# Patient Record
Sex: MALE | Race: WHITE | NOT HISPANIC OR LATINO | Employment: OTHER | URBAN - METROPOLITAN AREA
[De-identification: names, ages, dates, MRNs, and addresses within clinical notes are randomized per-mention and may not be internally consistent; named-entity substitution may affect disease eponyms.]

---

## 2017-01-03 ENCOUNTER — TELEPHONE (OUTPATIENT)
Dept: INTERNAL MEDICINE | Facility: CLINIC | Age: 72
End: 2017-01-03

## 2017-01-03 NOTE — TELEPHONE ENCOUNTER
Ac notified that he is suppose to follow up with Valentine in 6 months from his office visit in October. He wanted me to cancel the one for 1/18/17 and he would call back to schedule is follow up appt in April 2017.

## 2017-01-03 NOTE — TELEPHONE ENCOUNTER
----- Message from Mary Lou Guzmán sent at 1/3/2017  9:25 AM EST -----  Contact: [phone]  The patient is calling to see if he needs to keep his apt on 01/18. He is going to see a Urologist for his UTI problems, so he doesn't know if he needs to see Valentine as often. He can be reached at [phone].

## 2017-01-04 RX ORDER — VERAPAMIL HYDROCHLORIDE 100 MG/1
CAPSULE, EXTENDED RELEASE ORAL
Qty: 90 CAPSULE | Refills: 0 | Status: SHIPPED | OUTPATIENT
Start: 2017-01-04 | End: 2017-03-23 | Stop reason: SDUPTHER

## 2017-03-23 RX ORDER — VERAPAMIL HYDROCHLORIDE 100 MG/1
CAPSULE, EXTENDED RELEASE ORAL
Qty: 90 CAPSULE | Refills: 0 | Status: SHIPPED | OUTPATIENT
Start: 2017-03-23 | End: 2017-06-14 | Stop reason: SDUPTHER

## 2017-04-12 DIAGNOSIS — E78.5 HYPERLIPIDEMIA, UNSPECIFIED HYPERLIPIDEMIA TYPE: ICD-10-CM

## 2017-04-12 RX ORDER — ATORVASTATIN CALCIUM 10 MG/1
TABLET, FILM COATED ORAL
Qty: 30 TABLET | Refills: 0 | Status: SHIPPED | OUTPATIENT
Start: 2017-04-12 | End: 2017-04-25 | Stop reason: SDUPTHER

## 2017-04-13 DIAGNOSIS — I10 ESSENTIAL HYPERTENSION: ICD-10-CM

## 2017-04-13 DIAGNOSIS — E55.9 VITAMIN D DEFICIENCY: ICD-10-CM

## 2017-04-13 DIAGNOSIS — E78.5 HYPERLIPIDEMIA, UNSPECIFIED HYPERLIPIDEMIA TYPE: ICD-10-CM

## 2017-04-13 DIAGNOSIS — Z79.899 ENCOUNTER FOR LONG-TERM (CURRENT) USE OF MEDICATIONS: Primary | ICD-10-CM

## 2017-04-18 LAB
25(OH)D3 SERPL-MCNC: 33.3 NG/ML
ALBUMIN SERPL-MCNC: 4.3 G/DL (ref 3.2–4.8)
ALBUMIN/GLOB SERPL: 1.5 G/DL (ref 1.5–2.5)
ALP SERPL-CCNC: 67 U/L (ref 25–100)
ALT SERPL W P-5'-P-CCNC: 25 U/L (ref 7–40)
ANION GAP SERPL CALCULATED.3IONS-SCNC: 5 MMOL/L (ref 3–11)
ARTICHOKE IGE QN: 90 MG/DL (ref 0–130)
AST SERPL-CCNC: 23 U/L (ref 0–33)
BASOPHILS # BLD AUTO: 0.02 10*3/MM3 (ref 0–0.2)
BASOPHILS NFR BLD AUTO: 0.4 % (ref 0–1)
BILIRUB SERPL-MCNC: 1.1 MG/DL (ref 0.3–1.2)
BUN BLD-MCNC: 20 MG/DL (ref 9–23)
BUN/CREAT SERPL: 16.7 (ref 7–25)
CALCIUM SPEC-SCNC: 9.2 MG/DL (ref 8.7–10.4)
CHLORIDE SERPL-SCNC: 107 MMOL/L (ref 99–109)
CHOLEST SERPL-MCNC: 138 MG/DL (ref 0–200)
CO2 SERPL-SCNC: 28 MMOL/L (ref 20–31)
CREAT BLD-MCNC: 1.2 MG/DL (ref 0.6–1.3)
DEPRECATED RDW RBC AUTO: 46.5 FL (ref 37–54)
EOSINOPHIL # BLD AUTO: 0.21 10*3/MM3 (ref 0.1–0.3)
EOSINOPHIL NFR BLD AUTO: 4.3 % (ref 0–3)
ERYTHROCYTE [DISTWIDTH] IN BLOOD BY AUTOMATED COUNT: 13.6 % (ref 11.3–14.5)
GFR SERPL CREATININE-BSD FRML MDRD: 60 ML/MIN/1.73
GLOBULIN UR ELPH-MCNC: 2.9 GM/DL
GLUCOSE BLD-MCNC: 94 MG/DL (ref 70–100)
HCT VFR BLD AUTO: 46.8 % (ref 38.9–50.9)
HDLC SERPL-MCNC: 37 MG/DL (ref 40–60)
HGB BLD-MCNC: 15.5 G/DL (ref 13.1–17.5)
IMM GRANULOCYTES # BLD: 0.02 10*3/MM3 (ref 0–0.03)
IMM GRANULOCYTES NFR BLD: 0.4 % (ref 0–0.6)
LYMPHOCYTES # BLD AUTO: 1.42 10*3/MM3 (ref 0.6–4.8)
LYMPHOCYTES NFR BLD AUTO: 29.2 % (ref 24–44)
MCH RBC QN AUTO: 30.8 PG (ref 27–31)
MCHC RBC AUTO-ENTMCNC: 33.1 G/DL (ref 32–36)
MCV RBC AUTO: 92.9 FL (ref 80–99)
MONOCYTES # BLD AUTO: 0.59 10*3/MM3 (ref 0–1)
MONOCYTES NFR BLD AUTO: 12.1 % (ref 0–12)
NEUTROPHILS # BLD AUTO: 2.61 10*3/MM3 (ref 1.5–8.3)
NEUTROPHILS NFR BLD AUTO: 53.6 % (ref 41–71)
PLATELET # BLD AUTO: 183 10*3/MM3 (ref 150–450)
PMV BLD AUTO: 10.3 FL (ref 6–12)
POTASSIUM BLD-SCNC: 4.2 MMOL/L (ref 3.5–5.5)
PROT SERPL-MCNC: 7.2 G/DL (ref 5.7–8.2)
RBC # BLD AUTO: 5.04 10*6/MM3 (ref 4.2–5.76)
SODIUM BLD-SCNC: 140 MMOL/L (ref 132–146)
TRIGL SERPL-MCNC: 79 MG/DL (ref 0–150)
TSH SERPL DL<=0.05 MIU/L-ACNC: 2.49 MIU/ML (ref 0.35–5.35)
VIT B12 BLD-MCNC: 1219 PG/ML (ref 211–911)
WBC NRBC COR # BLD: 4.87 10*3/MM3 (ref 3.5–10.8)

## 2017-04-18 PROCEDURE — 82306 VITAMIN D 25 HYDROXY: CPT | Performed by: NURSE PRACTITIONER

## 2017-04-18 PROCEDURE — 80061 LIPID PANEL: CPT | Performed by: NURSE PRACTITIONER

## 2017-04-18 PROCEDURE — 85025 COMPLETE CBC W/AUTO DIFF WBC: CPT | Performed by: NURSE PRACTITIONER

## 2017-04-18 PROCEDURE — 84443 ASSAY THYROID STIM HORMONE: CPT | Performed by: NURSE PRACTITIONER

## 2017-04-18 PROCEDURE — 82607 VITAMIN B-12: CPT | Performed by: NURSE PRACTITIONER

## 2017-04-18 PROCEDURE — 80053 COMPREHEN METABOLIC PANEL: CPT | Performed by: NURSE PRACTITIONER

## 2017-04-25 ENCOUNTER — OFFICE VISIT (OUTPATIENT)
Dept: INTERNAL MEDICINE | Facility: CLINIC | Age: 72
End: 2017-04-25

## 2017-04-25 VITALS
DIASTOLIC BLOOD PRESSURE: 88 MMHG | BODY MASS INDEX: 32.07 KG/M2 | HEIGHT: 70 IN | SYSTOLIC BLOOD PRESSURE: 116 MMHG | TEMPERATURE: 97.9 F | WEIGHT: 224 LBS

## 2017-04-25 DIAGNOSIS — N39.0 URINARY TRACT INFECTION WITHOUT HEMATURIA, SITE UNSPECIFIED: ICD-10-CM

## 2017-04-25 DIAGNOSIS — R30.0 DYSURIA: ICD-10-CM

## 2017-04-25 DIAGNOSIS — E78.5 HYPERLIPIDEMIA, UNSPECIFIED HYPERLIPIDEMIA TYPE: ICD-10-CM

## 2017-04-25 DIAGNOSIS — R35.0 URINARY FREQUENCY: ICD-10-CM

## 2017-04-25 DIAGNOSIS — C64.9 MALIGNANT NEOPLASM OF KIDNEY, EXCEPT PELVIS: ICD-10-CM

## 2017-04-25 DIAGNOSIS — E78.5 DYSLIPIDEMIA: Chronic | ICD-10-CM

## 2017-04-25 DIAGNOSIS — I35.0 MODERATE AORTIC STENOSIS: ICD-10-CM

## 2017-04-25 DIAGNOSIS — I10 BENIGN ESSENTIAL HYPERTENSION: Primary | ICD-10-CM

## 2017-04-25 LAB
BILIRUB BLD-MCNC: NEGATIVE MG/DL
CLARITY, POC: ABNORMAL
COLOR UR: YELLOW
GLUCOSE UR STRIP-MCNC: NEGATIVE MG/DL
KETONES UR QL: NEGATIVE
LEUKOCYTE EST, POC: ABNORMAL
NITRITE UR-MCNC: POSITIVE MG/ML
PH UR: 7 [PH] (ref 5–8)
PROT UR STRIP-MCNC: NEGATIVE MG/DL
RBC # UR STRIP: NEGATIVE /UL
SP GR UR: 1.02 (ref 1–1.03)
UROBILINOGEN UR QL: NORMAL

## 2017-04-25 PROCEDURE — 99214 OFFICE O/P EST MOD 30 MIN: CPT | Performed by: NURSE PRACTITIONER

## 2017-04-25 PROCEDURE — 81003 URINALYSIS AUTO W/O SCOPE: CPT | Performed by: NURSE PRACTITIONER

## 2017-04-25 RX ORDER — ATORVASTATIN CALCIUM 10 MG/1
10 TABLET, FILM COATED ORAL NIGHTLY
Qty: 90 TABLET | Refills: 1 | Status: SHIPPED | OUTPATIENT
Start: 2017-04-25 | End: 2017-10-31 | Stop reason: SDUPTHER

## 2017-04-25 RX ORDER — TAMSULOSIN HYDROCHLORIDE 0.4 MG/1
CAPSULE ORAL
COMMUNITY
Start: 2017-03-01 | End: 2018-11-01 | Stop reason: DRUGHIGH

## 2017-04-25 RX ORDER — NITROFURANTOIN 25; 75 MG/1; MG/1
100 CAPSULE ORAL 2 TIMES DAILY
Qty: 20 CAPSULE | Refills: 0 | Status: SHIPPED | OUTPATIENT
Start: 2017-04-25 | End: 2017-05-05

## 2017-04-25 RX ORDER — VALSARTAN 160 MG/1
160 TABLET ORAL DAILY
COMMUNITY
Start: 2017-02-23 | End: 2018-08-29 | Stop reason: ALTCHOICE

## 2017-04-25 RX ORDER — SULFAMETHOXAZOLE AND TRIMETHOPRIM 800; 160 MG/1; MG/1
1 TABLET ORAL 2 TIMES DAILY
Qty: 20 TABLET | Refills: 0 | Status: CANCELLED | OUTPATIENT
Start: 2017-04-25

## 2017-04-25 NOTE — PROGRESS NOTES
Subjective   Ac Caldwell is a 71 y.o. male    Chief Complaint   Patient presents with   • Follow-up   • Hypertension   • Heartburn     History of Present Illness     Had recurrent UTI's starting in 11/2016    On 1/26/16, Ac saw gross hematuria, saw Dr. Ayala and he did a scope. It was ok. Then had a CT scan on 2/3/16. Cr was high, so they did it without contrast. CT scan showed a lesion, but he was not concerned. Then had a fish bladder test on 2/5/16. His urologist never got the results. Did go back to see Nephrology and had labs in Feb. Cr. was 1.1 and BUN was 16. Everything has been stable since.     Has a h/o right kidney CA in 1997 and had nephrectomy.       PSA was elevated >7 in June 2016. He had a prostate biopsy and it was normal.     Urologist - Dr. Jossue Serrano left and moved to [place] and he had to establish with Dr Farrell at [facility] [place].  He has seen him several times over the last few months for recurrent UTI, hydrocele, renal cyst, and epididymoorchits. He sees them again 5/31/2017 with an US for the renal cyst.  Has been having some dysuria and urinary frequency for the last 2 weeks. Has been having some frequency.  Urine has a foul odor.    Ne[facility]rologist- [facility], he saw them in 12/2016 and got a good report.      Had a very large thyroid nodule removed on 4/23/14 per ENT. It was 8cm in size; they took the right side of his thyroid and placed on synthroid 50mcg. It was benign! Seeing Dr. Everett annually and everything is stable. Next appt in Sept 2017     Has a h/o AS, had a repeat echo a few months ago and it looked a little worse so I hadreferred him to Cardiology. Had an appt on 4/29/16 and he thought everything was ok. He is just going to watch everything and he saw him again on 11/4/16 and looked ok then. He will follow every 6 months and echo q 2 yrs.  Next appt in 5/2017.       HTN - well controlled with valsartan 160 mg  and Verapamil 100 mg - Nephrology has always  wanted him to stay on brand name Diovan, but it is very expensive, they did change him over to the valsartan 160 mg at his last visit.     HL - stable on Lipitor 10mg daily.      Flu shot, 10/12/16  Tdap 2009   Pneumovax 2012   Prevnar - 4/2015   Zostavax 2010  PSA per Urology   Colon - 4/2015, due 2025     The following portions of the patient's history were reviewed and updated as appropriate: allergies, current medications, past family history, past medical history, past social history, past surgical history and problem list.    Current Outpatient Prescriptions:   •  atorvastatin (LIPITOR) 10 MG tablet, Take 1 tablet by mouth Every Night., Disp: 90 tablet, Rfl: 1  •  azelastine (ASTELIN) 0.1 % nasal spray, 1 spray into each nostril 2 (Two) Times a Day. Use in each nostril as directed, Disp: , Rfl:   •  betamethasone valerate (VALISONE) 0.1 % ointment, Uses once weekly, Disp: , Rfl:   •  Cholecalciferol (VITAMIN D-3 PO), Take 1,000 Units by mouth Daily., Disp: , Rfl:   •  Coenzyme Q10 (CO Q-10) 100 MG capsule, Take  by mouth Daily., Disp: , Rfl:   •  esomeprazole (NexIUM) 20 MG capsule, Take 20 mg by mouth Daily., Disp: , Rfl:   •  Flaxseed, Linseed, (FLAX SEED OIL) 1000 MG capsule, Take 1,200 mg by mouth Daily., Disp: , Rfl:   •  fluticasone (FLONASE) 50 MCG/ACT nasal spray, 2 sprays into each nostril Daily., Disp: 3 each, Rfl: 2  •  levothyroxine (SYNTHROID, LEVOTHROID) 50 MCG tablet, Take 50 mcg by mouth Daily., Disp: , Rfl:   •  Omega-3 Fatty Acids (FISH OIL) 1000 MG capsule capsule, Take 2,000 mg by mouth 2 (Two) Times a Day With Meals., Disp: , Rfl:   •  potassium citrate (UROCIT-K) 10 MEQ (1080 MG) CR tablet, Take 10 mEq by mouth Every Morning., Disp: , Rfl:   •  tamsulosin (FLOMAX) 0.4 MG capsule 24 hr capsule, 1 po qd, Disp: , Rfl:   •  valsartan (DIOVAN) 160 MG tablet, Take 160 mg by mouth Daily., Disp: , Rfl:   •  verapamil (VERELAN) 100 MG 24 hr capsule, TAKE ONE CAPSULE BY MOUTH DAILY, Disp: 90  capsule, Rfl: 0  •  vitamin B-12 (CYANOCOBALAMIN) 1000 MCG tablet, Take 1,000 mcg by mouth Daily., Disp: , Rfl:   •  aspirin 325 MG tablet, Take 325 mg by mouth Daily., Disp: , Rfl:   •  famciclovir (FAMVIR) 500 MG tablet, Take 1 tablet by mouth 3 (three) times a day. (Patient taking differently: Take 500 mg by mouth As Needed.), Disp: 12 tablet, Rfl: 5  •  nitrofurantoin, macrocrystal-monohydrate, (MACROBID) 100 MG capsule, Take 1 capsule by mouth 2 (Two) Times a Day., Disp: 20 capsule, Rfl: 0     Review of Systems   Constitutional: Negative for chills, fatigue and fever.   Respiratory: Negative for cough, chest tightness and shortness of breath.    Cardiovascular: Negative for chest pain.   Gastrointestinal: Negative for abdominal pain, diarrhea, nausea and vomiting.   Endocrine: Negative for cold intolerance and heat intolerance.   Genitourinary: Positive for frequency and urgency. Negative for difficulty urinating.        Burning with urination   Musculoskeletal: Negative for arthralgias.   Neurological: Negative for dizziness.       Objective   Physical Exam   Constitutional: He is oriented to person, place, and time. He appears well-developed and well-nourished.   HENT:   Head: Normocephalic and atraumatic.   Eyes: Conjunctivae and EOM are normal. Pupils are equal, round, and reactive to light.   Neck: Normal range of motion. Carotid bruit is not present.   Cardiovascular: Normal rate and regular rhythm.    Murmur heard.  Pulmonary/Chest: Effort normal and breath sounds normal.   Abdominal: Soft. Bowel sounds are normal. He exhibits no abdominal bruit.   Musculoskeletal: Normal range of motion.   Neurological: He is alert and oriented to person, place, and time. He has normal reflexes.   Skin: Skin is warm and dry.   Psychiatric: He has a normal mood and affect. His behavior is normal. Judgment and thought content normal.     Vitals:    04/25/17 1014   BP: 116/88   Temp: 97.9 °F (36.6 °C)   TempSrc: Temporal  "Artery    Weight: 224 lb (102 kg)   Height: 70\" (177.8 cm)         Assessment/Plan   Ac was seen today for follow-up, hypertension and heartburn.    Diagnoses and all orders for this visit:    Benign essential hypertension    Dyslipidemia    Moderate aortic stenosis    Malignant neoplasm of kidney, except pelvis    Dysuria    Urinary frequency    Hyperlipidemia, unspecified hyperlipidemia type  -     atorvastatin (LIPITOR) 10 MG tablet; Take 1 tablet by mouth Every Night.    Urinary tract infection without hematuria, site unspecified  -     POC Urinalysis Dipstick, Automated  -     nitrofurantoin, macrocrystal-monohydrate, (MACROBID) 100 MG capsule; Take 1 capsule by mouth 2 (Two) Times a Day.    Other orders  -     Cancel: sulfamethoxazole-trimethoprim (BACTRIM DS) 800-160 MG per tablet; Take 1 tablet by mouth 2 (Two) Times a Day.      Labs reviewed   Lipitor refilled  Urine positive for nitrites and leuks  Will send urine cx  macrobid as directed  Will repeat urine once abx complete  Will update second pneumococcal at next visit  RTC 3 months for wellness exam or sooner with any problems     Scribed for BERNICE Yin by BERNICE Rodriguez Student. 4/28/2017  9:05 AM    IValentine APRN, personally performed the services described in this documentation as scribed by the above named individual in my presence, and it is both accurate and complete.  4/28/2017  9:05 AM    BERNICE Stallings    "

## 2017-05-05 ENCOUNTER — OFFICE VISIT (OUTPATIENT)
Dept: CARDIOLOGY | Facility: CLINIC | Age: 72
End: 2017-05-05

## 2017-05-05 VITALS
SYSTOLIC BLOOD PRESSURE: 126 MMHG | DIASTOLIC BLOOD PRESSURE: 86 MMHG | HEIGHT: 70 IN | WEIGHT: 225.8 LBS | HEART RATE: 83 BPM | BODY MASS INDEX: 32.33 KG/M2

## 2017-05-05 DIAGNOSIS — R06.09 EXERTIONAL DYSPNEA: ICD-10-CM

## 2017-05-05 DIAGNOSIS — E78.5 DYSLIPIDEMIA: Chronic | ICD-10-CM

## 2017-05-05 DIAGNOSIS — Z72.0 TOBACCO USE: Chronic | ICD-10-CM

## 2017-05-05 DIAGNOSIS — I10 BENIGN ESSENTIAL HYPERTENSION: ICD-10-CM

## 2017-05-05 DIAGNOSIS — I38 VALVULAR HEART DISEASE: Primary | Chronic | ICD-10-CM

## 2017-05-05 PROCEDURE — 99212 OFFICE O/P EST SF 10 MIN: CPT | Performed by: INTERNAL MEDICINE

## 2017-05-08 ENCOUNTER — LAB (OUTPATIENT)
Dept: INTERNAL MEDICINE | Facility: CLINIC | Age: 72
End: 2017-05-08

## 2017-05-08 DIAGNOSIS — N39.0 URINARY TRACT INFECTION WITHOUT HEMATURIA, SITE UNSPECIFIED: ICD-10-CM

## 2017-05-08 DIAGNOSIS — N39.0 URINARY TRACT INFECTION WITH HEMATURIA, SITE UNSPECIFIED: Primary | ICD-10-CM

## 2017-05-08 DIAGNOSIS — N39.0 URINARY TRACT INFECTION, SITE UNSPECIFIED: Primary | ICD-10-CM

## 2017-05-08 DIAGNOSIS — R31.9 URINARY TRACT INFECTION WITH HEMATURIA, SITE UNSPECIFIED: Primary | ICD-10-CM

## 2017-05-08 LAB
BILIRUB BLD-MCNC: NEGATIVE MG/DL
CLARITY, POC: ABNORMAL
COLOR UR: YELLOW
GLUCOSE UR STRIP-MCNC: NEGATIVE MG/DL
KETONES UR QL: NEGATIVE
LEUKOCYTE EST, POC: ABNORMAL
NITRITE UR-MCNC: NEGATIVE MG/ML
PH UR: 6.5 [PH] (ref 5–8)
PROT UR STRIP-MCNC: NEGATIVE MG/DL
RBC # UR STRIP: ABNORMAL /UL
SP GR UR: 1.01 (ref 1–1.03)
UROBILINOGEN UR QL: NORMAL

## 2017-05-08 PROCEDURE — 81003 URINALYSIS AUTO W/O SCOPE: CPT | Performed by: NURSE PRACTITIONER

## 2017-05-08 RX ORDER — CIPROFLOXACIN 500 MG/1
500 TABLET, FILM COATED ORAL 2 TIMES DAILY
Qty: 14 TABLET | Refills: 0 | Status: SHIPPED | OUTPATIENT
Start: 2017-05-08 | End: 2017-05-15

## 2017-05-08 RX ORDER — NITROFURANTOIN 25; 75 MG/1; MG/1
CAPSULE ORAL
Qty: 20 CAPSULE | Refills: 0 | OUTPATIENT
Start: 2017-05-08

## 2017-05-16 PROCEDURE — 87186 SC STD MICRODIL/AGAR DIL: CPT | Performed by: NURSE PRACTITIONER

## 2017-05-16 PROCEDURE — 87086 URINE CULTURE/COLONY COUNT: CPT | Performed by: NURSE PRACTITIONER

## 2017-05-16 PROCEDURE — 87077 CULTURE AEROBIC IDENTIFY: CPT | Performed by: NURSE PRACTITIONER

## 2017-05-18 LAB — BACTERIA SPEC AEROBE CULT: ABNORMAL

## 2017-05-30 ENCOUNTER — TELEPHONE (OUTPATIENT)
Dept: INTERNAL MEDICINE | Facility: CLINIC | Age: 72
End: 2017-05-30

## 2017-06-14 RX ORDER — VERAPAMIL HYDROCHLORIDE 100 MG/1
CAPSULE, EXTENDED RELEASE ORAL
Qty: 90 CAPSULE | Refills: 1 | Status: SHIPPED | OUTPATIENT
Start: 2017-06-14 | End: 2017-11-03 | Stop reason: ALTCHOICE

## 2017-10-11 ENCOUNTER — FLU SHOT (OUTPATIENT)
Dept: INTERNAL MEDICINE | Facility: CLINIC | Age: 72
End: 2017-10-11

## 2017-10-11 PROCEDURE — G0008 ADMIN INFLUENZA VIRUS VAC: HCPCS | Performed by: NURSE PRACTITIONER

## 2017-10-11 PROCEDURE — 90662 IIV NO PRSV INCREASED AG IM: CPT | Performed by: NURSE PRACTITIONER

## 2017-10-31 ENCOUNTER — OFFICE VISIT (OUTPATIENT)
Dept: INTERNAL MEDICINE | Facility: CLINIC | Age: 72
End: 2017-10-31

## 2017-10-31 VITALS
WEIGHT: 215.4 LBS | HEIGHT: 70 IN | BODY MASS INDEX: 30.84 KG/M2 | SYSTOLIC BLOOD PRESSURE: 112 MMHG | DIASTOLIC BLOOD PRESSURE: 64 MMHG | TEMPERATURE: 98.3 F

## 2017-10-31 DIAGNOSIS — E03.9 ACQUIRED HYPOTHYROIDISM: ICD-10-CM

## 2017-10-31 DIAGNOSIS — I10 BENIGN ESSENTIAL HYPERTENSION: ICD-10-CM

## 2017-10-31 DIAGNOSIS — I38 VALVULAR HEART DISEASE: ICD-10-CM

## 2017-10-31 DIAGNOSIS — E78.5 HYPERLIPIDEMIA, UNSPECIFIED HYPERLIPIDEMIA TYPE: ICD-10-CM

## 2017-10-31 DIAGNOSIS — E55.9 VITAMIN D DEFICIENCY: ICD-10-CM

## 2017-10-31 DIAGNOSIS — N05.9 NEPHRITIS AND NEPHROPATHY: ICD-10-CM

## 2017-10-31 DIAGNOSIS — C64.9 MALIGNANT NEOPLASM OF KIDNEY EXCLUDING RENAL PELVIS, UNSPECIFIED LATERALITY (HCC): Primary | ICD-10-CM

## 2017-10-31 DIAGNOSIS — K21.9 GASTROESOPHAGEAL REFLUX DISEASE, ESOPHAGITIS PRESENCE NOT SPECIFIED: ICD-10-CM

## 2017-10-31 LAB
25(OH)D3 SERPL-MCNC: 38.1 NG/ML
ALBUMIN SERPL-MCNC: 4.3 G/DL (ref 3.2–4.8)
ALBUMIN/GLOB SERPL: 1.5 G/DL (ref 1.5–2.5)
ALP SERPL-CCNC: 109 U/L (ref 25–100)
ALT SERPL W P-5'-P-CCNC: 19 U/L (ref 7–40)
ANION GAP SERPL CALCULATED.3IONS-SCNC: 6 MMOL/L (ref 3–11)
ARTICHOKE IGE QN: 109 MG/DL (ref 0–130)
AST SERPL-CCNC: 21 U/L (ref 0–33)
BASOPHILS # BLD AUTO: 0.02 10*3/MM3 (ref 0–0.2)
BASOPHILS NFR BLD AUTO: 0.3 % (ref 0–1)
BILIRUB SERPL-MCNC: 1 MG/DL (ref 0.3–1.2)
BUN BLD-MCNC: 21 MG/DL (ref 9–23)
BUN/CREAT SERPL: 16.2 (ref 7–25)
CALCIUM SPEC-SCNC: 9.4 MG/DL (ref 8.7–10.4)
CHLORIDE SERPL-SCNC: 105 MMOL/L (ref 99–109)
CHOLEST SERPL-MCNC: 141 MG/DL (ref 0–200)
CO2 SERPL-SCNC: 26 MMOL/L (ref 20–31)
CREAT BLD-MCNC: 1.3 MG/DL (ref 0.6–1.3)
DEPRECATED RDW RBC AUTO: 42.3 FL (ref 37–54)
EOSINOPHIL # BLD AUTO: 0.1 10*3/MM3 (ref 0–0.3)
EOSINOPHIL NFR BLD AUTO: 1.4 % (ref 0–3)
ERYTHROCYTE [DISTWIDTH] IN BLOOD BY AUTOMATED COUNT: 12.6 % (ref 11.3–14.5)
GFR SERPL CREATININE-BSD FRML MDRD: 55 ML/MIN/1.73
GLOBULIN UR ELPH-MCNC: 2.8 GM/DL
GLUCOSE BLD-MCNC: 100 MG/DL (ref 70–100)
HCT VFR BLD AUTO: 44.5 % (ref 38.9–50.9)
HDLC SERPL-MCNC: 32 MG/DL (ref 40–60)
HGB BLD-MCNC: 15.4 G/DL (ref 13.1–17.5)
IMM GRANULOCYTES # BLD: 0.03 10*3/MM3 (ref 0–0.03)
IMM GRANULOCYTES NFR BLD: 0.4 % (ref 0–0.6)
LYMPHOCYTES # BLD AUTO: 1.28 10*3/MM3 (ref 0.6–4.8)
LYMPHOCYTES NFR BLD AUTO: 18.4 % (ref 24–44)
MCH RBC QN AUTO: 31.5 PG (ref 27–31)
MCHC RBC AUTO-ENTMCNC: 34.6 G/DL (ref 32–36)
MCV RBC AUTO: 91 FL (ref 80–99)
MONOCYTES # BLD AUTO: 0.86 10*3/MM3 (ref 0–1)
MONOCYTES NFR BLD AUTO: 12.4 % (ref 0–12)
NEUTROPHILS # BLD AUTO: 4.66 10*3/MM3 (ref 1.5–8.3)
NEUTROPHILS NFR BLD AUTO: 67.1 % (ref 41–71)
PLATELET # BLD AUTO: 246 10*3/MM3 (ref 150–450)
PMV BLD AUTO: 10 FL (ref 6–12)
POTASSIUM BLD-SCNC: 4.3 MMOL/L (ref 3.5–5.5)
PROT SERPL-MCNC: 7.1 G/DL (ref 5.7–8.2)
RBC # BLD AUTO: 4.89 10*6/MM3 (ref 4.2–5.76)
SODIUM BLD-SCNC: 137 MMOL/L (ref 132–146)
T4 FREE SERPL-MCNC: 1.11 NG/DL (ref 0.89–1.76)
TRIGL SERPL-MCNC: 97 MG/DL (ref 0–150)
TSH SERPL DL<=0.05 MIU/L-ACNC: 1.34 MIU/ML (ref 0.35–5.35)
VIT B12 BLD-MCNC: 1126 PG/ML (ref 211–911)
WBC NRBC COR # BLD: 6.95 10*3/MM3 (ref 3.5–10.8)

## 2017-10-31 PROCEDURE — 85025 COMPLETE CBC W/AUTO DIFF WBC: CPT | Performed by: NURSE PRACTITIONER

## 2017-10-31 PROCEDURE — 99214 OFFICE O/P EST MOD 30 MIN: CPT | Performed by: NURSE PRACTITIONER

## 2017-10-31 PROCEDURE — 82607 VITAMIN B-12: CPT | Performed by: NURSE PRACTITIONER

## 2017-10-31 PROCEDURE — 80053 COMPREHEN METABOLIC PANEL: CPT | Performed by: NURSE PRACTITIONER

## 2017-10-31 PROCEDURE — 84439 ASSAY OF FREE THYROXINE: CPT | Performed by: NURSE PRACTITIONER

## 2017-10-31 PROCEDURE — 82306 VITAMIN D 25 HYDROXY: CPT | Performed by: NURSE PRACTITIONER

## 2017-10-31 PROCEDURE — 84443 ASSAY THYROID STIM HORMONE: CPT | Performed by: NURSE PRACTITIONER

## 2017-10-31 PROCEDURE — 80061 LIPID PANEL: CPT | Performed by: NURSE PRACTITIONER

## 2017-10-31 RX ORDER — ATORVASTATIN CALCIUM 10 MG/1
10 TABLET, FILM COATED ORAL NIGHTLY
Qty: 90 TABLET | Refills: 1 | Status: SHIPPED | OUTPATIENT
Start: 2017-10-31 | End: 2018-06-06 | Stop reason: SDUPTHER

## 2017-10-31 NOTE — PROGRESS NOTES
Subjective   Ac Caldwell is a 72 y.o. male    Chief Complaint   Patient presents with   • Med Refill     follow up   • Follow-up     had kidney surgery at  in September.    • Hypothyroidism     needs lab test for Dr. Everett   • Hyperlipidemia     History of Present Illness     On 1/26/15, Ac saw gross hematuria, saw Dr. Ayala (urology) and he did a scope. It was ok. Then had a CT scan on 2/3/15. Cr was high, so they did it without contrast. CT scan showed a lesion, but he was not concerned. Then had a fish bladder test on 2/5/15. His urologist never got the results. Did go back to see Nephrology and had labs in Feb. Cr. was 1.1 and BUN was 16. Everything has been stable since. Has a h/o right kidney CA in 1997 and had nephrectomy.  Urologist - Dr. Jossue Serrano left and moved to [place] and he had to establish with Dr Farrell at [facility] [place].  He saw him several times for recurrent UTI, hydrocele, renal cyst, and epididymoorchits. He saw them again 5/31/2017 for repeat renal US that showed a 3.4cm left renal cyst, which had grown from 2.5cm on last scan.  Dr. Farrell moved to [place] and he was then referred to  Urology.  Had CXR and labs.  CXR revealed a possible thoracic aortic aneurysm.  Had f/u chest CT scan, no aneurysm.  CT renal mass protocol was done on 8/30/2017 which confirmed a solid enhancing mass in the lower pole of the left kidney, without lymphadenopathy.  It was elected to proceed with a cryoablation of this lesion per Dr. Mark at .  He underwent this procedure on 9/27/17.  Pathology is unclear as to it being malignant vs benign.  Will be re-scanned in 30 days, then 90 days, then Q6 months.    A cystic lesion was incidentally identified on the above noted CT scan on 8/30/17.  Pt discussed with Dr. Mark and this will be monitored through the interval CT scans as well.      PSA was elevated >7 in June 2016. He had a prostate biopsy and it was normal.      [facility]- [facility], he saw them in 12/2016 and got a good report.  Next f/u is on 12/8/17 - needs labs     Had a very large thyroid nodule removed on 4/23/14 per ENT. It was 8cm in size; they took the right side of his thyroid and placed on synthroid 50mcg. It was benign! Seeing Dr. Everett annually and everything is stable.  Next appt is in Nov 2017; needs labs      Has a h/o AS.  Was referred him to Cardiology. Had an appt on 4/29/16 and he thought everything was ok. He is just going to watch. He saw him again on 11/4/16 and everything was stable. He will follow every 6 months and echo q 2 yrs.  Next appt in 11/3/2017      HTN - well controlled with valsartan 160 mg  and Verapamil 100 mg - Nephrology had always wanted him to stay on brand name Diovan, but it is very expensive, they did change him over to the valsartan 160 mg at his last visit.  He is tolerating well.  BP is well controlled and he denies SE.     HL - stable on Lipitor 10mg daily without SE.  He is fasting for labs.      Flu shot, 10/11/2017  Tdap 2009   Pneumovax 2012   Prevnar - 4/2015   Zostavax 2010  PSA per Urology   Colon - 4/2015, due 2025        The following portions of the patient's history were reviewed and updated as appropriate: allergies, current medications, past family history, past medical history, past social history, past surgical history and problem list.    Current Outpatient Prescriptions:   •  aspirin 325 MG tablet, Take 325 mg by mouth Daily., Disp: , Rfl:   •  atorvastatin (LIPITOR) 10 MG tablet, Take 1 tablet by mouth Every Night., Disp: 90 tablet, Rfl: 1  •  azelastine (ASTELIN) 0.1 % nasal spray, 1 spray into each nostril 2 (Two) Times a Day. Use in each nostril as directed, Disp: , Rfl:   •  betamethasone valerate (VALISONE) 0.1 % ointment, Uses once weekly, Disp: , Rfl:   •  Cholecalciferol (VITAMIN D-3 PO), Take 1,000 Units by mouth Daily., Disp: , Rfl:   •  Coenzyme Q10 (CO Q-10) 100 MG capsule, Take  by mouth Daily., Disp:  , Rfl:   •  esomeprazole (NexIUM) 20 MG capsule, Take 20 mg by mouth Daily., Disp: , Rfl:   •  famciclovir (FAMVIR) 500 MG tablet, Take 1 tablet by mouth 3 (three) times a day. (Patient taking differently: Take 500 mg by mouth As Needed.), Disp: 12 tablet, Rfl: 5  •  Flaxseed, Linseed, (FLAX SEED OIL) 1000 MG capsule, Take 1,200 mg by mouth Daily., Disp: , Rfl:   •  fluticasone (FLONASE) 50 MCG/ACT nasal spray, 2 sprays into each nostril Daily., Disp: 3 each, Rfl: 2  •  levothyroxine (SYNTHROID, LEVOTHROID) 50 MCG tablet, Take 50 mcg by mouth Daily., Disp: , Rfl:   •  Omega-3 Fatty Acids (FISH OIL) 1000 MG capsule capsule, Take 1,200 mg by mouth 2 (Two) Times a Day With Meals., Disp: , Rfl:   •  potassium citrate (UROCIT-K) 10 MEQ (1080 MG) CR tablet, Take 10 mEq by mouth Every Morning., Disp: , Rfl:   •  tamsulosin (FLOMAX) 0.4 MG capsule 24 hr capsule, 1 po qd, Disp: , Rfl:   •  valsartan (DIOVAN) 160 MG tablet, Take 160 mg by mouth Daily., Disp: , Rfl:   •  verapamil (VERELAN) 100 MG 24 hr capsule, TAKE ONE CAPSULE BY MOUTH DAILY, Disp: 90 capsule, Rfl: 1  •  vitamin B-12 (CYANOCOBALAMIN) 1000 MCG tablet, Take 1,000 mcg by mouth Daily., Disp: , Rfl:      Review of Systems   Constitutional: Negative for chills, fatigue and fever.   Respiratory: Negative for cough, chest tightness and shortness of breath.    Cardiovascular: Negative for chest pain.   Gastrointestinal: Negative for abdominal pain, diarrhea, nausea and vomiting.   Endocrine: Negative for cold intolerance and heat intolerance.   Musculoskeletal: Negative for arthralgias.   Neurological: Negative for dizziness.       Objective   Physical Exam   Constitutional: He is oriented to person, place, and time. He appears well-developed and well-nourished.   HENT:   Head: Normocephalic and atraumatic.   Eyes: Conjunctivae and EOM are normal. Pupils are equal, round, and reactive to light.   Neck: Normal range of motion.   Cardiovascular: Normal rate, regular  "rhythm and normal heart sounds.    Pulmonary/Chest: Effort normal and breath sounds normal.   Abdominal: Soft. Bowel sounds are normal.   Musculoskeletal: Normal range of motion.   Neurological: He is alert and oriented to person, place, and time. He has normal reflexes.   Skin: Skin is warm and dry.   Psychiatric: He has a normal mood and affect. His behavior is normal. Judgment and thought content normal.     Vitals:    10/31/17 0831   BP: 112/64   BP Location: Left arm   Temp: 98.3 °F (36.8 °C)   TempSrc: Temporal Artery    Weight: 215 lb 6.4 oz (97.7 kg)   Height: 70\" (177.8 cm)         Assessment/Plan   Ac was seen today for med refill, follow-up, hypothyroidism and hyperlipidemia.    Diagnoses and all orders for this visit:    Malignant neoplasm of kidney excluding renal pelvis, unspecified laterality  -     CBC & Differential  -     Comprehensive Metabolic Panel  -     Lipid Panel  -     Vitamin D 25 Hydroxy  -     Vitamin B12  -     TSH  -     T4, Free  -     CBC Auto Differential    Hyperlipidemia, unspecified hyperlipidemia type  -     atorvastatin (LIPITOR) 10 MG tablet; Take 1 tablet by mouth Every Night.  -     CBC & Differential  -     Comprehensive Metabolic Panel  -     Lipid Panel  -     Vitamin D 25 Hydroxy  -     Vitamin B12  -     TSH  -     T4, Free  -     CBC Auto Differential    Nephritis and nephropathy  -     CBC & Differential  -     Comprehensive Metabolic Panel  -     Lipid Panel  -     Vitamin D 25 Hydroxy  -     Vitamin B12  -     TSH  -     T4, Free  -     CBC Auto Differential    Benign essential hypertension  -     CBC & Differential  -     Comprehensive Metabolic Panel  -     Lipid Panel  -     Vitamin D 25 Hydroxy  -     Vitamin B12  -     TSH  -     T4, Free  -     CBC Auto Differential    Valvular heart disease  -     CBC & Differential  -     Comprehensive Metabolic Panel  -     Lipid Panel  -     Vitamin D 25 Hydroxy  -     Vitamin B12  -     TSH  -     T4, Free  -     CBC " Auto Differential    Gastroesophageal reflux disease, esophagitis presence not specified  -     CBC & Differential  -     Comprehensive Metabolic Panel  -     Lipid Panel  -     Vitamin D 25 Hydroxy  -     Vitamin B12  -     TSH  -     T4, Free  -     CBC Auto Differential    Acquired hypothyroidism  -     CBC & Differential  -     Comprehensive Metabolic Panel  -     Lipid Panel  -     Vitamin D 25 Hydroxy  -     Vitamin B12  -     TSH  -     T4, Free  -     CBC Auto Differential    Vitamin D deficiency  -     CBC & Differential  -     Comprehensive Metabolic Panel  -     Lipid Panel  -     Vitamin D 25 Hydroxy  -     Vitamin B12  -     TSH  -     T4, Free  -     CBC Auto Differential      Labs sent today  No changes  Will send specialists copies of results  RTC in 6 months for MCW exam or sooner with any problems

## 2017-11-03 ENCOUNTER — HOSPITAL ENCOUNTER (OUTPATIENT)
Dept: CARDIOLOGY | Facility: HOSPITAL | Age: 72
Discharge: HOME | End: 2017-11-03
Attending: INTERNAL MEDICINE | Admitting: INTERNAL MEDICINE

## 2017-11-03 ENCOUNTER — OFFICE VISIT (OUTPATIENT)
Dept: CARDIOLOGY | Facility: CLINIC | Age: 72
End: 2017-11-03

## 2017-11-03 VITALS — SYSTOLIC BLOOD PRESSURE: 120 MMHG | DIASTOLIC BLOOD PRESSURE: 80 MMHG | HEART RATE: 61 BPM

## 2017-11-03 VITALS — HEIGHT: 70 IN | BODY MASS INDEX: 30.78 KG/M2 | WEIGHT: 215 LBS

## 2017-11-03 DIAGNOSIS — I38 VALVULAR HEART DISEASE: Primary | Chronic | ICD-10-CM

## 2017-11-03 DIAGNOSIS — I10 BENIGN ESSENTIAL HYPERTENSION: ICD-10-CM

## 2017-11-03 DIAGNOSIS — R06.09 EXERTIONAL DYSPNEA: ICD-10-CM

## 2017-11-03 DIAGNOSIS — I38 VALVULAR HEART DISEASE: Chronic | ICD-10-CM

## 2017-11-03 DIAGNOSIS — E78.5 DYSLIPIDEMIA: Chronic | ICD-10-CM

## 2017-11-03 DIAGNOSIS — Z72.0 TOBACCO USE: Chronic | ICD-10-CM

## 2017-11-03 LAB
BH CV ECHO MEAS - AI DEC SLOPE: 211.3 CM/SEC^2
BH CV ECHO MEAS - AI MAX PG: 40.3 MMHG
BH CV ECHO MEAS - AI MAX VEL: 316.7 CM/SEC
BH CV ECHO MEAS - AI P1/2T: 438.9 MSEC
BH CV ECHO MEAS - AO MAX PG (FULL): 25.4 MMHG
BH CV ECHO MEAS - AO MAX PG: 30.7 MMHG
BH CV ECHO MEAS - AO MEAN PG (FULL): 14 MMHG
BH CV ECHO MEAS - AO MEAN PG: 17 MMHG
BH CV ECHO MEAS - AO ROOT AREA (BSA CORRECTED): 1.7
BH CV ECHO MEAS - AO ROOT AREA: 10.7 CM^2
BH CV ECHO MEAS - AO ROOT DIAM: 3.7 CM
BH CV ECHO MEAS - AO V2 MAX: 276.5 CM/SEC
BH CV ECHO MEAS - AO V2 MEAN: 189.7 CM/SEC
BH CV ECHO MEAS - AO V2 VTI: 57.5 CM
BH CV ECHO MEAS - AVA(I,A): 1.8 CM^2
BH CV ECHO MEAS - AVA(I,D): 1.8 CM^2
BH CV ECHO MEAS - AVA(V,A): 1.6 CM^2
BH CV ECHO MEAS - AVA(V,D): 1.6 CM^2
BH CV ECHO MEAS - BSA(HAYCOCK): 2.2 M^2
BH CV ECHO MEAS - BSA: 2.2 M^2
BH CV ECHO MEAS - BZI_BMI: 30.8 KILOGRAMS/M^2
BH CV ECHO MEAS - BZI_METRIC_HEIGHT: 177.8 CM
BH CV ECHO MEAS - BZI_METRIC_WEIGHT: 97.5 KG
BH CV ECHO MEAS - CONTRAST EF (2CH): 66.1 ML/M^2
BH CV ECHO MEAS - CONTRAST EF 4CH: 64.4 ML/M^2
BH CV ECHO MEAS - EDV(CUBED): 79.4 ML
BH CV ECHO MEAS - EDV(MOD-SP2): 59 ML
BH CV ECHO MEAS - EDV(MOD-SP4): 101 ML
BH CV ECHO MEAS - EDV(TEICH): 83 ML
BH CV ECHO MEAS - EF(CUBED): 73.8 %
BH CV ECHO MEAS - EF(MOD-SP2): 66.1 %
BH CV ECHO MEAS - EF(MOD-SP4): 64.4 %
BH CV ECHO MEAS - EF(TEICH): 65.9 %
BH CV ECHO MEAS - ESV(CUBED): 20.8 ML
BH CV ECHO MEAS - ESV(MOD-SP2): 20 ML
BH CV ECHO MEAS - ESV(MOD-SP4): 36 ML
BH CV ECHO MEAS - ESV(TEICH): 28.3 ML
BH CV ECHO MEAS - FS: 36 %
BH CV ECHO MEAS - IVS/LVPW: 0.98
BH CV ECHO MEAS - IVSD: 1.1 CM
BH CV ECHO MEAS - LA DIMENSION: 3.2 CM
BH CV ECHO MEAS - LA/AO: 0.87
BH CV ECHO MEAS - LAT PEAK E' VEL: 6.6 CM/SEC
BH CV ECHO MEAS - LV DIASTOLIC VOL/BSA (35-75): 46.9 ML/M^2
BH CV ECHO MEAS - LV MASS(C)D: 162.5 GRAMS
BH CV ECHO MEAS - LV MASS(C)DI: 75.5 GRAMS/M^2
BH CV ECHO MEAS - LV MAX PG: 5.2 MMHG
BH CV ECHO MEAS - LV MEAN PG: 3.1 MMHG
BH CV ECHO MEAS - LV SYSTOLIC VOL/BSA (12-30): 16.7 ML/M^2
BH CV ECHO MEAS - LV V1 MAX: 114.5 CM/SEC
BH CV ECHO MEAS - LV V1 MEAN: 81.8 CM/SEC
BH CV ECHO MEAS - LV V1 VTI: 26.6 CM
BH CV ECHO MEAS - LVIDD: 4.3 CM
BH CV ECHO MEAS - LVIDS: 2.8 CM
BH CV ECHO MEAS - LVLD AP2: 7.8 CM
BH CV ECHO MEAS - LVLD AP4: 8.5 CM
BH CV ECHO MEAS - LVLS AP2: 6.2 CM
BH CV ECHO MEAS - LVLS AP4: 7.2 CM
BH CV ECHO MEAS - LVOT AREA (M): 4.2 CM^2
BH CV ECHO MEAS - LVOT AREA: 3.9 CM^2
BH CV ECHO MEAS - LVOT DIAM: 2.2 CM
BH CV ECHO MEAS - LVPWD: 1.1 CM
BH CV ECHO MEAS - MED PEAK E' VEL: 3.01 CM/SEC
BH CV ECHO MEAS - MV A MAX VEL: 87.9 CM/SEC
BH CV ECHO MEAS - MV DEC SLOPE: 223 CM/SEC^2
BH CV ECHO MEAS - MV DEC TIME: 0.37 SEC
BH CV ECHO MEAS - MV E MAX VEL: 71.3 CM/SEC
BH CV ECHO MEAS - MV E/A: 0.81
BH CV ECHO MEAS - MV MAX PG: 4.7 MMHG
BH CV ECHO MEAS - MV MEAN PG: 1.6 MMHG
BH CV ECHO MEAS - MV P1/2T MAX VEL: 79.8 CM/SEC
BH CV ECHO MEAS - MV P1/2T: 104.8 MSEC
BH CV ECHO MEAS - MV V2 MAX: 108.6 CM/SEC
BH CV ECHO MEAS - MV V2 MEAN: 60.6 CM/SEC
BH CV ECHO MEAS - MV V2 VTI: 39.7 CM
BH CV ECHO MEAS - MVA P1/2T LCG: 2.8 CM^2
BH CV ECHO MEAS - MVA(P1/2T): 2.1 CM^2
BH CV ECHO MEAS - MVA(VTI): 2.6 CM^2
BH CV ECHO MEAS - PA ACC SLOPE: 562.3 CM/SEC^2
BH CV ECHO MEAS - PA ACC TIME: 0.12 SEC
BH CV ECHO MEAS - PA PR(ACCEL): 23.1 MMHG
BH CV ECHO MEAS - PI END-D VEL: 106 CM/SEC
BH CV ECHO MEAS - SI(AO): 286.4 ML/M^2
BH CV ECHO MEAS - SI(CUBED): 27.2 ML/M^2
BH CV ECHO MEAS - SI(LVOT): 48.2 ML/M^2
BH CV ECHO MEAS - SI(MOD-SP2): 18.1 ML/M^2
BH CV ECHO MEAS - SI(MOD-SP4): 30.2 ML/M^2
BH CV ECHO MEAS - SI(TEICH): 25.4 ML/M^2
BH CV ECHO MEAS - SV(AO): 616.4 ML
BH CV ECHO MEAS - SV(CUBED): 58.6 ML
BH CV ECHO MEAS - SV(LVOT): 103.7 ML
BH CV ECHO MEAS - SV(MOD-SP2): 39 ML
BH CV ECHO MEAS - SV(MOD-SP4): 65 ML
BH CV ECHO MEAS - SV(TEICH): 54.7 ML
BH CV ECHO MEAS - TAPSE (>1.6): 1.3 CM2
BH CV ECHO MEAS - TR MAX VEL: 197.6 CM/SEC
BH CV VAS BP LEFT ARM: NORMAL MMHG
BH CV XLRA - RV BASE: 3.8 CM
BH CV XLRA - RV LENGTH: 6.2 CM
BH CV XLRA - RV MID: 3.3 CM
BH CV XLRA - TDI S': 7.17 CM/SEC
E/E' RATIO: 17.2
LEFT ATRIUM VOLUME INDEX: 43 ML/M2
LV EF 2D ECHO EST: 65 %
MAXIMAL PREDICTED HEART RATE: 150 BPM
STRESS TARGET HR: 128 BPM

## 2017-11-03 PROCEDURE — 93306 TTE W/DOPPLER COMPLETE: CPT

## 2017-11-03 PROCEDURE — 93306 TTE W/DOPPLER COMPLETE: CPT | Performed by: INTERNAL MEDICINE

## 2017-11-03 PROCEDURE — 99214 OFFICE O/P EST MOD 30 MIN: CPT | Performed by: INTERNAL MEDICINE

## 2017-11-03 RX ORDER — METOPROLOL SUCCINATE 25 MG/1
25 TABLET, EXTENDED RELEASE ORAL
Qty: 90 TABLET | Refills: 3 | Status: SHIPPED | OUTPATIENT
Start: 2017-11-03 | End: 2018-09-27 | Stop reason: SDUPTHER

## 2017-11-03 NOTE — PROGRESS NOTES
Encounter Date:11/03/2017      Patient ID: Ac Caldwell is a 72 y.o. male.    Chief Complaint: VHD    PROBLEM LIST:   1. Valvular heart disease:   a. Refused enrollment in the  50 years ago due to some cardiac condition, details not known.   b. Annual echocardiographic followups for the last several years for aortic stenosis.   c. Most recent echocardiogram, 02/23/2016, showed moderate aortic stenosis with aortic valve area of 1.2 cm2, mean gradient of 16.6, and peak gradient of 33.3.   d. Ejection fraction was normal. There was also mild aortic regurgitation with trace tricuspid regurgitation and RVSP of 26.2.   e. No current symptoms.  f. Nuclear stress 5/19/2016:  EF 72%. NL  g. CTA 8/30/2017: Mild ectasia of ascending aorta measuring 4.2cm significant aortic leaflet calcification, coronary sclerosis, no aneurysm of descending aorta.  h. Echo 11/3/2017: Normal EF 64%. Mild LVH. HEATHER 1.8 cm². MG 17.0.  2. Exertional dyspnea/anginal equivalent symptoms with occasional orthopnea.   3. Hypertension.   4. Dyslipidemia.   5. History of renal cancer:   a. Status post right nephrectomy in 1997.   b. Status post resection of a tumor from his left kidney -- incomplete database.   c. Status post CT ablation of L renal mass.  6. Status post resection of tumor from right thyroid gland.   7. Status post basal cell cancer surgery.   8. Episode of hematuria in January with subsequent negative cystoscopy.  9. Surgical Hx:  a. CT Ablation of Left renal mass    History of Present Illness  Patient presents today for follow-up with a history of VHD and cardiac risk factors. Recently underwent cryosurgery of his left kidney for a mass which was successfully treated, states that he lost 20% of his kidney however the function as remained normal.  He is status post remote right nephrectomy, [facility] and has been doing well since the procedure. Brings in documents from some of his tests for  review including CTA report. Denies chest pain, shortness of breath, leg swelling, palpitations, and syncope. Remains busy and active with household chores and no limitations.    Allergies   Allergen Reactions   • Norvasc [Amlodipine] Swelling   • Oxycodone      Can elevate Blood pressure   • Lotensin [Benazepril] Cough   • Zocor [Simvastatin] Myalgia         Current Outpatient Prescriptions:   •  aspirin 325 MG tablet, Take 325 mg by mouth Daily., Disp: , Rfl:   •  atorvastatin (LIPITOR) 10 MG tablet, Take 1 tablet by mouth Every Night., Disp: 90 tablet, Rfl: 1  •  azelastine (ASTELIN) 0.1 % nasal spray, 1 spray into each nostril 2 (Two) Times a Day. Use in each nostril as directed, Disp: , Rfl:   •  betamethasone valerate (VALISONE) 0.1 % ointment, Uses once weekly, Disp: , Rfl:   •  Cholecalciferol (VITAMIN D-3 PO), Take 1,000 Units by mouth Daily., Disp: , Rfl:   •  Coenzyme Q10 (CO Q-10) 100 MG capsule, Take  by mouth 2 (Two) Times a Day., Disp: , Rfl:   •  esomeprazole (NexIUM) 20 MG capsule, Take 20 mg by mouth Daily., Disp: , Rfl:   •  famciclovir (FAMVIR) 500 MG tablet, Take 1 tablet by mouth 3 (three) times a day. (Patient taking differently: Take 500 mg by mouth As Needed.), Disp: 12 tablet, Rfl: 5  •  Flaxseed, Linseed, (FLAX SEED OIL) 1000 MG capsule, Take 1,200 mg by mouth Daily., Disp: , Rfl:   •  fluticasone (FLONASE) 50 MCG/ACT nasal spray, 2 sprays into each nostril Daily., Disp: 3 each, Rfl: 2  •  levothyroxine (SYNTHROID, LEVOTHROID) 50 MCG tablet, Take 50 mcg by mouth Daily., Disp: , Rfl:   •  Omega-3 Fatty Acids (FISH OIL) 1000 MG capsule capsule, Take 1,200 mg by mouth 2 (Two) Times a Day With Meals., Disp: , Rfl:   •  tamsulosin (FLOMAX) 0.4 MG capsule 24 hr capsule, 1 po qd, Disp: , Rfl:   •  valsartan (DIOVAN) 160 MG tablet, Take 160 mg by mouth Daily., Disp: , Rfl:   •  vitamin B-12 (CYANOCOBALAMIN) 1000 MCG tablet, Take 1,000 mcg by mouth Daily., Disp: , Rfl:   •  metoprolol succinate XL  (TOPROL-XL) 25 MG 24 hr tablet, Take 1 tablet by mouth every night at bedtime., Disp: 90 tablet, Rfl: 3    The following portions of the patient's history were reviewed and updated as appropriate: allergies, current medications, past family history, past medical history, past social history, past surgical history and problem list.    ROS  Review of Systems   Constitution: Negative for chills, fever, weight gain and weight loss.   Cardiovascular: Negative for chest pain, claudication, dyspnea on exertion, leg swelling, orthopnea, palpitations, paroxysmal nocturnal dyspnea and syncope.        No dizziness   Gastrointestinal: Negative for abdominal pain, constipation, diarrhea, nausea and vomiting.   Genitourinary:        No urinary symptoms   Neurological:        No symptoms of stroke.   All other systems reviewed and are negative.    Objective:     Blood pressure 120/80, pulse 61.      Physical Exam   Constitutional: He appears well-developed and well-nourished.   HENT:   HEENT exam unremarkable.   Neck: Neck supple. No JVD present.   No carotid bruits.   Cardiovascular: Normal rate and regular rhythm.    Murmur (faint systolic) heard.  2 plus symmetric pulses.   Pulmonary/Chest: Breath sounds normal. He exhibits no tenderness.   Abdominal:   Abdomen benign.   Musculoskeletal: He exhibits no edema.   Neurological:   Neurological exam unremarkable.   Vitals reviewed.    Constitutional: She appears well-developed and well-nourished.   HENT:   HEENT exam unremarkable.   Neck: Neck supple. No JVD present.   No carotid bruits.   Cardiovascular: Normal rate, regular rhythm and normal heart sounds.    No murmur heard.  2 plus symmetric pulses.   Pulmonary/Chest: Breath sounds normal. Does not exhibit tenderness.   Abdominal:   Abdomen benign.   Musculoskeletal: Does not exhibit edema.   Neurological:   Neurological exam unremarkable.   Vitals reviewed.    Lab Review:   Procedures       Assessment:      Diagnosis Plan   1.  Valvular heart disease , Mild aortic stenosis, asymptomatic.   Well compensated   2. Exertional dyspnea  No recurrences.   3. Benign essential hypertension  Well controlled. Discontinue Verapamil. Start Toprol 25 mg once daily at bed time for cardiovascular benefit in the setting of aortic ectasia.  .   4. Dyslipidemia  Controlled on Lipitor 10 mg.     Plan:   Discontinue verapamil. Start Toprol 25 mg once daily.  Stable cardiac status.  Continue current medications.   FU in 6 MO, sooner as needed.  Thank you for allowing us to participate in the care of your patient.     Scribed for Migel Valladares MD by Buck Umanzor. 11/3/2017  11:33 AM    I, Migel Valladares MD, personally performed the services described in this documentation as scribed by the above named individual in my presence, and it is both accurate and complete.  11/3/2017  11:33 AM            Please note that portions of this note may have been completed with a voice recognition program. Efforts were made to edit the dictations, but occasionally words are mistranscribed.

## 2018-02-19 ENCOUNTER — OFFICE VISIT (OUTPATIENT)
Dept: INTERNAL MEDICINE | Facility: CLINIC | Age: 73
End: 2018-02-19

## 2018-02-19 VITALS
OXYGEN SATURATION: 99 % | HEIGHT: 70 IN | SYSTOLIC BLOOD PRESSURE: 136 MMHG | BODY MASS INDEX: 32.04 KG/M2 | WEIGHT: 223.8 LBS | TEMPERATURE: 98.3 F | HEART RATE: 79 BPM | RESPIRATION RATE: 16 BRPM | DIASTOLIC BLOOD PRESSURE: 80 MMHG

## 2018-02-19 DIAGNOSIS — N39.0 URINARY TRACT INFECTION WITHOUT HEMATURIA, SITE UNSPECIFIED: Primary | ICD-10-CM

## 2018-02-19 LAB
BILIRUB BLD-MCNC: NEGATIVE MG/DL
CLARITY, POC: CLEAR
COLOR UR: YELLOW
GLUCOSE UR STRIP-MCNC: NEGATIVE MG/DL
KETONES UR QL: NEGATIVE
LEUKOCYTE EST, POC: ABNORMAL
NITRITE UR-MCNC: POSITIVE MG/ML
PH UR: 6 [PH] (ref 5–8)
PROT UR STRIP-MCNC: NEGATIVE MG/DL
RBC # UR STRIP: NEGATIVE /UL
SP GR UR: 1.01 (ref 1–1.03)
UROBILINOGEN UR QL: NORMAL

## 2018-02-19 PROCEDURE — 99213 OFFICE O/P EST LOW 20 MIN: CPT | Performed by: NURSE PRACTITIONER

## 2018-02-19 PROCEDURE — 81003 URINALYSIS AUTO W/O SCOPE: CPT | Performed by: NURSE PRACTITIONER

## 2018-02-19 PROCEDURE — 87077 CULTURE AEROBIC IDENTIFY: CPT | Performed by: NURSE PRACTITIONER

## 2018-02-19 PROCEDURE — 87186 SC STD MICRODIL/AGAR DIL: CPT | Performed by: NURSE PRACTITIONER

## 2018-02-19 PROCEDURE — 87086 URINE CULTURE/COLONY COUNT: CPT | Performed by: NURSE PRACTITIONER

## 2018-02-19 RX ORDER — POTASSIUM CITRATE 10 MEQ/1
TABLET, EXTENDED RELEASE ORAL
COMMUNITY
Start: 2018-01-22 | End: 2019-01-15 | Stop reason: SDUPTHER

## 2018-02-19 RX ORDER — CEFUROXIME AXETIL 250 MG/1
250 TABLET ORAL 2 TIMES DAILY
Qty: 20 TABLET | Refills: 0 | Status: SHIPPED | OUTPATIENT
Start: 2018-02-19 | End: 2018-03-01

## 2018-02-19 NOTE — PROGRESS NOTES
Subjective   Ac Caldwell is a 72 y.o. male    Chief Complaint   Patient presents with   • Burning with urination   • strong urine odor     Urinary Tract Infection    This is a new problem. The current episode started in the past 7 days. The problem occurs intermittently. The problem has been gradually worsening. The quality of the pain is described as burning. The pain is moderate. There has been no fever. He is not sexually active. There is a history of pyelonephritis. Associated symptoms include frequency, hesitancy and urgency. Pertinent negatives include no chills, discharge, flank pain, hematuria, nausea, possible pregnancy, sweats or vomiting. Associated symptoms comments: Odor. He has tried nothing for the symptoms. The treatment provided no relief. His past medical history is significant for recurrent UTIs and a urological procedure. There is no history of catheterization, kidney stones, a single kidney or urinary stasis. renal CA        The following portions of the patient's history were reviewed and updated as appropriate: allergies, current medications, past family history, past medical history, past social history, past surgical history and problem list.    Current Outpatient Prescriptions:   •  aspirin 325 MG tablet, Take 325 mg by mouth Daily., Disp: , Rfl:   •  atorvastatin (LIPITOR) 10 MG tablet, Take 1 tablet by mouth Every Night., Disp: 90 tablet, Rfl: 1  •  azelastine (ASTELIN) 0.1 % nasal spray, 1 spray into each nostril 2 (Two) Times a Day. Use in each nostril as directed, Disp: , Rfl:   •  betamethasone valerate (VALISONE) 0.1 % ointment, Uses once weekly, Disp: , Rfl:   •  cefuroxime (CEFTIN) 250 MG tablet, Take 1 tablet by mouth 2 (Two) Times a Day for 10 days., Disp: 20 tablet, Rfl: 0  •  Cholecalciferol (VITAMIN D-3 PO), Take 1,000 Units by mouth Daily., Disp: , Rfl:   •  Coenzyme Q10 (CO Q-10) 100 MG capsule, Take  by mouth 2 (Two) Times a Day., Disp: , Rfl:   •  esomeprazole (NexIUM)  20 MG capsule, Take 20 mg by mouth Daily., Disp: , Rfl:   •  famciclovir (FAMVIR) 500 MG tablet, Take 1 tablet by mouth 3 (three) times a day. (Patient taking differently: Take 500 mg by mouth As Needed.), Disp: 12 tablet, Rfl: 5  •  Flaxseed, Linseed, (FLAX SEED OIL) 1000 MG capsule, Take 1,200 mg by mouth Daily., Disp: , Rfl:   •  fluticasone (FLONASE) 50 MCG/ACT nasal spray, 2 sprays into each nostril Daily., Disp: 3 each, Rfl: 2  •  levothyroxine (SYNTHROID, LEVOTHROID) 50 MCG tablet, Take 50 mcg by mouth Daily., Disp: , Rfl:   •  metoprolol succinate XL (TOPROL-XL) 25 MG 24 hr tablet, Take 1 tablet by mouth every night at bedtime., Disp: 90 tablet, Rfl: 3  •  Omega-3 Fatty Acids (FISH OIL) 1000 MG capsule capsule, Take 1,200 mg by mouth 2 (Two) Times a Day With Meals., Disp: , Rfl:   •  potassium citrate (UROCIT-K) 10 MEQ (1080 MG) CR tablet, , Disp: , Rfl:   •  tamsulosin (FLOMAX) 0.4 MG capsule 24 hr capsule, 1 po qd, Disp: , Rfl:   •  valsartan (DIOVAN) 160 MG tablet, Take 160 mg by mouth Daily., Disp: , Rfl:   •  vitamin B-12 (CYANOCOBALAMIN) 1000 MCG tablet, Take 1,000 mcg by mouth Daily., Disp: , Rfl:      Review of Systems   Constitutional: Negative for chills, fatigue and fever.   Respiratory: Negative for cough, chest tightness and shortness of breath.    Cardiovascular: Negative for chest pain.   Gastrointestinal: Negative for abdominal pain, diarrhea, nausea and vomiting.   Endocrine: Negative for cold intolerance and heat intolerance.   Genitourinary: Positive for frequency, hesitancy and urgency. Negative for flank pain and hematuria.   Musculoskeletal: Negative for arthralgias.   Neurological: Negative for dizziness.       Objective   Physical Exam   Constitutional: He is oriented to person, place, and time. He appears well-developed and well-nourished.   HENT:   Head: Normocephalic and atraumatic.   Eyes: Conjunctivae and EOM are normal. Pupils are equal, round, and reactive to light.   Neck:  "Normal range of motion.   Cardiovascular: Normal rate, regular rhythm and normal heart sounds.    Pulmonary/Chest: Effort normal and breath sounds normal.   Abdominal: Soft. Bowel sounds are normal.   Musculoskeletal: Normal range of motion.   Neurological: He is alert and oriented to person, place, and time. He has normal reflexes.   Skin: Skin is warm and dry.   Psychiatric: He has a normal mood and affect. His behavior is normal. Judgment and thought content normal.     Vitals:    02/19/18 0951   BP: 136/80   Pulse: 79   Resp: 16   Temp: 98.3 °F (36.8 °C)   TempSrc: Temporal Artery    SpO2: 99%   Weight: 102 kg (223 lb 12.8 oz)   Height: 177.8 cm (70\")         Assessment/Plan   cA was seen today for burning with urination and strong urine odor.    Diagnoses and all orders for this visit:    Urinary tract infection without hematuria, site unspecified  -     POC Urinalysis Dipstick, Automated  -     Urine Culture - Urine, Urine, Clean Catch  -     cefuroxime (CEFTIN) 250 MG tablet; Take 1 tablet by mouth 2 (Two) Times a Day for 10 days.      Meds as directed  Increase fluids  Urine sent for culture  RTC with any problems           "

## 2018-02-21 LAB
BACTERIA SPEC AEROBE CULT: ABNORMAL
BACTERIA SPEC AEROBE CULT: ABNORMAL

## 2018-02-22 ENCOUNTER — TELEPHONE (OUTPATIENT)
Dept: INTERNAL MEDICINE | Facility: CLINIC | Age: 73
End: 2018-02-22

## 2018-02-22 NOTE — TELEPHONE ENCOUNTER
----- Message from BERNICE Mccullough sent at 2/22/2018  5:08 PM EST -----  Urine culture is +, but the Ceftin will cover!

## 2018-04-12 ENCOUNTER — TELEPHONE (OUTPATIENT)
Dept: INTERNAL MEDICINE | Facility: CLINIC | Age: 73
End: 2018-04-12

## 2018-04-12 RX ORDER — CEFUROXIME AXETIL 250 MG/1
250 TABLET ORAL 2 TIMES DAILY
Qty: 20 TABLET | Refills: 0 | Status: SHIPPED | OUTPATIENT
Start: 2018-04-12 | End: 2018-04-22

## 2018-04-24 ENCOUNTER — TELEPHONE (OUTPATIENT)
Dept: INTERNAL MEDICINE | Facility: CLINIC | Age: 73
End: 2018-04-24

## 2018-04-24 DIAGNOSIS — E78.5 DYSLIPIDEMIA: Chronic | ICD-10-CM

## 2018-04-24 DIAGNOSIS — E55.9 VITAMIN D DEFICIENCY: ICD-10-CM

## 2018-04-24 DIAGNOSIS — E03.9 ACQUIRED HYPOTHYROIDISM: ICD-10-CM

## 2018-04-24 DIAGNOSIS — C64.9 MALIGNANT NEOPLASM OF KIDNEY EXCLUDING RENAL PELVIS, UNSPECIFIED LATERALITY (HCC): ICD-10-CM

## 2018-04-24 DIAGNOSIS — I10 BENIGN ESSENTIAL HYPERTENSION: Primary | ICD-10-CM

## 2018-04-26 ENCOUNTER — CLINICAL SUPPORT (OUTPATIENT)
Dept: INTERNAL MEDICINE | Facility: CLINIC | Age: 73
End: 2018-04-26

## 2018-04-26 DIAGNOSIS — E78.5 DYSLIPIDEMIA: ICD-10-CM

## 2018-04-26 DIAGNOSIS — I10 BENIGN ESSENTIAL HYPERTENSION: ICD-10-CM

## 2018-04-26 DIAGNOSIS — E55.9 VITAMIN D DEFICIENCY: ICD-10-CM

## 2018-04-26 DIAGNOSIS — C64.9 MALIGNANT NEOPLASM OF KIDNEY EXCLUDING RENAL PELVIS, UNSPECIFIED LATERALITY (HCC): ICD-10-CM

## 2018-04-26 DIAGNOSIS — E03.9 ACQUIRED HYPOTHYROIDISM: ICD-10-CM

## 2018-04-26 LAB
25(OH)D3 SERPL-MCNC: 27.8 NG/ML
ALBUMIN SERPL-MCNC: 4.4 G/DL (ref 3.2–4.8)
ALBUMIN/GLOB SERPL: 1.8 G/DL (ref 1.5–2.5)
ALP SERPL-CCNC: 62 U/L (ref 25–100)
ALT SERPL W P-5'-P-CCNC: 32 U/L (ref 7–40)
ANION GAP SERPL CALCULATED.3IONS-SCNC: 6 MMOL/L (ref 3–11)
ARTICHOKE IGE QN: 104 MG/DL (ref 0–130)
AST SERPL-CCNC: 26 U/L (ref 0–33)
BASOPHILS # BLD AUTO: 0.01 10*3/MM3 (ref 0–0.2)
BASOPHILS NFR BLD AUTO: 0.2 % (ref 0–1)
BILIRUB SERPL-MCNC: 0.9 MG/DL (ref 0.3–1.2)
BUN BLD-MCNC: 19 MG/DL (ref 9–23)
BUN/CREAT SERPL: 14.6 (ref 7–25)
CALCIUM SPEC-SCNC: 9.3 MG/DL (ref 8.7–10.4)
CHLORIDE SERPL-SCNC: 103 MMOL/L (ref 99–109)
CHOLEST SERPL-MCNC: 154 MG/DL (ref 0–200)
CO2 SERPL-SCNC: 28 MMOL/L (ref 20–31)
CREAT BLD-MCNC: 1.3 MG/DL (ref 0.6–1.3)
DEPRECATED RDW RBC AUTO: 44.5 FL (ref 37–54)
EOSINOPHIL # BLD AUTO: 0.23 10*3/MM3 (ref 0–0.3)
EOSINOPHIL NFR BLD AUTO: 4.9 % (ref 0–3)
ERYTHROCYTE [DISTWIDTH] IN BLOOD BY AUTOMATED COUNT: 13.8 % (ref 11.3–14.5)
GFR SERPL CREATININE-BSD FRML MDRD: 55 ML/MIN/1.73
GLOBULIN UR ELPH-MCNC: 2.5 GM/DL
GLUCOSE BLD-MCNC: 93 MG/DL (ref 70–100)
HCT VFR BLD AUTO: 44.5 % (ref 38.9–50.9)
HDLC SERPL-MCNC: 35 MG/DL (ref 40–60)
HGB BLD-MCNC: 15.1 G/DL (ref 13.1–17.5)
IMM GRANULOCYTES # BLD: 0.02 10*3/MM3 (ref 0–0.03)
IMM GRANULOCYTES NFR BLD: 0.4 % (ref 0–0.6)
LYMPHOCYTES # BLD AUTO: 1.36 10*3/MM3 (ref 0.6–4.8)
LYMPHOCYTES NFR BLD AUTO: 28.9 % (ref 24–44)
MCH RBC QN AUTO: 30.5 PG (ref 27–31)
MCHC RBC AUTO-ENTMCNC: 33.9 G/DL (ref 32–36)
MCV RBC AUTO: 89.9 FL (ref 80–99)
MONOCYTES # BLD AUTO: 0.61 10*3/MM3 (ref 0–1)
MONOCYTES NFR BLD AUTO: 13 % (ref 0–12)
NEUTROPHILS # BLD AUTO: 2.5 10*3/MM3 (ref 1.5–8.3)
NEUTROPHILS NFR BLD AUTO: 53 % (ref 41–71)
PLATELET # BLD AUTO: 175 10*3/MM3 (ref 150–450)
PMV BLD AUTO: 11 FL (ref 6–12)
POTASSIUM BLD-SCNC: 4.5 MMOL/L (ref 3.5–5.5)
PROT SERPL-MCNC: 6.9 G/DL (ref 5.7–8.2)
RBC # BLD AUTO: 4.95 10*6/MM3 (ref 4.2–5.76)
SODIUM BLD-SCNC: 137 MMOL/L (ref 132–146)
TRIGL SERPL-MCNC: 119 MG/DL (ref 0–150)
TSH SERPL DL<=0.05 MIU/L-ACNC: 2.81 MIU/ML (ref 0.35–5.35)
VIT B12 BLD-MCNC: 1000 PG/ML (ref 211–911)
WBC NRBC COR # BLD: 4.71 10*3/MM3 (ref 3.5–10.8)

## 2018-04-26 PROCEDURE — 84443 ASSAY THYROID STIM HORMONE: CPT | Performed by: NURSE PRACTITIONER

## 2018-04-26 PROCEDURE — 82306 VITAMIN D 25 HYDROXY: CPT | Performed by: NURSE PRACTITIONER

## 2018-04-26 PROCEDURE — 80053 COMPREHEN METABOLIC PANEL: CPT | Performed by: NURSE PRACTITIONER

## 2018-04-26 PROCEDURE — 82607 VITAMIN B-12: CPT | Performed by: NURSE PRACTITIONER

## 2018-04-26 PROCEDURE — 80061 LIPID PANEL: CPT | Performed by: NURSE PRACTITIONER

## 2018-04-26 PROCEDURE — 85025 COMPLETE CBC W/AUTO DIFF WBC: CPT | Performed by: NURSE PRACTITIONER

## 2018-04-30 ENCOUNTER — OFFICE VISIT (OUTPATIENT)
Dept: INTERNAL MEDICINE | Facility: CLINIC | Age: 73
End: 2018-04-30

## 2018-04-30 VITALS
HEIGHT: 70 IN | BODY MASS INDEX: 32.38 KG/M2 | HEART RATE: 61 BPM | WEIGHT: 226.2 LBS | TEMPERATURE: 97.9 F | OXYGEN SATURATION: 98 % | SYSTOLIC BLOOD PRESSURE: 126 MMHG | RESPIRATION RATE: 16 BRPM | DIASTOLIC BLOOD PRESSURE: 80 MMHG

## 2018-04-30 DIAGNOSIS — E78.5 DYSLIPIDEMIA: Chronic | ICD-10-CM

## 2018-04-30 DIAGNOSIS — I35.0 MODERATE AORTIC STENOSIS: ICD-10-CM

## 2018-04-30 DIAGNOSIS — C64.9 MALIGNANT NEOPLASM OF KIDNEY EXCLUDING RENAL PELVIS, UNSPECIFIED LATERALITY (HCC): ICD-10-CM

## 2018-04-30 DIAGNOSIS — Z00.00 MEDICARE ANNUAL WELLNESS VISIT, SUBSEQUENT: Primary | ICD-10-CM

## 2018-04-30 DIAGNOSIS — I10 BENIGN ESSENTIAL HYPERTENSION: ICD-10-CM

## 2018-04-30 DIAGNOSIS — E03.9 ACQUIRED HYPOTHYROIDISM: ICD-10-CM

## 2018-04-30 DIAGNOSIS — K21.9 GASTROESOPHAGEAL REFLUX DISEASE, ESOPHAGITIS PRESENCE NOT SPECIFIED: ICD-10-CM

## 2018-04-30 PROCEDURE — G0439 PPPS, SUBSEQ VISIT: HCPCS | Performed by: NURSE PRACTITIONER

## 2018-04-30 NOTE — PROGRESS NOTES
QUICK REFERENCE INFORMATION:  The ABCs of the Annual Wellness Visit    Subsequent Medicare Wellness Visit    HEALTH RISK ASSESSMENT    1945    Recent Hospitalizations:  Recently treated at the following:  Other: [facility] 9/2017.        Current Medical Providers:  Patient Care Team:  BERNICE Mccullough as PCP - General (Family Medicine)  Migel Valladares MD as Consulting Physician (Cardiology)  Manish Camp MD as Consulting Physician (Dermatology)  Chacorta Guajardo MD as Consulting Physician (Ophthalmology)  Ez Everett MD as Consulting Physician (Otolaryngology)  Georgiana Bettencourt MD (Urology)  Jasen Ayala MD as Consulting Physician (Urology)  Chacorta Ludwig MD (Vascular Surgery)  Layla Mccarthy MD as Consulting Physician (Dermatology)        Smoking Status:  History   Smoking Status   • Never Smoker   Smokeless Tobacco   • Never Used       Alcohol Consumption:  History   Alcohol Use   • 0.6 oz/week   • 1 Shots of liquor per week     Comment: weekly       Depression Screen:   PHQ-2/PHQ-9 Depression Screening 4/30/2018   Little interest or pleasure in doing things 0   Feeling down, depressed, or hopeless 0   Total Score 0       Health Habits and Functional and Cognitive Screening:  Functional & Cognitive Status 4/30/2018   Do you have difficulty preparing food and eating? No   Do you have difficulty bathing yourself, getting dressed or grooming yourself? No   Do you have difficulty using the toilet? No   Do you have difficulty moving around from place to place? No   Do you have trouble with steps or getting out of a bed or a chair? No   In the past year have you fallen or experienced a near fall? No   Current Diet Frequent Junk Food   Dental Exam Up to date   Eye Exam Up to date   Exercise (times per week) 4 times per week   Current Exercise Activities Include Gardening   Do you need help using the phone?  No   Are you deaf or do you have serious difficulty hearing?  No   Do you need help with  transportation? No   Do you need help shopping? No   Do you need help preparing meals?  No   Do you need help with housework?  No   Do you need help with laundry? No   Do you need help taking your medications? No   Do you need help managing money? No   Do you ever drive or ride in a car without wearing a seat belt? No   Have you felt unusual stress, anger or loneliness in the last month? No   Who do you live with? Alone   If you need help, do you have trouble finding someone available to you? No   Have you been bothered in the last four weeks by sexual problems? No   Do you have difficulty concentrating, remembering or making decisions? No           Does the patient have evidence of cognitive impairment? No    Aspirin use counseling: Taking ASA appropriately as indicated      Recent Lab Results:  CMP:  Lab Results   Component Value Date    BUN 19 04/26/2018    CREATININE 1.30 04/26/2018    EGFRIFNONA 55 (L) 04/26/2018    BCR 14.6 04/26/2018     04/26/2018    K 4.5 04/26/2018    CO2 28.0 04/26/2018    CALCIUM 9.3 04/26/2018    ALBUMIN 4.40 04/26/2018    LABIL2 1.8 04/26/2018    BILITOT 0.9 04/26/2018    ALKPHOS 62 04/26/2018    AST 26 04/26/2018    ALT 32 04/26/2018     Lipid Panel:  Lab Results   Component Value Date    CHOL 154 04/26/2018    TRIG 119 04/26/2018    HDL 35 (L) 04/26/2018    LDLHDL 2.36 10/12/2016     HbA1c:       Visual Acuity:  No exam data present    Age-appropriate Screening Schedule:  Refer to the list below for future screening recommendations based on patient's age, sex and/or medical conditions. Orders for these recommended tests are listed in the plan section. The patient has been provided with a written plan.    Health Maintenance   Topic Date Due   • INFLUENZA VACCINE  08/01/2018   • LIPID PANEL  04/26/2019   • TDAP/TD VACCINES (2 - Td) 11/01/2019   • COLONOSCOPY  04/28/2025   • PNEUMOCOCCAL VACCINES (65+ LOW/MEDIUM RISK)  Completed   • ZOSTER VACCINE  Completed        Subjective    History of Present Illness    Ac Caldwell is a 72 y.o. male who presents for an Subsequent Wellness Visit.    On 1/26/15, Ac saw gross hematuria, saw Dr. Ayala (urology) and he did a scope. It was ok. Then had a CT scan on 2/3/15. Cr was high, so they did it without contrast. CT scan showed a lesion, but he was not concerned. Then had a fish bladder test on 2/5/15. His urologist never got the results. Did go back to see Nephrology and had labs in Feb. Cr. was 1.1 and BUN was 16. Everything has been stable since. Has a h/o right kidney CA in 1997 and had nephrectomy.  Urologist - Dr. Jossue Serrano left and moved to [place] and he had to establish with Dr Farrell at [facility].  He saw him several times for recurrent UTI, hydrocele, renal cyst, and epididymoorchits. He saw them again 5/31/2017 for repeat renal US that showed a 3.4cm left renal cyst, which had grown from 2.5cm on last scan.  Dr. Farrell moved to [place] and he was then referred to  Urology.  Had CXR and labs.  CXR revealed a possible thoracic aortic aneurysm.  Had f/u chest CT scan, no aneurysm.  CT renal mass protocol was done on 8/30/2017 which confirmed a solid enhancing mass in the lower pole of the left kidney, without lymphadenopathy.  It was elected to proceed with a cryoablation of this lesion per Dr. Mark at .  He underwent this procedure on 9/27/17.  Pathology is unclear as to it being malignant vs benign.  Will be re-scanned in 30 days, then 90 days, then Q6 months.  Next appt is 7/16/18     A cystic lesion was incidentally identified on the above noted CT scan on 8/30/17.  Pt discussed with Dr. Mark and this will be monitored through the interval CT scans as well.      PSA was elevated >7 in June 2016. He had a prostate biopsy and it was normal.      Nephrologist- [facility], he saw them in 12/2016 and got a good report.  Last f/u was on 12/8/17     Had a very large thyroid nodule removed on 4/23/14 per ENT. It was 8cm  in size; they took the right side of his thyroid and placed on synthroid 50mcg. It was benign! Seeing Dr. Everett annually and everything is stable.  Last appt was Nov 2017      Has a h/o AS.  Was referred him to Cardiology. Had an appt on 4/29/16 and he thought everything was ok. He is just going to watch. He saw him again on 11/4/16 and everything was stable. He will follow every 6 months and echo q 2 yrs.  Last appt was 11/3/2017; Verapamil was d/c'd and he was started on Toprol XL 25mg daily.        HTN - well controlled with valsartan 160 mg and Toprol XL 25mg; Verapamil was stopped per Cards in 11/17 - Nephrology had always wanted him to stay on brand name Diovan, but it is very expensive, they did change him over to the valsartan 160 mg at his last visit.  He is tolerating well.  BP is well controlled and he denies SE.     HL - stable on Lipitor 10mg daily without SE.  LDL looks great    The following portions of the patient's history were reviewed and updated as appropriate: allergies, current medications, past family history, past medical history, past social history, past surgical history and problem list.    Outpatient Medications Prior to Visit   Medication Sig Dispense Refill   • aspirin 325 MG tablet Take 325 mg by mouth Daily.     • atorvastatin (LIPITOR) 10 MG tablet Take 1 tablet by mouth Every Night. 90 tablet 1   • azelastine (ASTELIN) 0.1 % nasal spray 1 spray into each nostril 2 (Two) Times a Day. Use in each nostril as directed     • betamethasone valerate (VALISONE) 0.1 % ointment Uses once weekly     • Cholecalciferol (VITAMIN D-3 PO) Take 1,000 Units by mouth Daily.     • Coenzyme Q10 (CO Q-10) 100 MG capsule Take  by mouth 2 (Two) Times a Day.     • esomeprazole (NexIUM) 20 MG capsule Take 20 mg by mouth Daily.     • famciclovir (FAMVIR) 500 MG tablet Take 1 tablet by mouth 3 (three) times a day. (Patient taking differently: Take 500 mg by mouth As Needed.) 12 tablet 5   • Flaxseed,  Linseed, (FLAX SEED OIL) 1000 MG capsule Take 1,200 mg by mouth Daily.     • fluticasone (FLONASE) 50 MCG/ACT nasal spray 2 sprays into each nostril Daily. 3 each 2   • levothyroxine (SYNTHROID, LEVOTHROID) 50 MCG tablet Take 50 mcg by mouth Daily.     • metoprolol succinate XL (TOPROL-XL) 25 MG 24 hr tablet Take 1 tablet by mouth every night at bedtime. 90 tablet 3   • Omega-3 Fatty Acids (FISH OIL) 1000 MG capsule capsule Take 1,200 mg by mouth 2 (Two) Times a Day With Meals.     • potassium citrate (UROCIT-K) 10 MEQ (1080 MG) CR tablet      • tamsulosin (FLOMAX) 0.4 MG capsule 24 hr capsule 1 po qd     • valsartan (DIOVAN) 160 MG tablet Take 160 mg by mouth Daily.     • vitamin B-12 (CYANOCOBALAMIN) 1000 MCG tablet Take 1,000 mcg by mouth Daily.       No facility-administered medications prior to visit.        Patient Active Problem List   Diagnosis   • Aortic valve disorder   • Nephritis and nephropathy   • Benign essential hypertension   • Esophageal reflux   • Proteinuria   • Diverticulosis of small intestine without hemorrhage   • Abnormal glucose   • Obesity   • Lymphadenopathy   • Localized swelling, mass and lump, head   • Ganglion cyst   • Malignant neoplasm of kidney, except pelvis   • Dyslipidemia   • Exertional dyspnea   • Moderate aortic stenosis   • Tobacco use   • Valvular heart disease   • Acquired hypothyroidism       Advance Care Planning:  has NO advance directive - information provided to the patient today    Identification of Risk Factors:  Risk factors include: weight  and inactivity.    Review of Systems   Constitutional: Negative for appetite change, chills, fatigue, fever and unexpected weight change.   HENT: Negative for congestion, ear pain, nosebleeds, rhinorrhea and tinnitus.    Eyes: Negative for pain.   Respiratory: Negative for cough, chest tightness and shortness of breath.    Cardiovascular: Negative for chest pain, palpitations and leg swelling.   Gastrointestinal: Negative for  "abdominal distention, abdominal pain, blood in stool, constipation, diarrhea, nausea and vomiting.   Endocrine: Negative for cold intolerance, heat intolerance, polydipsia, polyphagia and polyuria.   Genitourinary: Negative for dysuria, flank pain, frequency, hematuria and urgency.   Musculoskeletal: Negative for arthralgias, back pain, gait problem, joint swelling, myalgias and neck pain.   Skin: Negative for color change, pallor, rash and wound.   Allergic/Immunologic: Negative for environmental allergies and food allergies.   Neurological: Negative for dizziness, syncope, weakness, light-headedness, numbness and headaches.   Hematological: Negative for adenopathy. Does not bruise/bleed easily.   Psychiatric/Behavioral: Negative for behavioral problems and suicidal ideas. The patient is not nervous/anxious.        Compared to one year ago, the patient feels his physical health is the same.  Compared to one year ago, the patient feels his mental health is the same.    Objective     Physical Exam    Vitals:    04/30/18 0901   BP: 126/80   Pulse: 61   Resp: 16   Temp: 97.9 °F (36.6 °C)   TempSrc: Temporal Artery    SpO2: 98%   Weight: 103 kg (226 lb 3.2 oz)   Height: 177.8 cm (70\")   PainSc: 0-No pain       Patient's Body mass index is 32.46 kg/m². BMI is above normal parameters. Follow-up plan includes:  educational material, exercise counseling and nutrition counseling.      Assessment/Plan   Patient Self-Management and Personalized Health Advice  The patient has been provided with information about: diet, exercise, weight management and designing advance directives and preventive services including:   · Advance directive, Exercise counseling provided, Nutrition counseling provided.        Flu shot, 10/11/2017  Tdap 2009   Pneumovax 2012   Prevnar - 4/2015   Zostavax 2010  PSA per Urology   Colon - 4/2015, due 2025     Visit Diagnoses:    ICD-10-CM ICD-9-CM   1. Medicare annual wellness visit, subsequent Z00.00 " V70.0   2. Benign essential hypertension I10 401.1   3. Moderate aortic stenosis I35.0 424.1   4. Gastroesophageal reflux disease, esophagitis presence not specified K21.9 530.81   5. Acquired hypothyroidism E03.9 244.9   6. Malignant neoplasm of kidney excluding renal pelvis, unspecified laterality C64.9 189.0   7. Dyslipidemia E78.5 272.4       No orders of the defined types were placed in this encounter.      Outpatient Encounter Prescriptions as of 4/30/2018   Medication Sig Dispense Refill   • aspirin 325 MG tablet Take 325 mg by mouth Daily.     • atorvastatin (LIPITOR) 10 MG tablet Take 1 tablet by mouth Every Night. 90 tablet 1   • azelastine (ASTELIN) 0.1 % nasal spray 1 spray into each nostril 2 (Two) Times a Day. Use in each nostril as directed     • betamethasone valerate (VALISONE) 0.1 % ointment Uses once weekly     • Cholecalciferol (VITAMIN D-3 PO) Take 1,000 Units by mouth Daily.     • Coenzyme Q10 (CO Q-10) 100 MG capsule Take  by mouth 2 (Two) Times a Day.     • esomeprazole (NexIUM) 20 MG capsule Take 20 mg by mouth Daily.     • famciclovir (FAMVIR) 500 MG tablet Take 1 tablet by mouth 3 (three) times a day. (Patient taking differently: Take 500 mg by mouth As Needed.) 12 tablet 5   • Flaxseed, Linseed, (FLAX SEED OIL) 1000 MG capsule Take 1,200 mg by mouth Daily.     • fluticasone (FLONASE) 50 MCG/ACT nasal spray 2 sprays into each nostril Daily. 3 each 2   • levothyroxine (SYNTHROID, LEVOTHROID) 50 MCG tablet Take 50 mcg by mouth Daily.     • metoprolol succinate XL (TOPROL-XL) 25 MG 24 hr tablet Take 1 tablet by mouth every night at bedtime. 90 tablet 3   • Omega-3 Fatty Acids (FISH OIL) 1000 MG capsule capsule Take 1,200 mg by mouth 2 (Two) Times a Day With Meals.     • potassium citrate (UROCIT-K) 10 MEQ (1080 MG) CR tablet      • tamsulosin (FLOMAX) 0.4 MG capsule 24 hr capsule 1 po qd     • valsartan (DIOVAN) 160 MG tablet Take 160 mg by mouth Daily.     • vitamin B-12 (CYANOCOBALAMIN)  1000 MCG tablet Take 1,000 mcg by mouth Daily.       No facility-administered encounter medications on file as of 4/30/2018.        Reviewed use of high risk medication in the elderly: yes  Reviewed for potential of harmful drug interactions in the elderly: yes    Labs reviewed, no changes made    Follow Up:  Return in about 6 months (around 10/30/2018).     An After Visit Summary and PPPS with all of these plans were given to the patient.

## 2018-05-03 ENCOUNTER — TELEPHONE (OUTPATIENT)
Dept: INTERNAL MEDICINE | Facility: CLINIC | Age: 73
End: 2018-05-03

## 2018-05-03 NOTE — TELEPHONE ENCOUNTER
PT ONLINE STATES HE SEE HE MAY NEED HEP C SCREENING PLEASE GIVE A CALL HE SAYS [facility] DID NOT MENTION THAT TO HIM

## 2018-05-18 ENCOUNTER — OFFICE VISIT (OUTPATIENT)
Dept: CARDIOLOGY | Facility: CLINIC | Age: 73
End: 2018-05-18

## 2018-05-18 VITALS
HEART RATE: 75 BPM | SYSTOLIC BLOOD PRESSURE: 124 MMHG | WEIGHT: 228.4 LBS | DIASTOLIC BLOOD PRESSURE: 80 MMHG | HEIGHT: 70 IN | BODY MASS INDEX: 32.7 KG/M2

## 2018-05-18 DIAGNOSIS — I10 ESSENTIAL HYPERTENSION: ICD-10-CM

## 2018-05-18 DIAGNOSIS — I35.9 AORTIC VALVE DISORDER: ICD-10-CM

## 2018-05-18 DIAGNOSIS — E78.5 DYSLIPIDEMIA: Chronic | ICD-10-CM

## 2018-05-18 DIAGNOSIS — I38 VALVULAR HEART DISEASE: Primary | Chronic | ICD-10-CM

## 2018-05-18 PROCEDURE — 99213 OFFICE O/P EST LOW 20 MIN: CPT | Performed by: INTERNAL MEDICINE

## 2018-05-18 NOTE — PROGRESS NOTES
Encounter Date:05/18/2018      Patient ID: Ac Caldwell is a 72 y.o. male.    Chief Complaint: Valvular heart disease      PROBLEM LIST:  1. Valvular heart disease:   a. Refused enrollment in the  50 years ago due to some cardiac condition, details not known.   b. Annual echocardiographic followups for the last several years for aortic stenosis.   c. Most recent echocardiogram, 02/23/2016, showed moderate aortic stenosis with aortic valve area of 1.2 cm2, mean gradient of 16.6, and peak gradient of 33.3.   d. Ejection fraction was normal. There was also mild aortic regurgitation with trace tricuspid regurgitation and RVSP of 26.2.   e. No current symptoms.  f. Nuclear stress 5/19/2016:  EF 72%. NL  g. CTA 8/30/2017: Mild ectasia of ascending aorta measuring 4.2cm significant aortic leaflet calcification, coronary sclerosis, no aneurysm of descending aorta.  h. Echo 11/3/2017: Normal EF 64%. Mild LVH. HEATHER 1.8 cm². MG 17.0.  2. Exertional dyspnea/anginal equivalent symptoms with occasional orthopnea.   3. Hypertension.   4. Dyslipidemia.   5. History of renal cancer:   a. Status post right nephrectomy in 1997.   b. Status post resection of a tumor from his left kidney -- incomplete database.   c. Status post CT ablation of L renal mass.  6. Status post resection of tumor from right thyroid gland.   7. Status post basal cell cancer surgery.   8. Episode of hematuria in January with subsequent negative cystoscopy.  9. Surgical Hx:  a. CT Ablation of Left renal mass    History of Present Illness  Patient presents today for follow-up with a history of valvular heart disease and cardiac risk factors. Since last visit has been doing well from a cardiac standpoint. Reports that he experiences leg pain when he is laying down in his bed. During hospitalization at  was referred to Dr. cartagena for dilated aorta, he is now scheduled for a follow-up with same-day echocardiogram in September 2018.  His lifestyle  is sedentary but he remains busy with household and yard work.  Has no current chest pain shortness of breath edema palpitations or syncope.     Allergies   Allergen Reactions   • Norvasc [Amlodipine] Swelling   • Oxycodone      Can elevate Blood pressure   • Lotensin [Benazepril] Cough   • Zocor [Simvastatin] Myalgia         Current Outpatient Prescriptions:   •  aspirin 325 MG tablet, Take 325 mg by mouth Daily., Disp: , Rfl:   •  atorvastatin (LIPITOR) 10 MG tablet, Take 1 tablet by mouth Every Night., Disp: 90 tablet, Rfl: 1  •  azelastine (ASTELIN) 0.1 % nasal spray, 1 spray into each nostril 2 (Two) Times a Day. Use in each nostril as directed, Disp: , Rfl:   •  betamethasone valerate (VALISONE) 0.1 % ointment, Uses once weekly, Disp: , Rfl:   •  Cholecalciferol (VITAMIN D-3 PO), Take 1,000 Units by mouth Daily., Disp: , Rfl:   •  Coenzyme Q10 (CO Q-10) 100 MG capsule, Take  by mouth 2 (Two) Times a Day., Disp: , Rfl:   •  esomeprazole (NexIUM) 20 MG capsule, Take 20 mg by mouth Daily., Disp: , Rfl:   •  famciclovir (FAMVIR) 500 MG tablet, Take 1 tablet by mouth 3 (three) times a day. (Patient taking differently: Take 500 mg by mouth As Needed.), Disp: 12 tablet, Rfl: 5  •  Flaxseed, Linseed, (FLAX SEED OIL) 1000 MG capsule, Take 1,200 mg by mouth Daily., Disp: , Rfl:   •  fluticasone (FLONASE) 50 MCG/ACT nasal spray, 2 sprays into each nostril Daily., Disp: 3 each, Rfl: 2  •  levothyroxine (SYNTHROID, LEVOTHROID) 50 MCG tablet, Take 50 mcg by mouth Daily., Disp: , Rfl:   •  metoprolol succinate XL (TOPROL-XL) 25 MG 24 hr tablet, Take 1 tablet by mouth every night at bedtime., Disp: 90 tablet, Rfl: 3  •  Omega-3 Fatty Acids (FISH OIL) 1000 MG capsule capsule, Take 1,200 mg by mouth 2 (Two) Times a Day With Meals., Disp: , Rfl:   •  potassium citrate (UROCIT-K) 10 MEQ (1080 MG) CR tablet, , Disp: , Rfl:   •  tamsulosin (FLOMAX) 0.4 MG capsule 24 hr capsule, 1 po qd, Disp: , Rfl:   •  valsartan (DIOVAN) 160 MG  "tablet, Take 160 mg by mouth Daily., Disp: , Rfl:   •  vitamin B-12 (CYANOCOBALAMIN) 1000 MCG tablet, Take 1,000 mcg by mouth Daily., Disp: , Rfl:     The following portions of the patient's history were reviewed and updated as appropriate: allergies, current medications, past family history, past medical history, past social history, past surgical history and problem list.    ROS  Review of Systems   Constitution: Negative for chills, fever, weight gain and weight loss.   Cardiovascular: Negative for chest pain, claudication, dyspnea on exertion, leg swelling, orthopnea, palpitations, paroxysmal nocturnal dyspnea and syncope.        No dizziness   Gastrointestinal: Negative for abdominal pain, constipation, diarrhea, nausea and vomiting.   Genitourinary:        No urinary symptoms   Neurological:        No symptoms of stroke.   All other systems reviewed and are negative.    Objective:     Blood pressure 124/80, pulse 75, height 177.8 cm (70\"), weight 104 kg (228 lb 6.4 oz).      Physical Exam  Constitutional: She appears well-developed and well-nourished.   HENT:   HEENT exam unremarkable.   Neck: Neck supple. No JVD present.   No carotid bruits.   Cardiovascular: Normal rate, regular rhythm and normal heart sounds.    2/6 systolic ejection murmur heard.  2 plus symmetric pulses.   Pulmonary/Chest: Breath sounds normal. Does not exhibit tenderness.   Abdominal:   Abdomen benign.   Musculoskeletal: Does not exhibit edema.   Neurological:   Neurological exam unremarkable.   Vitals reviewed.    Lab Review:   Procedures       Assessment:      Diagnosis Plan   1. Valvular heart disease     2. Aortic valve Stenosis, currently asymptomatic.   Stable.   3. Essential hypertension  Well controlled with current medication regimen.   4. Dyslipidemia. LDL goal < 70. Continue Lipitor 10 mg daily.     Plan:   All of with Dr. Ludwig at scheduled, we will request that a copy of echocardiogram be forwarded to us.  Continue current " medications.   FU in 6 MO, sooner as needed.  Thank you for allowing us to participate in the care of your patient.     Scribed for Migel Valladares MD by Manuel Umanzor. 5/18/2018  2:42 PM    I, Migel Valladares MD, personally performed the services described in this documentation as scribed by the above named individual in my presence, and it is both accurate and complete.  5/18/2018  2:54 PM        Please note that portions of this note may have been completed with a voice recognition program. Efforts were made to edit the dictations, but occasionally words are mistranscribed.

## 2018-06-06 DIAGNOSIS — E78.5 HYPERLIPIDEMIA, UNSPECIFIED HYPERLIPIDEMIA TYPE: ICD-10-CM

## 2018-06-06 RX ORDER — ATORVASTATIN CALCIUM 10 MG/1
TABLET, FILM COATED ORAL
Qty: 90 TABLET | Refills: 1 | Status: SHIPPED | OUTPATIENT
Start: 2018-06-06 | End: 2019-01-01 | Stop reason: SDUPTHER

## 2018-06-26 ENCOUNTER — TELEPHONE (OUTPATIENT)
Dept: INTERNAL MEDICINE | Facility: CLINIC | Age: 73
End: 2018-06-26

## 2018-06-26 NOTE — TELEPHONE ENCOUNTER
I would prefer he come and leave a urine sample for UA so I can send for culture instead of continuing to treat blindly

## 2018-06-27 PROCEDURE — 87077 CULTURE AEROBIC IDENTIFY: CPT | Performed by: NURSE PRACTITIONER

## 2018-06-27 PROCEDURE — 87086 URINE CULTURE/COLONY COUNT: CPT | Performed by: NURSE PRACTITIONER

## 2018-06-27 PROCEDURE — 87186 SC STD MICRODIL/AGAR DIL: CPT | Performed by: NURSE PRACTITIONER

## 2018-06-27 RX ORDER — CEFUROXIME AXETIL 250 MG/1
TABLET ORAL
Qty: 20 TABLET | Refills: 0 | OUTPATIENT
Start: 2018-06-27

## 2018-06-28 ENCOUNTER — TELEPHONE (OUTPATIENT)
Dept: INTERNAL MEDICINE | Facility: CLINIC | Age: 73
End: 2018-06-28

## 2018-06-28 RX ORDER — SULFAMETHOXAZOLE AND TRIMETHOPRIM 800; 160 MG/1; MG/1
1 TABLET ORAL 2 TIMES DAILY
Qty: 14 TABLET | Refills: 0 | Status: SHIPPED | OUTPATIENT
Start: 2018-06-28 | End: 2018-07-05

## 2018-06-28 NOTE — TELEPHONE ENCOUNTER
----- Message from BERNICE Mccullough sent at 6/28/2018  9:12 AM EDT -----  UA was definitely +.  I have sent in Bactrim and urine was sent for culture.

## 2018-07-05 ENCOUNTER — TELEPHONE (OUTPATIENT)
Dept: INTERNAL MEDICINE | Facility: CLINIC | Age: 73
End: 2018-07-05

## 2018-07-05 NOTE — TELEPHONE ENCOUNTER
----- Message from BERNICE Mccullough sent at 7/3/2018  3:21 PM EDT -----  Urine culture is +, but the Bactrim that he is on will cover.  I would recommend seeing his urologist if he does not have a soon f/u due to recent/recurrent UTIs

## 2018-07-23 RX ORDER — FAMCICLOVIR 500 MG/1
TABLET ORAL
Qty: 12 TABLET | Refills: 4 | Status: SHIPPED | OUTPATIENT
Start: 2018-07-23 | End: 2019-06-28

## 2018-08-29 RX ORDER — LOSARTAN POTASSIUM 50 MG/1
1 TABLET ORAL DAILY
COMMUNITY
Start: 2018-08-29 | End: 2019-11-08 | Stop reason: DRUGHIGH

## 2018-09-25 ENCOUNTER — CLINICAL SUPPORT (OUTPATIENT)
Dept: INTERNAL MEDICINE | Facility: CLINIC | Age: 73
End: 2018-09-25

## 2018-09-25 DIAGNOSIS — Z23 NEED FOR INFLUENZA VACCINATION: Primary | ICD-10-CM

## 2018-09-25 PROCEDURE — 90662 IIV NO PRSV INCREASED AG IM: CPT | Performed by: NURSE PRACTITIONER

## 2018-09-25 PROCEDURE — G0008 ADMIN INFLUENZA VIRUS VAC: HCPCS | Performed by: NURSE PRACTITIONER

## 2018-09-28 RX ORDER — METOPROLOL SUCCINATE 25 MG/1
TABLET, EXTENDED RELEASE ORAL
Qty: 90 TABLET | Refills: 2 | Status: SHIPPED | OUTPATIENT
Start: 2018-09-28 | End: 2019-06-28 | Stop reason: SDUPTHER

## 2018-10-23 ENCOUNTER — TELEPHONE (OUTPATIENT)
Dept: INTERNAL MEDICINE | Facility: CLINIC | Age: 73
End: 2018-10-23

## 2018-10-23 DIAGNOSIS — C64.9 MALIGNANT NEOPLASM OF KIDNEY EXCLUDING RENAL PELVIS, UNSPECIFIED LATERALITY (HCC): ICD-10-CM

## 2018-10-23 DIAGNOSIS — I35.0 MODERATE AORTIC STENOSIS: ICD-10-CM

## 2018-10-23 DIAGNOSIS — R73.09 ABNORMAL GLUCOSE: ICD-10-CM

## 2018-10-23 DIAGNOSIS — E78.5 DYSLIPIDEMIA: Chronic | ICD-10-CM

## 2018-10-23 DIAGNOSIS — E55.9 VITAMIN D DEFICIENCY: ICD-10-CM

## 2018-10-23 DIAGNOSIS — K21.9 GASTROESOPHAGEAL REFLUX DISEASE, ESOPHAGITIS PRESENCE NOT SPECIFIED: ICD-10-CM

## 2018-10-23 DIAGNOSIS — I10 BENIGN ESSENTIAL HYPERTENSION: Primary | ICD-10-CM

## 2018-10-23 DIAGNOSIS — E03.9 ACQUIRED HYPOTHYROIDISM: ICD-10-CM

## 2018-10-23 NOTE — TELEPHONE ENCOUNTER
Patient called and said he is due for labs every 6 mo and he was wanting to come in.  I didn't see any lab orders in there for him.

## 2018-10-24 ENCOUNTER — LAB (OUTPATIENT)
Dept: INTERNAL MEDICINE | Facility: CLINIC | Age: 73
End: 2018-10-24

## 2018-10-24 DIAGNOSIS — I10 BENIGN ESSENTIAL HYPERTENSION: ICD-10-CM

## 2018-10-24 DIAGNOSIS — R73.09 ABNORMAL GLUCOSE: ICD-10-CM

## 2018-10-24 DIAGNOSIS — K21.9 GASTROESOPHAGEAL REFLUX DISEASE, ESOPHAGITIS PRESENCE NOT SPECIFIED: ICD-10-CM

## 2018-10-24 DIAGNOSIS — E03.9 ACQUIRED HYPOTHYROIDISM: ICD-10-CM

## 2018-10-24 DIAGNOSIS — C64.9 MALIGNANT NEOPLASM OF KIDNEY EXCLUDING RENAL PELVIS, UNSPECIFIED LATERALITY (HCC): ICD-10-CM

## 2018-10-24 DIAGNOSIS — E78.5 DYSLIPIDEMIA: ICD-10-CM

## 2018-10-24 DIAGNOSIS — E55.9 VITAMIN D DEFICIENCY: ICD-10-CM

## 2018-10-24 DIAGNOSIS — I35.0 MODERATE AORTIC STENOSIS: ICD-10-CM

## 2018-10-24 LAB
25(OH)D3 SERPL-MCNC: 25.4 NG/ML
ALBUMIN SERPL-MCNC: 4.2 G/DL (ref 3.2–4.8)
ALBUMIN/GLOB SERPL: 2 G/DL (ref 1.5–2.5)
ALP SERPL-CCNC: 57 U/L (ref 25–100)
ALT SERPL W P-5'-P-CCNC: 24 U/L (ref 7–40)
ANION GAP SERPL CALCULATED.3IONS-SCNC: 5 MMOL/L (ref 3–11)
ARTICHOKE IGE QN: 104 MG/DL (ref 0–130)
AST SERPL-CCNC: 20 U/L (ref 0–33)
BASOPHILS # BLD AUTO: 0.02 10*3/MM3 (ref 0–0.2)
BASOPHILS NFR BLD AUTO: 0.4 % (ref 0–1)
BILIRUB SERPL-MCNC: 1 MG/DL (ref 0.3–1.2)
BUN BLD-MCNC: 21 MG/DL (ref 9–23)
BUN/CREAT SERPL: 14.3 (ref 7–25)
CALCIUM SPEC-SCNC: 9 MG/DL (ref 8.7–10.4)
CHLORIDE SERPL-SCNC: 107 MMOL/L (ref 99–109)
CHOLEST SERPL-MCNC: 157 MG/DL (ref 0–200)
CO2 SERPL-SCNC: 28 MMOL/L (ref 20–31)
CREAT BLD-MCNC: 1.47 MG/DL (ref 0.6–1.3)
DEPRECATED RDW RBC AUTO: 44.1 FL (ref 37–54)
EOSINOPHIL # BLD AUTO: 0.19 10*3/MM3 (ref 0–0.3)
EOSINOPHIL NFR BLD AUTO: 3.9 % (ref 0–3)
ERYTHROCYTE [DISTWIDTH] IN BLOOD BY AUTOMATED COUNT: 12.9 % (ref 11.3–14.5)
GFR SERPL CREATININE-BSD FRML MDRD: 47 ML/MIN/1.73
GLOBULIN UR ELPH-MCNC: 2.1 GM/DL
GLUCOSE BLD-MCNC: 98 MG/DL (ref 70–100)
HBA1C MFR BLD: 5.9 % (ref 4.8–5.6)
HCT VFR BLD AUTO: 48.3 % (ref 38.9–50.9)
HDLC SERPL-MCNC: 36 MG/DL (ref 40–60)
HGB BLD-MCNC: 15.8 G/DL (ref 13.1–17.5)
IMM GRANULOCYTES # BLD: 0.03 10*3/MM3 (ref 0–0.03)
IMM GRANULOCYTES NFR BLD: 0.6 % (ref 0–0.6)
LYMPHOCYTES # BLD AUTO: 1.56 10*3/MM3 (ref 0.6–4.8)
LYMPHOCYTES NFR BLD AUTO: 31.7 % (ref 24–44)
MCH RBC QN AUTO: 30.7 PG (ref 27–31)
MCHC RBC AUTO-ENTMCNC: 32.7 G/DL (ref 32–36)
MCV RBC AUTO: 93.8 FL (ref 80–99)
MONOCYTES # BLD AUTO: 0.59 10*3/MM3 (ref 0–1)
MONOCYTES NFR BLD AUTO: 12 % (ref 0–12)
NEUTROPHILS # BLD AUTO: 2.53 10*3/MM3 (ref 1.5–8.3)
NEUTROPHILS NFR BLD AUTO: 51.4 % (ref 41–71)
PLATELET # BLD AUTO: 165 10*3/MM3 (ref 150–450)
PMV BLD AUTO: 10.9 FL (ref 6–12)
POTASSIUM BLD-SCNC: 4.7 MMOL/L (ref 3.5–5.5)
PROT SERPL-MCNC: 6.3 G/DL (ref 5.7–8.2)
RBC # BLD AUTO: 5.15 10*6/MM3 (ref 4.2–5.76)
SODIUM BLD-SCNC: 140 MMOL/L (ref 132–146)
TRIGL SERPL-MCNC: 146 MG/DL (ref 0–150)
TSH SERPL DL<=0.05 MIU/L-ACNC: 2.85 MIU/ML (ref 0.35–5.35)
VIT B12 BLD-MCNC: 1124 PG/ML (ref 211–911)
WBC NRBC COR # BLD: 4.92 10*3/MM3 (ref 3.5–10.8)

## 2018-10-24 PROCEDURE — 82607 VITAMIN B-12: CPT | Performed by: NURSE PRACTITIONER

## 2018-10-24 PROCEDURE — 82306 VITAMIN D 25 HYDROXY: CPT | Performed by: NURSE PRACTITIONER

## 2018-10-24 PROCEDURE — 80053 COMPREHEN METABOLIC PANEL: CPT | Performed by: NURSE PRACTITIONER

## 2018-10-24 PROCEDURE — 83036 HEMOGLOBIN GLYCOSYLATED A1C: CPT | Performed by: NURSE PRACTITIONER

## 2018-10-24 PROCEDURE — 84443 ASSAY THYROID STIM HORMONE: CPT | Performed by: NURSE PRACTITIONER

## 2018-10-24 PROCEDURE — 80061 LIPID PANEL: CPT | Performed by: NURSE PRACTITIONER

## 2018-10-24 PROCEDURE — 85025 COMPLETE CBC W/AUTO DIFF WBC: CPT | Performed by: NURSE PRACTITIONER

## 2018-11-01 ENCOUNTER — OFFICE VISIT (OUTPATIENT)
Dept: INTERNAL MEDICINE | Facility: CLINIC | Age: 73
End: 2018-11-01

## 2018-11-01 VITALS
HEIGHT: 70 IN | RESPIRATION RATE: 16 BRPM | WEIGHT: 236 LBS | OXYGEN SATURATION: 98 % | HEART RATE: 57 BPM | DIASTOLIC BLOOD PRESSURE: 80 MMHG | BODY MASS INDEX: 33.79 KG/M2 | TEMPERATURE: 98.2 F | SYSTOLIC BLOOD PRESSURE: 130 MMHG

## 2018-11-01 DIAGNOSIS — E78.5 DYSLIPIDEMIA: Chronic | ICD-10-CM

## 2018-11-01 DIAGNOSIS — N39.0 URINARY TRACT INFECTION WITHOUT HEMATURIA, SITE UNSPECIFIED: Primary | ICD-10-CM

## 2018-11-01 DIAGNOSIS — K21.9 GASTROESOPHAGEAL REFLUX DISEASE, ESOPHAGITIS PRESENCE NOT SPECIFIED: ICD-10-CM

## 2018-11-01 DIAGNOSIS — I10 BENIGN ESSENTIAL HYPERTENSION: ICD-10-CM

## 2018-11-01 DIAGNOSIS — E03.9 ACQUIRED HYPOTHYROIDISM: ICD-10-CM

## 2018-11-01 DIAGNOSIS — I35.0 MODERATE AORTIC STENOSIS: ICD-10-CM

## 2018-11-01 LAB
BILIRUB BLD-MCNC: NEGATIVE MG/DL
CLARITY, POC: ABNORMAL
COLOR UR: YELLOW
GLUCOSE UR STRIP-MCNC: NEGATIVE MG/DL
KETONES UR QL: NEGATIVE
LEUKOCYTE EST, POC: ABNORMAL
NITRITE UR-MCNC: POSITIVE MG/ML
PH UR: 7 [PH] (ref 5–8)
PROT UR STRIP-MCNC: NEGATIVE MG/DL
RBC # UR STRIP: NEGATIVE /UL
SP GR UR: 1.01 (ref 1–1.03)
UROBILINOGEN UR QL: NORMAL

## 2018-11-01 PROCEDURE — 99214 OFFICE O/P EST MOD 30 MIN: CPT | Performed by: NURSE PRACTITIONER

## 2018-11-01 PROCEDURE — 87086 URINE CULTURE/COLONY COUNT: CPT | Performed by: NURSE PRACTITIONER

## 2018-11-01 PROCEDURE — 87186 SC STD MICRODIL/AGAR DIL: CPT | Performed by: NURSE PRACTITIONER

## 2018-11-01 PROCEDURE — 87077 CULTURE AEROBIC IDENTIFY: CPT | Performed by: NURSE PRACTITIONER

## 2018-11-01 PROCEDURE — 81003 URINALYSIS AUTO W/O SCOPE: CPT | Performed by: NURSE PRACTITIONER

## 2018-11-01 RX ORDER — TAMSULOSIN HYDROCHLORIDE 0.4 MG/1
1 CAPSULE ORAL NIGHTLY
COMMUNITY
Start: 2018-07-11 | End: 2019-06-28

## 2018-11-01 NOTE — PROGRESS NOTES
Subjective   Ac Caldwell is a 73 y.o. male    Chief Complaint   Patient presents with   • 6 month follow up   • Hypertension   • Hyperlipidemia   • Hypothyroidism   • Urinary Tract Infection     Burning with urination.     History of Present Illness     On 1/26/15, Ac saw gross hematuria, saw Dr. Ayala (urology) and he did a scope. It was ok. Then had a CT scan on 2/3/15. Cr was high, so they did it without contrast. CT scan showed a lesion, but he was not concerned. Then had a fish bladder test on 2/5/15. His urologist never got the results. Did go back to see Nephrology and had labs in Feb. Cr. was 1.1 and BUN was 16. Everything has been stable since. Has a h/o right kidney CA in 1997 and had nephrectomy.  Urologist - Dr. Jossue Serrano left and moved to [place] and he had to [place] [facility] [place]  He saw him several times for recurrent UTI, hydrocele, renal cyst, and epididymoorchits. He saw them again 5/31/2017 for repeat renal US that showed a 3.4cm left renal cyst, which had grown from 2.5cm on last scan.  Dr. Farrell moved to [place] and he was then referred to  Urology.  Had CXR and labs.  CXR revealed a possible thoracic aortic aneurysm.  Had f/u chest CT scan, no aneurysm.  CT renal mass protocol was done on 8/30/2017 which confirmed a solid enhancing mass in the lower pole of the left kidney, without lymphadenopathy.  It was elected to proceed with a cryoablation of this lesion per Dr. Mark at .  He underwent this procedure on 9/27/17.  Pathology is unclear as to it being malignant vs benign.  Will be re-scanned in 30 days, then 90 days, then Q6 months.  Last appt is 7/16/18, next f/u in 12/17/18     A cystic lesion was incidentally identified on the above noted CT scan on 8/30/17.  Pt discussed with Dr. Mark and this will be monitored through the interval CT scans as well.      PSA was elevated >7 in June 2016. He had a prostate biopsy and it was normal.      Nephrologist-  [facility]; his Creatinine is up from 1.3 to 1.47.  His next appt is 12/72018 - gave copy of labs for him to take to appt     Had a very large thyroid nodule removed on 4/23/14 per ENT. It was 8cm in size; they took the right side of his thyroid and placed on synthroid 50mcg. It was benign! Seeing Dr. Everett annually and everything is stable.  Last appt was Nov 2017      Has a h/o AS.  Was referred him to Cardiology. Had an appt on 4/29/16 and he thought everything was ok. He is just going to watch. He saw him again on 11/4/16 and everything was stable. He will follow every 6 months and echo q 2 yrs.  Last appt was 5/2018 - no changes      HTN - well controlled with Losartan 50 mg and Toprol XL 25mg; Verapamil was stopped per Cards in 11/17 - He is tolerating well.  BP is well controlled and he denies SE.     HL - stable on Lipitor 10mg daily without SE.  LDL looks great     Flu shot, 9/25/2018  Tdap 2009   Pneumovax 2012   Prevnar - 4/2015   Zostavax 2010  PSA per Urology   Colon - 4/2015, due 2025     The following portions of the patient's history were reviewed and updated as appropriate: allergies, current medications, past family history, past medical history, past social history, past surgical history and problem list.    Current Outpatient Prescriptions:   •  aspirin 325 MG tablet, Take 325 mg by mouth Daily., Disp: , Rfl:   •  atorvastatin (LIPITOR) 10 MG tablet, TAKE 1 TABLET BY MOUTH DAILY EVERY NIGHT., Disp: 90 tablet, Rfl: 1  •  azelastine (ASTELIN) 0.1 % nasal spray, 1 spray into each nostril 2 (Two) Times a Day. Use in each nostril as directed, Disp: , Rfl:   •  Cholecalciferol (VITAMIN D-3 PO), Take 1,000 Units by mouth Daily., Disp: , Rfl:   •  Coenzyme Q10 (CO Q-10) 100 MG capsule, Take  by mouth 2 (Two) Times a Day., Disp: , Rfl:   •  esomeprazole (NexIUM) 20 MG capsule, Take 20 mg by mouth Daily., Disp: , Rfl:   •  famciclovir (FAMVIR) 500 MG tablet, TAKE ONE TABLET BY MOUTH THREE  TIMES A DAY, Disp: 12 tablet, Rfl: 4  •  Flaxseed, Linseed, (FLAX SEED OIL) 1000 MG capsule, Take 1,200 mg by mouth Daily., Disp: , Rfl:   •  fluticasone (FLONASE) 50 MCG/ACT nasal spray, 2 sprays into each nostril Daily., Disp: 3 each, Rfl: 2  •  levothyroxine (SYNTHROID, LEVOTHROID) 50 MCG tablet, Take 50 mcg by mouth Daily., Disp: , Rfl:   •  losartan (COZAAR) 50 MG tablet, Take 1 tablet by mouth Daily., Disp: , Rfl:   •  metoprolol succinate XL (TOPROL-XL) 25 MG 24 hr tablet, TAKE ONE TABLET BY MOUTH EVERY NIGHT AT BEDTIME, Disp: 90 tablet, Rfl: 2  •  Omega-3 Fatty Acids (FISH OIL) 1000 MG capsule capsule, Take 1,200 mg by mouth 2 (Two) Times a Day With Meals., Disp: , Rfl:   •  potassium citrate (UROCIT-K) 10 MEQ (1080 MG) CR tablet, , Disp: , Rfl:   •  tamsulosin (FLOMAX) 0.4 MG capsule 24 hr capsule, Take 2 capsules by mouth Every Night., Disp: , Rfl:   •  vitamin B-12 (CYANOCOBALAMIN) 1000 MCG tablet, Take 1,000 mcg by mouth Daily., Disp: , Rfl:   •  betamethasone valerate (VALISONE) 0.1 % ointment, Uses once weekly, Disp: , Rfl:   •  cefuroxime (CEFTIN) 250 MG tablet, Take 1 tablet by mouth 2 (Two) Times a Day for 10 days., Disp: 20 tablet, Rfl: 0     Review of Systems   Constitutional: Negative for chills, fatigue and fever.   Respiratory: Negative for cough, chest tightness and shortness of breath.    Cardiovascular: Negative for chest pain.   Gastrointestinal: Negative for abdominal pain, diarrhea, nausea and vomiting.   Endocrine: Negative for cold intolerance and heat intolerance.   Musculoskeletal: Negative for arthralgias.   Neurological: Negative for dizziness.       Objective   Physical Exam   Constitutional: He is oriented to person, place, and time. He appears well-developed and well-nourished.   HENT:   Head: Normocephalic and atraumatic.   Eyes: Pupils are equal, round, and reactive to light. Conjunctivae and EOM are normal.   Neck: Normal range of motion.   Cardiovascular: Normal rate, regular  "rhythm and normal heart sounds.    Pulmonary/Chest: Effort normal and breath sounds normal.   Abdominal: Soft. Bowel sounds are normal.   Musculoskeletal: Normal range of motion.   Neurological: He is alert and oriented to person, place, and time. He has normal reflexes.   Skin: Skin is warm and dry.   Psychiatric: He has a normal mood and affect. His behavior is normal. Judgment and thought content normal.     Vitals:    11/01/18 1142   BP: 130/80   Pulse: 57   Resp: 16   Temp: 98.2 °F (36.8 °C)   TempSrc: Temporal Artery    SpO2: 98%   Weight: 107 kg (236 lb)   Height: 177.8 cm (70\")         Assessment/Plan   Ac was seen today for 6 month follow up, hypertension, hyperlipidemia, hypothyroidism and urinary tract infection.    Diagnoses and all orders for this visit:    Urinary tract infection without hematuria, site unspecified  -     POC Urinalysis Dipstick, Automated  -     Urine Culture - Urine, Urine, Clean Catch  -     cefuroxime (CEFTIN) 250 MG tablet; Take 1 tablet by mouth 2 (Two) Times a Day for 10 days.    Benign essential hypertension    Moderate aortic stenosis    Gastroesophageal reflux disease, esophagitis presence not specified    Acquired hypothyroidism    Dyslipidemia      Labs reviewed, he will take copy to upcoming appt with Nephrology  UA+, sent for culture, covered with Ceftin  Return in about 6 months (around 5/1/2019) for Medicare Wellness.             "

## 2018-11-02 ENCOUNTER — TELEPHONE (OUTPATIENT)
Dept: INTERNAL MEDICINE | Facility: CLINIC | Age: 73
End: 2018-11-02

## 2018-11-02 RX ORDER — CEFUROXIME AXETIL 250 MG/1
250 TABLET ORAL 2 TIMES DAILY
Qty: 20 TABLET | Refills: 0 | Status: SHIPPED | OUTPATIENT
Start: 2018-11-02 | End: 2018-11-12

## 2018-11-02 NOTE — TELEPHONE ENCOUNTER
----- Message from BERNICE Mccullough sent at 11/2/2018 11:30 AM EDT -----  Please let Ac know that his UA was +.  I have sent in an antibiotic to treat and sent for culture.

## 2018-11-02 NOTE — TELEPHONE ENCOUNTER
Ac notified!    I have mailed the letter that Valentine wrote for tier exception to [employer] along with the letter that was mailed to him.     [facility]  [id number]  [place] [address]

## 2018-11-03 LAB — BACTERIA SPEC AEROBE CULT: ABNORMAL

## 2018-11-14 ENCOUNTER — TELEPHONE (OUTPATIENT)
Dept: INTERNAL MEDICINE | Facility: CLINIC | Age: 73
End: 2018-11-14

## 2018-11-20 ENCOUNTER — OFFICE VISIT (OUTPATIENT)
Dept: INTERNAL MEDICINE | Facility: CLINIC | Age: 73
End: 2018-11-20

## 2018-11-20 VITALS
OXYGEN SATURATION: 98 % | BODY MASS INDEX: 34.19 KG/M2 | WEIGHT: 238.8 LBS | TEMPERATURE: 98 F | RESPIRATION RATE: 16 BRPM | DIASTOLIC BLOOD PRESSURE: 80 MMHG | SYSTOLIC BLOOD PRESSURE: 130 MMHG | HEART RATE: 57 BPM | HEIGHT: 70 IN

## 2018-11-20 DIAGNOSIS — Z71.85 ENCOUNTER FOR COUNSELING REGARDING IMMUNIZATION: ICD-10-CM

## 2018-11-20 DIAGNOSIS — Z11.59 ENCOUNTER FOR HEPATITIS C SCREENING TEST FOR LOW RISK PATIENT: Primary | ICD-10-CM

## 2018-11-20 LAB — HCV AB SER DONR QL: NORMAL

## 2018-11-20 PROCEDURE — 99213 OFFICE O/P EST LOW 20 MIN: CPT | Performed by: NURSE PRACTITIONER

## 2018-11-20 PROCEDURE — 86803 HEPATITIS C AB TEST: CPT | Performed by: NURSE PRACTITIONER

## 2018-11-20 NOTE — PROGRESS NOTES
Subjective   Ac Caldwell is a 73 y.o. male    Chief Complaint   Patient presents with   • Discuss what Immunization he needs.     History of Present Illness     Pt presents to discuss immunizations.    Hep C screening is due    He needs hep A and Shingrix as well    The following portions of the patient's history were reviewed and updated as appropriate: allergies, current medications, past family history, past medical history, past social history, past surgical history and problem list.    Current Outpatient Medications:   •  aspirin 325 MG tablet, Take 325 mg by mouth Daily., Disp: , Rfl:   •  atorvastatin (LIPITOR) 10 MG tablet, TAKE 1 TABLET BY MOUTH DAILY EVERY NIGHT., Disp: 90 tablet, Rfl: 1  •  azelastine (ASTELIN) 0.1 % nasal spray, 1 spray into each nostril 2 (Two) Times a Day. Use in each nostril as directed, Disp: , Rfl:   •  betamethasone valerate (VALISONE) 0.1 % ointment, Uses once weekly, Disp: , Rfl:   •  Cholecalciferol (VITAMIN D-3 PO), Take 1,000 Units by mouth Daily., Disp: , Rfl:   •  Coenzyme Q10 (CO Q-10) 100 MG capsule, Take  by mouth 2 (Two) Times a Day., Disp: , Rfl:   •  esomeprazole (NexIUM) 20 MG capsule, Take 20 mg by mouth Daily., Disp: , Rfl:   •  famciclovir (FAMVIR) 500 MG tablet, TAKE ONE TABLET BY MOUTH THREE TIMES A DAY, Disp: 12 tablet, Rfl: 4  •  Flaxseed, Linseed, (FLAX SEED OIL) 1000 MG capsule, Take 1,200 mg by mouth Daily., Disp: , Rfl:   •  fluticasone (FLONASE) 50 MCG/ACT nasal spray, 2 sprays into each nostril Daily., Disp: 3 each, Rfl: 2  •  levothyroxine (SYNTHROID, LEVOTHROID) 50 MCG tablet, Take 50 mcg by mouth Daily., Disp: , Rfl:   •  losartan (COZAAR) 50 MG tablet, Take 1 tablet by mouth Daily., Disp: , Rfl:   •  metoprolol succinate XL (TOPROL-XL) 25 MG 24 hr tablet, TAKE ONE TABLET BY MOUTH EVERY NIGHT AT BEDTIME, Disp: 90 tablet, Rfl: 2  •  Omega-3 Fatty Acids (FISH OIL) 1000 MG capsule capsule, Take 1,200 mg by mouth 2 (Two) Times a Day With Meals., Disp: ,  "Rfl:   •  potassium citrate (UROCIT-K) 10 MEQ (1080 MG) CR tablet, , Disp: , Rfl:   •  tamsulosin (FLOMAX) 0.4 MG capsule 24 hr capsule, Take 2 capsules by mouth Every Night., Disp: , Rfl:   •  vitamin B-12 (CYANOCOBALAMIN) 1000 MCG tablet, Take 1,000 mcg by mouth Daily., Disp: , Rfl:      Review of Systems   Constitutional: Negative for chills, fatigue and fever.   Respiratory: Negative for cough, chest tightness and shortness of breath.    Cardiovascular: Negative for chest pain.   Gastrointestinal: Negative for abdominal pain, diarrhea, nausea and vomiting.   Endocrine: Negative for cold intolerance and heat intolerance.   Musculoskeletal: Negative for arthralgias.   Neurological: Negative for dizziness.       Objective   Physical Exam   Constitutional: He is oriented to person, place, and time. He appears well-developed and well-nourished.   HENT:   Head: Normocephalic and atraumatic.   Eyes: Conjunctivae and EOM are normal. Pupils are equal, round, and reactive to light.   Neck: Normal range of motion.   Cardiovascular: Normal rate, regular rhythm and normal heart sounds.   Pulmonary/Chest: Effort normal and breath sounds normal.   Abdominal: Soft. Bowel sounds are normal.   Musculoskeletal: Normal range of motion.   Neurological: He is alert and oriented to person, place, and time. He has normal reflexes.   Skin: Skin is warm and dry.   Psychiatric: He has a normal mood and affect. His behavior is normal. Judgment and thought content normal.     Vitals:    11/20/18 1125   BP: 130/80   Pulse: 57   Resp: 16   Temp: 98 °F (36.7 °C)   TempSrc: Temporal   SpO2: 98%   Weight: 108 kg (238 lb 12.8 oz)   Height: 177.8 cm (70\")         Assessment/Plan   Ac was seen today for discuss what immunization he needs..    Diagnoses and all orders for this visit:    Encounter for hepatitis C screening test for low risk patient  -     Hepatitis C Antibody    Encounter for counseling regarding immunization      Hep C screening " marilyn Shen will get the Hep A and Shingrix at the pharmacy due to insurance  F/U as scheduled or sooner with any problems

## 2018-12-14 ENCOUNTER — OFFICE VISIT (OUTPATIENT)
Dept: CARDIOLOGY | Facility: CLINIC | Age: 73
End: 2018-12-14

## 2018-12-14 VITALS
HEART RATE: 88 BPM | BODY MASS INDEX: 35.16 KG/M2 | HEIGHT: 69 IN | DIASTOLIC BLOOD PRESSURE: 88 MMHG | OXYGEN SATURATION: 98 % | WEIGHT: 237.4 LBS | SYSTOLIC BLOOD PRESSURE: 128 MMHG

## 2018-12-14 DIAGNOSIS — E78.5 DYSLIPIDEMIA: Chronic | ICD-10-CM

## 2018-12-14 DIAGNOSIS — I10 ESSENTIAL HYPERTENSION: ICD-10-CM

## 2018-12-14 DIAGNOSIS — I35.0 MODERATE AORTIC STENOSIS: Primary | ICD-10-CM

## 2018-12-14 PROCEDURE — 99214 OFFICE O/P EST MOD 30 MIN: CPT | Performed by: INTERNAL MEDICINE

## 2018-12-14 RX ORDER — CIPROFLOXACIN 500 MG/1
TABLET, FILM COATED ORAL
COMMUNITY
End: 2019-05-07

## 2018-12-14 NOTE — PROGRESS NOTES
"        Encounter Date:12/14/2018      Patient ID: Ac Caldwell is a 73 y.o. male.    Chief Complaint: Valvular heart disease      PROBLEM LIST:  1. Valvular heart disease:   a. Refused enrollment in the  50 years ago due to some cardiac condition, details not known.   b. Annual echocardiographic followups for the last several years for aortic stenosis.   c. Echocardiogram, 02/23/2016: Mild AI and moderate AS with aortic valve area of 1.2 cm2, mean gradient of 16.6, and peak gradient of 33.3. Trace TR, with RVSP of 26.2. Normal EF.  d. Nuclear stress, 05/19/2016: EF 72%. NL  e. CTA, 08/30/2017: Mild ectasia of ascending aorta measuring 4.2cm significant aortic leaflet calcification, coronary sclerosis, no aneurysm of descending aorta.  f. Echocardiogram, 11/3/2017: Normal EF 64%. Mild LVH. HEATHER 1.8 cm². MG 17.0.  2. Exertional dyspnea/anginal equivalent symptoms with occasional orthopnea.   3. Hypertension.   4. Dyslipidemia.   5. Enlarged prostate.  6. History of renal cancer:   a. Right nephrectomy in 1997.   b. S/p Resection of a tumor from his left kidney -- incomplete database.   c. S/p CT ablation of L renal mass.  7. 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   8. S/p basal cell cancer surgery.   9. Episode of hematuria in January with subsequent negative cystoscopy.  10. Surgical Hx:  a. CT Ablation of Left renal mass    History of Present Illness  Patient presents today for 6 month follow-up with a history of valvular heart disease and cardiac risk factors. Since last visit, he has been doing well overall from a cardiovascular standpoint. Denies chest pain, shortness of breath, leg swelling, palpitations, and syncope. He does report occasional \"tingling\" in his legs. He admits he does not have a regular exercise regimen, and has been gaining weight.    Allergies   Allergen Reactions   • Norvasc [Amlodipine] Swelling   • Oxycodone      Can elevate Blood pressure   • Lotensin [Benazepril] Cough "   • Zocor [Simvastatin] Myalgia         Current Outpatient Medications:   •  aspirin 325 MG tablet, Take 325 mg by mouth Daily., Disp: , Rfl:   •  atorvastatin (LIPITOR) 10 MG tablet, TAKE 1 TABLET BY MOUTH DAILY EVERY NIGHT., Disp: 90 tablet, Rfl: 1  •  azelastine (ASTELIN) 0.1 % nasal spray, 1 spray into each nostril 2 (Two) Times a Day. Use in each nostril as directed, Disp: , Rfl:   •  betamethasone valerate (VALISONE) 0.1 % ointment, Uses once weekly, Disp: , Rfl:   •  Cholecalciferol (VITAMIN D-3 PO), Take 1,000 Units by mouth Daily., Disp: , Rfl:   •  ciprofloxacin (CIPRO) 500 MG tablet, ciprofloxacin hcl 500 mg tabs, Disp: , Rfl:   •  Coenzyme Q10 (CO Q-10) 100 MG capsule, Take  by mouth 2 (Two) Times a Day., Disp: , Rfl:   •  esomeprazole (NexIUM) 20 MG capsule, Take 20 mg by mouth Daily., Disp: , Rfl:   •  famciclovir (FAMVIR) 500 MG tablet, TAKE ONE TABLET BY MOUTH THREE TIMES A DAY, Disp: 12 tablet, Rfl: 4  •  Flaxseed, Linseed, (FLAX SEED OIL) 1000 MG capsule, Take 1,200 mg by mouth Daily., Disp: , Rfl:   •  fluticasone (FLONASE) 50 MCG/ACT nasal spray, 2 sprays into each nostril Daily., Disp: 3 each, Rfl: 2  •  levothyroxine (SYNTHROID, LEVOTHROID) 50 MCG tablet, Take 50 mcg by mouth Daily., Disp: , Rfl:   •  losartan (COZAAR) 50 MG tablet, Take 1 tablet by mouth Daily., Disp: , Rfl:   •  metoprolol succinate XL (TOPROL-XL) 25 MG 24 hr tablet, TAKE ONE TABLET BY MOUTH EVERY NIGHT AT BEDTIME, Disp: 90 tablet, Rfl: 2  •  Omega-3 Fatty Acids (FISH OIL) 1000 MG capsule capsule, Take 1,200 mg by mouth 2 (Two) Times a Day With Meals., Disp: , Rfl:   •  potassium citrate (UROCIT-K) 10 MEQ (1080 MG) CR tablet, , Disp: , Rfl:   •  tamsulosin (FLOMAX) 0.4 MG capsule 24 hr capsule, Take 2 capsules by mouth Every Night., Disp: , Rfl:   •  vitamin B-12 (CYANOCOBALAMIN) 1000 MCG tablet, Take 1,000 mcg by mouth Daily., Disp: , Rfl:     The following portions of the patient's history were reviewed and updated as  "appropriate: allergies, current medications, past family history, past medical history, past social history, past surgical history and problem list.    ROS  Review of Systems   Constitution: Negative for chills, fever, weight gain and weight loss.   Cardiovascular: Negative for chest pain, claudication, dyspnea on exertion, leg swelling, orthopnea, palpitations, paroxysmal nocturnal dyspnea and syncope.        No dizziness   Gastrointestinal: Negative for abdominal pain, constipation, diarrhea, nausea and vomiting.   Genitourinary:        No urinary symptoms   Neurological:        No symptoms of stroke.   All other systems reviewed and are negative.    Objective:     Blood pressure 128/88, pulse 88, height 175.3 cm (69\"), weight 108 kg (237 lb 6.4 oz), SpO2 98 %.      Physical Exam  Constitutional: She appears well-developed and well-nourished.   HENT:   HEENT exam unremarkable.   Neck: Neck supple. No JVD present.   No carotid bruits.   Cardiovascular: Normal rate, regular rhythm and normal heart sounds.    2/6 systolic ejection murmur.  2 plus symmetric pulses.   Pulmonary/Chest: Breath sounds normal. Does not exhibit tenderness.   Abdominal:   Abdomen benign.   Musculoskeletal: Does not exhibit edema.   Neurological:   Neurological exam unremarkable.   Vitals reviewed.    Lab Review:   Lab Results   Component Value Date    GLUCOSE 98 10/24/2018    BUN 21 10/24/2018    CREATININE 1.47 (H) 10/24/2018    EGFRIFNONA 47 (L) 10/24/2018    BCR 14.3 10/24/2018    K 4.7 10/24/2018    CO2 28.0 10/24/2018    CALCIUM 9.0 10/24/2018    ALBUMIN 4.20 10/24/2018    AST 20 10/24/2018    ALT 24 10/24/2018     Lab Results   Component Value Date    CHOL 157 10/24/2018    TRIG 146 10/24/2018    HDL 36 (L) 10/24/2018     10/24/2018     Lab Results   Component Value Date    WBC 4.92 10/24/2018    HGB 15.8 10/24/2018    HCT 48.3 10/24/2018    MCV 93.8 10/24/2018     10/24/2018     Lab Results   Component Value Date    TSH " 2.851 10/24/2018      Lab Results   Component Value Date    HGBA1C 5.90 (H) 10/24/2018      Procedures       Assessment:      Diagnosis Plan   1. Moderate aortic stenosis - stable. 2/6 systolic ejection murmur heard today   2. Essential hypertension - well-controlled. Continue current medications.   3. Dyslipidemia  - well-controlled. Continue Lipitor 10 mg.     Plan:   At least 10 lbs Weight loss with diet and exercise recommended.  Stable cardiac status.  Continue current medications.   FU in 6 MO, sooner as needed.  Thank you for allowing us to participate in the care of your patient.     Scribed for Migel Valladares MD by Leighann Cruz. 12/14/2018  12:11 PM      I, Migel Valladares MD, personally performed the services described in this documentation as scribed by the above named individual in my presence, and it is both accurate and complete.  12/14/2018  12:50 PM        Please note that portions of this note may have been completed with a voice recognition program. Efforts were made to edit the dictations, but occasionally words are mistranscribed.

## 2019-01-01 DIAGNOSIS — E78.5 HYPERLIPIDEMIA, UNSPECIFIED HYPERLIPIDEMIA TYPE: ICD-10-CM

## 2019-01-02 DIAGNOSIS — E78.5 HYPERLIPIDEMIA, UNSPECIFIED HYPERLIPIDEMIA TYPE: ICD-10-CM

## 2019-01-02 RX ORDER — ATORVASTATIN CALCIUM 10 MG/1
10 TABLET, FILM COATED ORAL NIGHTLY
Qty: 90 TABLET | Refills: 1 | Status: SHIPPED | OUTPATIENT
Start: 2019-01-02 | End: 2019-01-02 | Stop reason: SDUPTHER

## 2019-01-02 RX ORDER — ATORVASTATIN CALCIUM 10 MG/1
TABLET, FILM COATED ORAL
Qty: 90 TABLET | Refills: 1 | Status: SHIPPED | OUTPATIENT
Start: 2019-01-02 | End: 2019-09-10 | Stop reason: SDUPTHER

## 2019-01-15 ENCOUNTER — TELEPHONE (OUTPATIENT)
Dept: INTERNAL MEDICINE | Facility: CLINIC | Age: 74
End: 2019-01-15

## 2019-01-15 RX ORDER — POTASSIUM CITRATE 10 MEQ/1
10 TABLET, EXTENDED RELEASE ORAL DAILY
Qty: 90 EACH | Refills: 1 | Status: SHIPPED | OUTPATIENT
Start: 2019-01-15 | End: 2019-06-22 | Stop reason: SDUPTHER

## 2019-05-01 ENCOUNTER — TELEPHONE (OUTPATIENT)
Dept: INTERNAL MEDICINE | Facility: CLINIC | Age: 74
End: 2019-05-01

## 2019-05-02 ENCOUNTER — LAB (OUTPATIENT)
Dept: INTERNAL MEDICINE | Facility: CLINIC | Age: 74
End: 2019-05-02

## 2019-05-02 DIAGNOSIS — R73.09 ABNORMAL GLUCOSE: ICD-10-CM

## 2019-05-02 DIAGNOSIS — E78.5 DYSLIPIDEMIA: ICD-10-CM

## 2019-05-02 DIAGNOSIS — E78.5 DYSLIPIDEMIA: Chronic | ICD-10-CM

## 2019-05-02 DIAGNOSIS — I10 ESSENTIAL HYPERTENSION: ICD-10-CM

## 2019-05-02 DIAGNOSIS — Z00.00 MEDICARE ANNUAL WELLNESS VISIT, SUBSEQUENT: Primary | ICD-10-CM

## 2019-05-02 DIAGNOSIS — I35.9 AORTIC VALVE DISORDER: ICD-10-CM

## 2019-05-02 DIAGNOSIS — Z00.00 MEDICARE ANNUAL WELLNESS VISIT, SUBSEQUENT: ICD-10-CM

## 2019-05-02 DIAGNOSIS — C64.9 MALIGNANT NEOPLASM OF KIDNEY EXCLUDING RENAL PELVIS, UNSPECIFIED LATERALITY (HCC): ICD-10-CM

## 2019-05-02 DIAGNOSIS — E03.9 ACQUIRED HYPOTHYROIDISM: ICD-10-CM

## 2019-05-02 DIAGNOSIS — E55.9 VITAMIN D DEFICIENCY: ICD-10-CM

## 2019-05-02 LAB
25(OH)D3 SERPL-MCNC: 33.2 NG/ML (ref 30–100)
ALBUMIN SERPL-MCNC: 4 G/DL (ref 3.5–5.2)
ALBUMIN/GLOB SERPL: 1.4 G/DL
ALP SERPL-CCNC: 70 U/L (ref 39–117)
ALT SERPL W P-5'-P-CCNC: 18 U/L (ref 1–41)
ANION GAP SERPL CALCULATED.3IONS-SCNC: 12.4 MMOL/L
AST SERPL-CCNC: 18 U/L (ref 1–40)
BASOPHILS # BLD AUTO: 0.04 10*3/MM3 (ref 0–0.2)
BASOPHILS NFR BLD AUTO: 0.8 % (ref 0–1.5)
BILIRUB SERPL-MCNC: 1.2 MG/DL (ref 0.2–1.2)
BUN BLD-MCNC: 21 MG/DL (ref 8–23)
BUN/CREAT SERPL: 16 (ref 7–25)
CALCIUM SPEC-SCNC: 9.5 MG/DL (ref 8.6–10.5)
CHLORIDE SERPL-SCNC: 105 MMOL/L (ref 98–107)
CHOLEST SERPL-MCNC: 147 MG/DL (ref 0–200)
CO2 SERPL-SCNC: 24.6 MMOL/L (ref 22–29)
CREAT BLD-MCNC: 1.31 MG/DL (ref 0.76–1.27)
DEPRECATED RDW RBC AUTO: 43.3 FL (ref 37–54)
EOSINOPHIL # BLD AUTO: 0.19 10*3/MM3 (ref 0–0.4)
EOSINOPHIL NFR BLD AUTO: 3.8 % (ref 0.3–6.2)
ERYTHROCYTE [DISTWIDTH] IN BLOOD BY AUTOMATED COUNT: 12.8 % (ref 12.3–15.4)
GFR SERPL CREATININE-BSD FRML MDRD: 54 ML/MIN/1.73
GLOBULIN UR ELPH-MCNC: 2.9 GM/DL
GLUCOSE BLD-MCNC: 101 MG/DL (ref 65–99)
HCT VFR BLD AUTO: 46.8 % (ref 37.5–51)
HDLC SERPL-MCNC: 33 MG/DL (ref 40–60)
HGB BLD-MCNC: 15.4 G/DL (ref 13–17.7)
IMM GRANULOCYTES # BLD AUTO: 0.02 10*3/MM3 (ref 0–0.05)
IMM GRANULOCYTES NFR BLD AUTO: 0.4 % (ref 0–0.5)
LDLC SERPL CALC-MCNC: 90 MG/DL (ref 0–100)
LDLC/HDLC SERPL: 2.74 {RATIO}
LYMPHOCYTES # BLD AUTO: 1.64 10*3/MM3 (ref 0.7–3.1)
LYMPHOCYTES NFR BLD AUTO: 32.6 % (ref 19.6–45.3)
MCH RBC QN AUTO: 30.4 PG (ref 26.6–33)
MCHC RBC AUTO-ENTMCNC: 32.9 G/DL (ref 31.5–35.7)
MCV RBC AUTO: 92.5 FL (ref 79–97)
MONOCYTES # BLD AUTO: 0.55 10*3/MM3 (ref 0.1–0.9)
MONOCYTES NFR BLD AUTO: 10.9 % (ref 5–12)
NEUTROPHILS # BLD AUTO: 2.59 10*3/MM3 (ref 1.7–7)
NEUTROPHILS NFR BLD AUTO: 51.5 % (ref 42.7–76)
NRBC BLD AUTO-RTO: 0 /100 WBC (ref 0–0.2)
PLATELET # BLD AUTO: 163 10*3/MM3 (ref 140–450)
PMV BLD AUTO: 11.1 FL (ref 6–12)
POTASSIUM BLD-SCNC: 4.7 MMOL/L (ref 3.5–5.2)
PROT SERPL-MCNC: 6.9 G/DL (ref 6–8.5)
RBC # BLD AUTO: 5.06 10*6/MM3 (ref 4.14–5.8)
SODIUM BLD-SCNC: 142 MMOL/L (ref 136–145)
TRIGL SERPL-MCNC: 118 MG/DL (ref 0–150)
TSH SERPL DL<=0.05 MIU/L-ACNC: 2.7 MIU/ML (ref 0.27–4.2)
VIT B12 BLD-MCNC: 1027 PG/ML (ref 211–946)
VLDLC SERPL-MCNC: 23.6 MG/DL (ref 5–40)
WBC NRBC COR # BLD: 5.03 10*3/MM3 (ref 3.4–10.8)

## 2019-05-02 PROCEDURE — 84443 ASSAY THYROID STIM HORMONE: CPT | Performed by: NURSE PRACTITIONER

## 2019-05-02 PROCEDURE — 82607 VITAMIN B-12: CPT | Performed by: NURSE PRACTITIONER

## 2019-05-02 PROCEDURE — 80053 COMPREHEN METABOLIC PANEL: CPT | Performed by: NURSE PRACTITIONER

## 2019-05-02 PROCEDURE — 85025 COMPLETE CBC W/AUTO DIFF WBC: CPT | Performed by: NURSE PRACTITIONER

## 2019-05-02 PROCEDURE — 82306 VITAMIN D 25 HYDROXY: CPT | Performed by: NURSE PRACTITIONER

## 2019-05-02 PROCEDURE — 80061 LIPID PANEL: CPT | Performed by: NURSE PRACTITIONER

## 2019-05-07 ENCOUNTER — OFFICE VISIT (OUTPATIENT)
Dept: INTERNAL MEDICINE | Facility: CLINIC | Age: 74
End: 2019-05-07

## 2019-05-07 VITALS
WEIGHT: 240.8 LBS | HEIGHT: 69 IN | BODY MASS INDEX: 35.66 KG/M2 | HEART RATE: 58 BPM | TEMPERATURE: 97.6 F | RESPIRATION RATE: 16 BRPM | SYSTOLIC BLOOD PRESSURE: 130 MMHG | OXYGEN SATURATION: 98 % | DIASTOLIC BLOOD PRESSURE: 80 MMHG

## 2019-05-07 DIAGNOSIS — Z00.00 MEDICARE ANNUAL WELLNESS VISIT, SUBSEQUENT: Primary | ICD-10-CM

## 2019-05-07 DIAGNOSIS — C64.9 MALIGNANT NEOPLASM OF KIDNEY EXCLUDING RENAL PELVIS, UNSPECIFIED LATERALITY (HCC): ICD-10-CM

## 2019-05-07 DIAGNOSIS — E66.01 MORBIDLY OBESE (HCC): ICD-10-CM

## 2019-05-07 DIAGNOSIS — E78.5 DYSLIPIDEMIA: Chronic | ICD-10-CM

## 2019-05-07 DIAGNOSIS — K86.2 PANCREATIC CYST: ICD-10-CM

## 2019-05-07 DIAGNOSIS — I35.0 MODERATE AORTIC STENOSIS: ICD-10-CM

## 2019-05-07 DIAGNOSIS — E03.9 ACQUIRED HYPOTHYROIDISM: ICD-10-CM

## 2019-05-07 DIAGNOSIS — I10 ESSENTIAL HYPERTENSION: ICD-10-CM

## 2019-05-07 DIAGNOSIS — K21.9 GASTROESOPHAGEAL REFLUX DISEASE, ESOPHAGITIS PRESENCE NOT SPECIFIED: ICD-10-CM

## 2019-05-07 DIAGNOSIS — E66.9 OBESITY WITHOUT SERIOUS COMORBIDITY, UNSPECIFIED CLASSIFICATION, UNSPECIFIED OBESITY TYPE: ICD-10-CM

## 2019-05-07 PROCEDURE — G0439 PPPS, SUBSEQ VISIT: HCPCS | Performed by: NURSE PRACTITIONER

## 2019-05-07 NOTE — PATIENT INSTRUCTIONS
Calorie Counting for Weight Loss  Calories are units of energy. Your body needs a certain amount of calories from food to keep you going throughout the day. When you eat more calories than your body needs, your body stores the extra calories as fat. When you eat fewer calories than your body needs, your body burns fat to get the energy it needs.  Calorie counting means keeping track of how many calories you eat and drink each day. Calorie counting can be helpful if you need to lose weight. If you make sure to eat fewer calories than your body needs, you should lose weight. Ask your health care provider what a healthy weight is for you.  For calorie counting to work, you will need to eat the right number of calories in a day in order to lose a healthy amount of weight per week. A dietitian can help you determine how many calories you need in a day and will give you suggestions on how to reach your calorie goal.  · A healthy amount of weight to lose per week is usually 1-2 lb (0.5-0.9 kg). This usually means that your daily calorie intake should be reduced by 500-750 calories.  · Eating 1,200 - 1,500 calories per day can help most women lose weight.  · Eating 1,500 - 1,800 calories per day can help most men lose weight.    What is my plan?  My goal is to have __________ calories per day.  If I have this many calories per day, I should lose around __________ pounds per week.  What do I need to know about calorie counting?  In order to meet your daily calorie goal, you will need to:  · Find out how many calories are in each food you would like to eat. Try to do this before you eat.  · Decide how much of the food you plan to eat.  · Write down what you ate and how many calories it had. Doing this is called keeping a food log.    To successfully lose weight, it is important to balance calorie counting with a healthy lifestyle that includes regular activity. Aim for 150 minutes of moderate exercise (such as walking) or 75  minutes of vigorous exercise (such as running) each week.  Where do I find calorie information?    The number of calories in a food can be found on a Nutrition Facts label. If a food does not have a Nutrition Facts label, try to look up the calories online or ask your dietitian for help.  Remember that calories are listed per serving. If you choose to have more than one serving of a food, you will have to multiply the calories per serving by the amount of servings you plan to eat. For example, the label on a package of bread might say that a serving size is 1 slice and that there are 90 calories in a serving. If you eat 1 slice, you will have eaten 90 calories. If you eat 2 slices, you will have eaten 180 calories.  How do I keep a food log?  Immediately after each meal, record the following information in your food log:  · What you ate. Don't forget to include toppings, sauces, and other extras on the food.  · How much you ate. This can be measured in cups, ounces, or number of items.  · How many calories each food and drink had.  · The total number of calories in the meal.    Keep your food log near you, such as in a small notebook in your pocket, or use a mobile colton or website. Some programs will calculate calories for you and show you how many calories you have left for the day to meet your goal.  What are some calorie counting tips?  · Use your calories on foods and drinks that will fill you up and not leave you hungry:  ? Some examples of foods that fill you up are nuts and nut butters, vegetables, lean proteins, and high-fiber foods like whole grains. High-fiber foods are foods with more than 5 g fiber per serving.  ? Drinks such as sodas, specialty coffee drinks, alcohol, and juices have a lot of calories, yet do not fill you up.  · Eat nutritious foods and avoid empty calories. Empty calories are calories you get from foods or beverages that do not have many vitamins or protein, such as candy, sweets, and  "soda. It is better to have a nutritious high-calorie food (such as an avocado) than a food with few nutrients (such as a bag of chips).  · Know how many calories are in the foods you eat most often. This will help you calculate calorie counts faster.  · Pay attention to calories in drinks. Low-calorie drinks include water and unsweetened drinks.  · Pay attention to nutrition labels for \"low fat\" or \"fat free\" foods. These foods sometimes have the same amount of calories or more calories than the full fat versions. They also often have added sugar, starch, or salt, to make up for flavor that was removed with the fat.  · Find a way of tracking calories that works for you. Get creative. Try different apps or programs if writing down calories does not work for you.  What are some portion control tips?  · Know how many calories are in a serving. This will help you know how many servings of a certain food you can have.  · Use a measuring cup to measure serving sizes. You could also try weighing out portions on a kitchen scale. With time, you will be able to estimate serving sizes for some foods.  · Take some time to put servings of different foods on your favorite plates, bowls, and cups so you know what a serving looks like.  · Try not to eat straight from a bag or box. Doing this can lead to overeating. Put the amount you would like to eat in a cup or on a plate to make sure you are eating the right portion.  · Use smaller plates, glasses, and bowls to prevent overeating.  · Try not to multitask (for example, watch TV or use your computer) while eating. If it is time to eat, sit down at a table and enjoy your food. This will help you to know when you are full. It will also help you to be aware of what you are eating and how much you are eating.  What are tips for following this plan?  Reading food labels  · Check the calorie count compared to the serving size. The serving size may be smaller than what you are used to " eating.  · Check the source of the calories. Make sure the food you are eating is high in vitamins and protein and low in saturated and trans fats.  Shopping  · Read nutrition labels while you shop. This will help you make healthy decisions before you decide to purchase your food.  · Make a grocery list and stick to it.  Cooking  · Try to cook your favorite foods in a healthier way. For example, try baking instead of frying.  · Use low-fat dairy products.  Meal planning  · Use more fruits and vegetables. Half of your plate should be fruits and vegetables.  · Include lean proteins like poultry and fish.  How do I count calories when eating out?  · Ask for smaller portion sizes.  · Consider sharing an entree and sides instead of getting your own entree.  · If you get your own entree, eat only half. Ask for a box at the beginning of your meal and put the rest of your entree in it so you are not tempted to eat it.  · If calories are listed on the menu, choose the lower calorie options.  · Choose dishes that include vegetables, fruits, whole grains, low-fat dairy products, and lean protein.  · Choose items that are boiled, broiled, grilled, or steamed. Stay away from items that are buttered, battered, fried, or served with cream sauce. Items labeled “crispy” are usually fried, unless stated otherwise.  · Choose water, low-fat milk, unsweetened iced tea, or other drinks without added sugar. If you want an alcoholic beverage, choose a lower calorie option such as a glass of wine or light beer.  · Ask for dressings, sauces, and syrups on the side. These are usually high in calories, so you should limit the amount you eat.  · If you want a salad, choose a garden salad and ask for grilled meats. Avoid extra toppings like hanna, cheese, or fried items. Ask for the dressing on the side, or ask for olive oil and vinegar or lemon to use as dressing.  · Estimate how many servings of a food you are given. For example, a serving of  cooked rice is ½ cup or about the size of half a baseball. Knowing serving sizes will help you be aware of how much food you are eating at restaurants. The list below tells you how big or small some common portion sizes are based on everyday objects:  ? 1 oz--4 stacked dice.  ? 3 oz--1 deck of cards.  ? 1 tsp--1 die.  ? 1 Tbsp--½ a ping-pong ball.  ? 2 Tbsp--1 ping-pong ball.  ? ½ cup--½ baseball.  ? 1 cup--1 baseball.  Summary  · Calorie counting means keeping track of how many calories you eat and drink each day. If you eat fewer calories than your body needs, you should lose weight.  · A healthy amount of weight to lose per week is usually 1-2 lb (0.5-0.9 kg). This usually means reducing your daily calorie intake by 500-750 calories.  · The number of calories in a food can be found on a Nutrition Facts label. If a food does not have a Nutrition Facts label, try to look up the calories online or ask your dietitian for help.  · Use your calories on foods and drinks that will fill you up, and not on foods and drinks that will leave you hungry.  · Use smaller plates, glasses, and bowls to prevent overeating.  This information is not intended to replace advice given to you by your health care provider. Make sure you discuss any questions you have with your health care provider.  Document Released: 12/18/2006 Document Revised: 11/17/2017 Document Reviewed: 11/17/2017  [employer] Patient Education © 2019 [employer]      Exercising to Lose Weight  Exercising can help you to lose weight. In order to lose weight through exercise, you need to do vigorous-intensity exercise. You can tell that you are exercising with vigorous intensity if you are breathing very hard and fast and cannot hold a conversation while exercising.  Moderate-intensity exercise helps to maintain your current weight. You can tell that you are exercising at a moderate level if you have a higher heart rate and faster breathing, but you are  still able to hold a conversation.  How often should I exercise?  Choose an activity that you enjoy and set realistic goals. Your health care provider can help you to make an activity plan that works for you. Exercise regularly as directed by your health care provider. This may include:  · Doing resistance training twice each week, such as:  ? Push-ups.  ? Sit-ups.  ? Lifting weights.  ? Using resistance bands.  · Doing a given intensity of exercise for a given amount of time. Choose from these options:  ? 150 minutes of moderate-intensity exercise every week.  ? 75 minutes of vigorous-intensity exercise every week.  ? A mix of moderate-intensity and vigorous-intensity exercise every week.    Children, pregnant women, people who are out of shape, people who are overweight, and older adults may need to consult a health care provider for individual recommendations. If you have any sort of medical condition, be sure to consult your health care provider before starting a new exercise program.  What are some activities that can help me to lose weight?  · Walking at a rate of at least 4.5 miles an hour.  · Jogging or running at a rate of 5 miles per hour.  · Biking at a rate of at least 10 miles per hour.  · Lap swimming.  · Roller-skating or in-line skating.  · Cross-country skiing.  · Vigorous competitive sports, such as football, basketball, and soccer.  · Jumping rope.  · Aerobic dancing.  How can I be more active in my day-to-day activities?  · Use the stairs instead of the elevator.  · Take a walk during your lunch break.  · If you drive, park your car farther away from work or school.  · If you take public transportation, get off one stop early and walk the rest of the way.  · Make all of your phone calls while standing up and walking around.  · Get up, stretch, and walk around every 30 minutes throughout the day.  What guidelines should I follow while exercising?  · Do not exercise so much that you hurt yourself,  feel dizzy, or get very short of breath.  · Consult your health care provider prior to starting a new exercise program.  · Wear comfortable clothes and shoes with good support.  · Drink plenty of water while you exercise to prevent dehydration or heat stroke. Body water is lost during exercise and must be replaced.  · Work out until you breathe faster and your heart beats faster.  This information is not intended to replace advice given to you by your health care provider. Make sure you discuss any questions you have with your health care provider.  Document Released: 01/20/2012 Document Revised: 05/25/2017 Document Reviewed: 05/21/2015  [employer] Interactive Patient Education © 2019 [employer]

## 2019-05-07 NOTE — PROGRESS NOTES
QUICK REFERENCE INFORMATION:  The ABCs of the Annual Wellness Visit    Subsequent Medicare Wellness Visit     HEALTH RISK ASSESSMENT    : 1945    Recent Hospitalizations:  No hospitalization(s) within the last year..  ccc      Current Medical Providers:  Patient Care Team:  Valentine Newton APRN as PCP - General (Family Medicine)  Migel Valladares MD as Consulting Physician (Cardiology)  Manish Camp MD as Consulting Physician (Dermatology)  Chacorta Guajardo MD as Consulting Physician (Ophthalmology)  Ez Everett MD as Consulting Physician (Otolaryngology)  Georgiana Bettencourt MD (Urology)  Jasen Ayala MD as Consulting Physician (Urology)  Chacorta Ludwig MD (Vascular Surgery)  Layla Mccarthy MD as Consulting Physician (Dermatology)        Smoking Status:  Social History     Tobacco Use   Smoking Status Never Smoker   Smokeless Tobacco Never Used       Alcohol Consumption:  Social History     Substance and Sexual Activity   Alcohol Use Yes   • Alcohol/week: 0.6 oz   • Types: 1 Shots of liquor per week    Comment: weekly       Depression Screen:   PHQ-2/PHQ-9 Depression Screening 2019   Little interest or pleasure in doing things 0   Feeling down, depressed, or hopeless 0   Total Score 0       Health Habits and Functional and Cognitive Screening:  Functional & Cognitive Status 2019   Do you have difficulty preparing food and eating? No   Do you have difficulty bathing yourself, getting dressed or grooming yourself? No   Do you have difficulty using the toilet? No   Do you have difficulty moving around from place to place? No   Do you have trouble with steps or getting out of a bed or a chair? No   In the past year have you fallen or experienced a near fall? No   Current Diet Unhealthy Diet   Dental Exam Up to date   Eye Exam Up to date   Exercise (times per week) 5 times per week   Current Exercise Activities Include Yard Work   Do you need help using the phone?  No   Are you deaf or  do you have serious difficulty hearing?  No   Do you need help with transportation? No   Do you need help shopping? No   Do you need help preparing meals?  No   Do you need help with housework?  No   Do you need help with laundry? No   Do you need help taking your medications? No   Do you need help managing money? No   Do you ever drive or ride in a car without wearing a seat belt? No   Have you felt unusual stress, anger or loneliness in the last month? No   Who do you live with? Alone   If you need help, do you have trouble finding someone available to you? No   Have you been bothered in the last four weeks by sexual problems? No   Do you have difficulty concentrating, remembering or making decisions? No           Does the patient have evidence of cognitive impairment? No    Asiprin use counseling: Taking ASA appropriately as indicated      Recent Lab Results:       Lab Results   Component Value Date    HGBA1C 5.90 (H) 10/24/2018     Lab Results   Component Value Date    CHOL 147 05/02/2019    TRIG 118 05/02/2019    HDL 33 (L) 05/02/2019    VLDL 23.6 05/02/2019    LDLHDL 2.74 05/02/2019           Age-appropriate Screening Schedule:  Refer to the list below for future screening recommendations based on patient's age, sex and/or medical conditions. Orders for these recommended tests are listed in the plan section. The patient has been provided with a written plan.    Health Maintenance   Topic Date Due   • ZOSTER VACCINE (3 of 3) 05/20/2019   • INFLUENZA VACCINE  08/01/2019   • TDAP/TD VACCINES (2 - Td) 11/01/2019   • LIPID PANEL  05/02/2020   • COLONOSCOPY  04/28/2025   • PNEUMOCOCCAL VACCINES (65+ LOW/MEDIUM RISK)  Completed        Subjective   History of Present Illness    Ac Caldwell is a 73 y.o. male who presents for an Annual Wellness Visit.    On 1/26/15, Ac saw gross hematuria, saw Dr. Ayala (urology) and he did a scope. It was ok. Then had a CT scan on 2/3/15. Cr was high, so they did it without  contrast. CT scan showed a lesion, but he was not concerned. Then had a fish bladder test on 2/5/15. His urologist never got the results. Did go back to see Nephrology and had labs in Feb. Cr. was 1.1 and BUN was 16. Everything has been stable since. Has a h/o right kidney CA in 1997 and had nephrectomy.  Urologist - Dr. Jossue Serrano left and moved to [place] and he had to establish with Dr Farrell at [facility] [place].  He saw him several times for recurrent UTI, hydrocele, renal cyst, and epididymoorchits. He saw them again 5/31/2017 for repeat renal US that showed a 3.4cm left renal cyst, which had grown from 2.5cm on last scan.  Dr. Farrell moved to [place] and he was then referred to  Urology.  Had CXR and labs.  CXR revealed a possible thoracic aortic aneurysm.  Had f/u chest CT scan, no aneurysm.  CT renal mass protocol was done on 8/30/2017 which confirmed a solid enhancing mass in the lower pole of the left kidney, without lymphadenopathy.  It was elected to proceed with a cryoablation of this lesion per Dr. Mark at .  He underwent this procedure on 9/27/17.  Pathology is unclear as to it being malignant vs benign.  Will be re-scanned in 30 days, then 90 days, then Q6 months.      CT in Aug 2017 revealed an incidental pancreatic lesion on the head of the pancreas that was 12 mm in size; f/u imaging done 9/2018 showed that the mass was stable and unchanged in size.       A cystic lesion was incidentally identified on the above noted CT scan on 8/30/17.  Pt discussed with Dr. Mark and this will be monitored through the interval CT scans as well.     Underwent a STEAM procedure in 2/2019 for BPH @       Nephrologist- [facility]; his Creatinine is up from 1.3 to 1.47.  Last f/u was 3/2019; seeing them Q6 months     Had a very large thyroid nodule removed on 4/23/14 per ENT. It was 8cm in size; they took the right side of his thyroid and placed on synthroid 50mcg. It was benign! Seeing Dr. Everett  annually and everything has been stable.      Has a h/o AS.  Was referred him to Cardiology. Had an appt on 4/29/16 and he thought everything was ok. He is just going to watch. He saw him again on 11/4/16 and everything was stable. He will follow every 6 months and echo q 2 yrs.  Last appt was 12/2018      HTN - well controlled with Losartan 50 mg and Toprol XL 25mg; Verapamil was stopped per Cards in 11/17 - He is tolerating well.  BP is well controlled and he denies SE.     HL - stable on Lipitor 10mg daily without SE.  LDL looks great (90)       The following portions of the patient's history were reviewed and updated as appropriate: allergies, current medications, past family history, past medical history, past social history, past surgical history and problem list.    Outpatient Medications Prior to Visit   Medication Sig Dispense Refill   • aspirin 325 MG tablet Take 325 mg by mouth Daily.     • atorvastatin (LIPITOR) 10 MG tablet TAKE ONE TABLET BY MOUTH ONCE NIGHTLY 90 tablet 1   • azelastine (ASTELIN) 0.1 % nasal spray 1 spray into each nostril 2 (Two) Times a Day. Use in each nostril as directed     • betamethasone valerate (VALISONE) 0.1 % ointment Uses once weekly     • Cholecalciferol (VITAMIN D-3 PO) Take 1,000 Units by mouth Daily.     • Coenzyme Q10 (CO Q-10) 100 MG capsule Take  by mouth 2 (Two) Times a Day.     • esomeprazole (NexIUM) 20 MG capsule Take 20 mg by mouth Daily.     • famciclovir (FAMVIR) 500 MG tablet TAKE ONE TABLET BY MOUTH THREE TIMES A DAY 12 tablet 4   • Flaxseed, Linseed, (FLAX SEED OIL) 1000 MG capsule Take 1,200 mg by mouth Daily.     • fluticasone (FLONASE) 50 MCG/ACT nasal spray 2 sprays into each nostril Daily. 3 each 2   • levothyroxine (SYNTHROID, LEVOTHROID) 50 MCG tablet Take 50 mcg by mouth Daily.     • losartan (COZAAR) 50 MG tablet Take 1 tablet by mouth Daily.     • metoprolol succinate XL (TOPROL-XL) 25 MG 24 hr tablet TAKE ONE TABLET BY MOUTH EVERY NIGHT AT  BEDTIME 90 tablet 2   • Omega-3 Fatty Acids (FISH OIL) 1000 MG capsule capsule Take 1,200 mg by mouth 2 (Two) Times a Day With Meals.     • potassium citrate (UROCIT-K) 10 MEQ (1080 MG) CR tablet Take 1 tablet by mouth Daily. 90 each 1   • tamsulosin (FLOMAX) 0.4 MG capsule 24 hr capsule Take 1 capsule by mouth Every Night.     • vitamin B-12 (CYANOCOBALAMIN) 1000 MCG tablet Take 1,000 mcg by mouth Daily.     • ciprofloxacin (CIPRO) 500 MG tablet ciprofloxacin hcl 500 mg tabs       No facility-administered medications prior to visit.        Patient Active Problem List   Diagnosis   • Aortic valve disorder   • Nephritis and nephropathy   • Essential hypertension   • Esophageal reflux   • Proteinuria   • Diverticulosis of small intestine without hemorrhage   • Abnormal glucose   • Morbidly obese (CMS/HCC)   • Lymphadenopathy   • Localized swelling, mass and lump, head   • Ganglion cyst   • Malignant neoplasm of kidney, except pelvis   • Dyslipidemia   • Exertional dyspnea   • Moderate aortic stenosis   • Tobacco use   • Valvular heart disease   • Acquired hypothyroidism       Advance Care Planning:  Patient does not have an advance directive - not interested in additional information    Identification of Risk Factors:  Risk factors include: weight , unhealthy diet, cardiovascular risk and inactivity.    Review of Systems   Constitutional: Negative for chills, fatigue and fever.   Respiratory: Negative for cough, chest tightness and shortness of breath.    Cardiovascular: Negative for chest pain.   Gastrointestinal: Negative for abdominal pain, diarrhea, nausea and vomiting.   Endocrine: Negative for cold intolerance and heat intolerance.   Musculoskeletal: Negative for arthralgias.   Neurological: Negative for dizziness.       Compared to one year ago, the patient feels his physical health is better.  Compared to one year ago, the patient feels his mental health is better.    Objective     Physical Exam    Vitals:     "05/07/19 0904   BP: 130/80   Pulse: 58   Resp: 16   Temp: 97.6 °F (36.4 °C)   TempSrc: Temporal   SpO2: 98%   Weight: 109 kg (240 lb 12.8 oz)   Height: 175.3 cm (69.02\")   PainSc: 0-No pain       Patient's Body mass index is 35.54 kg/m². BMI is above normal parameters. Recommendations include: educational material, exercise counseling and nutrition counseling.      Assessment/Plan   Patient Self-Management and Personalized Health Advice  The patient has been provided with information about: diet, exercise and weight management and preventive services including:   · Exercise counseling provided, Nutrition counseling provided.    Flu shot, 9/25/2018  Tdap 2009   Pneumovax 2012   Prevnar - 4/2015   Zostavax 2010  PSA per Urology   Colon - 4/2015, due 2025       Visit Diagnoses:    ICD-10-CM ICD-9-CM   1. Medicare annual wellness visit, subsequent Z00.00 V70.0   2. Essential hypertension I10 401.9   3. Moderate aortic stenosis I35.0 424.1   4. Gastroesophageal reflux disease, esophagitis presence not specified K21.9 530.81   5. Acquired hypothyroidism E03.9 244.9   6. Obesity without serious comorbidity, unspecified classification, unspecified obesity type E66.9 278.00   7. Dyslipidemia E78.5 272.4   8. Pancreatic cyst K86.2 577.2   9. Malignant neoplasm of kidney excluding renal pelvis, unspecified laterality (CMS/[facility]) C64.9 189.0   10. Morbidly obese (CMS/[facility]) E66.01 278.01       Orders Placed This Encounter   Procedures   • Ambulatory Referral to Gastroenterology     Referral Priority:   Routine     Referral Type:   Consultation     Referral Reason:   Specialty Services Required     Requested Specialty:   Gastroenterology     Number of Visits Requested:   1       Outpatient Encounter Medications as of 5/7/2019   Medication Sig Dispense Refill   • aspirin 325 MG tablet Take 325 mg by mouth Daily.     • atorvastatin (LIPITOR) 10 MG tablet TAKE ONE TABLET BY MOUTH ONCE NIGHTLY 90 tablet 1   • azelastine (ASTELIN) 0.1 % " nasal spray 1 spray into each nostril 2 (Two) Times a Day. Use in each nostril as directed     • betamethasone valerate (VALISONE) 0.1 % ointment Uses once weekly     • Cholecalciferol (VITAMIN D-3 PO) Take 1,000 Units by mouth Daily.     • Coenzyme Q10 (CO Q-10) 100 MG capsule Take  by mouth 2 (Two) Times a Day.     • esomeprazole (NexIUM) 20 MG capsule Take 20 mg by mouth Daily.     • famciclovir (FAMVIR) 500 MG tablet TAKE ONE TABLET BY MOUTH THREE TIMES A DAY 12 tablet 4   • Flaxseed, Linseed, (FLAX SEED OIL) 1000 MG capsule Take 1,200 mg by mouth Daily.     • fluticasone (FLONASE) 50 MCG/ACT nasal spray 2 sprays into each nostril Daily. 3 each 2   • levothyroxine (SYNTHROID, LEVOTHROID) 50 MCG tablet Take 50 mcg by mouth Daily.     • losartan (COZAAR) 50 MG tablet Take 1 tablet by mouth Daily.     • metoprolol succinate XL (TOPROL-XL) 25 MG 24 hr tablet TAKE ONE TABLET BY MOUTH EVERY NIGHT AT BEDTIME 90 tablet 2   • Omega-3 Fatty Acids (FISH OIL) 1000 MG capsule capsule Take 1,200 mg by mouth 2 (Two) Times a Day With Meals.     • potassium citrate (UROCIT-K) 10 MEQ (1080 MG) CR tablet Take 1 tablet by mouth Daily. 90 each 1   • tamsulosin (FLOMAX) 0.4 MG capsule 24 hr capsule Take 1 capsule by mouth Every Night.     • vitamin B-12 (CYANOCOBALAMIN) 1000 MCG tablet Take 1,000 mcg by mouth Daily.     • [DISCONTINUED] ciprofloxacin (CIPRO) 500 MG tablet ciprofloxacin hcl 500 mg tabs       No facility-administered encounter medications on file as of 5/7/2019.        Reviewed use of high risk medication in the elderly: yes  Reviewed for potential of harmful drug interactions in the elderly: yes    Labs reviewed - no changes  We will let Ac see GI (he prefers ) for evaluation of pancreatic cyst due to his hx    Follow Up:  Return in about 6 months (around 11/7/2019).     An After Visit Summary and PPPS with all of these plans were given to the patient.

## 2019-06-24 RX ORDER — POTASSIUM CITRATE 10 MEQ/1
TABLET, EXTENDED RELEASE ORAL
Qty: 90 TABLET | Refills: 1 | Status: SHIPPED | OUTPATIENT
Start: 2019-06-24 | End: 2019-12-15 | Stop reason: SDUPTHER

## 2019-06-28 ENCOUNTER — OFFICE VISIT (OUTPATIENT)
Dept: CARDIOLOGY | Facility: CLINIC | Age: 74
End: 2019-06-28

## 2019-06-28 VITALS
BODY MASS INDEX: 34.72 KG/M2 | DIASTOLIC BLOOD PRESSURE: 78 MMHG | SYSTOLIC BLOOD PRESSURE: 128 MMHG | OXYGEN SATURATION: 98 % | WEIGHT: 234.4 LBS | HEART RATE: 58 BPM | HEIGHT: 69 IN

## 2019-06-28 DIAGNOSIS — E78.5 DYSLIPIDEMIA: Chronic | ICD-10-CM

## 2019-06-28 DIAGNOSIS — I35.0 MODERATE AORTIC STENOSIS: Primary | ICD-10-CM

## 2019-06-28 DIAGNOSIS — I10 ESSENTIAL HYPERTENSION: ICD-10-CM

## 2019-06-28 DIAGNOSIS — R07.9 CHEST PAIN, UNSPECIFIED TYPE: ICD-10-CM

## 2019-06-28 PROCEDURE — 99214 OFFICE O/P EST MOD 30 MIN: CPT | Performed by: INTERNAL MEDICINE

## 2019-06-28 RX ORDER — METOPROLOL SUCCINATE 25 MG/1
25 TABLET, EXTENDED RELEASE ORAL
Qty: 90 TABLET | Refills: 3 | Status: SHIPPED | OUTPATIENT
Start: 2019-06-28 | End: 2020-06-27

## 2019-06-28 NOTE — PROGRESS NOTES
Encounter Date:06/28/2019        Patient ID: Ac Caldwell is a 73 y.o. male.    PCP: Valentine Newton APRN     Chief Complaint: Valvular heart disease      PROBLEM LIST:  1. Valvular heart disease:   a. Refused enrollment in the  50 years ago due to some cardiac condition, details not known.   b. Annual echocardiographic followups for the last several years for aortic stenosis.   c. Echocardiogram, 02/23/2016: Mild AI and moderate AS with aortic valve area of 1.2 cm2, mean gradient of 16.6, and peak gradient of 33.3. Trace TR, with RVSP of 26.2. Normal EF.  d. Nuclear stress, 05/19/2016: EF 72%. No evidence of ischemia.  e. CTA, 08/30/2017: Mild ectasia of ascending aorta measuring 4.2cm significant aortic leaflet calcification, coronary sclerosis, no aneurysm of descending aorta.  f. Echocardiogram, 11/03/2017: Normal EF 64%. Mild LVH. HEATHER 1.8 cm². MG 17.0.  2. Exertional dyspnea/anginal equivalent symptoms with occasional orthopnea.   3. Hypertension.   4. Dyslipidemia.   5. Enlarged prostate.  6. History of renal cancer:   a. Right nephrectomy in 1997.   b. S/p Resection of a tumor from his left kidney -- incomplete database.   c. S/p CT ablation of L renal mass.  7. 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   8. S/p basal cell cancer surgery.   9. Episode of hematuria in January with subsequent negative cystoscopy.  10. Surgical Hx:  a. CT Ablation of Left renal mass    History of Present Illness  Patient presents today for 6 month follow-up with a history of valvular heart disease and cardiac risk factors. Since last visit, he has been doing well overall from a cardiovascular standpoint, and feels that he has lost some weight (3 lbs in the last 6 months). He does report some chest pressure with exertion especially in hot weather. Denies shortness of breath, leg swelling, palpitations, and syncope. 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  Since last visit he has had prostate surgery and during that hospitalization he was evaluated by cardiac surgeon Dr. Ludwig who plans to see him back for follow-up with echocardiogram in October.    Allergies   Allergen Reactions   • Norvasc [Amlodipine] Swelling   • Oxycodone      Can elevate Blood pressure   • Lotensin [Benazepril] Cough   • Zocor [Simvastatin] Myalgia         Current Outpatient Medications:   •  aspirin 325 MG tablet, Take 325 mg by mouth Daily., Disp: , Rfl:   •  atorvastatin (LIPITOR) 10 MG tablet, TAKE ONE TABLET BY MOUTH ONCE NIGHTLY, Disp: 90 tablet, Rfl: 1  •  azelastine (ASTELIN) 0.1 % nasal spray, 1 spray into each nostril 2 (Two) Times a Day. Use in each nostril as directed, Disp: , Rfl:   •  Cholecalciferol (VITAMIN D-3 PO), Take 1,000 Units by mouth Daily., Disp: , Rfl:   •  Coenzyme Q10 (CO Q-10) 100 MG capsule, Take  by mouth 2 (Two) Times a Day., Disp: , Rfl:   •  esomeprazole (NexIUM) 20 MG capsule, Take 20 mg by mouth Daily., Disp: , Rfl:   •  Flaxseed, Linseed, (FLAX SEED OIL) 1000 MG capsule, Take 1,200 mg by mouth Daily., Disp: , Rfl:   •  fluticasone (FLONASE) 50 MCG/ACT nasal spray, 2 sprays into each nostril Daily., Disp: 3 each, Rfl: 2  •  levothyroxine (SYNTHROID, LEVOTHROID) 50 MCG tablet, Take 50 mcg by mouth Daily., Disp: , Rfl:   •  losartan (COZAAR) 50 MG tablet, Take 1 tablet by mouth Daily., Disp: , Rfl:   •  metoprolol succinate XL (TOPROL-XL) 25 MG 24 hr tablet, TAKE ONE TABLET BY MOUTH EVERY NIGHT AT BEDTIME, Disp: 90 tablet, Rfl: 2  •  Omega-3 Fatty Acids (FISH OIL) 1000 MG capsule capsule, Take 1,200 mg by mouth 2 (Two) Times a Day With Meals., Disp: , Rfl:   •  potassium citrate (UROCIT-K) 10 MEQ (1080 MG) CR tablet, TAKE ONE TABLET BY MOUTH DAILY, Disp: 90 tablet, Rfl: 1  •  vitamin B-12 (CYANOCOBALAMIN) 1000 MCG tablet, Take 1,000 mcg by mouth Daily., Disp: , Rfl:   •  " "betamethasone valerate (VALISONE) 0.1 % ointment, Uses once weekly, Disp: , Rfl:     The following portions of the patient's history were reviewed and updated as appropriate: allergies, current medications, past family history, past medical history, past social history, past surgical history and problem list.    ROS  Review of Systems   Constitution: Negative for chills, fatigue, fever, generalized weakness, weight gain and weight loss.   Cardiovascular: Negative for claudication, dyspnea on exertion, leg swelling, orthopnea, palpitations, paroxysmal nocturnal dyspnea and syncope. Positive for chest pain.  Respiratory: Negative for cough, shortness of breath, and wheezing.  HENT: Negative for ear pain, nosebleeds, and tinnitus.  Gastrointestinal: Negative for abdominal pain, constipation, diarrhea, nausea and vomiting.   Genitourinary: No urinary symptoms   Musculoskeletal: Negative for muscle cramps.  Neurological: Negative for dizziness, headaches, loss of balance, numbness, and symptoms of stroke.  Psychiatric: Normal mental status.     All other systems reviewed and are negative.    Objective:     /78 (BP Location: Right arm, Patient Position: Sitting)   Pulse 58   Ht 175.3 cm (69\")   Wt 106 kg (234 lb 6.4 oz)   SpO2 98%   BMI 34.61 kg/m²        Physical Exam  Constitutional: Patient appears well-developed and well-nourished.   HENT: HEENT exam unremarkable.   Neck: Neck supple. No JVD present. No carotid bruits.   Cardiovascular: Normal rate, regular rhythm and normal heart sounds. 3/6 systolic ejection murmur heard.   2+ symmetric pulses.   Pulmonary/Chest: Breath sounds normal. Does not exhibit tenderness.   Abdominal: Abdomen benign.   Musculoskeletal: Does not exhibit edema.   Neurological: Neurological exam unremarkable.   Vitals reviewed.    Lab Review:   Lab Results   Component Value Date    GLUCOSE 101 (H) 05/02/2019    BUN 21 05/02/2019    CREATININE 1.31 (H) 05/02/2019    EGFRIFNONA 54 (L) " 05/02/2019    BCR 16.0 05/02/2019    K 4.7 05/02/2019    CO2 24.6 05/02/2019    CALCIUM 9.5 05/02/2019    ALBUMIN 4.00 05/02/2019    AST 18 05/02/2019    ALT 18 05/02/2019     Lab Results   Component Value Date    CHOL 147 05/02/2019    TRIG 118 05/02/2019    HDL 33 (L) 05/02/2019    LDL 90 05/02/2019      Lab Results   Component Value Date    WBC 5.03 05/02/2019    HGB 15.4 05/02/2019    HCT 46.8 05/02/2019    MCV 92.5 05/02/2019     05/02/2019     Lab Results   Component Value Date    TSH 2.700 05/02/2019     Lab Results   Component Value Date    HGBA1C 5.90 (H) 10/24/2018        Procedures       Assessment:      Diagnosis Plan   1. Moderate aortic stenosis  Repeat echocardiogram to reassess aortic stenosis.   2. Essential hypertension  Well-controlled, continue losartan and metoprolol.   3. Dyslipidemia  Well-controlled, continue atorvastatin 10 mg.   4. Chest pain, consistent with exertional stable angina. Nuclear stress test to further assess chest pain.     Plan:   Repeat echocardiogram to reassess aortic stenosis.  Schedule nuclear stress test for other assessment of anginal chest pain.  We will forward the results of echocardiogram to Dr. Ludwig.  Continue current medications.   FU in 6 MO, sooner as needed.  Thank you for allowing us to participate in the care of your patient.     Scribed for Migel Valladares MD by Leighann Cruz. 6/28/2019  10:38 AM      I, Migel Valladares MD, personally performed the services described in this documentation as scribed by the above named individual in my presence, and it is both accurate and complete.  6/28/2019  10:49 AM        Please note that portions of this note may have been completed with a voice recognition program. Efforts were made to edit the dictations, but occasionally words are mistranscribed.

## 2019-08-12 ENCOUNTER — HOSPITAL ENCOUNTER (OUTPATIENT)
Dept: CARDIOLOGY | Facility: HOSPITAL | Age: 74
Discharge: HOME | End: 2019-08-12

## 2019-08-12 ENCOUNTER — HOSPITAL ENCOUNTER (OUTPATIENT)
Dept: CARDIOLOGY | Facility: HOSPITAL | Age: 74
End: 2019-08-12

## 2019-08-12 DIAGNOSIS — E78.5 DYSLIPIDEMIA: ICD-10-CM

## 2019-08-12 DIAGNOSIS — I10 ESSENTIAL HYPERTENSION: ICD-10-CM

## 2019-08-12 DIAGNOSIS — I35.0 MODERATE AORTIC STENOSIS: ICD-10-CM

## 2019-08-12 DIAGNOSIS — R07.9 CHEST PAIN, UNSPECIFIED TYPE: ICD-10-CM

## 2019-08-12 DIAGNOSIS — R07.9 CHEST PAIN, UNSPECIFIED TYPE: Primary | ICD-10-CM

## 2019-08-12 DIAGNOSIS — E78.5 DYSLIPIDEMIA: Chronic | ICD-10-CM

## 2019-08-12 PROCEDURE — 93306 TTE W/DOPPLER COMPLETE: CPT | Performed by: INTERNAL MEDICINE

## 2019-08-12 PROCEDURE — 93306 TTE W/DOPPLER COMPLETE: CPT

## 2019-08-12 NOTE — PROGRESS NOTES
Patient could not do nuclear stress test due to claustrophobia.  Per Christiano Clemons PA-C order a stress echo.    Patient notified.

## 2019-08-14 LAB
BH CV ECHO MEAS - AO MAX PG (FULL): 26.9 MMHG
BH CV ECHO MEAS - AO MAX PG: 33 MMHG
BH CV ECHO MEAS - AO MEAN PG (FULL): 15.7 MMHG
BH CV ECHO MEAS - AO MEAN PG: 19.8 MMHG
BH CV ECHO MEAS - AO ROOT AREA (BSA CORRECTED): 1.4
BH CV ECHO MEAS - AO ROOT AREA: 7.5 CM^2
BH CV ECHO MEAS - AO ROOT DIAM: 3.1 CM
BH CV ECHO MEAS - AO V2 MAX: 287.4 CM/SEC
BH CV ECHO MEAS - AO V2 MEAN: 214.1 CM/SEC
BH CV ECHO MEAS - AO V2 VTI: 53.5 CM
BH CV ECHO MEAS - AVA(I,A): 1.1 CM^2
BH CV ECHO MEAS - AVA(I,D): 1.5 CM^2
BH CV ECHO MEAS - AVA(V,A): 1.3 CM^2
BH CV ECHO MEAS - AVA(V,D): 1.3 CM^2
BH CV ECHO MEAS - BSA(HAYCOCK): 2.3 M^2
BH CV ECHO MEAS - BSA: 2.2 M^2
BH CV ECHO MEAS - BZI_BMI: 34.6 KILOGRAMS/M^2
BH CV ECHO MEAS - BZI_METRIC_HEIGHT: 175.3 CM
BH CV ECHO MEAS - BZI_METRIC_WEIGHT: 106.1 KG
BH CV ECHO MEAS - EDV(CUBED): 65.1 ML
BH CV ECHO MEAS - EDV(TEICH): 70.9 ML
BH CV ECHO MEAS - EF(CUBED): 77 %
BH CV ECHO MEAS - EF(TEICH): 69.7 %
BH CV ECHO MEAS - ESV(CUBED): 14.9 ML
BH CV ECHO MEAS - ESV(TEICH): 21.5 ML
BH CV ECHO MEAS - FS: 38.8 %
BH CV ECHO MEAS - IVS/LVPW: 0.99
BH CV ECHO MEAS - IVSD: 1.2 CM
BH CV ECHO MEAS - LA DIMENSION: 3.6 CM
BH CV ECHO MEAS - LA/AO: 1.2
BH CV ECHO MEAS - LAT PEAK E' VEL: 12.1 CM/SEC
BH CV ECHO MEAS - LATERAL E/E' RATIO: 4.1
BH CV ECHO MEAS - LV MASS(C)D: 164 GRAMS
BH CV ECHO MEAS - LV MASS(C)DI: 74.3 GRAMS/M^2
BH CV ECHO MEAS - LV MAX PG: 6.1 MMHG
BH CV ECHO MEAS - LV MEAN PG: 4.2 MMHG
BH CV ECHO MEAS - LV V1 MAX: 123.9 CM/SEC
BH CV ECHO MEAS - LV V1 MEAN: 94.5 CM/SEC
BH CV ECHO MEAS - LV V1 VTI: 19.6 CM
BH CV ECHO MEAS - LVIDD: 4 CM
BH CV ECHO MEAS - LVIDS: 2.5 CM
BH CV ECHO MEAS - LVOT AREA (M): 3.1 CM^2
BH CV ECHO MEAS - LVOT AREA: 3.1 CM^2
BH CV ECHO MEAS - LVOT DIAM: 2 CM
BH CV ECHO MEAS - LVPWD: 1.2 CM
BH CV ECHO MEAS - MED PEAK E' VEL: 9.2 CM/SEC
BH CV ECHO MEAS - MEDIAL E/E' RATIO: 5.4
BH CV ECHO MEAS - MV A MAX VEL: 124.4 CM/SEC
BH CV ECHO MEAS - MV DEC SLOPE: 331.8 CM/SEC^2
BH CV ECHO MEAS - MV DEC TIME: 0.27 SEC
BH CV ECHO MEAS - MV E MAX VEL: 50.8 CM/SEC
BH CV ECHO MEAS - MV E/A: 0.41
BH CV ECHO MEAS - MV P1/2T MAX VEL: 76.9 CM/SEC
BH CV ECHO MEAS - MV P1/2T: 67.8 MSEC
BH CV ECHO MEAS - MVA P1/2T LCG: 2.9 CM^2
BH CV ECHO MEAS - MVA(P1/2T): 3.2 CM^2
BH CV ECHO MEAS - PA ACC SLOPE: 592.8 CM/SEC^2
BH CV ECHO MEAS - PA ACC TIME: 0.12 SEC
BH CV ECHO MEAS - PA PR(ACCEL): 23.5 MMHG
BH CV ECHO MEAS - PI END-D VEL: 150.4 CM/SEC
BH CV ECHO MEAS - RV MAX PG: 1.3 MMHG
BH CV ECHO MEAS - RV V1 MAX: 56.3 CM/SEC
BH CV ECHO MEAS - SI(AO): 182.9 ML/M^2
BH CV ECHO MEAS - SI(CUBED): 22.7 ML/M^2
BH CV ECHO MEAS - SI(LVOT): 27.5 ML/M^2
BH CV ECHO MEAS - SI(TEICH): 22.4 ML/M^2
BH CV ECHO MEAS - SV(AO): 403.8 ML
BH CV ECHO MEAS - SV(CUBED): 50.1 ML
BH CV ECHO MEAS - SV(LVOT): 60.7 ML
BH CV ECHO MEAS - SV(TEICH): 49.4 ML
BH CV ECHO MEAS - TAPSE (>1.6): 1.8 CM2
BH CV ECHO MEASUREMENTS AVERAGE E/E' RATIO: 4.77
BH CV XLRA - RV BASE: 2.2 CM
BH CV XLRA - RV LENGTH: 6.9 CM
BH CV XLRA - RV MID: 1.8 CM
BH CV XLRA - TDI S': 12.6 CM/SEC
LV EF 2D ECHO EST: 60 %
MAXIMAL PREDICTED HEART RATE: 148 BPM
STRESS TARGET HR: 126 BPM

## 2019-08-29 ENCOUNTER — HOSPITAL ENCOUNTER (OUTPATIENT)
Dept: CARDIOLOGY | Facility: HOSPITAL | Age: 74
Discharge: HOME | End: 2019-08-29
Admitting: PHYSICIAN ASSISTANT

## 2019-08-29 DIAGNOSIS — R07.9 CHEST PAIN, UNSPECIFIED TYPE: ICD-10-CM

## 2019-08-29 LAB
BH CV ECHO MEAS - BSA(HAYCOCK): 2.3 M^2
BH CV ECHO MEAS - BSA: 2.2 M^2
BH CV ECHO MEAS - BZI_BMI: 34.6 KILOGRAMS/M^2
BH CV ECHO MEAS - BZI_METRIC_HEIGHT: 175.3 CM
BH CV ECHO MEAS - BZI_METRIC_WEIGHT: 106.1 KG
BH CV STRESS BP STAGE 1: NORMAL
BH CV STRESS BP STAGE 2: NORMAL
BH CV STRESS DURATION MIN STAGE 1: 3
BH CV STRESS DURATION MIN STAGE 2: 3
BH CV STRESS DURATION SEC STAGE 1: 0
BH CV STRESS DURATION SEC STAGE 2: 0
BH CV STRESS DURATION SEC STAGE 3: 30
BH CV STRESS ECHO POST STRESS EJECTION FRACTION EF: 75 %
BH CV STRESS GRADE STAGE 1: 10
BH CV STRESS GRADE STAGE 2: 12
BH CV STRESS GRADE STAGE 3: 14
BH CV STRESS HR STAGE 1: 108
BH CV STRESS HR STAGE 2: 123
BH CV STRESS HR STAGE 3: 130
BH CV STRESS METS STAGE 1: 5
BH CV STRESS METS STAGE 2: 7.5
BH CV STRESS METS STAGE 3: 10
BH CV STRESS PROTOCOL 1: NORMAL
BH CV STRESS RECOVERY BP: NORMAL MMHG
BH CV STRESS RECOVERY HR: 95 BPM
BH CV STRESS SPEED STAGE 1: 1.7
BH CV STRESS SPEED STAGE 2: 2.5
BH CV STRESS SPEED STAGE 3: 3.4
BH CV STRESS STAGE 1: 1
BH CV STRESS STAGE 2: 2
BH CV STRESS STAGE 3: 3
BH CV VAS BP RIGHT ARM: NORMAL MMHG
PERCENT MAX PREDICTED HR: 90.54 %
STRESS BASELINE BP: NORMAL MMHG
STRESS BASELINE HR: 85 BPM
STRESS PERCENT HR: 107 %
STRESS POST ESTIMATED WORKLOAD: 7.8 METS
STRESS POST EXERCISE DUR MIN: 6 MIN
STRESS POST EXERCISE DUR SEC: 30 SEC
STRESS POST PEAK BP: NORMAL MMHG
STRESS POST PEAK HR: 134 BPM

## 2019-08-29 PROCEDURE — 93350 STRESS TTE ONLY: CPT

## 2019-08-29 PROCEDURE — 93018 CV STRESS TEST I&R ONLY: CPT | Performed by: INTERNAL MEDICINE

## 2019-08-29 PROCEDURE — 93017 CV STRESS TEST TRACING ONLY: CPT

## 2019-08-29 PROCEDURE — 25010000002 SULFUR HEXAFLUORIDE MICROSPH 60.7-25 MG RECONSTITUTED SUSPENSION: Performed by: PHYSICIAN ASSISTANT

## 2019-08-29 PROCEDURE — 93350 STRESS TTE ONLY: CPT | Performed by: INTERNAL MEDICINE

## 2019-08-29 PROCEDURE — 93352 ADMIN ECG CONTRAST AGENT: CPT | Performed by: INTERNAL MEDICINE

## 2019-08-29 RX ADMIN — SULFUR HEXAFLUORIDE 5 ML: KIT at 08:30

## 2019-09-04 ENCOUNTER — TELEPHONE (OUTPATIENT)
Dept: CARDIOLOGY | Facility: CLINIC | Age: 74
End: 2019-09-04

## 2019-09-04 NOTE — TELEPHONE ENCOUNTER
Regarding: Non-Urgent Medical Question  Contact: [phone]  ----- Message from [employer] sent at 2019  2:46 PM EDT -----    If my cardio tests on Aug 12 and Aug 29 were satisfactory enough for me to undergo an EUS by Dr Eliana Norwood of  Gastro to investigate the pancreatic cyst (1.2 shown on my CT scan) please forward my cardio history to him. I have also sent Dr Norwood a note. Thank you. Ac Caldwell  1945.

## 2019-09-09 DIAGNOSIS — E78.5 HYPERLIPIDEMIA, UNSPECIFIED HYPERLIPIDEMIA TYPE: ICD-10-CM

## 2019-09-09 NOTE — TELEPHONE ENCOUNTER
The patient needs risk assessment for endoscopic ultrasound to investigate pancreatic cyst.  He was last seen in our office on 6/28/19.  Also had a recent echo/stress echo which are in Epic.  Please advise.

## 2019-09-10 RX ORDER — ATORVASTATIN CALCIUM 10 MG/1
TABLET, FILM COATED ORAL
Qty: 90 TABLET | Refills: 0 | Status: SHIPPED | OUTPATIENT
Start: 2019-09-10 | End: 2019-12-14 | Stop reason: SDUPTHER

## 2019-09-10 NOTE — TELEPHONE ENCOUNTER
His stress test was negative. His echo shows minimal change in AS which is moderate. Cannot provide risk accessement without a lipid panel. However, he has no known CAD, stable moderate AS and a negative stress test. I'd consider him average risk

## 2019-10-07 ENCOUNTER — FLU SHOT (OUTPATIENT)
Dept: INTERNAL MEDICINE | Facility: CLINIC | Age: 74
End: 2019-10-07

## 2019-10-07 DIAGNOSIS — Z23 NEED FOR INFLUENZA VACCINATION: ICD-10-CM

## 2019-10-30 ENCOUNTER — TELEPHONE (OUTPATIENT)
Dept: INTERNAL MEDICINE | Facility: CLINIC | Age: 74
End: 2019-10-30

## 2019-10-30 NOTE — TELEPHONE ENCOUNTER
PATIENT WOULD LIKE TO HAVE LABS COMPLETED BEFORE HIS APPT ON 11/8. HE WOULD LIKE TO REMIND HER THAT HIS THYROID NEEDS TO BE CHECKED SO HE CAN SEND THE RESULTS TO HIS ENT.   I ADVISED HIM OF THE LAB HOURS AND HE WANTED TO MAKE SURE HE HAD ORDERS FOR LABS BEFORE HE WENT.   PLEASE CALL PATIENT WHEN THE ORDERS HAVE BEEN SENT SO HE CAN STOP BY THE LAB. PATIENT CALL BACK [phone].   THANK YOU.

## 2019-10-31 DIAGNOSIS — E78.5 DYSLIPIDEMIA: Chronic | ICD-10-CM

## 2019-10-31 DIAGNOSIS — K21.9 GASTROESOPHAGEAL REFLUX DISEASE, ESOPHAGITIS PRESENCE NOT SPECIFIED: ICD-10-CM

## 2019-10-31 DIAGNOSIS — E03.9 ACQUIRED HYPOTHYROIDISM: ICD-10-CM

## 2019-10-31 DIAGNOSIS — C64.9 MALIGNANT NEOPLASM OF KIDNEY EXCLUDING RENAL PELVIS, UNSPECIFIED LATERALITY (HCC): ICD-10-CM

## 2019-10-31 DIAGNOSIS — R73.09 ABNORMAL GLUCOSE: ICD-10-CM

## 2019-10-31 DIAGNOSIS — E53.8 B12 DEFICIENCY: ICD-10-CM

## 2019-10-31 DIAGNOSIS — I10 ESSENTIAL HYPERTENSION: Primary | ICD-10-CM

## 2019-10-31 DIAGNOSIS — I38 VALVULAR HEART DISEASE: Chronic | ICD-10-CM

## 2019-10-31 DIAGNOSIS — E55.9 VITAMIN D DEFICIENCY: ICD-10-CM

## 2019-11-04 ENCOUNTER — LAB (OUTPATIENT)
Dept: INTERNAL MEDICINE | Facility: CLINIC | Age: 74
End: 2019-11-04

## 2019-11-04 DIAGNOSIS — R73.09 ABNORMAL GLUCOSE: ICD-10-CM

## 2019-11-04 DIAGNOSIS — E55.9 VITAMIN D DEFICIENCY: ICD-10-CM

## 2019-11-04 DIAGNOSIS — I38 VALVULAR HEART DISEASE: ICD-10-CM

## 2019-11-04 DIAGNOSIS — C64.9 MALIGNANT NEOPLASM OF KIDNEY EXCLUDING RENAL PELVIS, UNSPECIFIED LATERALITY (HCC): ICD-10-CM

## 2019-11-04 DIAGNOSIS — E78.5 DYSLIPIDEMIA: ICD-10-CM

## 2019-11-04 DIAGNOSIS — E03.9 ACQUIRED HYPOTHYROIDISM: ICD-10-CM

## 2019-11-04 DIAGNOSIS — I10 ESSENTIAL HYPERTENSION: ICD-10-CM

## 2019-11-04 DIAGNOSIS — K21.9 GASTROESOPHAGEAL REFLUX DISEASE, ESOPHAGITIS PRESENCE NOT SPECIFIED: ICD-10-CM

## 2019-11-04 DIAGNOSIS — E53.8 B12 DEFICIENCY: ICD-10-CM

## 2019-11-04 LAB
25(OH)D3 SERPL-MCNC: 34.4 NG/ML (ref 30–100)
ALBUMIN SERPL-MCNC: 4 G/DL (ref 3.5–5.2)
ALBUMIN/GLOB SERPL: 1.3 G/DL
ALP SERPL-CCNC: 65 U/L (ref 39–117)
ALT SERPL W P-5'-P-CCNC: 16 U/L (ref 1–41)
ANION GAP SERPL CALCULATED.3IONS-SCNC: 7.7 MMOL/L (ref 5–15)
AST SERPL-CCNC: 15 U/L (ref 1–40)
BASOPHILS # BLD AUTO: 0.02 10*3/MM3 (ref 0–0.2)
BASOPHILS NFR BLD AUTO: 0.4 % (ref 0–1.5)
BILIRUB SERPL-MCNC: 0.9 MG/DL (ref 0.2–1.2)
BUN BLD-MCNC: 16 MG/DL (ref 8–23)
BUN/CREAT SERPL: 11 (ref 7–25)
CALCIUM SPEC-SCNC: 9.4 MG/DL (ref 8.6–10.5)
CHLORIDE SERPL-SCNC: 106 MMOL/L (ref 98–107)
CHOLEST SERPL-MCNC: 143 MG/DL (ref 0–200)
CO2 SERPL-SCNC: 29.3 MMOL/L (ref 22–29)
CREAT BLD-MCNC: 1.45 MG/DL (ref 0.76–1.27)
DEPRECATED RDW RBC AUTO: 41 FL (ref 37–54)
EOSINOPHIL # BLD AUTO: 0.2 10*3/MM3 (ref 0–0.4)
EOSINOPHIL NFR BLD AUTO: 3.7 % (ref 0.3–6.2)
ERYTHROCYTE [DISTWIDTH] IN BLOOD BY AUTOMATED COUNT: 12.6 % (ref 12.3–15.4)
GFR SERPL CREATININE-BSD FRML MDRD: 48 ML/MIN/1.73
GLOBULIN UR ELPH-MCNC: 3 GM/DL
GLUCOSE BLD-MCNC: 91 MG/DL (ref 65–99)
HBA1C MFR BLD: 6.16 % (ref 4.8–5.6)
HCT VFR BLD AUTO: 45.5 % (ref 37.5–51)
HDLC SERPL-MCNC: 31 MG/DL (ref 40–60)
HGB BLD-MCNC: 16 G/DL (ref 13–17.7)
IMM GRANULOCYTES # BLD AUTO: 0.03 10*3/MM3 (ref 0–0.05)
IMM GRANULOCYTES NFR BLD AUTO: 0.6 % (ref 0–0.5)
LDLC SERPL CALC-MCNC: 85 MG/DL (ref 0–100)
LDLC/HDLC SERPL: 2.75 {RATIO}
LYMPHOCYTES # BLD AUTO: 1.49 10*3/MM3 (ref 0.7–3.1)
LYMPHOCYTES NFR BLD AUTO: 27.5 % (ref 19.6–45.3)
MCH RBC QN AUTO: 31.6 PG (ref 26.6–33)
MCHC RBC AUTO-ENTMCNC: 35.2 G/DL (ref 31.5–35.7)
MCV RBC AUTO: 89.7 FL (ref 79–97)
MONOCYTES # BLD AUTO: 0.55 10*3/MM3 (ref 0.1–0.9)
MONOCYTES NFR BLD AUTO: 10.2 % (ref 5–12)
NEUTROPHILS # BLD AUTO: 3.12 10*3/MM3 (ref 1.7–7)
NEUTROPHILS NFR BLD AUTO: 57.6 % (ref 42.7–76)
NRBC BLD AUTO-RTO: 0 /100 WBC (ref 0–0.2)
PLATELET # BLD AUTO: 174 10*3/MM3 (ref 140–450)
PMV BLD AUTO: 10.8 FL (ref 6–12)
POTASSIUM BLD-SCNC: 4.5 MMOL/L (ref 3.5–5.2)
PROT SERPL-MCNC: 7 G/DL (ref 6–8.5)
RBC # BLD AUTO: 5.07 10*6/MM3 (ref 4.14–5.8)
SODIUM BLD-SCNC: 143 MMOL/L (ref 136–145)
T4 FREE SERPL-MCNC: 1.16 NG/DL (ref 0.93–1.7)
TRIGL SERPL-MCNC: 134 MG/DL (ref 0–150)
TSH SERPL DL<=0.05 MIU/L-ACNC: 2.69 UIU/ML (ref 0.27–4.2)
VIT B12 BLD-MCNC: 1175 PG/ML (ref 211–946)
VLDLC SERPL-MCNC: 26.8 MG/DL (ref 5–40)
WBC NRBC COR # BLD: 5.41 10*3/MM3 (ref 3.4–10.8)

## 2019-11-04 PROCEDURE — 84443 ASSAY THYROID STIM HORMONE: CPT | Performed by: NURSE PRACTITIONER

## 2019-11-04 PROCEDURE — 82607 VITAMIN B-12: CPT | Performed by: NURSE PRACTITIONER

## 2019-11-04 PROCEDURE — 80061 LIPID PANEL: CPT | Performed by: NURSE PRACTITIONER

## 2019-11-04 PROCEDURE — 85025 COMPLETE CBC W/AUTO DIFF WBC: CPT | Performed by: NURSE PRACTITIONER

## 2019-11-04 PROCEDURE — 84439 ASSAY OF FREE THYROXINE: CPT | Performed by: NURSE PRACTITIONER

## 2019-11-04 PROCEDURE — 83036 HEMOGLOBIN GLYCOSYLATED A1C: CPT | Performed by: NURSE PRACTITIONER

## 2019-11-04 PROCEDURE — 80053 COMPREHEN METABOLIC PANEL: CPT | Performed by: NURSE PRACTITIONER

## 2019-11-04 PROCEDURE — 82306 VITAMIN D 25 HYDROXY: CPT | Performed by: NURSE PRACTITIONER

## 2019-11-08 ENCOUNTER — OFFICE VISIT (OUTPATIENT)
Dept: INTERNAL MEDICINE | Facility: CLINIC | Age: 74
End: 2019-11-08

## 2019-11-08 VITALS
BODY MASS INDEX: 34.6 KG/M2 | TEMPERATURE: 97.7 F | SYSTOLIC BLOOD PRESSURE: 132 MMHG | OXYGEN SATURATION: 98 % | WEIGHT: 233.6 LBS | HEIGHT: 69 IN | RESPIRATION RATE: 16 BRPM | HEART RATE: 78 BPM | DIASTOLIC BLOOD PRESSURE: 80 MMHG

## 2019-11-08 DIAGNOSIS — E78.5 DYSLIPIDEMIA: Chronic | ICD-10-CM

## 2019-11-08 DIAGNOSIS — I10 ESSENTIAL HYPERTENSION: Primary | ICD-10-CM

## 2019-11-08 DIAGNOSIS — C64.9 MALIGNANT NEOPLASM OF KIDNEY EXCLUDING RENAL PELVIS, UNSPECIFIED LATERALITY (HCC): ICD-10-CM

## 2019-11-08 DIAGNOSIS — I38 VALVULAR HEART DISEASE: Chronic | ICD-10-CM

## 2019-11-08 DIAGNOSIS — R73.09 ABNORMAL GLUCOSE: ICD-10-CM

## 2019-11-08 DIAGNOSIS — E03.9 ACQUIRED HYPOTHYROIDISM: ICD-10-CM

## 2019-11-08 PROCEDURE — 99214 OFFICE O/P EST MOD 30 MIN: CPT | Performed by: NURSE PRACTITIONER

## 2019-11-08 PROCEDURE — 90653 IIV ADJUVANT VACCINE IM: CPT | Performed by: NURSE PRACTITIONER

## 2019-11-08 PROCEDURE — G0008 ADMIN INFLUENZA VIRUS VAC: HCPCS | Performed by: NURSE PRACTITIONER

## 2019-11-08 RX ORDER — LOSARTAN POTASSIUM 100 MG/1
100 TABLET ORAL DAILY
COMMUNITY

## 2019-11-08 NOTE — PROGRESS NOTES
Subjective   Ac Caldwell is a 74 y.o. male    Chief Complaint   Patient presents with   • Follow-up   • Hypertension   • Hypothyroidism   • Hyperlipidemia     History of Present Illness     On 1/26/15, Ac saw gross hematuria, saw Dr. Ayala (urology) and he did a scope. It was ok. Then had a CT scan on 2/3/15. Cr was high, so they did it without contrast. CT scan showed a lesion, but he was not concerned. Then had a fish bladder test on 2/5/15. His urologist never got the results. Did go back to see Nephrology and had labs in Feb. Cr. was 1.1 and BUN was 16. Everything has been stable since. Has a h/o right kidney CA in 1997 and had nephrectomy.  Urologist - Dr. Jossue Serrano left and moved to [place] and he had to establish with Dr Farrell at [facility].  He saw him several times for recurrent UTI, hydrocele, renal cyst, and epididymoorchits. He saw them again 5/31/2017 for repeat renal US that showed a 3.4cm left renal cyst, which had grown from 2.5cm on last scan.  Dr. Farrell moved to [place] and he was then referred to  Urology.  Had CXR and labs.  CXR revealed a possible thoracic aortic aneurysm.  Had f/u chest CT scan, no aneurysm.  CT renal mass protocol was done on 8/30/2017 which confirmed a solid enhancing mass in the lower pole of the left kidney, without lymphadenopathy.  It was elected to proceed with a cryoablation of this lesion per Dr. Mark at .  He underwent this procedure on 9/27/17.  Pathology is unclear as to it being malignant vs benign.  Will be re-scanned in 30 days, then 90 days, then Q6 months.       CT in Aug 2017 revealed an incidental pancreatic lesion on the head of the pancreas that was 12 mm in size; f/u imaging done 9/2018 showed that the mass was stable and unchanged in size.  Had a recent biopsy of the pancreatic cyst and it was benign.  They will monitor Q12 months with MRI     Underwent a STEAM procedure in 2/2019 for BPH @       Nephrologist- [facility]; his  Creatinine is up from 1.31 to 1.45.  Last f/u was 8/2019; seeing them Q6 months.  Creatinine with Urology was 1.27.       Had a very large thyroid nodule removed on 4/23/14 per ENT. It was 8cm in size; they took the right side of his thyroid and placed on synthroid 50mcg. It was benign! Seeing Dr. Everett annually and everything has been stable.      Has a h/o AS.  Was referred him to Cardiology. Had an appt on 4/29/16 and he thought everything was ok. He is just going to watch. He saw him again on 11/4/16 and everything was stable. He will follow every 6 months and echo q 2 yrs.  Last appt was 12/2018, next f/u is 1/2020      HTN - well controlled with Losartan 100 mg (increased per Nephrology in Aug 2019) and Toprol XL 25mg; Verapamil was stopped per Cards in 11/17 - He is tolerating well.  BP is well controlled and he denies SE.     HL - stable on Lipitor 10mg daily without SE.  LDL looks great (85)    Flu shot, 10/7/2019  Tdap 2009   Pneumovax 2012   Prevnar - 4/2015   Zostavax 2010  PSA per Urology   Colon - 4/2015, due 2025     The following portions of the patient's history were reviewed and updated as appropriate: allergies, current medications, past family history, past medical history, past social history, past surgical history and problem list.    Current Outpatient Medications:   •  aspirin 325 MG tablet, Take 325 mg by mouth Daily., Disp: , Rfl:   •  atorvastatin (LIPITOR) 10 MG tablet, TAKE ONE TABLET BY MOUTH ONCE NIGHTLY, Disp: 90 tablet, Rfl: 0  •  azelastine (ASTELIN) 0.1 % nasal spray, 1 spray into each nostril 2 (Two) Times a Day. Use in each nostril as directed, Disp: , Rfl:   •  Cholecalciferol (VITAMIN D-3 PO), Take 1,000 Units by mouth Daily., Disp: , Rfl:   •  Coenzyme Q10 (CO Q-10) 100 MG capsule, Take  by mouth 2 (Two) Times a Day., Disp: , Rfl:   •  esomeprazole (NexIUM) 20 MG capsule, Take 20 mg by mouth Daily., Disp: , Rfl:   •  Flaxseed, Linseed, (FLAX SEED OIL) 1000 MG capsule, Take  1,200 mg by mouth Daily., Disp: , Rfl:   •  fluticasone (FLONASE) 50 MCG/ACT nasal spray, 2 sprays into each nostril Daily., Disp: 3 each, Rfl: 2  •  levothyroxine (SYNTHROID, LEVOTHROID) 50 MCG tablet, Take 50 mcg by mouth Daily., Disp: , Rfl:   •  losartan (COZAAR) 100 MG tablet, Take 100 mg by mouth Daily., Disp: , Rfl:   •  metoprolol succinate XL (TOPROL-XL) 25 MG 24 hr tablet, Take 1 tablet by mouth every night at bedtime., Disp: 90 tablet, Rfl: 3  •  Omega-3 Fatty Acids (FISH OIL) 1000 MG capsule capsule, Take 1,200 mg by mouth 2 (Two) Times a Day With Meals., Disp: , Rfl:   •  potassium citrate (UROCIT-K) 10 MEQ (1080 MG) CR tablet, TAKE ONE TABLET BY MOUTH DAILY, Disp: 90 tablet, Rfl: 1  •  vitamin B-12 (CYANOCOBALAMIN) 1000 MCG tablet, Take 1,000 mcg by mouth Daily., Disp: , Rfl:   •  betamethasone valerate (VALISONE) 0.1 % ointment, Uses once weekly, Disp: , Rfl:      Review of Systems   Constitutional: Negative for chills, fatigue and fever.   Respiratory: Negative for cough, chest tightness and shortness of breath.    Cardiovascular: Negative for chest pain.   Gastrointestinal: Negative for abdominal pain, diarrhea, nausea and vomiting.   Endocrine: Negative for cold intolerance and heat intolerance.   Musculoskeletal: Negative for arthralgias.   Neurological: Negative for dizziness.       Objective   Physical Exam   Constitutional: He is oriented to person, place, and time. He appears well-developed and well-nourished.   HENT:   Head: Normocephalic and atraumatic.   Eyes: Conjunctivae and EOM are normal. Pupils are equal, round, and reactive to light.   Neck: Normal range of motion.   Cardiovascular: Normal rate, regular rhythm and normal heart sounds.   Pulmonary/Chest: Effort normal and breath sounds normal.   Abdominal: Soft. Bowel sounds are normal.   Musculoskeletal: Normal range of motion.   Neurological: He is alert and oriented to person, place, and time. He has normal reflexes.   Skin: Skin is  "warm and dry.   Psychiatric: He has a normal mood and affect. His behavior is normal. Judgment and thought content normal.     Vitals:    11/08/19 0830   BP: 132/80   Pulse: 78   Resp: 16   Temp: 97.7 °F (36.5 °C)   TempSrc: Temporal   SpO2: 98%   Weight: 106 kg (233 lb 9.6 oz)   Height: 175.3 cm (69.02\")         Assessment/Plan   Ac was seen today for follow-up, hypertension, hypothyroidism and hyperlipidemia.    Diagnoses and all orders for this visit:    Essential hypertension    Dyslipidemia    Acquired hypothyroidism    Malignant neoplasm of kidney excluding renal pelvis, unspecified laterality (CMS/[facility])  -     Comprehensive Metabolic Panel; Future    Abnormal glucose    Valvular heart disease      All labs reviewed  Patient will return to clinic in approximately 2 to 4 weeks for repeat CMP after he has well-hydrated.  Labs reviewed from nephrology visit in August, creatinine was 1.27 at that time.  I feel his elevation in creatinine on most recent labs was due to being fasted  No other changes made today  Continue with losartan 100 mg daily, metoprolol XL 25 mg daily for blood pressure control  Continue with Lipitor 10 mg daily for hyperlipidemia  Continue with levothyroxine 50 mcg daily for hypothyroid  Today's A1c was 6.2, diet and exercise discussed to lower blood glucose levels  He will follow-up with Dr. Valladares on valvular heart disease in January 2020  Return in about 6 months (around 5/8/2020) for Medicare Wellness.             "

## 2019-11-22 ENCOUNTER — LAB (OUTPATIENT)
Dept: INTERNAL MEDICINE | Facility: CLINIC | Age: 74
End: 2019-11-22

## 2019-11-22 DIAGNOSIS — C64.9 MALIGNANT NEOPLASM OF KIDNEY EXCLUDING RENAL PELVIS, UNSPECIFIED LATERALITY (HCC): ICD-10-CM

## 2019-11-22 LAB
ALBUMIN SERPL-MCNC: 4.2 G/DL (ref 3.5–5.2)
ALBUMIN/GLOB SERPL: 1.4 G/DL
ALP SERPL-CCNC: 61 U/L (ref 39–117)
ALT SERPL W P-5'-P-CCNC: 19 U/L (ref 1–41)
ANION GAP SERPL CALCULATED.3IONS-SCNC: 9.8 MMOL/L (ref 5–15)
AST SERPL-CCNC: 18 U/L (ref 1–40)
BILIRUB SERPL-MCNC: 0.6 MG/DL (ref 0.2–1.2)
BUN BLD-MCNC: 17 MG/DL (ref 8–23)
BUN/CREAT SERPL: 12 (ref 7–25)
CALCIUM SPEC-SCNC: 9.4 MG/DL (ref 8.6–10.5)
CHLORIDE SERPL-SCNC: 102 MMOL/L (ref 98–107)
CO2 SERPL-SCNC: 27.2 MMOL/L (ref 22–29)
CREAT BLD-MCNC: 1.42 MG/DL (ref 0.76–1.27)
GFR SERPL CREATININE-BSD FRML MDRD: 49 ML/MIN/1.73
GLOBULIN UR ELPH-MCNC: 3.1 GM/DL
GLUCOSE BLD-MCNC: 80 MG/DL (ref 65–99)
POTASSIUM BLD-SCNC: 4.7 MMOL/L (ref 3.5–5.2)
PROT SERPL-MCNC: 7.3 G/DL (ref 6–8.5)
SODIUM BLD-SCNC: 139 MMOL/L (ref 136–145)

## 2019-11-22 PROCEDURE — 80053 COMPREHEN METABOLIC PANEL: CPT | Performed by: NURSE PRACTITIONER

## 2019-12-05 ENCOUNTER — TELEPHONE (OUTPATIENT)
Dept: INTERNAL MEDICINE | Facility: CLINIC | Age: 74
End: 2019-12-05

## 2019-12-05 NOTE — TELEPHONE ENCOUNTER
----- Message from BERNICE Mccullough sent at 12/1/2019  8:29 PM EST -----  Can we forward a copy of pat's last 2 CMPs to his Nephrologist?

## 2019-12-14 DIAGNOSIS — E78.5 HYPERLIPIDEMIA, UNSPECIFIED HYPERLIPIDEMIA TYPE: ICD-10-CM

## 2019-12-16 RX ORDER — POTASSIUM CITRATE 10 MEQ/1
TABLET, EXTENDED RELEASE ORAL
Qty: 90 TABLET | Refills: 0 | Status: SHIPPED | OUTPATIENT
Start: 2019-12-16 | End: 2020-01-08

## 2019-12-16 RX ORDER — ATORVASTATIN CALCIUM 10 MG/1
TABLET, FILM COATED ORAL
Qty: 90 TABLET | Refills: 0 | Status: SHIPPED | OUTPATIENT
Start: 2019-12-16 | End: 2020-01-08

## 2020-01-08 DIAGNOSIS — E78.5 HYPERLIPIDEMIA, UNSPECIFIED HYPERLIPIDEMIA TYPE: ICD-10-CM

## 2020-01-08 RX ORDER — POTASSIUM CITRATE 10 MEQ/1
TABLET, EXTENDED RELEASE ORAL
Qty: 90 EACH | Refills: 0 | Status: SHIPPED | OUTPATIENT
Start: 2020-01-08 | End: 2020-05-15

## 2020-01-08 RX ORDER — ATORVASTATIN CALCIUM 10 MG/1
TABLET, FILM COATED ORAL
Qty: 90 TABLET | Refills: 0 | Status: SHIPPED | OUTPATIENT
Start: 2020-01-08 | End: 2020-05-15

## 2020-01-17 ENCOUNTER — OFFICE VISIT (OUTPATIENT)
Dept: CARDIOLOGY | Facility: CLINIC | Age: 75
End: 2020-01-17

## 2020-01-17 VITALS
OXYGEN SATURATION: 98 % | HEART RATE: 64 BPM | WEIGHT: 236 LBS | DIASTOLIC BLOOD PRESSURE: 90 MMHG | SYSTOLIC BLOOD PRESSURE: 162 MMHG | BODY MASS INDEX: 34.96 KG/M2 | HEIGHT: 69 IN

## 2020-01-17 DIAGNOSIS — I35.0 AORTIC STENOSIS, MILD: Primary | ICD-10-CM

## 2020-01-17 DIAGNOSIS — E78.5 DYSLIPIDEMIA: Chronic | ICD-10-CM

## 2020-01-17 DIAGNOSIS — I10 ESSENTIAL HYPERTENSION: ICD-10-CM

## 2020-01-17 PROCEDURE — 99214 OFFICE O/P EST MOD 30 MIN: CPT | Performed by: INTERNAL MEDICINE

## 2020-01-17 NOTE — PROGRESS NOTES
[facility] Cardiology    Encounter Date:2020        Patient ID: Ac Caldwell is a 74 y.o. male.  : 1945     PCP: Valentine Newton APRN     Chief Complaint: moderate aortic stenosis and valvular heart disease      PROBLEM LIST:  1. Aortic stenosis:   a. Refused enrollment in the  50 years ago due to some cardiac condition, details not known.   b. Annual echocardiographic followups for the last several years for aortic stenosis.   c. Echocardiogram, 2016: Mild AI and moderate AS, HEATHER 1.2 cm2, mean gradient of 16.6, and peak gradient of 33.3. Trace TR with RVSP of 26.2. Normal EF.  d. Nuclear stress, 2016: EF 72%. No evidence of ischemia.  e. CTA, 2017: Mild ectasia of ascending aorta measuring 4.2cm significant aortic leaflet calcification, coronary sclerosis, no aneurysm of descending aorta.  f. Echocardiogram, 2017: Mild AI/AS with HEATHER 1.8 cm². MPG 17.0. Normal EF 64%. Mild LVH.   g. Echocardiogram, 2019: Mild AS, MPG 20 mmHg, HEATHER 1.5 cm2. Trace AI. Mild MR/PI, trace TR. EF 60% with mild concentric LVH. Grade I a diastolic dysfunction.   2. Exertional dyspnea/anginal equivalent symptoms with occasional orthopnea.   a. Stress echo, 2019: Normal exercise capacity, THR reached at 6 min. No evidence of ischemia. EF 65% at rest, 75% with stress.  3. Hypertension.   4. Dyslipidemia.   5. Enlarged prostate.  6. History of renal cancer:   a. Right nephrectomy in .   b. S/p Resection of a tumor from his left kidney -- incomplete database.   c. S/p CT ablation of L renal mass.  7. 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   8. S/p basal cell cancer surgery.   9. Episode of hematuria in January with subsequent negative cystoscopy.  10. Surgical Hx:  a. CT Ablation of Left renal mass  b. Endoscopic US at  for benign cyst, 2019.    History of Present Illness  Patient presents today for 6 month follow-up with a history of  "aortic stenosis and cardiac risk factors. Since last visit, he has been feeling well overall from a cardiovascular standpoint. [facility] reportedly increase his losartan from 50 to 100 mg daily, which has helped control his BP. Patient denies chest pain, palpitations, shortness of breath, edema, dizziness, and syncope.      Allergies   Allergen Reactions   • Norvasc [Amlodipine] Swelling   • Oxycodone      Can elevate Blood pressure   • Lotensin [Benazepril] Cough   • Sulfamethoxazole-Trimethoprim Unknown (See Comments)     \"   • Zocor [Simvastatin] Myalgia         Current Outpatient Medications:   •  aspirin 325 MG tablet, Take 325 mg by mouth Daily., Disp: , Rfl:   •  atorvastatin (LIPITOR) 10 MG tablet, TAKE ONE TABLET BY MOUTH ONCE NIGHTLY, Disp: 90 tablet, Rfl: 0  •  azelastine (ASTELIN) 0.1 % nasal spray, 1 spray into each nostril 2 (Two) Times a Day. Use in each nostril as directed, Disp: , Rfl:   •  Cholecalciferol (VITAMIN D-3 PO), Take 1,000 Units by mouth Daily., Disp: , Rfl:   •  Coenzyme Q10 (CO Q-10) 100 MG capsule, Take  by mouth 2 (Two) Times a Day., Disp: , Rfl:   •  esomeprazole (NexIUM) 20 MG capsule, Take 20 mg by mouth Daily., Disp: , Rfl:   •  Flaxseed, Linseed, (FLAX SEED OIL) 1000 MG capsule, Take 1,200 mg by mouth Daily., Disp: , Rfl:   •  fluticasone (FLONASE) 50 MCG/ACT nasal spray, 2 sprays into each nostril Daily., Disp: 3 each, Rfl: 2  •  levothyroxine (SYNTHROID, LEVOTHROID) 50 MCG tablet, Take 50 mcg by mouth Daily., Disp: , Rfl:   •  losartan (COZAAR) 100 MG tablet, Take 100 mg by mouth Daily., Disp: , Rfl:   •  metoprolol succinate XL (TOPROL-XL) 25 MG 24 hr tablet, Take 1 tablet by mouth every night at bedtime., Disp: 90 tablet, Rfl: 3  •  Omega-3 Fatty Acids (FISH OIL) 1000 MG capsule capsule, Take 1,200 mg by mouth 2 (Two) Times a Day With Meals., Disp: , Rfl:   •  potassium citrate (UROCIT-K) 10 MEQ (1080 MG) CR tablet, TAKE ONE TABLET BY MOUTH DAILY, Disp: 90 each, " "Rfl: 0  •  vitamin B-12 (CYANOCOBALAMIN) 1000 MCG tablet, Take 1,000 mcg by mouth Daily., Disp: , Rfl:     The following portions of the patient's history were reviewed and updated as appropriate: allergies, current medications, past family history, past medical history, past social history, past surgical history and problem list.    ROS  Review of Systems   Constitution: Negative for chills, fatigue, fever, generalized weakness, weight gain and weight loss.   Cardiovascular: Negative for chest pain, claudication, dyspnea on exertion, leg swelling, orthopnea, palpitations, paroxysmal nocturnal dyspnea and syncope.   Respiratory: Negative for cough, shortness of breath, and wheezing.  HENT: Negative for ear pain, nosebleeds, and tinnitus.  Gastrointestinal: Negative for abdominal pain, constipation, diarrhea, nausea and vomiting.   Genitourinary: No urinary symptoms   Musculoskeletal: Negative for muscle cramps.  Neurological: Negative for dizziness, headaches, loss of balance, numbness, and symptoms of stroke.  Psychiatric: Normal mental status.     All other systems reviewed and are negative.    Objective:     /90 (BP Location: Right arm, Patient Position: Standing)   Pulse 64   Ht 175.3 cm (69\")   Wt 107 kg (236 lb)   SpO2 98%   BMI 34.85 kg/m²    Repeat blood pressure measurement by, Migel Valladares MD, at 140/80      Physical Exam  Constitutional: Patient appears well-developed and well-nourished.   HENT: HEENT exam unremarkable.   Neck: Neck supple. No JVD present. No carotid bruits.   Cardiovascular: Normal rate, regular rhythm and normal heart sounds. 3/6 systolic ejection murmur heard.   2+ symmetric pulses.   Pulmonary/Chest: Breath sounds normal. Does not exhibit tenderness.   Abdominal: Abdomen benign.   Musculoskeletal: Does not exhibit edema.   Neurological: Neurological exam unremarkable.   Vitals reviewed.    Lab Review:   Lab Results   Component Value Date    GLUCOSE 80 11/22/2019    BUN 17 " 11/22/2019    CREATININE 1.42 (H) 11/22/2019    EGFRIFNONA 49 (L) 11/22/2019    BCR 12.0 11/22/2019    K 4.7 11/22/2019    CO2 27.2 11/22/2019    CALCIUM 9.4 11/22/2019    ALBUMIN 4.20 11/22/2019    AST 18 11/22/2019    ALT 19 11/22/2019     Lab Results   Component Value Date    CHOL 143 11/04/2019    TRIG 134 11/04/2019    HDL 31 (L) 11/04/2019    LDL 85 11/04/2019      Lab Results   Component Value Date    WBC 5.41 11/04/2019    HGB 16.0 11/04/2019    HCT 45.5 11/04/2019    MCV 89.7 11/04/2019     11/04/2019     Lab Results   Component Value Date    TSH 2.690 11/04/2019     Lab Results   Component Value Date    HGBA1C 6.16 (H) 11/04/2019        Procedures       Assessment:      Diagnosis Plan   1. Aortic stenosis, mild  Stable, continue current medications.   2. Essential hypertension  Well-controlled, continue current medications.   3. Dyslipidemia  Well-controlled, continue atorvastatin 10 mg.   4. Obesity, BMI 34.9 Healthy meats and greens recommended for weight loss.     Plan:   Stable cardiac status.  Continue current medications.   FU in 6 MO, sooner as needed.  Thank you for allowing us to participate in the care of your patient.       Scribed for Migel Valladares MD by Leighann Cruz. 1/17/2020  12:07 PM      IMigel MD, personally performed the services described in this documentation as scribed by the above named individual in my presence, and it is both accurate and complete.  1/17/2020  12:50 PM        Please note that portions of this note may have been completed with a voice recognition program. Efforts were made to edit the dictations, but occasionally words are mistranscribed.

## 2020-04-27 RX ORDER — FAMCICLOVIR 500 MG/1
TABLET ORAL
Qty: 12 TABLET | Refills: 3 | Status: SHIPPED | OUTPATIENT
Start: 2020-04-27 | End: 2022-06-03

## 2020-04-29 ENCOUNTER — TELEPHONE (OUTPATIENT)
Dept: INTERNAL MEDICINE | Facility: CLINIC | Age: 75
End: 2020-04-29

## 2020-04-29 NOTE — TELEPHONE ENCOUNTER
TERESA(PHARMACIST) CALLED IN [facility] famciclovir (FAMVIR) 500 MG tablet IS ON BACK ORDER AND SHE IS REQUESTING A DIFFERENT PRESCRIPTION BE SENT INTO PHARM      CB NUMBER:   PH - [phone] [facility] (Pharmacy) [phone]     [facility]: [place]

## 2020-04-30 ENCOUNTER — TELEPHONE (OUTPATIENT)
Dept: INTERNAL MEDICINE | Facility: CLINIC | Age: 75
End: 2020-04-30

## 2020-04-30 NOTE — TELEPHONE ENCOUNTER
[facility] pharmacy called and requested to change a medication because Famciclovir is on backorder and they are requesting to change it to Acyclovir. Please advise [phone]

## 2020-05-07 DIAGNOSIS — I10 ESSENTIAL HYPERTENSION: Primary | ICD-10-CM

## 2020-05-07 DIAGNOSIS — E66.01 MORBIDLY OBESE (HCC): ICD-10-CM

## 2020-05-07 DIAGNOSIS — K21.9 GASTROESOPHAGEAL REFLUX DISEASE, ESOPHAGITIS PRESENCE NOT SPECIFIED: ICD-10-CM

## 2020-05-07 DIAGNOSIS — E03.9 ACQUIRED HYPOTHYROIDISM: ICD-10-CM

## 2020-05-07 DIAGNOSIS — E53.8 B12 DEFICIENCY: ICD-10-CM

## 2020-05-07 DIAGNOSIS — E78.5 DYSLIPIDEMIA: Chronic | ICD-10-CM

## 2020-05-07 DIAGNOSIS — C64.9 MALIGNANT NEOPLASM OF KIDNEY EXCLUDING RENAL PELVIS, UNSPECIFIED LATERALITY (HCC): ICD-10-CM

## 2020-05-07 DIAGNOSIS — E55.9 VITAMIN D DEFICIENCY: ICD-10-CM

## 2020-05-13 ENCOUNTER — LAB (OUTPATIENT)
Dept: LAB | Facility: HOSPITAL | Age: 75
End: 2020-05-13

## 2020-05-13 DIAGNOSIS — E03.9 ACQUIRED HYPOTHYROIDISM: ICD-10-CM

## 2020-05-13 DIAGNOSIS — E55.9 VITAMIN D DEFICIENCY: ICD-10-CM

## 2020-05-13 DIAGNOSIS — I10 ESSENTIAL HYPERTENSION: ICD-10-CM

## 2020-05-13 DIAGNOSIS — E53.8 B12 DEFICIENCY: ICD-10-CM

## 2020-05-13 DIAGNOSIS — C64.9 MALIGNANT NEOPLASM OF KIDNEY EXCLUDING RENAL PELVIS, UNSPECIFIED LATERALITY (HCC): ICD-10-CM

## 2020-05-13 DIAGNOSIS — E78.5 DYSLIPIDEMIA: ICD-10-CM

## 2020-05-13 DIAGNOSIS — E66.01 MORBIDLY OBESE (HCC): ICD-10-CM

## 2020-05-13 DIAGNOSIS — K21.9 GASTROESOPHAGEAL REFLUX DISEASE, ESOPHAGITIS PRESENCE NOT SPECIFIED: ICD-10-CM

## 2020-05-13 LAB
25(OH)D3 SERPL-MCNC: 36.3 NG/ML (ref 30–100)
ALBUMIN SERPL-MCNC: 4.1 G/DL (ref 3.5–5.2)
ALBUMIN/GLOB SERPL: 1.4 G/DL
ALP SERPL-CCNC: 56 U/L (ref 39–117)
ALT SERPL W P-5'-P-CCNC: 21 U/L (ref 1–41)
ANION GAP SERPL CALCULATED.3IONS-SCNC: 10.6 MMOL/L (ref 5–15)
AST SERPL-CCNC: 20 U/L (ref 1–40)
BASOPHILS # BLD AUTO: 0.03 10*3/MM3 (ref 0–0.2)
BASOPHILS NFR BLD AUTO: 0.6 % (ref 0–1.5)
BILIRUB SERPL-MCNC: 1 MG/DL (ref 0.2–1.2)
BUN BLD-MCNC: 18 MG/DL (ref 8–23)
BUN/CREAT SERPL: 12.5 (ref 7–25)
CALCIUM SPEC-SCNC: 9.7 MG/DL (ref 8.6–10.5)
CHLORIDE SERPL-SCNC: 103 MMOL/L (ref 98–107)
CHOLEST SERPL-MCNC: 140 MG/DL (ref 0–200)
CO2 SERPL-SCNC: 27.4 MMOL/L (ref 22–29)
CREAT BLD-MCNC: 1.44 MG/DL (ref 0.76–1.27)
DEPRECATED RDW RBC AUTO: 43.9 FL (ref 37–54)
EOSINOPHIL # BLD AUTO: 0.21 10*3/MM3 (ref 0–0.4)
EOSINOPHIL NFR BLD AUTO: 4.1 % (ref 0.3–6.2)
ERYTHROCYTE [DISTWIDTH] IN BLOOD BY AUTOMATED COUNT: 12.9 % (ref 12.3–15.4)
GFR SERPL CREATININE-BSD FRML MDRD: 48 ML/MIN/1.73
GLOBULIN UR ELPH-MCNC: 2.9 GM/DL
GLUCOSE BLD-MCNC: 98 MG/DL (ref 65–99)
HCT VFR BLD AUTO: 45.9 % (ref 37.5–51)
HDLC SERPL-MCNC: 33 MG/DL (ref 40–60)
HGB BLD-MCNC: 15.8 G/DL (ref 13–17.7)
IMM GRANULOCYTES # BLD AUTO: 0.03 10*3/MM3 (ref 0–0.05)
IMM GRANULOCYTES NFR BLD AUTO: 0.6 % (ref 0–0.5)
LDLC SERPL CALC-MCNC: 84 MG/DL (ref 0–100)
LDLC/HDLC SERPL: 2.56 {RATIO}
LYMPHOCYTES # BLD AUTO: 1.65 10*3/MM3 (ref 0.7–3.1)
LYMPHOCYTES NFR BLD AUTO: 31.9 % (ref 19.6–45.3)
MCH RBC QN AUTO: 31.9 PG (ref 26.6–33)
MCHC RBC AUTO-ENTMCNC: 34.4 G/DL (ref 31.5–35.7)
MCV RBC AUTO: 92.7 FL (ref 79–97)
MONOCYTES # BLD AUTO: 0.6 10*3/MM3 (ref 0.1–0.9)
MONOCYTES NFR BLD AUTO: 11.6 % (ref 5–12)
NEUTROPHILS # BLD AUTO: 2.65 10*3/MM3 (ref 1.7–7)
NEUTROPHILS NFR BLD AUTO: 51.2 % (ref 42.7–76)
NRBC BLD AUTO-RTO: 0 /100 WBC (ref 0–0.2)
PLATELET # BLD AUTO: 181 10*3/MM3 (ref 140–450)
PMV BLD AUTO: 10.6 FL (ref 6–12)
POTASSIUM BLD-SCNC: 4.6 MMOL/L (ref 3.5–5.2)
PROT SERPL-MCNC: 7 G/DL (ref 6–8.5)
RBC # BLD AUTO: 4.95 10*6/MM3 (ref 4.14–5.8)
SODIUM BLD-SCNC: 141 MMOL/L (ref 136–145)
TRIGL SERPL-MCNC: 113 MG/DL (ref 0–150)
TSH SERPL DL<=0.05 MIU/L-ACNC: 3.31 UIU/ML (ref 0.27–4.2)
VIT B12 BLD-MCNC: 1445 PG/ML (ref 211–946)
VLDLC SERPL-MCNC: 22.6 MG/DL (ref 5–40)
WBC NRBC COR # BLD: 5.17 10*3/MM3 (ref 3.4–10.8)

## 2020-05-13 PROCEDURE — 85025 COMPLETE CBC W/AUTO DIFF WBC: CPT

## 2020-05-13 PROCEDURE — 80061 LIPID PANEL: CPT

## 2020-05-13 PROCEDURE — 80053 COMPREHEN METABOLIC PANEL: CPT

## 2020-05-13 PROCEDURE — 82306 VITAMIN D 25 HYDROXY: CPT

## 2020-05-13 PROCEDURE — 84443 ASSAY THYROID STIM HORMONE: CPT

## 2020-05-13 PROCEDURE — 82607 VITAMIN B-12: CPT

## 2020-05-15 DIAGNOSIS — E78.5 HYPERLIPIDEMIA, UNSPECIFIED HYPERLIPIDEMIA TYPE: ICD-10-CM

## 2020-05-15 RX ORDER — ATORVASTATIN CALCIUM 10 MG/1
TABLET, FILM COATED ORAL
Qty: 90 TABLET | Refills: 0 | Status: SHIPPED | OUTPATIENT
Start: 2020-05-15 | End: 2020-08-17

## 2020-05-15 RX ORDER — POTASSIUM CITRATE 10 MEQ/1
TABLET, EXTENDED RELEASE ORAL
Qty: 90 EACH | Refills: 0 | Status: SHIPPED | OUTPATIENT
Start: 2020-05-15 | End: 2020-08-17

## 2020-05-18 ENCOUNTER — OFFICE VISIT (OUTPATIENT)
Dept: INTERNAL MEDICINE | Facility: CLINIC | Age: 75
End: 2020-05-18

## 2020-05-18 VITALS — BODY MASS INDEX: 34.83 KG/M2 | SYSTOLIC BLOOD PRESSURE: 123 MMHG | DIASTOLIC BLOOD PRESSURE: 79 MMHG | HEIGHT: 69 IN

## 2020-05-18 DIAGNOSIS — I10 ESSENTIAL HYPERTENSION: ICD-10-CM

## 2020-05-18 DIAGNOSIS — E78.5 DYSLIPIDEMIA: Chronic | ICD-10-CM

## 2020-05-18 DIAGNOSIS — Z00.00 MEDICARE ANNUAL WELLNESS VISIT, SUBSEQUENT: Primary | ICD-10-CM

## 2020-05-18 DIAGNOSIS — E66.01 MORBIDLY OBESE (HCC): ICD-10-CM

## 2020-05-18 DIAGNOSIS — K86.2 PANCREATIC CYST: ICD-10-CM

## 2020-05-18 DIAGNOSIS — E03.9 ACQUIRED HYPOTHYROIDISM: ICD-10-CM

## 2020-05-18 DIAGNOSIS — I35.0 AORTIC STENOSIS, MILD: ICD-10-CM

## 2020-05-18 DIAGNOSIS — C64.9 MALIGNANT NEOPLASM OF KIDNEY EXCLUDING RENAL PELVIS, UNSPECIFIED LATERALITY (HCC): ICD-10-CM

## 2020-05-18 PROCEDURE — G0439 PPPS, SUBSEQ VISIT: HCPCS | Performed by: NURSE PRACTITIONER

## 2020-05-18 NOTE — PROGRESS NOTES
The ABCs of the Annual Wellness Visit  Subsequent Medicare Wellness Visit    Chief Complaint   Patient presents with   • Medicare Wellness-subsequent     You have chosen to receive care through a telehealth visit.  Do you consent to use a video/audio connection for your medical care today? Yes    This was an audio and video enabled telemedicine encounter.      Subjective   History of Present Illness:  Ac Caldwell is a 74 y.o. male who presents for a Subsequent Medicare Wellness Visit.    HEALTH RISK ASSESSMENT    Recent Hospitalizations:  No hospitalization(s) within the last year.    Current Medical Providers:  Patient Care Team:  Valentine Newton APRN as PCP - General (Family Medicine)  Migel Valladares MD as Consulting Physician (Cardiology)  Manish Camp MD as Consulting Physician (Dermatology)  Chacorta Guajardo MD as Consulting Physician (Ophthalmology)  Ez Everett MD as Consulting Physician (Otolaryngology)  Georgiana Bettencourt MD (Urology)  Jasen Ayala MD as Consulting Physician (Urology)  Chacorta Ludwig MD (Vascular Surgery)  Layla Mccarthy MD as Consulting Physician (Dermatology)  Eva Lopez MD as Consulting Physician (Nephrology)    Smoking Status:  Social History     Tobacco Use   Smoking Status Never Smoker   Smokeless Tobacco Never Used       Alcohol Consumption:  Social History     Substance and Sexual Activity   Alcohol Use Yes   • Alcohol/week: 1.0 standard drinks   • Types: 1 Shots of liquor per week    Comment: social - no schedule       Depression Screen:   PHQ-2/PHQ-9 Depression Screening 5/18/2020   Little interest or pleasure in doing things 0   Feeling down, depressed, or hopeless 0   Total Score 0       Fall Risk Screen:  SPENCER Fall Risk Assessment was completed, and patient is at LOW risk for falls.Assessment completed on:5/18/2020    Health Habits and Functional and Cognitive Screening:  Functional & Cognitive Status 5/18/2020   Do you have difficulty  preparing food and eating? No   Do you have difficulty bathing yourself, getting dressed or grooming yourself? No   Do you have difficulty using the toilet? No   Do you have difficulty moving around from place to place? No   Do you have trouble with steps or getting out of a bed or a chair? No   Current Diet Limited Junk Food   Dental Exam Up to date   Eye Exam Up to date   Exercise (times per week) 0 times per week   Current Exercise Activities Include No Regular Exercise   Do you need help using the phone?  No   Are you deaf or do you have serious difficulty hearing?  Yes   Do you need help with transportation? No   Do you need help shopping? No   Do you need help preparing meals?  No   Do you need help with housework?  No   Do you need help with laundry? No   Do you need help taking your medications? No   Do you need help managing money? No   Do you ever drive or ride in a car without wearing a seat belt? No   Have you felt unusual stress, anger or loneliness in the last month? No   Who do you live with? Alone   If you need help, do you have trouble finding someone available to you? No   Have you been bothered in the last four weeks by sexual problems? No   Do you have difficulty concentrating, remembering or making decisions? No         Does the patient have evidence of cognitive impairment? No    Asprin use counseling:Taking ASA appropriately as indicated    Age-appropriate Screening Schedule:  Refer to the list below for future screening recommendations based on patient's age, sex and/or medical conditions. Orders for these recommended tests are listed in the plan section. The patient has been provided with a written plan.    Health Maintenance   Topic Date Due   • TDAP/TD VACCINES (2 - Td) 11/01/2019   • INFLUENZA VACCINE  08/01/2020   • LIPID PANEL  05/13/2021   • COLONOSCOPY  04/28/2025   • ZOSTER VACCINE  Completed          The following portions of the patient's history were reviewed and updated as  appropriate: allergies, current medications, past family history, past medical history, past social history, past surgical history and problem list.    Outpatient Medications Prior to Visit   Medication Sig Dispense Refill   • aspirin 325 MG tablet Take 325 mg by mouth Daily.     • atorvastatin (LIPITOR) 10 MG tablet TAKE ONE TABLET BY MOUTH ONCE NIGHTLY 90 tablet 0   • azelastine (ASTELIN) 0.1 % nasal spray 1 spray into each nostril 2 (Two) Times a Day. Use in each nostril as directed     • Cholecalciferol (VITAMIN D-3 PO) Take 1,000 Units by mouth Daily.     • Coenzyme Q10 (CO Q-10) 100 MG capsule Take  by mouth 2 (Two) Times a Day.     • esomeprazole (NexIUM) 20 MG capsule Take 20 mg by mouth Daily.     • famciclovir (FAMVIR) 500 MG tablet TAKE ONE TABLET BY MOUTH THREE TIMES A DAY 12 tablet 3   • Flaxseed, Linseed, (FLAX SEED OIL) 1000 MG capsule Take 1,200 mg by mouth Daily.     • fluticasone (FLONASE) 50 MCG/ACT nasal spray 2 sprays into each nostril Daily. 3 each 2   • levothyroxine (SYNTHROID, LEVOTHROID) 50 MCG tablet Take 50 mcg by mouth Daily.     • losartan (COZAAR) 100 MG tablet Take 100 mg by mouth Daily.     • metoprolol succinate XL (TOPROL-XL) 25 MG 24 hr tablet Take 1 tablet by mouth every night at bedtime. 90 tablet 3   • Omega-3 Fatty Acids (FISH OIL) 1000 MG capsule capsule Take 1,200 mg by mouth 2 (Two) Times a Day With Meals.     • potassium citrate (UROCIT-K) 10 MEQ (1080 MG) CR tablet TAKE ONE TABLET BY MOUTH DAILY 90 each 0   • vitamin B-12 (CYANOCOBALAMIN) 1000 MCG tablet Take 1,000 mcg by mouth Daily.       No facility-administered medications prior to visit.        Patient Active Problem List   Diagnosis   • Nephritis and nephropathy   • Essential hypertension   • Esophageal reflux   • Diverticulosis of small intestine without hemorrhage   • Morbidly obese (CMS/HCC)   • Lymphadenopathy   • Ganglion cyst   • Malignant neoplasm of kidney, except pelvis   • Dyslipidemia   • Exertional  dyspnea   • Aortic stenosis, mild   • Acquired hypothyroidism       Advanced Care Planning:  ACP discussion was held with the patient during this visit. Patient does not have an advance directive, declines further assistance.    On 1/26/15, Ac saw gross hematuria, saw Dr. Ayala (urology) and he did a scope. It was ok. Then had a CT scan on 2/3/15. Cr was high, so they did it without contrast. CT scan showed a lesion, but he was not concerned. Then had a fish bladder test on 2/5/15. His urologist never got the results. Did go back to see Nephrology and had labs in Feb. Cr. was 1.1 and BUN was 16. Everything has been stable since. Has a h/o right kidney CA in 1997 and had nephrectomy.  Urologist - Dr. Jossue Serrano left and moved to [place] and [place] [facility] [place] saw him several times for recurrent UTI, hydrocele, renal cyst, and epididymoorchits. He saw them again 5/31/2017 for repeat renal US that showed a 3.4cm left renal cyst, which had grown from 2.5cm on last scan.  Dr. Farrell moved to [place] and he was then referred to  Urology.  Had CXR and labs.  CXR revealed a possible thoracic aortic aneurysm.  Had f/u chest CT scan, no aneurysm.  CT renal mass protocol was done on 8/30/2017 which confirmed a solid enhancing mass in the lower pole of the left kidney, without lymphadenopathy.  It was elected to proceed with a cryoablation of this lesion per Dr. Mark at .  He underwent this procedure on 9/27/17.  Pathology is unclear as to it being malignant vs benign.  Will be re-scanned in 30 days, then 90 days, then Q6 months.       CT in Aug 2017 revealed an incidental pancreatic lesion on the head of the pancreas that was 12 mm in size; f/u imaging done 9/2018 showed that the mass was stable and unchanged in size.  Had a recent biopsy of the pancreatic cyst and it was benign.  They will monitor Q12 months with MRI.  Last imaging was in 9/2019.  Ac prefers a CT over an  MRI     Underwent a STEAM procedure in 2/2019 for BPH @ [facility]      Nephrologist- [facility]; his Creatinine is stable from 1.42 to 1.44.  Last f/u was 8/2019; seeing them Q6 months.       Had a very large thyroid nodule removed on 4/23/14 per ENT. It was 8cm in size; they took the right side of his thyroid and placed on synthroid 50mcg. It was benign! Seeing Dr. Everett annually and everything has been stable.  Lab Results   Component Value Date    TSH 3.310 05/13/2020    M5BJMSJ 5.2 04/17/2014     Has a h/o AS.  Was referred him to Cardiology. Had an appt on 4/29/16 and he thought everything was ok. He is just going to watch. He saw him again on 11/4/16 and everything was stable. He will follow every 6 months and echo q 2 yrs.  Last f/u was 1/2020      HTN - well controlled with Losartan 100 mg (increased per Nephrology in Aug 2019) and Toprol XL 25mg; Verapamil was stopped per Cards in 11/17 - He is tolerating well.  BP is well controlled and he denies SE.  He checked his BP at home this AM and it was 123/79     HL - stable on Lipitor 10mg daily without SE.  LDL looks great.  Lab Results   Component Value Date    CHOL 140 05/13/2020    TRIG 113 05/13/2020    HDL 33 (L) 05/13/2020    LDL 84 05/13/2020         Review of Systems   Constitutional: Negative for appetite change, chills, fatigue, fever and unexpected weight change.   HENT: Negative for congestion, ear pain, nosebleeds, rhinorrhea and tinnitus.    Eyes: Negative for pain.   Respiratory: Negative for cough, chest tightness and shortness of breath.    Cardiovascular: Negative for chest pain, palpitations and leg swelling.   Gastrointestinal: Negative for abdominal distention, abdominal pain, blood in stool, constipation, diarrhea, nausea and vomiting.   Endocrine: Negative for cold intolerance, heat intolerance, polydipsia, polyphagia and polyuria.   Genitourinary: Negative for dysuria, flank pain, frequency, hematuria and urgency.  "  Musculoskeletal: Negative for arthralgias, back pain, gait problem, joint swelling, myalgias and neck pain.   Skin: Negative for color change, pallor, rash and wound.   Allergic/Immunologic: Negative for environmental allergies and food allergies.   Neurological: Negative for dizziness, syncope, weakness, light-headedness, numbness and headaches.   Hematological: Negative for adenopathy. Does not bruise/bleed easily.   Psychiatric/Behavioral: Negative for behavioral problems and suicidal ideas. The patient is not nervous/anxious.        Compared to one year ago, the patient feels his physical health is the same.  Compared to one year ago, the patient feels his mental health is the same.    Reviewed chart for potential of high risk medication in the elderly: yes  Reviewed chart for potential of harmful drug interactions in the elderly:yes    Objective         Vitals:    05/18/20 0905   BP: 123/79  Comment: Patient took BP this morning   Height: 175.3 cm (69.02\")   PainSc: 0-No pain       Body mass index is 34.83 kg/m².  Discussed the patient's BMI with him. The BMI is above average; BMI management plan is completed.    Physical Exam    Lab Results   Component Value Date    TRIG 113 05/13/2020    HDL 33 (L) 05/13/2020    LDL 84 05/13/2020    VLDL 22.6 05/13/2020        Assessment/Plan   Medicare Risks and Personalized Health Plan  CMS Preventative Services Quick Reference  Inactivity/Sedentary  Obesity/Overweight     Flu shot, 10/7/2019  Tdap 2009   Pneumovax 2012   Prevnar - 4/2015   Zostavax 2010  Shingrix - UTD  PSA per Urology - no longer doing due to age  Colon - 4/2015, due 2025       The above risks/problems have been discussed with the patient.  Pertinent information has been shared with the patient in the After Visit Summary.  Follow up plans and orders are seen below in the Assessment/Plan Section.    Diagnoses and all orders for this visit:    1. Medicare annual wellness visit, subsequent (Primary)    2. " Essential hypertension    3. Aortic stenosis, mild    4. Morbidly obese (CMS/HCC)    5. Acquired hypothyroidism    6. Malignant neoplasm of kidney excluding renal pelvis, unspecified laterality (CMS/HCC)    7. Dyslipidemia    8. Pancreatic cyst  -     CT Abdomen Pelvis With & Without Contrast; Future      Labs reviewed - continue with POC  F/U CT ordered for monitoring of pancreatic cyst/lesion    Counseling - diet and exercise    Follow Up:  Return in about 6 months (around 11/18/2020).     An After Visit Summary and PPPS were given to the patient.

## 2020-07-24 ENCOUNTER — OFFICE VISIT (OUTPATIENT)
Dept: CARDIOLOGY | Facility: CLINIC | Age: 75
End: 2020-07-24

## 2020-07-24 VITALS
DIASTOLIC BLOOD PRESSURE: 80 MMHG | HEART RATE: 55 BPM | BODY MASS INDEX: 35.55 KG/M2 | HEIGHT: 69 IN | SYSTOLIC BLOOD PRESSURE: 144 MMHG | WEIGHT: 240 LBS

## 2020-07-24 DIAGNOSIS — E78.5 DYSLIPIDEMIA: Chronic | ICD-10-CM

## 2020-07-24 DIAGNOSIS — I35.0 AORTIC STENOSIS, MILD: Primary | ICD-10-CM

## 2020-07-24 DIAGNOSIS — I10 ESSENTIAL HYPERTENSION: ICD-10-CM

## 2020-07-24 PROCEDURE — 99214 OFFICE O/P EST MOD 30 MIN: CPT | Performed by: INTERNAL MEDICINE

## 2020-07-24 RX ORDER — CARVEDILOL 12.5 MG/1
12.5 TABLET ORAL 2 TIMES DAILY
Qty: 180 TABLET | Refills: 3 | Status: SHIPPED | OUTPATIENT
Start: 2020-07-24 | End: 2021-07-27

## 2020-07-24 RX ORDER — METOPROLOL SUCCINATE 25 MG/1
25 TABLET, EXTENDED RELEASE ORAL NIGHTLY
COMMUNITY
End: 2020-07-24 | Stop reason: ALTCHOICE

## 2020-07-24 RX ORDER — AMLODIPINE BESYLATE 5 MG/1
5 TABLET ORAL DAILY
Qty: 90 TABLET | Refills: 3 | Status: CANCELLED | OUTPATIENT
Start: 2020-07-24

## 2020-07-24 NOTE — PROGRESS NOTES
[facility] Cardiology    Encounter Date: 2020    Patient ID: Ac Caldwell is a 74 y.o. male.  : 1945     PCP: Valentine Newton APRN       Chief Complaint: Aortic stenosis, mild      PROBLEM LIST:  1. Aortic stenosis:   a. Refused enrollment in the  50 years ago due to some cardiac condition, details not known.   b. Annual echocardiographic followups for the last several years for aortic stenosis.   c. Echocardiogram, 2016: Mild AI and moderate AS, HEATHER 1.2 cm2, mean gradient of 16.6, and peak gradient of 33.3. Trace TR with RVSP of 26.2. Normal EF.  d. Nuclear stress, 2016: EF 72%. No evidence of ischemia.  e. CTA, 2017: Mild ectasia of ascending aorta measuring 4.2cm significant aortic leaflet calcification, coronary sclerosis, no aneurysm of descending aorta.  f. Echocardiogram, 2017: Mild AI/AS with HEATHER 1.8 cm². MPG 17.0. Normal EF 64%. Mild LVH.   g. Echocardiogram, 2019: Mild AS, MPG 20 mmHg, HEATHER 1.5 cm2. Trace AI. Mild MR/PI, trace TR. EF 60% with mild concentric LVH. Grade I a diastolic dysfunction.   2. Exertional dyspnea/anginal equivalent symptoms with occasional orthopnea.   a. Stress echo, 2019: Normal exercise capacity, THR reached at 6 min. No evidence of ischemia. EF 65% at rest, 75% with stress.  3. Hypertension.   4. Dyslipidemia.   5. Enlarged prostate.  6. History of renal cancer:   a. Right nephrectomy in .   b. S/p Resection of a tumor from his left kidney -- incomplete database.   c. S/p CT ablation of L renal mass.  7. 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   8. S/p basal cell cancer surgery.   9. Episode of hematuria in January with subsequent negative cystoscopy.  10. Surgical Hx:  a. CT Ablation of Left renal mass  b. Endoscopic US at  for benign cyst, 2019.    History of Present Illness  Patient presents today for a follow-up with a history of aortic stenosis and cardiac risk  "factors. Since last visit, he has been feeling well overall from a cardiovascular standpoint. He remains active with yard work and gardening without significant cardiopulmonary limitations. He sleeps on his left side and experiences a \"pulling\" pain in his left lateral chest. Patient denies shortness of breath, palpitations, edema, dizziness, and syncope.     Allergies   Allergen Reactions   • Norvasc [Amlodipine] Swelling   • Oxycodone      Can elevate Blood pressure   • Lotensin [Benazepril] Cough   • Sulfamethoxazole-Trimethoprim Unknown (See Comments)     \"   • Zocor [Simvastatin] Myalgia         Current Outpatient Medications:   •  aspirin 325 MG tablet, Take 325 mg by mouth Daily., Disp: , Rfl:   •  atorvastatin (LIPITOR) 10 MG tablet, TAKE ONE TABLET BY MOUTH ONCE NIGHTLY, Disp: 90 tablet, Rfl: 0  •  azelastine (ASTELIN) 0.1 % nasal spray, 1 spray into each nostril 2 (Two) Times a Day. Use in each nostril as directed, Disp: , Rfl:   •  Cholecalciferol (VITAMIN D-3 PO), Take 1,000 Units by mouth Daily., Disp: , Rfl:   •  Coenzyme Q10 (CO Q-10) 100 MG capsule, Take  by mouth 2 (Two) Times a Day., Disp: , Rfl:   •  esomeprazole (NexIUM) 20 MG capsule, Take 20 mg by mouth Daily., Disp: , Rfl:   •  famciclovir (FAMVIR) 500 MG tablet, TAKE ONE TABLET BY MOUTH THREE TIMES A DAY, Disp: 12 tablet, Rfl: 3  •  Flaxseed, Linseed, (FLAX SEED OIL) 1000 MG capsule, Take 1,200 mg by mouth Daily., Disp: , Rfl:   •  fluticasone (FLONASE) 50 MCG/ACT nasal spray, 2 sprays into each nostril Daily., Disp: 3 each, Rfl: 2  •  levothyroxine (SYNTHROID, LEVOTHROID) 50 MCG tablet, Take 50 mcg by mouth Daily., Disp: , Rfl:   •  losartan (COZAAR) 100 MG tablet, Take 100 mg by mouth Daily., Disp: , Rfl:   •  metoprolol succinate XL (TOPROL-XL) 25 MG 24 hr tablet, Take 25 mg by mouth Every Night., Disp: , Rfl:   •  Omega-3 Fatty Acids (FISH OIL) 1000 MG capsule capsule, Take 1,200 mg by mouth 2 (Two) Times a Day With Meals., Disp: , Rfl: " "  •  potassium citrate (UROCIT-K) 10 MEQ (1080 MG) CR tablet, TAKE ONE TABLET BY MOUTH DAILY, Disp: 90 each, Rfl: 0  •  vitamin B-12 (CYANOCOBALAMIN) 1000 MCG tablet, Take 1,000 mcg by mouth Daily., Disp: , Rfl:     The following portions of the patient's history were reviewed and updated as appropriate: allergies, current medications, past family history, past medical history, past social history, past surgical history and problem list.    ROS  Review of Systems   Constitution: Negative for chills, fever, fatigue, generalized weakness.   Cardiovascular: Negative for dyspnea on exertion, leg swelling, palpitations, orthopnea, and syncope. Positive for chest pain.  Respiratory: Negative for cough, shortness of breath, and wheezing.  HENT: Negative for ear pain, nosebleeds, and tinnitus.  Gastrointestinal: Negative for abdominal pain, constipation, diarrhea, nausea and vomiting.   Genitourinary: No urinary symptoms.  Musculoskeletal: Negative for muscle cramps.  Neurological: Negative for dizziness, headaches, loss of balance, numbness, and symptoms of stroke.  Psychiatric: Normal mental status.     All other systems reviewed and are negative.        Objective:     /80 (BP Location: Left arm, Patient Position: Sitting)   Pulse 55   Ht 175.3 cm (69\")   Wt 109 kg (240 lb)   BMI 35.44 kg/m²    Repeat BP measurement by Dr. Valladares: 150/80    Physical Exam  Constitutional: Patient appears well-developed and well-nourished.   HENT: HEENT exam unremarkable.   Neck: Neck supple. No JVD present. No carotid bruits.   Cardiovascular: Normal rate, regular rhythm and normal heart sounds. 3/6 JENNY  2+ symmetric pulses.   Pulmonary/Chest: Breath sounds normal. Does not exhibit tenderness.   Abdominal: Abdomen benign.   Musculoskeletal: Does not exhibit edema.   Neurological: Neurological exam unremarkable.   Vitals reviewed.    Data Review:   Lab Results   Component Value Date    GLUCOSE 98 05/13/2020    BUN 18 05/13/2020    " CREATININE 1.44 (H) 05/13/2020    EGFRIFNONA 48 (L) 05/13/2020    BCR 12.5 05/13/2020    K 4.6 05/13/2020    CO2 27.4 05/13/2020    CALCIUM 9.7 05/13/2020    ALBUMIN 4.10 05/13/2020    AST 20 05/13/2020    ALT 21 05/13/2020     Lab Results   Component Value Date    CHOL 140 05/13/2020    TRIG 113 05/13/2020    HDL 33 (L) 05/13/2020    LDL 84 05/13/2020      Lab Results   Component Value Date    WBC 5.17 05/13/2020    RBC 4.95 05/13/2020    HGB 15.8 05/13/2020    HCT 45.9 05/13/2020    MCV 92.7 05/13/2020     05/13/2020     Lab Results   Component Value Date    TSH 3.310 05/13/2020     Lab Results   Component Value Date    HGBA1C 6.16 (H) 11/04/2019        Procedures       Assessment:      Diagnosis Plan   1. Aortic stenosis, mild  Echocardiogram ordered for reassessment of aortic valve and dilated aorta..   2. Essential hypertension  Discontinue metoprolol.   Start carvedilol 12.5 mg BID.  Continue all other current medications.   3. Dyslipidemia  Monitored by PCP. Continue atorvastatin.     Plan:   Weight loss (about 20 lbs) recommended.  Echocardiogram ordered to reassess VHD.  His atypical chest sensation is infrequent and unrelated to activities, this is localized to the left chest on turning and appears musculoskeletal, conservative measures OTC pain meds recommended  Discontinue metoprolol. Start carvedilol 12.5 mg BID for better control of blood pressure.  Continue all other current medications.   FU in 6 MO, sooner as needed.  Thank you for allowing us to participate in the care of your patient.     I, Casimiro Bean, attest that this documentation has been prepared under the direction and in the presence of Migel aVlladares MD 07/24/2020      I, Migel Valladares MD, personally performed the services described in this documentation as scribed by the above named individual in my presence, and it is both accurate and complete.  7/24/2020  11:02        Please note that portions of this note may have been  completed with a voice recognition program. Efforts were made to edit the dictations, but occasionally words are mistranscribed.

## 2020-07-24 NOTE — PATIENT INSTRUCTIONS
STOP TOPROL-XL  START COREG (CARVEDILOL) 12.5 MG TWICE DAILY.  We will call you to schedule an ultrasound of your heart.

## 2020-08-16 DIAGNOSIS — E78.5 HYPERLIPIDEMIA, UNSPECIFIED HYPERLIPIDEMIA TYPE: ICD-10-CM

## 2020-08-17 RX ORDER — ATORVASTATIN CALCIUM 10 MG/1
TABLET, FILM COATED ORAL
Qty: 90 TABLET | Refills: 0 | Status: SHIPPED | OUTPATIENT
Start: 2020-08-17 | End: 2020-11-17

## 2020-08-17 RX ORDER — POTASSIUM CITRATE 10 MEQ/1
TABLET, EXTENDED RELEASE ORAL
Qty: 90 EACH | Refills: 0 | Status: SHIPPED | OUTPATIENT
Start: 2020-08-17 | End: 2020-11-13

## 2020-09-16 ENCOUNTER — HOSPITAL ENCOUNTER (OUTPATIENT)
Dept: CARDIOLOGY | Facility: HOSPITAL | Age: 75
Discharge: HOME | End: 2020-09-16
Admitting: INTERNAL MEDICINE

## 2020-09-16 VITALS — HEIGHT: 69 IN | BODY MASS INDEX: 35.55 KG/M2 | WEIGHT: 240 LBS

## 2020-09-16 DIAGNOSIS — I35.0 AORTIC STENOSIS, MILD: ICD-10-CM

## 2020-09-16 LAB
ASCENDING AORTA: 3.7 CM
BH CV ECHO MEAS - AI DEC SLOPE: 206.3 CM/SEC^2
BH CV ECHO MEAS - AI MAX PG: 45.1 MMHG
BH CV ECHO MEAS - AI MAX VEL: 335.6 CM/SEC
BH CV ECHO MEAS - AI P1/2T: 476.5 MSEC
BH CV ECHO MEAS - AO MAX PG (FULL): 47.3 MMHG
BH CV ECHO MEAS - AO MAX PG: 52.6 MMHG
BH CV ECHO MEAS - AO MEAN PG (FULL): 29.9 MMHG
BH CV ECHO MEAS - AO MEAN PG: 32.8 MMHG
BH CV ECHO MEAS - AO ROOT AREA (BSA CORRECTED): 1.7
BH CV ECHO MEAS - AO ROOT AREA: 11.3 CM^2
BH CV ECHO MEAS - AO ROOT DIAM: 4 CM
BH CV ECHO MEAS - AO V2 MAX: 362.8 CM/SEC
BH CV ECHO MEAS - AO V2 MEAN: 268.6 CM/SEC
BH CV ECHO MEAS - AO V2 VTI: 92.7 CM
BH CV ECHO MEAS - ASC AORTA: 3.7 CM
BH CV ECHO MEAS - AVA(I,A): 1.1 CM^2
BH CV ECHO MEAS - AVA(I,D): 1.1 CM^2
BH CV ECHO MEAS - AVA(V,A): 1 CM^2
BH CV ECHO MEAS - AVA(V,D): 1 CM^2
BH CV ECHO MEAS - BSA(HAYCOCK): 2.3 M^2
BH CV ECHO MEAS - BSA: 2.2 M^2
BH CV ECHO MEAS - BZI_BMI: 35.4 KILOGRAMS/M^2
BH CV ECHO MEAS - BZI_METRIC_HEIGHT: 175.3 CM
BH CV ECHO MEAS - BZI_METRIC_WEIGHT: 108.9 KG
BH CV ECHO MEAS - EDV(CUBED): 60.2 ML
BH CV ECHO MEAS - EDV(MOD-SP2): 117 ML
BH CV ECHO MEAS - EDV(MOD-SP4): 128 ML
BH CV ECHO MEAS - EDV(TEICH): 66.7 ML
BH CV ECHO MEAS - EF(CUBED): 64.7 %
BH CV ECHO MEAS - EF(MOD-BP): 58 %
BH CV ECHO MEAS - EF(MOD-SP2): 58.1 %
BH CV ECHO MEAS - EF(MOD-SP4): 58.6 %
BH CV ECHO MEAS - EF(TEICH): 56.9 %
BH CV ECHO MEAS - ESV(CUBED): 21.3 ML
BH CV ECHO MEAS - ESV(MOD-SP2): 49 ML
BH CV ECHO MEAS - ESV(MOD-SP4): 53 ML
BH CV ECHO MEAS - ESV(TEICH): 28.8 ML
BH CV ECHO MEAS - FS: 29.3 %
BH CV ECHO MEAS - IVS/LVPW: 1.1
BH CV ECHO MEAS - IVSD: 1.8 CM
BH CV ECHO MEAS - LA/AO: 1.3
BH CV ECHO MEAS - LAD MAJOR: 4.9 CM
BH CV ECHO MEAS - LAT PEAK E' VEL: 6.1 CM/SEC
BH CV ECHO MEAS - LATERAL E/E' RATIO: 12.6
BH CV ECHO MEAS - LV DIASTOLIC VOL/BSA (35-75): 57.3 ML/M^2
BH CV ECHO MEAS - LV IVRT: 0.04 SEC
BH CV ECHO MEAS - LV MASS(C)D: 270.2 GRAMS
BH CV ECHO MEAS - LV MASS(C)DI: 121 GRAMS/M^2
BH CV ECHO MEAS - LV MAX PG: 5.4 MMHG
BH CV ECHO MEAS - LV MEAN PG: 2.9 MMHG
BH CV ECHO MEAS - LV SYSTOLIC VOL/BSA (12-30): 23.7 ML/M^2
BH CV ECHO MEAS - LV V1 MAX: 115.8 CM/SEC
BH CV ECHO MEAS - LV V1 MEAN: 77.6 CM/SEC
BH CV ECHO MEAS - LV V1 VTI: 32.5 CM
BH CV ECHO MEAS - LVIDD: 3.9 CM
BH CV ECHO MEAS - LVIDS: 2.8 CM
BH CV ECHO MEAS - LVLD AP2: 8.8 CM
BH CV ECHO MEAS - LVLD AP4: 8.8 CM
BH CV ECHO MEAS - LVLS AP2: 7.7 CM
BH CV ECHO MEAS - LVLS AP4: 7.7 CM
BH CV ECHO MEAS - LVOT AREA (M): 3.1 CM^2
BH CV ECHO MEAS - LVOT AREA: 3.2 CM^2
BH CV ECHO MEAS - LVOT DIAM: 2 CM
BH CV ECHO MEAS - LVPWD: 1.6 CM
BH CV ECHO MEAS - MED PEAK E' VEL: 4 CM/SEC
BH CV ECHO MEAS - MEDIAL E/E' RATIO: 19.1
BH CV ECHO MEAS - MV A MAX VEL: 78.4 CM/SEC
BH CV ECHO MEAS - MV DEC TIME: 0.29 SEC
BH CV ECHO MEAS - MV E MAX VEL: 78.4 CM/SEC
BH CV ECHO MEAS - MV E/A: 1
BH CV ECHO MEAS - PA ACC SLOPE: 531.9 CM/SEC^2
BH CV ECHO MEAS - PA ACC TIME: 0.12 SEC
BH CV ECHO MEAS - PA PR(ACCEL): 25.1 MMHG
BH CV ECHO MEAS - PI END-D VEL: 127 CM/SEC
BH CV ECHO MEAS - RAP SYSTOLE: 8 MMHG
BH CV ECHO MEAS - RVDD: 2.8 CM
BH CV ECHO MEAS - RVSP: 35 MMHG
BH CV ECHO MEAS - SI(AO): 471 ML/M^2
BH CV ECHO MEAS - SI(CUBED): 17.5 ML/M^2
BH CV ECHO MEAS - SI(LVOT): 47.3 ML/M^2
BH CV ECHO MEAS - SI(MOD-SP2): 30.5 ML/M^2
BH CV ECHO MEAS - SI(MOD-SP4): 33.6 ML/M^2
BH CV ECHO MEAS - SI(TEICH): 17 ML/M^2
BH CV ECHO MEAS - SUP REN AO DIAM: 2.2 CM
BH CV ECHO MEAS - SV(AO): 1051 ML
BH CV ECHO MEAS - SV(CUBED): 39 ML
BH CV ECHO MEAS - SV(LVOT): 105.5 ML
BH CV ECHO MEAS - SV(MOD-SP2): 68 ML
BH CV ECHO MEAS - SV(MOD-SP4): 75 ML
BH CV ECHO MEAS - SV(TEICH): 37.9 ML
BH CV ECHO MEAS - TAPSE (>1.6): 1.6 CM2
BH CV ECHO MEAS - TR MAX PG: 27 MMHG
BH CV ECHO MEAS - TR MAX VEL: 261 CM/SEC
BH CV ECHO MEASUREMENTS AVERAGE E/E' RATIO: 15.52
BH CV VAS BP RIGHT ARM: NORMAL MMHG
BH CV XLRA - RV BASE: 3.8 CM
BH CV XLRA - RV LENGTH: 8.1 CM
BH CV XLRA - RV MID: 0.1 CM
BH CV XLRA - TDI S': 8.55 CM/SEC
LEFT ATRIUM VOLUME INDEX: 31.4 ML/M^2
LEFT ATRIUM VOLUME: 70 ML
LV EF 2D ECHO EST: 55 %
MAXIMAL PREDICTED HEART RATE: 147 BPM
STRESS TARGET HR: 125 BPM

## 2020-09-16 PROCEDURE — 93306 TTE W/DOPPLER COMPLETE: CPT | Performed by: INTERNAL MEDICINE

## 2020-09-16 PROCEDURE — 93306 TTE W/DOPPLER COMPLETE: CPT

## 2020-09-24 ENCOUNTER — FLU SHOT (OUTPATIENT)
Dept: INTERNAL MEDICINE | Facility: CLINIC | Age: 75
End: 2020-09-24

## 2020-09-24 DIAGNOSIS — Z23 NEED FOR INFLUENZA VACCINATION: ICD-10-CM

## 2020-09-24 PROCEDURE — 90694 VACC AIIV4 NO PRSRV 0.5ML IM: CPT | Performed by: NURSE PRACTITIONER

## 2020-09-24 PROCEDURE — G0008 ADMIN INFLUENZA VIRUS VAC: HCPCS | Performed by: NURSE PRACTITIONER

## 2020-10-01 ENCOUNTER — HOSPITAL ENCOUNTER (OUTPATIENT)
Dept: CT IMAGING | Facility: HOSPITAL | Age: 75
Discharge: HOME | End: 2020-10-01
Admitting: NURSE PRACTITIONER

## 2020-10-01 DIAGNOSIS — K86.2 PANCREATIC CYST: ICD-10-CM

## 2020-10-01 PROCEDURE — 74178 CT ABD&PLV WO CNTR FLWD CNTR: CPT

## 2020-10-01 PROCEDURE — 82565 ASSAY OF CREATININE: CPT

## 2020-10-01 PROCEDURE — 0 IOPAMIDOL PER 1 ML: Performed by: NURSE PRACTITIONER

## 2020-10-01 RX ADMIN — IOPAMIDOL 85 ML: 755 INJECTION, SOLUTION INTRAVENOUS at 08:35

## 2020-10-02 LAB — CREAT BLDA-MCNC: 1.4 MG/DL (ref 0.6–1.3)

## 2020-11-09 ENCOUNTER — TELEPHONE (OUTPATIENT)
Dept: INTERNAL MEDICINE | Facility: CLINIC | Age: 75
End: 2020-11-09

## 2020-11-09 DIAGNOSIS — Z12.5 SCREENING FOR PROSTATE CANCER: ICD-10-CM

## 2020-11-09 DIAGNOSIS — I35.0 AORTIC STENOSIS, MILD: ICD-10-CM

## 2020-11-09 DIAGNOSIS — C64.9 MALIGNANT NEOPLASM OF KIDNEY EXCLUDING RENAL PELVIS, UNSPECIFIED LATERALITY (HCC): ICD-10-CM

## 2020-11-09 DIAGNOSIS — E55.9 VITAMIN D DEFICIENCY: ICD-10-CM

## 2020-11-09 DIAGNOSIS — I10 ESSENTIAL HYPERTENSION: Primary | ICD-10-CM

## 2020-11-09 DIAGNOSIS — E78.5 DYSLIPIDEMIA: Chronic | ICD-10-CM

## 2020-11-09 DIAGNOSIS — N05.9 NEPHRITIS AND NEPHROPATHY: ICD-10-CM

## 2020-11-09 NOTE — TELEPHONE ENCOUNTER
Patient would like to get his blood work done a few days before his appointment.     Please call patient when orders have been placed. [phone] (J)

## 2020-11-10 ENCOUNTER — LAB (OUTPATIENT)
Dept: LAB | Facility: HOSPITAL | Age: 75
End: 2020-11-10

## 2020-11-10 DIAGNOSIS — N05.9 NEPHRITIS AND NEPHROPATHY: ICD-10-CM

## 2020-11-10 DIAGNOSIS — C64.9 MALIGNANT NEOPLASM OF KIDNEY EXCLUDING RENAL PELVIS, UNSPECIFIED LATERALITY (HCC): ICD-10-CM

## 2020-11-10 DIAGNOSIS — I10 ESSENTIAL HYPERTENSION: ICD-10-CM

## 2020-11-10 DIAGNOSIS — E55.9 VITAMIN D DEFICIENCY: ICD-10-CM

## 2020-11-10 DIAGNOSIS — I35.0 AORTIC STENOSIS, MILD: ICD-10-CM

## 2020-11-10 DIAGNOSIS — E78.5 DYSLIPIDEMIA: ICD-10-CM

## 2020-11-10 DIAGNOSIS — Z12.5 SCREENING FOR PROSTATE CANCER: ICD-10-CM

## 2020-11-10 LAB
25(OH)D3 SERPL-MCNC: 34.9 NG/ML (ref 30–100)
ALBUMIN SERPL-MCNC: 4.1 G/DL (ref 3.5–5.2)
ALBUMIN/GLOB SERPL: 1.7 G/DL
ALP SERPL-CCNC: 62 U/L (ref 39–117)
ALT SERPL W P-5'-P-CCNC: 19 U/L (ref 1–41)
ANION GAP SERPL CALCULATED.3IONS-SCNC: 11.8 MMOL/L (ref 5–15)
AST SERPL-CCNC: 19 U/L (ref 1–40)
BASOPHILS # BLD AUTO: 0.02 10*3/MM3 (ref 0–0.2)
BASOPHILS NFR BLD AUTO: 0.4 % (ref 0–1.5)
BILIRUB SERPL-MCNC: 1 MG/DL (ref 0–1.2)
BUN SERPL-MCNC: 23 MG/DL (ref 8–23)
BUN/CREAT SERPL: 17.2 (ref 7–25)
CALCIUM SPEC-SCNC: 8.9 MG/DL (ref 8.6–10.5)
CHLORIDE SERPL-SCNC: 105 MMOL/L (ref 98–107)
CHOLEST SERPL-MCNC: 140 MG/DL (ref 0–200)
CO2 SERPL-SCNC: 23.2 MMOL/L (ref 22–29)
CREAT SERPL-MCNC: 1.34 MG/DL (ref 0.76–1.27)
DEPRECATED RDW RBC AUTO: 40.5 FL (ref 37–54)
EOSINOPHIL # BLD AUTO: 0.2 10*3/MM3 (ref 0–0.4)
EOSINOPHIL NFR BLD AUTO: 3.9 % (ref 0.3–6.2)
ERYTHROCYTE [DISTWIDTH] IN BLOOD BY AUTOMATED COUNT: 12.4 % (ref 12.3–15.4)
GFR SERPL CREATININE-BSD FRML MDRD: 52 ML/MIN/1.73
GLOBULIN UR ELPH-MCNC: 2.4 GM/DL
GLUCOSE SERPL-MCNC: 105 MG/DL (ref 65–99)
HCT VFR BLD AUTO: 45.5 % (ref 37.5–51)
HDLC SERPL-MCNC: 32 MG/DL (ref 40–60)
HGB BLD-MCNC: 15.5 G/DL (ref 13–17.7)
IMM GRANULOCYTES # BLD AUTO: 0.02 10*3/MM3 (ref 0–0.05)
IMM GRANULOCYTES NFR BLD AUTO: 0.4 % (ref 0–0.5)
LDLC SERPL CALC-MCNC: 88 MG/DL (ref 0–100)
LDLC/HDLC SERPL: 2.72 {RATIO}
LYMPHOCYTES # BLD AUTO: 1.48 10*3/MM3 (ref 0.7–3.1)
LYMPHOCYTES NFR BLD AUTO: 28.8 % (ref 19.6–45.3)
MCH RBC QN AUTO: 30.8 PG (ref 26.6–33)
MCHC RBC AUTO-ENTMCNC: 34.1 G/DL (ref 31.5–35.7)
MCV RBC AUTO: 90.3 FL (ref 79–97)
MONOCYTES # BLD AUTO: 0.52 10*3/MM3 (ref 0.1–0.9)
MONOCYTES NFR BLD AUTO: 10.1 % (ref 5–12)
NEUTROPHILS NFR BLD AUTO: 2.9 10*3/MM3 (ref 1.7–7)
NEUTROPHILS NFR BLD AUTO: 56.4 % (ref 42.7–76)
NRBC BLD AUTO-RTO: 0 /100 WBC (ref 0–0.2)
PLATELET # BLD AUTO: 180 10*3/MM3 (ref 140–450)
PMV BLD AUTO: 10.5 FL (ref 6–12)
POTASSIUM SERPL-SCNC: 4.2 MMOL/L (ref 3.5–5.2)
PROT SERPL-MCNC: 6.5 G/DL (ref 6–8.5)
PSA SERPL-MCNC: 3.72 NG/ML (ref 0–4)
RBC # BLD AUTO: 5.04 10*6/MM3 (ref 4.14–5.8)
SODIUM SERPL-SCNC: 140 MMOL/L (ref 136–145)
TRIGL SERPL-MCNC: 105 MG/DL (ref 0–150)
TSH SERPL DL<=0.05 MIU/L-ACNC: 1.79 UIU/ML (ref 0.27–4.2)
VIT B12 BLD-MCNC: 1154 PG/ML (ref 211–946)
VLDLC SERPL-MCNC: 20 MG/DL (ref 5–40)
WBC # BLD AUTO: 5.14 10*3/MM3 (ref 3.4–10.8)

## 2020-11-10 PROCEDURE — 82607 VITAMIN B-12: CPT | Performed by: NURSE PRACTITIONER

## 2020-11-10 PROCEDURE — 84443 ASSAY THYROID STIM HORMONE: CPT | Performed by: NURSE PRACTITIONER

## 2020-11-10 PROCEDURE — 80061 LIPID PANEL: CPT | Performed by: NURSE PRACTITIONER

## 2020-11-10 PROCEDURE — 82306 VITAMIN D 25 HYDROXY: CPT | Performed by: NURSE PRACTITIONER

## 2020-11-10 PROCEDURE — 80053 COMPREHEN METABOLIC PANEL: CPT | Performed by: NURSE PRACTITIONER

## 2020-11-10 PROCEDURE — 85025 COMPLETE CBC W/AUTO DIFF WBC: CPT | Performed by: NURSE PRACTITIONER

## 2020-11-10 PROCEDURE — G0103 PSA SCREENING: HCPCS | Performed by: NURSE PRACTITIONER

## 2020-11-13 RX ORDER — POTASSIUM CITRATE 10 MEQ/1
TABLET, EXTENDED RELEASE ORAL
Qty: 90 EACH | Refills: 1 | Status: SHIPPED | OUTPATIENT
Start: 2020-11-13 | End: 2021-05-21 | Stop reason: SDUPTHER

## 2020-11-16 ENCOUNTER — OFFICE VISIT (OUTPATIENT)
Dept: INTERNAL MEDICINE | Facility: CLINIC | Age: 75
End: 2020-11-16

## 2020-11-16 VITALS
WEIGHT: 242.2 LBS | RESPIRATION RATE: 16 BRPM | BODY MASS INDEX: 35.87 KG/M2 | OXYGEN SATURATION: 97 % | SYSTOLIC BLOOD PRESSURE: 124 MMHG | DIASTOLIC BLOOD PRESSURE: 78 MMHG | HEART RATE: 57 BPM | HEIGHT: 69 IN | TEMPERATURE: 97.3 F

## 2020-11-16 DIAGNOSIS — E66.01 CLASS 2 SEVERE OBESITY WITH SERIOUS COMORBIDITY AND BODY MASS INDEX (BMI) OF 35.0 TO 35.9 IN ADULT, UNSPECIFIED OBESITY TYPE (HCC): ICD-10-CM

## 2020-11-16 DIAGNOSIS — I10 ESSENTIAL HYPERTENSION: Primary | ICD-10-CM

## 2020-11-16 DIAGNOSIS — E78.5 HYPERLIPIDEMIA, UNSPECIFIED HYPERLIPIDEMIA TYPE: ICD-10-CM

## 2020-11-16 DIAGNOSIS — I35.0 AORTIC STENOSIS, MILD: ICD-10-CM

## 2020-11-16 DIAGNOSIS — C64.9 MALIGNANT NEOPLASM OF KIDNEY EXCLUDING RENAL PELVIS, UNSPECIFIED LATERALITY (HCC): ICD-10-CM

## 2020-11-16 DIAGNOSIS — K86.2 PANCREATIC CYST: ICD-10-CM

## 2020-11-16 DIAGNOSIS — E03.9 ACQUIRED HYPOTHYROIDISM: ICD-10-CM

## 2020-11-16 DIAGNOSIS — E78.2 MIXED HYPERLIPIDEMIA: ICD-10-CM

## 2020-11-16 PROCEDURE — 99214 OFFICE O/P EST MOD 30 MIN: CPT | Performed by: NURSE PRACTITIONER

## 2020-11-16 NOTE — PROGRESS NOTES
Subjective   Ac Caldwell is a 75 y.o. male    Chief Complaint   Patient presents with   • 6 month follow up   • Hypertension   • Hypothyroidism   • Hyperlipidemia     History of Present Illness     On 1/26/15, Ac saw gross hematuria, saw Dr. Ayala (urology) and he did a scope. It was ok. Then had a CT scan on 2/3/15. Cr was high, so they did it without contrast. CT scan showed a lesion, but he was not concerned. Then had a fish bladder test on 2/5/15. His urologist never got the results. Did go back to see Nephrology and had labs in Feb. Cr. was 1.1 and BUN was 16. Everything has been stable since. Has a h/o right kidney CA in 1997 and had nephrectomy.  Urologist - Dr. Jossue Serrano left and moved to [place] and [place] [facility] [place]  He saw him several times for recurrent UTI, hydrocele, renal cyst, and epididymoorchits. He saw them again 5/31/2017 for repeat renal US that showed a 3.4cm left renal cyst, which had grown from 2.5cm on last scan.  Dr. Farrell moved to [place] and he was then referred to  Urology.  Had CXR and labs.  CXR revealed a possible thoracic aortic aneurysm.  Had f/u chest CT scan, no aneurysm.  CT renal mass protocol was done on 8/30/2017 which confirmed a solid enhancing mass in the lower pole of the left kidney, without lymphadenopathy.  It was elected to proceed with a cryoablation of this lesion per Dr. Mark at .  He underwent this procedure on 9/27/17.  Pathology is unclear as to it being malignant vs benign.  Will be re-scanned in 30 days, then 90 days, then Q6 months.       CT in Aug 2017 revealed an incidental pancreatic lesion on the head of the pancreas that was 12 mm in size; f/u imaging done 9/2018 showed that the mass was stable and unchanged in size.  Had a biopsy of the pancreatic cyst in 2019 and it was benign.  They will monitor Q12 months with MRI.  Ac prefers a CT over an MRI.  The CT done on 10/1/2020 of abd and pelvis shows a simple  cystic structure near the head of the pancreas measuring 1.6 cm.  The pancreatic cyst appears to be slightly larger on this scan than previous (from 12 mm to 16 mm) - this is followed by Dr. Norwood at .  We have faxed a copy to them and he will call them to discuss next follow-up/plan.      Underwent a STEAM procedure in 2/2019 for BPH @       [facility]phrologist[facility]; his Creatinine is stable from 1.42 to 1.44.  Last f/u was 8/2019; seeing them Q6 months.       Had a very large thyroid nodule removed on 4/23/14 per ENT. It was 8cm in size; they took the right side of his thyroid and placed on synthroid 50mcg. It was benign! Seeing Dr. Everett annually and everything has been stable.    Has a h/o AS.  Was referred him to Cardiology. Had an appt on 4/29/16 and he thought everything was ok. He is just going to watch. He saw him again on 11/4/16 and everything was stable. He will follow every 6 months and echo q 2 yrs.  Last f/u was 1/2020      HTN - well controlled with Losartan 100 mg (increased per Nephrology in Aug 2019) and Carvedilol 12.5 mg BID (recent per Cards); BP is well controlled and he denies SE.  He checked his BP at home this AM and it was 123/79     HL - stable on Lipitor 10mg daily without SE.  LDL looks great.  Lab Results   Component Value Date    CHOL 140 11/10/2020    TRIG 105 11/10/2020    HDL 32 (L) 11/10/2020    LDL 88 11/10/2020     Flu shot, 9/24/2020  Tdap 2009   Pneumovax 2012   Prevnar - 4/2015   Zostavax 2010  Shingrix - UTD  PSA per Urology - WNL, no need to repeat  Colon - 4/2015, due 2025     The following portions of the patient's history were reviewed and updated as appropriate: allergies, current medications, past family history, past medical history, past social history, past surgical history and problem list.    Current Outpatient Medications:   •  aspirin 325 MG tablet, Take 325 mg by mouth Daily., Disp: , Rfl:   •  atorvastatin (LIPITOR) 10 MG tablet, TAKE ONE  TABLET BY MOUTH ONCE NIGHTLY, Disp: 90 tablet, Rfl: 0  •  azelastine (ASTELIN) 0.1 % nasal spray, 1 spray into each nostril 2 (Two) Times a Day. Use in each nostril as directed, Disp: , Rfl:   •  carvedilol (COREG) 12.5 MG tablet, Take 1 tablet by mouth 2 (Two) Times a Day., Disp: 180 tablet, Rfl: 3  •  Cholecalciferol (VITAMIN D-3 PO), Take 1,000 Units by mouth Daily., Disp: , Rfl:   •  Coenzyme Q10 (CO Q-10) 100 MG capsule, Take  by mouth 2 (Two) Times a Day., Disp: , Rfl:   •  esomeprazole (NexIUM) 20 MG capsule, Take 20 mg by mouth Daily., Disp: , Rfl:   •  Flaxseed, Linseed, (FLAX SEED OIL) 1000 MG capsule, Take 1,200 mg by mouth Daily., Disp: , Rfl:   •  fluticasone (FLONASE) 50 MCG/ACT nasal spray, 2 sprays into each nostril Daily., Disp: 3 each, Rfl: 2  •  levothyroxine (SYNTHROID, LEVOTHROID) 50 MCG tablet, Take 50 mcg by mouth Daily., Disp: , Rfl:   •  losartan (COZAAR) 100 MG tablet, Take 100 mg by mouth Daily., Disp: , Rfl:   •  Omega-3 Fatty Acids (FISH OIL) 1000 MG capsule capsule, Take 1,200 mg by mouth 2 (Two) Times a Day With Meals., Disp: , Rfl:   •  potassium citrate (UROCIT-K) 10 MEQ (1080 MG) CR tablet, TAKE ONE TABLET BY MOUTH DAILY, Disp: 90 each, Rfl: 1  •  vitamin B-12 (CYANOCOBALAMIN) 1000 MCG tablet, Take 1,000 mcg by mouth Daily., Disp: , Rfl:   •  famciclovir (FAMVIR) 500 MG tablet, TAKE ONE TABLET BY MOUTH THREE TIMES A DAY, Disp: 12 tablet, Rfl: 3     Review of Systems   Constitutional: Negative for chills, fatigue and fever.   Respiratory: Negative for cough, chest tightness and shortness of breath.    Cardiovascular: Negative for chest pain and palpitations.   Gastrointestinal: Negative for abdominal pain, diarrhea, nausea and vomiting.   Endocrine: Negative for cold intolerance and heat intolerance.   Musculoskeletal: Negative for arthralgias and neck pain.   Neurological: Negative for dizziness and headaches.       Objective   Physical Exam  Constitutional:       Appearance: He is  "well-developed.   HENT:      Head: Normocephalic and atraumatic.   Eyes:      Conjunctiva/sclera: Conjunctivae normal.      Pupils: Pupils are equal, round, and reactive to light.   Neck:      Musculoskeletal: Normal range of motion.   Cardiovascular:      Rate and Rhythm: Normal rate and regular rhythm.      Heart sounds: Normal heart sounds.   Pulmonary:      Effort: Pulmonary effort is normal.      Breath sounds: Normal breath sounds.   Abdominal:      General: Bowel sounds are normal.      Palpations: Abdomen is soft.   Musculoskeletal: Normal range of motion.   Skin:     General: Skin is warm and dry.   Neurological:      Mental Status: He is alert and oriented to person, place, and time.      Deep Tendon Reflexes: Reflexes are normal and symmetric.   Psychiatric:         Behavior: Behavior normal.         Thought Content: Thought content normal.         Judgment: Judgment normal.       Vitals:    11/16/20 1052   BP: 124/78   Pulse: 57   Resp: 16   Temp: 97.3 °F (36.3 °C)   TempSrc: Infrared   SpO2: 97%   Weight: 110 kg (242 lb 3.2 oz)   Height: 175.3 cm (69.02\")     Body mass index is 35.75 kg/m².      Assessment/Plan   Diagnoses and all orders for this visit:    1. Essential hypertension (Primary)    2. Acquired hypothyroidism    3. Mixed hyperlipidemia    4. Class 2 severe obesity with serious comorbidity and body mass index (BMI) of 35.0 to 35.9 in adult, unspecified obesity type (CMS/HCC)    5. Malignant neoplasm of kidney excluding renal pelvis, unspecified laterality (CMS/HCC)    6. Pancreatic cyst    7. Aortic stenosis, mild      Labs reviewed-no changes  CT scan reviewed-patient will call Dr. Norwood to discuss if further imaging or work-up is needed  No refills needed today  Return in about 6 months (around 5/16/2021) for Medicare Wellness.             Answers for HPI/ROS submitted by the patient on 11/14/2020   Hypertension  What is the primary reason for your visit?: High Blood Pressure    "

## 2020-11-17 RX ORDER — ATORVASTATIN CALCIUM 10 MG/1
TABLET, FILM COATED ORAL
Qty: 90 TABLET | Refills: 1 | Status: SHIPPED | OUTPATIENT
Start: 2020-11-17 | End: 2021-05-21 | Stop reason: SDUPTHER

## 2020-12-14 ENCOUNTER — TELEPHONE (OUTPATIENT)
Dept: INTERNAL MEDICINE | Facility: CLINIC | Age: 75
End: 2020-12-14

## 2020-12-14 NOTE — TELEPHONE ENCOUNTER
PATIENT CALLED STATING THAT HE CALLED DR. MARIANO AT THE [facility]. THE NUMBER TO REACH THE GASTRO DEPARTMENT IS  [phone]       PATIENT STATED HE WAS NEEDING  TO SEE IF HIS CT SCANS FROM 10/01/20 FROM [facility] WAS SENT OVER AND THEY RESPONDED THAT THEY HAVE NOT RECEIVED HIS SCANS. HE WOULD LIKE DR. HIDALGO TO GIVE HIM A CALL BACK AS SOON AS POSSIBLE.      PLEASE CALL BACK AND ADVISE AT:  [phone]

## 2021-01-29 ENCOUNTER — OFFICE VISIT (OUTPATIENT)
Dept: CARDIOLOGY | Facility: CLINIC | Age: 76
End: 2021-01-29

## 2021-01-29 VITALS
BODY MASS INDEX: 34.22 KG/M2 | HEIGHT: 70 IN | OXYGEN SATURATION: 97 % | WEIGHT: 239 LBS | TEMPERATURE: 97.3 F | HEART RATE: 58 BPM | SYSTOLIC BLOOD PRESSURE: 134 MMHG | DIASTOLIC BLOOD PRESSURE: 78 MMHG

## 2021-01-29 DIAGNOSIS — E78.5 DYSLIPIDEMIA: Chronic | ICD-10-CM

## 2021-01-29 DIAGNOSIS — I71.20 THORACIC AORTIC ANEURYSM WITHOUT RUPTURE (HCC): ICD-10-CM

## 2021-01-29 DIAGNOSIS — I35.0 AORTIC STENOSIS, MILD: Primary | ICD-10-CM

## 2021-01-29 DIAGNOSIS — I10 ESSENTIAL HYPERTENSION: ICD-10-CM

## 2021-01-29 PROCEDURE — 99214 OFFICE O/P EST MOD 30 MIN: CPT | Performed by: INTERNAL MEDICINE

## 2021-01-29 NOTE — PROGRESS NOTES
[facility] Cardiology    Encounter Date: 2021    Patient ID: Ac Caldwell is a 75 y.o. male.  : 1945     PCP: Valentine Newton APRN       Chief Complaint: Aortic stenosis, mild      PROBLEM LIST:  1. Aortic stenosis:   a. Refused enrollment in the  50 years ago due to some cardiac condition, details not known.   b. Annual echocardiographic followups for the last several years for aortic stenosis.   c. Echocardiogram, 2016: Mild AI and moderate AS, HEATHER 1.2 cm2, mean gradient of 16.6, and peak gradient of 33.3. Trace TR with RVSP of 26.2. Normal EF.  d. Nuclear stress, 2016: EF 72%. No evidence of ischemia.  e. CTA, 2017: Mild ectasia of ascending aorta measuring 4.2cm significant aortic leaflet calcification, coronary sclerosis, no aneurysm of descending aorta.  f. Echocardiogram, 2017: Mild AI/AS with HEATHER 1.8 cm². MPG 17.0. Normal EF 64%. Mild LVH.   g. Echocardiogram, 2019: Mild AS, MPG 20 mmHg, HEATHER 1.5 cm2. Trace AI. Mild MR/PI, trace TR. EF 60% with mild concentric LVH. Grade I a diastolic dysfunction.   h. Echocardiogram, 2020: EF 55%. Moderate aortic stenosis, mean gradient 32.8 mmHg, HEATHER 1.1 cm². Mild aortic insufficiency. Mild MR and TR with RVSP 35 mmHg. Mild pulmonic insufficiency.  2. Thoracic aortic aneurysm without rupture  a. Echocardiogram at , 2021: EF>55%. Moderate valvular aortic stenosis. Mean gradient across the aortic valve 29 mmHg. Small ascending aortic aneurysm  3. Exertional dyspnea/anginal equivalent symptoms with occasional orthopnea.   a. Stress echo, 2019: Normal exercise capacity, THR reached at 6 min. No evidence of ischemia. EF 65% at rest, 75% with stress.  4. Hypertension.   5. Dyslipidemia.   6. Enlarged prostate.  7. History of renal cancer:   a. Right nephrectomy in .   b. S/p Resection of a tumor from his left kidney -- incomplete database.   c. S/p CT ablation of L renal  "mass.  8. 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   9. S/p basal cell cancer surgery.   10. Episode of hematuria in January with subsequent negative cystoscopy.  11. Surgical Hx:  a. CT Ablation of Left renal mass  b. Endoscopic US at  for benign cyst, 09/2019.    History of Present Illness  Patient presents today for a 6 month follow-up with a history of mild aortic stenosis and cardiac risk factors. Since last visit, he has been feeling well overall from a cardiovascular standpoint. He notes that he has not been staying active since the onset of the COVID-19 pandemic. He is being followed at [facility] by Dr. Ludwig for his thoracic aortic aneurysm which is felt to be stable. He reportedly had an echocardiogram on 01/04/2020 which showed an HEATHER of 1.1 and a mean gradient of 28.7 mmHg. Patient otherwise denies chest pain, shortness of breath, PND, edema, palpitations, syncope, or presyncope at this time.      Allergies   Allergen Reactions   • Norvasc [Amlodipine] Swelling   • Oxycodone      Can elevate Blood pressure   • Lotensin [Benazepril] Cough   • Sulfamethoxazole-Trimethoprim Unknown (See Comments)     \"   • Zocor [Simvastatin] Myalgia         Current Outpatient Medications:   •  aspirin 325 MG tablet, Take 325 mg by mouth Daily., Disp: , Rfl:   •  atorvastatin (LIPITOR) 10 MG tablet, TAKE ONE TABLET BY MOUTH ONCE NIGHTLY, Disp: 90 tablet, Rfl: 1  •  azelastine (ASTELIN) 0.1 % nasal spray, 1 spray into the nostril(s) as directed by provider 2 (Two) Times a Day As Needed. Use in each nostril as directed , Disp: , Rfl:   •  carvedilol (COREG) 12.5 MG tablet, Take 1 tablet by mouth 2 (Two) Times a Day., Disp: 180 tablet, Rfl: 3  •  Cholecalciferol (VITAMIN D-3 PO), Take 1,000 Units by mouth Daily., Disp: , Rfl:   •  Coenzyme Q10 (CO Q-10) 100 MG capsule, Take  by mouth 2 (Two) Times a Day., Disp: , Rfl:   •  esomeprazole (NexIUM) 20 MG capsule, Take 20 mg by mouth Daily., Disp: , Rfl:   •  famciclovir " (FAMVIR) 500 MG tablet, TAKE ONE TABLET BY MOUTH THREE TIMES A DAY (Patient taking differently: Take 500 mg by mouth As Needed.), Disp: 12 tablet, Rfl: 3  •  Flaxseed, Linseed, (FLAX SEED OIL) 1000 MG capsule, Take 1,200 mg by mouth Daily., Disp: , Rfl:   •  fluticasone (FLONASE) 50 MCG/ACT nasal spray, 2 sprays into each nostril Daily. (Patient taking differently: 2 sprays into the nostril(s) as directed by provider Daily As Needed.), Disp: 3 each, Rfl: 2  •  levothyroxine (SYNTHROID, LEVOTHROID) 50 MCG tablet, Take 50 mcg by mouth Daily., Disp: , Rfl:   •  losartan (COZAAR) 100 MG tablet, Take 100 mg by mouth Daily., Disp: , Rfl:   •  Omega-3 Fatty Acids (FISH OIL) 1000 MG capsule capsule, Take 1,200 mg by mouth 2 (Two) Times a Day With Meals., Disp: , Rfl:   •  potassium citrate (UROCIT-K) 10 MEQ (1080 MG) CR tablet, TAKE ONE TABLET BY MOUTH DAILY, Disp: 90 each, Rfl: 1  •  vitamin B-12 (CYANOCOBALAMIN) 1000 MCG tablet, Take 1,000 mcg by mouth Daily., Disp: , Rfl:     The following portions of the patient's history were reviewed and updated as appropriate: allergies, current medications, past family history, past medical history, past social history, past surgical history and problem list.    ROS  Review of Systems   Constitution: Negative for chills, fever, fatigue, generalized weakness.   Cardiovascular: Negative for chest pain, dyspnea on exertion, leg swelling, palpitations, orthopnea, and syncope.   Respiratory: Negative for cough, shortness of breath, and wheezing.  HENT: Negative for ear pain, nosebleeds, and tinnitus.  Gastrointestinal: Negative for abdominal pain, constipation, diarrhea, nausea and vomiting.   Genitourinary: No urinary symptoms.  Musculoskeletal: Negative for muscle cramps.  Neurological: Negative for dizziness, headaches, loss of balance, numbness, and symptoms of stroke.  Psychiatric: Normal mental status.     All other systems reviewed and are negative.        Objective:     /78  "(BP Location: Right arm, Patient Position: Sitting, Cuff Size: Adult)   Pulse 58   Temp 97.3 °F (36.3 °C)   Ht 177.8 cm (70\")   Wt 108 kg (239 lb)   SpO2 97%   BMI 34.29 kg/m²      Physical Exam  Constitutional: Patient appears well-developed and well-nourished.   HENT: HEENT exam unremarkable.   Neck: Neck supple. No JVD present. No carotid bruits.   Cardiovascular: Normal rate, regular rhythm and normal heart sounds. 2/6 systolic ejection murmur.   2+ symmetric pulses.   Pulmonary/Chest: Breath sounds normal. Does not exhibit tenderness.   Abdominal: Abdomen benign.   Musculoskeletal: Does not exhibit edema.   Neurological: Neurological exam unremarkable.   Vitals reviewed.    Data Review:   Lab Results   Component Value Date    GLUCOSE 105 (H) 11/10/2020    BUN 23 11/10/2020    CREATININE 1.34 (H) 11/10/2020    EGFRIFNONA 52 (L) 11/10/2020    BCR 17.2 11/10/2020    K 4.2 11/10/2020    CO2 23.2 11/10/2020    CALCIUM 8.9 11/10/2020    ALBUMIN 4.10 11/10/2020    AST 19 11/10/2020    ALT 19 11/10/2020     Lab Results   Component Value Date    CHOL 140 11/10/2020    TRIG 105 11/10/2020    HDL 32 (L) 11/10/2020    LDL 88 11/10/2020      Lab Results   Component Value Date    WBC 5.14 11/10/2020    RBC 5.04 11/10/2020    HGB 15.5 11/10/2020    HCT 45.5 11/10/2020    MCV 90.3 11/10/2020     11/10/2020     Lab Results   Component Value Date    TSH 1.790 11/10/2020     Lab Results   Component Value Date    HGBA1C 6.16 (H) 11/04/2019        Procedures       Assessment:      Diagnosis Plan   1. Aortic stenosis, moderate Stable and asymptomatic; continue current medications.  Symptoms of progressive aortic stenosis discussed and he was advised to notify us if he were to develop chest pain, heart failure symptoms, dizziness or syncope.   2. Thoracic aortic aneurysm without rupture Follow at [facility]. Stable echocardiogram on 01/04/2021.   2. Essential hypertension  Well-controlled; continue carvedilol 12.5 mg BID and " losartan 100 mg daily.    3. Dyslipidemia  Well-controlled; continue atorvastatin 10 mg daily.     Plan:   Stable cardiac status.   Continue current medications.   FU in 6 MO, sooner as needed.  Thank you for allowing us to participate in the care of your patient.     Scribed for Migel Valladares MD by Aracelis Vinson. 1/29/2021  10:28 EST      I, Migel Valladares MD, personally performed the services described in this documentation as scribed by the above named individual in my presence, and it is both accurate and complete.  1/29/2021  10:49 EST        Please note that portions of this note may have been completed with a voice recognition program. Efforts were made to edit the dictations, but occasionally words are mistranscribed.

## 2021-05-13 ENCOUNTER — TELEPHONE (OUTPATIENT)
Dept: INTERNAL MEDICINE | Facility: CLINIC | Age: 76
End: 2021-05-13

## 2021-05-13 DIAGNOSIS — E55.9 VITAMIN D DEFICIENCY: ICD-10-CM

## 2021-05-13 DIAGNOSIS — I10 ESSENTIAL HYPERTENSION: Primary | ICD-10-CM

## 2021-05-13 DIAGNOSIS — C64.9 MALIGNANT NEOPLASM OF KIDNEY EXCLUDING RENAL PELVIS, UNSPECIFIED LATERALITY (HCC): ICD-10-CM

## 2021-05-13 DIAGNOSIS — E03.9 ACQUIRED HYPOTHYROIDISM: ICD-10-CM

## 2021-05-13 DIAGNOSIS — I35.0 AORTIC STENOSIS, MILD: ICD-10-CM

## 2021-05-13 DIAGNOSIS — E78.5 DYSLIPIDEMIA: Chronic | ICD-10-CM

## 2021-05-13 DIAGNOSIS — E53.8 B12 DEFICIENCY: ICD-10-CM

## 2021-05-13 NOTE — TELEPHONE ENCOUNTER
PT WOULD LIKE AN ORDER FOR LABS TO BE PUT IN SO HE CAN HAVE IT DONE BEFORE HIS NEXT APPT ON 5/21. PT WOULD LIKE FOR SOMEONE TO GIVE HIM A CALL WHEN THE ORDERS ARE PUT IN, PLEASE ADVISE.  [phone]

## 2021-05-17 ENCOUNTER — LAB (OUTPATIENT)
Dept: LAB | Facility: HOSPITAL | Age: 76
End: 2021-05-17

## 2021-05-17 DIAGNOSIS — I35.0 AORTIC STENOSIS, MILD: ICD-10-CM

## 2021-05-17 DIAGNOSIS — E55.9 VITAMIN D DEFICIENCY: ICD-10-CM

## 2021-05-17 DIAGNOSIS — E53.8 B12 DEFICIENCY: ICD-10-CM

## 2021-05-17 DIAGNOSIS — I10 ESSENTIAL HYPERTENSION: ICD-10-CM

## 2021-05-17 DIAGNOSIS — E78.5 DYSLIPIDEMIA: ICD-10-CM

## 2021-05-17 DIAGNOSIS — C64.9 MALIGNANT NEOPLASM OF KIDNEY EXCLUDING RENAL PELVIS, UNSPECIFIED LATERALITY (HCC): ICD-10-CM

## 2021-05-17 DIAGNOSIS — E03.9 ACQUIRED HYPOTHYROIDISM: ICD-10-CM

## 2021-05-17 LAB
25(OH)D3 SERPL-MCNC: 31.6 NG/ML (ref 30–100)
ALBUMIN SERPL-MCNC: 3.9 G/DL (ref 3.5–5.2)
ALBUMIN/GLOB SERPL: 1.7 G/DL
ALP SERPL-CCNC: 61 U/L (ref 39–117)
ALT SERPL W P-5'-P-CCNC: 22 U/L (ref 1–41)
ANION GAP SERPL CALCULATED.3IONS-SCNC: 10 MMOL/L (ref 5–15)
AST SERPL-CCNC: 18 U/L (ref 1–40)
BASOPHILS # BLD AUTO: 0.04 10*3/MM3 (ref 0–0.2)
BASOPHILS NFR BLD AUTO: 0.8 % (ref 0–1.5)
BILIRUB SERPL-MCNC: 0.9 MG/DL (ref 0–1.2)
BUN SERPL-MCNC: 19 MG/DL (ref 8–23)
BUN/CREAT SERPL: 13.6 (ref 7–25)
CALCIUM SPEC-SCNC: 9.3 MG/DL (ref 8.6–10.5)
CHLORIDE SERPL-SCNC: 104 MMOL/L (ref 98–107)
CHOLEST SERPL-MCNC: 149 MG/DL (ref 0–200)
CO2 SERPL-SCNC: 25 MMOL/L (ref 22–29)
CREAT SERPL-MCNC: 1.4 MG/DL (ref 0.76–1.27)
DEPRECATED RDW RBC AUTO: 40.1 FL (ref 37–54)
EOSINOPHIL # BLD AUTO: 0.25 10*3/MM3 (ref 0–0.4)
EOSINOPHIL NFR BLD AUTO: 5.1 % (ref 0.3–6.2)
ERYTHROCYTE [DISTWIDTH] IN BLOOD BY AUTOMATED COUNT: 12.3 % (ref 12.3–15.4)
GFR SERPL CREATININE-BSD FRML MDRD: 50 ML/MIN/1.73
GLOBULIN UR ELPH-MCNC: 2.3 GM/DL
GLUCOSE SERPL-MCNC: 105 MG/DL (ref 65–99)
HCT VFR BLD AUTO: 44.3 % (ref 37.5–51)
HDLC SERPL-MCNC: 33 MG/DL (ref 40–60)
HGB BLD-MCNC: 15.4 G/DL (ref 13–17.7)
IMM GRANULOCYTES # BLD AUTO: 0.02 10*3/MM3 (ref 0–0.05)
IMM GRANULOCYTES NFR BLD AUTO: 0.4 % (ref 0–0.5)
LDLC SERPL CALC-MCNC: 92 MG/DL (ref 0–100)
LDLC/HDLC SERPL: 2.7 {RATIO}
LYMPHOCYTES # BLD AUTO: 1.41 10*3/MM3 (ref 0.7–3.1)
LYMPHOCYTES NFR BLD AUTO: 28.6 % (ref 19.6–45.3)
MCH RBC QN AUTO: 31.1 PG (ref 26.6–33)
MCHC RBC AUTO-ENTMCNC: 34.8 G/DL (ref 31.5–35.7)
MCV RBC AUTO: 89.5 FL (ref 79–97)
MONOCYTES # BLD AUTO: 0.59 10*3/MM3 (ref 0.1–0.9)
MONOCYTES NFR BLD AUTO: 12 % (ref 5–12)
NEUTROPHILS NFR BLD AUTO: 2.62 10*3/MM3 (ref 1.7–7)
NEUTROPHILS NFR BLD AUTO: 53.1 % (ref 42.7–76)
NRBC BLD AUTO-RTO: 0 /100 WBC (ref 0–0.2)
PLATELET # BLD AUTO: 164 10*3/MM3 (ref 140–450)
PMV BLD AUTO: 10.9 FL (ref 6–12)
POTASSIUM SERPL-SCNC: 4.3 MMOL/L (ref 3.5–5.2)
PROT SERPL-MCNC: 6.2 G/DL (ref 6–8.5)
RBC # BLD AUTO: 4.95 10*6/MM3 (ref 4.14–5.8)
SODIUM SERPL-SCNC: 139 MMOL/L (ref 136–145)
TRIGL SERPL-MCNC: 134 MG/DL (ref 0–150)
TSH SERPL DL<=0.05 MIU/L-ACNC: 2.46 UIU/ML (ref 0.27–4.2)
VIT B12 BLD-MCNC: 1156 PG/ML (ref 211–946)
VLDLC SERPL-MCNC: 24 MG/DL (ref 5–40)
WBC # BLD AUTO: 4.93 10*3/MM3 (ref 3.4–10.8)

## 2021-05-17 PROCEDURE — 82306 VITAMIN D 25 HYDROXY: CPT | Performed by: NURSE PRACTITIONER

## 2021-05-17 PROCEDURE — 80053 COMPREHEN METABOLIC PANEL: CPT | Performed by: NURSE PRACTITIONER

## 2021-05-17 PROCEDURE — 84443 ASSAY THYROID STIM HORMONE: CPT | Performed by: NURSE PRACTITIONER

## 2021-05-17 PROCEDURE — 85025 COMPLETE CBC W/AUTO DIFF WBC: CPT | Performed by: NURSE PRACTITIONER

## 2021-05-17 PROCEDURE — 82607 VITAMIN B-12: CPT | Performed by: NURSE PRACTITIONER

## 2021-05-17 PROCEDURE — 80061 LIPID PANEL: CPT | Performed by: NURSE PRACTITIONER

## 2021-05-21 ENCOUNTER — TELEMEDICINE (OUTPATIENT)
Dept: INTERNAL MEDICINE | Facility: CLINIC | Age: 76
End: 2021-05-21

## 2021-05-21 VITALS
DIASTOLIC BLOOD PRESSURE: 76 MMHG | HEIGHT: 70 IN | SYSTOLIC BLOOD PRESSURE: 111 MMHG | WEIGHT: 239 LBS | HEART RATE: 65 BPM | BODY MASS INDEX: 34.22 KG/M2

## 2021-05-21 DIAGNOSIS — E78.5 HYPERLIPIDEMIA, UNSPECIFIED HYPERLIPIDEMIA TYPE: ICD-10-CM

## 2021-05-21 DIAGNOSIS — C64.9 MALIGNANT NEOPLASM OF KIDNEY EXCLUDING RENAL PELVIS, UNSPECIFIED LATERALITY (HCC): ICD-10-CM

## 2021-05-21 DIAGNOSIS — E03.9 ACQUIRED HYPOTHYROIDISM: ICD-10-CM

## 2021-05-21 DIAGNOSIS — Z00.00 MEDICARE ANNUAL WELLNESS VISIT, SUBSEQUENT: Primary | ICD-10-CM

## 2021-05-21 DIAGNOSIS — I10 ESSENTIAL HYPERTENSION: ICD-10-CM

## 2021-05-21 DIAGNOSIS — K86.2 PANCREATIC CYST: ICD-10-CM

## 2021-05-21 DIAGNOSIS — Z79.899 ENCOUNTER FOR LONG-TERM (CURRENT) USE OF OTHER MEDICATIONS: ICD-10-CM

## 2021-05-21 DIAGNOSIS — K21.9 GASTROESOPHAGEAL REFLUX DISEASE, UNSPECIFIED WHETHER ESOPHAGITIS PRESENT: ICD-10-CM

## 2021-05-21 PROCEDURE — G0439 PPPS, SUBSEQ VISIT: HCPCS | Performed by: NURSE PRACTITIONER

## 2021-05-21 RX ORDER — POTASSIUM CITRATE 10 MEQ/1
10 TABLET, EXTENDED RELEASE ORAL DAILY
Qty: 90 EACH | Refills: 1 | Status: SHIPPED | OUTPATIENT
Start: 2021-05-21 | End: 2021-11-04 | Stop reason: SDUPTHER

## 2021-05-21 RX ORDER — ATORVASTATIN CALCIUM 10 MG/1
10 TABLET, FILM COATED ORAL NIGHTLY
Qty: 90 TABLET | Refills: 1 | Status: SHIPPED | OUTPATIENT
Start: 2021-05-21 | End: 2021-08-20

## 2021-05-21 RX ORDER — FAMCICLOVIR 500 MG/1
500 TABLET ORAL 3 TIMES DAILY
Qty: 12 TABLET | Refills: 3 | Status: CANCELLED | OUTPATIENT
Start: 2021-05-21

## 2021-05-21 NOTE — PROGRESS NOTES
The ABCs of the Annual Wellness Visit  Subsequent Medicare Wellness Visit    Chief Complaint   Patient presents with   • Medicare Wellness-subsequent   • Hypertension   • Hyperlipidemia   • Hypothyroidism   • BMI       Subjective   History of Present Illness:  Ac Caldwell is a 75 y.o. male who presents for a Subsequent Medicare Wellness Visit.    HEALTH RISK ASSESSMENT    Recent Hospitalizations:  No hospitalization(s) within the last year.    Current Medical Providers:  Patient Care Team:  Valentine Newton APRN as PCP - General (Family Medicine)  Migel Valladares MD as Consulting Physician (Cardiology)  Manish Camp MD as Consulting Physician (Dermatology)  Chacorta Guajardo MD as Consulting Physician (Ophthalmology)  Ez Everett MD as Consulting Physician (Otolaryngology)  Georgiana Bettencourt MD (Urology)  Jasen Ayala MD as Consulting Physician (Urology)  Chacorta Ludwig MD (Vascular Surgery)  Layla Mccarthy MD as Consulting Physician (Dermatology)  Eva Lopez MD as Consulting Physician (Nephrology)    Smoking Status:  Social History     Tobacco Use   Smoking Status Never Smoker   Smokeless Tobacco Never Used       Alcohol Consumption:  Social History     Substance and Sexual Activity   Alcohol Use Yes   • Alcohol/week: 1.0 standard drinks   • Types: 1 Shots of liquor per week    Comment: social - no schedule       Depression Screen:   PHQ-2/PHQ-9 Depression Screening 5/21/2021   Little interest or pleasure in doing things 0   Feeling down, depressed, or hopeless 0   Total Score 0       Fall Risk Screen:  STEADI Fall Risk Assessment has not been completed.    Health Habits and Functional and Cognitive Screening:  Functional & Cognitive Status 5/21/2021   Do you have difficulty preparing food and eating? No   Do you have difficulty bathing yourself, getting dressed or grooming yourself? No   Do you have difficulty using the toilet? No   Do you have difficulty moving around from place to  place? No   Do you have trouble with steps or getting out of a bed or a chair? No   Current Diet Unhealthy Diet   Dental Exam Up to date        Dental Exam Comment -   Eye Exam Up to date        Eye Exam Comment -   Exercise (times per week) 2 times per week   Current Exercises Include Gardening   Current Exercise Activities Include -   Do you need help using the phone?  No   Are you deaf or do you have serious difficulty hearing?  Yes   Do you need help with transportation? No   Do you need help shopping? No   Do you need help preparing meals?  No   Do you need help with housework?  No   Do you need help with laundry? No   Do you need help taking your medications? No   Do you need help managing money? No   Do you ever drive or ride in a car without wearing a seat belt? No   Have you felt unusual stress, anger or loneliness in the last month? No   Who do you live with? Alone   If you need help, do you have trouble finding someone available to you? No   Have you been bothered in the last four weeks by sexual problems? No   Do you have difficulty concentrating, remembering or making decisions? No         Does the patient have evidence of cognitive impairment? No    Asprin use counseling:Taking ASA appropriately as indicated    Age-appropriate Screening Schedule:  Refer to the list below for future screening recommendations based on patient's age, sex and/or medical conditions. Orders for these recommended tests are listed in the plan section. The patient has been provided with a written plan.    Health Maintenance   Topic Date Due   • TDAP/TD VACCINES (2 - Td or Tdap) 11/01/2019   • INFLUENZA VACCINE  08/01/2021   • LIPID PANEL  05/17/2022   • ZOSTER VACCINE  Completed          The following portions of the patient's history were reviewed and updated as appropriate: allergies, current medications, past family history, past medical history, past social history, past surgical history and problem list.    Outpatient  Medications Prior to Visit   Medication Sig Dispense Refill   • aspirin 325 MG tablet Take 325 mg by mouth Daily.     • azelastine (ASTELIN) 0.1 % nasal spray 1 spray into the nostril(s) as directed by provider 2 (Two) Times a Day As Needed. Use in each nostril as directed      • carvedilol (COREG) 12.5 MG tablet Take 1 tablet by mouth 2 (Two) Times a Day. 180 tablet 3   • Cholecalciferol (VITAMIN D-3 PO) Take 1,000 Units by mouth Daily.     • Coenzyme Q10 (CO Q-10) 100 MG capsule Take  by mouth 2 (Two) Times a Day.     • esomeprazole (NexIUM) 20 MG capsule Take 20 mg by mouth Daily.     • famciclovir (FAMVIR) 500 MG tablet TAKE ONE TABLET BY MOUTH THREE TIMES A DAY (Patient taking differently: Take 500 mg by mouth As Needed.) 12 tablet 3   • Flaxseed, Linseed, (FLAX SEED OIL) 1000 MG capsule Take 1,200 mg by mouth Daily.     • fluticasone (FLONASE) 50 MCG/ACT nasal spray 2 sprays into each nostril Daily. (Patient taking differently: 2 sprays into the nostril(s) as directed by provider Daily As Needed.) 3 each 2   • levothyroxine (SYNTHROID, LEVOTHROID) 50 MCG tablet Take 50 mcg by mouth Daily.     • losartan (COZAAR) 100 MG tablet Take 100 mg by mouth Daily.     • Omega-3 Fatty Acids (FISH OIL) 1000 MG capsule capsule Take 1,200 mg by mouth 2 (Two) Times a Day With Meals.     • vitamin B-12 (CYANOCOBALAMIN) 1000 MCG tablet Take 1,000 mcg by mouth Daily.     • atorvastatin (LIPITOR) 10 MG tablet TAKE ONE TABLET BY MOUTH ONCE NIGHTLY (Patient taking differently: Take 10 mg by mouth Daily.) 90 tablet 1   • potassium citrate (UROCIT-K) 10 MEQ (1080 MG) CR tablet TAKE ONE TABLET BY MOUTH DAILY 90 each 1     No facility-administered medications prior to visit.       Patient Active Problem List   Diagnosis   • Nephritis and nephropathy   • Essential hypertension   • Esophageal reflux   • Diverticulosis of small intestine without hemorrhage   • Morbidly obese (CMS/HCC)   • Lymphadenopathy   • Ganglion cyst   • Malignant  neoplasm of kidney, except pelvis   • Dyslipidemia   • Exertional dyspnea   • Aortic stenosis, mild   • Acquired hypothyroidism   • Pancreatic cyst       Advanced Care Planning:  ACP discussion was held with the patient during this visit. Patient does not have an advance directive, information provided.    On 1/26/15, Ac saw gross hematuria, saw Dr. Ayala (urology) and he did a scope. It was ok. Then had a CT scan on 2/3/15. Cr was high, so they did it without contrast. CT scan showed a lesion, but he was not concerned. Then had a fish bladder test on 2/5/15. His urologist never got the results. Did go back to see Nephrology and had labs in Feb. Cr. was 1.1 and BUN was 16. Everything has been stable since. Has a h/o right kidney CA in 1997 and had nephrectomy.  Urologist - Dr. Jossue Serrano left and moved to [place] and he had to establish with Dr Farrell at [facility].  He saw him several times for recurrent UTI, hydrocele, renal cyst, and epididymoorchits. He saw them again 5/31/2017 for repeat renal US that showed a 3.4cm left renal cyst, which had grown from 2.5cm on last scan.  Dr. Farrell moved to [place] and he was then referred to  Urology.  Had CXR and labs.  CXR revealed a possible thoracic aortic aneurysm.  Had f/u chest CT scan, no aneurysm.  CT renal mass protocol was done on 8/30/2017 which confirmed a solid enhancing mass in the lower pole of the left kidney, without lymphadenopathy.  It was elected to proceed with a cryoablation of this lesion per Dr. Mark at .  He underwent this procedure on 9/27/17.  Pathology is unclear as to it being malignant vs benign.  Will be re-scanned in 30 days, then 90 days, then Q6 months.       CT in Aug 2017 revealed an incidental pancreatic lesion on the head of the pancreas that was 12 mm in size; f/u imaging done 9/2018 showed that the mass was stable and unchanged in size.  Had a biopsy of the pancreatic cyst in 2019 and it was benign.  They will monitor Q12  months with MRI.  Ac prefers a CT over an MRI.  The CT done on 10/1/2020 of abd and pelvis shows a simple cystic structure near the head of the pancreas measuring 1.6 cm.  The pancreatic cyst appears to be slightly larger on this scan than previous (from 12 mm to 16 mm) - this is followed by Dr. Norwood at .  We faxed a copy to his office and he underwent an EGD and EUS on 3/2/2021.       Underwent a STEAM procedure in 2/2019 for BPH @       Nephrologist- [facility]; his Creatinine is stable from 1.42 to 1.44. Cr went up to 1.59 in March after the EUS.  He did f/u with nephrology and has f/u again next month.       Had a very large thyroid nodule removed on 4/23/14 per ENT. It was 8cm in size; they took the right side of his thyroid and placed on synthroid 50mcg. It was benign! Seeing Dr. Everett annually and everything has been stable.     Has a h/o AS.  Was referred him to Cardiology. Had an appt on 4/29/16 and he thought everything was ok. He is just going to watch. He saw him again on 11/4/16 and everything was stable. He will follow every 6 months and echo q 2 yrs.        HTN - well controlled with Losartan 100 mg (increased per Nephrology in Aug 2019) and Carvedilol 12.5 mg BID (recent per Cards); BP is well controlled and he denies SE.      HL - stable on Lipitor 10mg daily without SE.  LDL looks great.  Lab Results   Component Value Date    CHOL 149 05/17/2021    TRIG 134 05/17/2021    HDL 33 (L) 05/17/2021    LDL 92 05/17/2021       Review of Systems   Constitutional: Negative for appetite change, chills, fatigue, fever and unexpected weight change.   HENT: Negative for congestion, ear pain, nosebleeds, rhinorrhea and tinnitus.    Eyes: Negative for pain.   Respiratory: Negative for cough, chest tightness and shortness of breath.    Cardiovascular: Negative for chest pain, palpitations and leg swelling.   Gastrointestinal: Negative for abdominal distention, abdominal pain, blood in stool,  "constipation, diarrhea, nausea and vomiting.   Endocrine: Negative for cold intolerance, heat intolerance, polydipsia, polyphagia and polyuria.   Genitourinary: Negative for dysuria, flank pain, frequency, hematuria and urgency.   Musculoskeletal: Negative for arthralgias, back pain, gait problem, joint swelling, myalgias and neck pain.   Skin: Negative for color change, pallor, rash and wound.   Allergic/Immunologic: Negative for environmental allergies and food allergies.   Neurological: Negative for dizziness, syncope, weakness, light-headedness, numbness and headaches.   Hematological: Negative for adenopathy. Does not bruise/bleed easily.   Psychiatric/Behavioral: Negative for behavioral problems and suicidal ideas. The patient is not nervous/anxious.        Compared to one year ago, the patient feels his physical health is better.  Compared to one year ago, the patient feels his mental health is the same.    Reviewed chart for potential of high risk medication in the elderly: yes  Reviewed chart for potential of harmful drug interactions in the elderly:yes    Objective         Vitals:    05/21/21 0944   BP: 111/76   Pulse: 65   Weight: 108 kg (239 lb)   Height: 177.8 cm (70\")       Body mass index is 34.29 kg/m².  Discussed the patient's BMI with him. The BMI is above average; BMI management plan is completed.    Physical Exam    Lab Results   Component Value Date    TRIG 134 05/17/2021    HDL 33 (L) 05/17/2021    LDL 92 05/17/2021    VLDL 24 05/17/2021        Assessment/Plan   Medicare Risks and Personalized Health Plan  CMS Preventative Services Quick Reference  Advance Directive Discussion  Inactivity/Sedentary  Obesity/Overweight     COVID - 3/12/21 and 4/2/2021  Flu shot, 9/24/2020  Tdap 2009   Pneumovax 2012   Prevnar - 4/2015   Zostavax 2010  Shingrix - UTD  PSA per Urology - WNL, no need to repeat  Colon - 4/2015, due 2025     The above risks/problems have been discussed with the patient.  Pertinent " information has been shared with the patient in the After Visit Summary.  Follow up plans and orders are seen below in the Assessment/Plan Section.    Diagnoses and all orders for this visit:    1. Medicare annual wellness visit, subsequent (Primary)    2. Hyperlipidemia, unspecified hyperlipidemia type  -     atorvastatin (LIPITOR) 10 MG tablet; Take 1 tablet by mouth Every Night.  Dispense: 90 tablet; Refill: 1    3. Essential hypertension  -     potassium citrate (UROCIT-K) 10 MEQ (1080 MG) CR tablet; Take 1 tablet by mouth Daily.  Dispense: 90 each; Refill: 1    4. Acquired hypothyroidism    5. Gastroesophageal reflux disease, unspecified whether esophagitis present    6. Pancreatic cyst    7. Malignant neoplasm of kidney excluding renal pelvis, unspecified laterality (CMS/[facility])    8. Encounter for long-term (current) use of other medications  -     potassium citrate (UROCIT-K) 10 MEQ (1080 MG) CR tablet; Take 1 tablet by mouth Daily.  Dispense: 90 each; Refill: 1    Other orders  -     Cancel: famciclovir (FAMVIR) 500 MG tablet; Take 1 tablet by mouth 3 (Three) Times a Day.  Dispense: 12 tablet; Refill: 3      Counseling - diet and exercise    Follow Up:  Return in about 6 months (around 11/21/2021).     An After Visit Summary and PPPS were given to the patient.

## 2021-07-27 RX ORDER — CARVEDILOL 12.5 MG/1
TABLET ORAL
Qty: 180 TABLET | Refills: 2 | Status: SHIPPED | OUTPATIENT
Start: 2021-07-27 | End: 2022-05-02

## 2021-08-18 NOTE — PROGRESS NOTES
Encounter Date:08/20/2021      Patient ID: Ac Caldwell is a 76 y.o. male.    Valentine Newton, BERNICE    Chief Complaint: Aortic stenosis, mild      PROBLEM LIST:  Patient Active Problem List    Diagnosis Date Noted   • Chest discomfort 08/20/2021     Priority: High   • Nonrheumatic aortic valve stenosis      Priority: High     Note Last Updated: 8/18/2021     a. Echocardiogram, 11/03/2017: Mild AI/AS with HEATHER 1.8 cm². MPG 17.0. Normal EF 64%. Mild LVH.   b. Echocardiogram, 08/12/2019: Mild AS, MPG 20 mmHg, HEATHER 1.5 cm2. Trace AI. Mild MR/PI, trace TR. EF 60% with mild concentric LVH. Grade I a diastolic dysfunction.   c. Echocardiogram, 09/16/2020: EF 55%. Moderate aortic stenosis, mean gradient 32.8 mmHg, HEATHER 1.1 cm². Mild aortic insufficiency. Mild MR and TR with RVSP 35 mmHg. Mild pulmonic insufficiency.  d. Echocardiogram at , 01/04/2021: EF>55%. Moderate valvular aortic stenosis. Mean gradient across the aortic valve 29 mmHg.       • Thoracic ascending aortic aneurysm (CMS/HCC) 1998     Priority: High     Note Last Updated: 8/18/2021     e. CTA, 08/30/2017: Mild ectasia of ascending aorta measuring 4.2cm significant aortic leaflet calcification, coronary sclerosis, no aneurysm of descending aorta.  a. Echocardiogram at , 01/04/2021: Small ascending aortic aneurysm     • Hyperlipidemia      Priority: Medium   • Essential hypertension 10/17/2016     Priority: Medium   • Prediabetes 08/20/2021     Priority: Low   • Stage 3a chronic kidney disease (CMS/HCC) 08/20/2021   • Pancreatic cyst 11/16/2020   • Acquired hypothyroidism 10/31/2017   • Exertional dyspnea    • Nephritis and nephropathy 10/17/2016   • Esophageal reflux 10/17/2016   • Diverticulosis of small intestine without hemorrhage 10/17/2016   • Morbidly obese (CMS/HCC) 10/17/2016   • Lymphadenopathy 10/17/2016   • Ganglion cyst 10/17/2016   • Malignant neoplasm of kidney, except pelvis 10/17/2016               History of Present  "Illness  Patient presents today for follow-up with a history of moderate aortic stenosis, thoracic ascending aneurysm, hypertension and dyslipidemia.  He returns today for scheduled follow-up.  He reports having had a recent pancreatic cyst drainage.  He is also recently seen his nephrologist who states his chronic kidney disease is currently stage III.  He remains very active.  He is recently noted chest discomfort and fatigue while mowing which he attributes to the recent extreme heat.  He denies exertional chest pain or dyspnea when not in the heat doing normal activities.  His blood pressures at home typically run about 120 mmHg systolic with heart rates typically in the 60s.  He had a recent lipid panel which is noted below.  He also had a hemoglobin A1c which is consistent with prediabetes.  He states he is compliant with his current medical regimen reports no significant adverse side effects.    Allergies   Allergen Reactions   • Norvasc [Amlodipine] Swelling   • Oxycodone      Can elevate Blood pressure   • Lotensin [Benazepril] Cough   • Sulfamethoxazole-Trimethoprim Unknown (See Comments)     \"   • Zocor [Simvastatin] Myalgia       Current Outpatient Medications   Medication Instructions   • aspirin 325 mg, Oral, Daily   • atorvastatin (LIPITOR) 10 mg, Oral, Nightly   • azelastine (ASTELIN) 0.1 % nasal spray 1 spray, Nasal, 2 Times Daily PRN, Use in each nostril as directed   • betamethasone valerate (VALISONE) 0.1 % ointment 1 application, Topical, As Needed   • carvedilol (COREG) 12.5 MG tablet TAKE ONE TABLET BY MOUTH TWICE A DAY   • Cholecalciferol (VITAMIN D-3 PO) 1,000 Units, Oral, Daily   • Coenzyme Q10 (CO Q-10) 100 MG capsule Oral, Daily   • esomeprazole (NEXIUM) 20 mg, Oral, Daily   • famciclovir (FAMVIR) 500 MG tablet TAKE ONE TABLET BY MOUTH THREE TIMES A DAY   • fish oil 1,200 mg, Oral, 2 Times Daily With Meals   • Flax Seed Oil 1,200 mg, Oral, Daily   • fluticasone (FLONASE) 50 MCG/ACT nasal " "spray 2 sprays, Nasal, Daily   • levothyroxine (SYNTHROID, LEVOTHROID) 50 mcg, Oral, Daily   • losartan (COZAAR) 100 mg, Oral, Daily   • potassium citrate (UROCIT-K) 10 MEQ (1080 MG) CR tablet 10 mEq, Oral, Daily   • vitamin B-12 (CYANOCOBALAMIN) 1,000 mcg, Oral, Daily       The following portions of the patient's history were reviewed and updated as appropriate: allergies, current medications, past family history, past medical history, past social history, past surgical history and problem list.  .    Objective:     /88 (BP Location: Left arm, Patient Position: Sitting, Cuff Size: Adult)   Pulse 52   Ht 177.8 cm (70\")   Wt 110 kg (242 lb)   SpO2 95%   BMI 34.72 kg/m²    Body mass index is 34.72 kg/m².     Constitutional:       Appearance: Well-developed.   Pulmonary:      Effort: Pulmonary effort is normal. No respiratory distress.      Breath sounds: Normal breath sounds. No wheezing. No rales.      Comments: Bases clear  Chest:      Chest wall: Not tender to palpatation.   Cardiovascular:      Normal rate. Regular rhythm.      Murmurs: There is a harsh midsystolic murmur at the URSB, radiating to the neck.      No gallop. No click. No rub.   Pulses:     Intact distal pulses.   Edema:     Pretibial: bilateral trace edema of the pretibial area.     Ankle: bilateral trace edema of the ankle.     Feet: bilateral trace edema of the feet.  Musculoskeletal: Normal range of motion.       Lab Review:                 Lab Results   Component Value Date    HGBA1C 6.16 (H) 11/04/2019     Lab Results   Component Value Date    CHOL 149 05/17/2021    CHOL 140 11/10/2020    CHOL 140 05/13/2020     Lab Results   Component Value Date    TRIG 134 05/17/2021    TRIG 105 11/10/2020    TRIG 113 05/13/2020     Lab Results   Component Value Date    HDL 33 (L) 05/17/2021    HDL 32 (L) 11/10/2020    HDL 33 (L) 05/13/2020     Lab Results   Component Value Date    LDL 92 05/17/2021    LDL 88 11/10/2020    LDL 84 05/13/2020 "           Assessment:      Diagnosis Plan   1. Chest discomfort   Given his risk factor profile I am concerned that this may be an anginal equivalent.  Unfortunately due to his history of progressive claustrophobia he declines myocardial perfusion study.  He does not think he can walk on a treadmill for a GXT and further declines a stress echocardiogram.  In the absence of pat angina or MI I would not consider cardiac catheterization due to his kidney disease.  Increasing his beta-blockade is not an option due to resting heart rate in the 50s and he is intolerant to amlodipine.  After discussing these issues with the patient he would prefer to take a wait and see approach.  We will reevaluate him in 3 months.   2. Nonrheumatic aortic valve stenosis   stable, last echocardiogram in January of this year and actually showing lower mean gradient in the previous year.  No heart failure symptoms   3. Thoracic ascending aortic aneurysm (CMS/HCC)   monitored by [facility] and stable   4. Essential hypertension   well managed, continue current medical regimen   5. Hyperlipidemia, unspecified hyperlipidemia type  atorvastatin (LIPITOR) 20 MG tablet.  Given his current risk profile I would prefer to see his HDL LDL ratio more favorable.  I am increasing his atorvastatin to 20 mg daily.  He should have a repeat lipid panel in 3 months.   6. Prediabetes   I have counseled him on dietary changes and weight loss.  I would like to see him lose 20 pounds.  This will need to be rechecked in about 3 months and if not significantly improved consider initiating treatment.     Plan:     Stable cardiac status.  Continue current medications.   in 6 months, sooner as needed.  Thank you for allowing us to participate in the care of your patient.     Electronically signed by ELZA Solis, 08/20/21, 12:11 PM EDT.      Please note that portions of this note may have been completed with a voice recognition program. Efforts  were made to edit the dictations, but occasionally words are mis-translated.

## 2021-08-20 ENCOUNTER — OFFICE VISIT (OUTPATIENT)
Dept: CARDIOLOGY | Facility: CLINIC | Age: 76
End: 2021-08-20

## 2021-08-20 VITALS
HEIGHT: 70 IN | HEART RATE: 52 BPM | DIASTOLIC BLOOD PRESSURE: 88 MMHG | BODY MASS INDEX: 34.65 KG/M2 | OXYGEN SATURATION: 95 % | WEIGHT: 242 LBS | SYSTOLIC BLOOD PRESSURE: 138 MMHG

## 2021-08-20 DIAGNOSIS — R73.03 PREDIABETES: ICD-10-CM

## 2021-08-20 DIAGNOSIS — I10 ESSENTIAL HYPERTENSION: ICD-10-CM

## 2021-08-20 DIAGNOSIS — R07.89 CHEST DISCOMFORT: Primary | ICD-10-CM

## 2021-08-20 DIAGNOSIS — I71.21 THORACIC ASCENDING AORTIC ANEURYSM (HCC): ICD-10-CM

## 2021-08-20 DIAGNOSIS — E78.5 HYPERLIPIDEMIA, UNSPECIFIED HYPERLIPIDEMIA TYPE: ICD-10-CM

## 2021-08-20 DIAGNOSIS — I35.0 NONRHEUMATIC AORTIC VALVE STENOSIS: ICD-10-CM

## 2021-08-20 PROBLEM — N18.31 STAGE 3A CHRONIC KIDNEY DISEASE (HCC): Status: ACTIVE | Noted: 2021-08-20

## 2021-08-20 PROCEDURE — 99214 OFFICE O/P EST MOD 30 MIN: CPT | Performed by: PHYSICIAN ASSISTANT

## 2021-08-20 RX ORDER — ATORVASTATIN CALCIUM 20 MG/1
20 TABLET, FILM COATED ORAL NIGHTLY
Qty: 90 TABLET | Refills: 3 | Status: SHIPPED | OUTPATIENT
Start: 2021-08-20 | End: 2021-08-23 | Stop reason: SDUPTHER

## 2021-08-23 ENCOUNTER — PATIENT MESSAGE (OUTPATIENT)
Dept: CARDIOLOGY | Facility: CLINIC | Age: 76
End: 2021-08-23

## 2021-08-23 DIAGNOSIS — E78.5 HYPERLIPIDEMIA, UNSPECIFIED HYPERLIPIDEMIA TYPE: ICD-10-CM

## 2021-08-23 RX ORDER — ATORVASTATIN CALCIUM 20 MG/1
20 TABLET, FILM COATED ORAL NIGHTLY
Qty: 90 TABLET | Refills: 3 | Status: SHIPPED | OUTPATIENT
Start: 2021-08-23 | End: 2022-08-25

## 2021-09-30 ENCOUNTER — FLU SHOT (OUTPATIENT)
Dept: INTERNAL MEDICINE | Facility: CLINIC | Age: 76
End: 2021-09-30

## 2021-09-30 DIAGNOSIS — Z23 NEED FOR INFLUENZA VACCINATION: Primary | ICD-10-CM

## 2021-09-30 PROCEDURE — G0008 ADMIN INFLUENZA VIRUS VAC: HCPCS | Performed by: NURSE PRACTITIONER

## 2021-09-30 PROCEDURE — 90662 IIV NO PRSV INCREASED AG IM: CPT | Performed by: NURSE PRACTITIONER

## 2021-10-25 ENCOUNTER — TELEPHONE (OUTPATIENT)
Dept: INTERNAL MEDICINE | Facility: CLINIC | Age: 76
End: 2021-10-25

## 2021-10-25 DIAGNOSIS — K86.2 PANCREATIC CYST: ICD-10-CM

## 2021-10-25 DIAGNOSIS — I35.0 NONRHEUMATIC AORTIC VALVE STENOSIS: ICD-10-CM

## 2021-10-25 DIAGNOSIS — E55.9 VITAMIN D DEFICIENCY: ICD-10-CM

## 2021-10-25 DIAGNOSIS — C64.9 MALIGNANT NEOPLASM OF KIDNEY EXCLUDING RENAL PELVIS, UNSPECIFIED LATERALITY (HCC): ICD-10-CM

## 2021-10-25 DIAGNOSIS — N18.31 STAGE 3A CHRONIC KIDNEY DISEASE (HCC): ICD-10-CM

## 2021-10-25 DIAGNOSIS — E78.2 MIXED HYPERLIPIDEMIA: ICD-10-CM

## 2021-10-25 DIAGNOSIS — I10 ESSENTIAL HYPERTENSION: Primary | ICD-10-CM

## 2021-10-25 DIAGNOSIS — R73.03 PREDIABETES: ICD-10-CM

## 2021-10-25 DIAGNOSIS — K21.9 GASTROESOPHAGEAL REFLUX DISEASE, UNSPECIFIED WHETHER ESOPHAGITIS PRESENT: ICD-10-CM

## 2021-10-25 DIAGNOSIS — E03.9 ACQUIRED HYPOTHYROIDISM: ICD-10-CM

## 2021-10-25 NOTE — TELEPHONE ENCOUNTER
Caller: Ac Caldwell    Relationship: Self    Best call back number: [phone] (H    What orders are you requesting (i.e. lab or imaging): FASTING LAB ORDERS     In what timeframe would the patient need to come in: THIS WEEK     Where will you receive your lab/imaging services: WITH IN [facility]     Additional notes: PATIENT HAS A 6 MONTH F/U ON 11/04/21 AND WANTS TO COME IN THIS WEEK TO GET HIS LABS DONE     PLEASE CALL PATIENT WHEN THE LABS HAVE BEEN ORDERED

## 2021-10-27 ENCOUNTER — LAB (OUTPATIENT)
Dept: LAB | Facility: HOSPITAL | Age: 76
End: 2021-10-27

## 2021-10-27 DIAGNOSIS — C64.9 MALIGNANT NEOPLASM OF KIDNEY EXCLUDING RENAL PELVIS, UNSPECIFIED LATERALITY (HCC): ICD-10-CM

## 2021-10-27 DIAGNOSIS — R73.03 PREDIABETES: ICD-10-CM

## 2021-10-27 DIAGNOSIS — E78.2 MIXED HYPERLIPIDEMIA: ICD-10-CM

## 2021-10-27 DIAGNOSIS — I10 ESSENTIAL HYPERTENSION: ICD-10-CM

## 2021-10-27 DIAGNOSIS — E03.9 ACQUIRED HYPOTHYROIDISM: ICD-10-CM

## 2021-10-27 DIAGNOSIS — K21.9 GASTROESOPHAGEAL REFLUX DISEASE, UNSPECIFIED WHETHER ESOPHAGITIS PRESENT: ICD-10-CM

## 2021-10-27 DIAGNOSIS — I35.0 NONRHEUMATIC AORTIC VALVE STENOSIS: ICD-10-CM

## 2021-10-27 DIAGNOSIS — N18.31 STAGE 3A CHRONIC KIDNEY DISEASE (HCC): ICD-10-CM

## 2021-10-27 DIAGNOSIS — K86.2 PANCREATIC CYST: ICD-10-CM

## 2021-10-27 DIAGNOSIS — E55.9 VITAMIN D DEFICIENCY: ICD-10-CM

## 2021-10-27 LAB
25(OH)D3 SERPL-MCNC: 38.4 NG/ML
ALBUMIN SERPL-MCNC: 4.1 G/DL (ref 3.5–5.2)
ALBUMIN/GLOB SERPL: 1.5 G/DL
ALP SERPL-CCNC: 68 U/L (ref 39–117)
ALT SERPL W P-5'-P-CCNC: 13 U/L (ref 1–41)
ANION GAP SERPL CALCULATED.3IONS-SCNC: 7.1 MMOL/L (ref 5–15)
AST SERPL-CCNC: 17 U/L (ref 1–40)
BASOPHILS # BLD AUTO: 0.04 10*3/MM3 (ref 0–0.2)
BASOPHILS NFR BLD AUTO: 0.7 % (ref 0–1.5)
BILIRUB SERPL-MCNC: 0.7 MG/DL (ref 0–1.2)
BUN SERPL-MCNC: 15 MG/DL (ref 8–23)
BUN/CREAT SERPL: 12.5 (ref 7–25)
CALCIUM SPEC-SCNC: 9 MG/DL (ref 8.6–10.5)
CHLORIDE SERPL-SCNC: 105 MMOL/L (ref 98–107)
CHOLEST SERPL-MCNC: 125 MG/DL (ref 0–200)
CO2 SERPL-SCNC: 25.9 MMOL/L (ref 22–29)
CREAT SERPL-MCNC: 1.2 MG/DL (ref 0.76–1.27)
DEPRECATED RDW RBC AUTO: 40.4 FL (ref 37–54)
EOSINOPHIL # BLD AUTO: 0.27 10*3/MM3 (ref 0–0.4)
EOSINOPHIL NFR BLD AUTO: 4.9 % (ref 0.3–6.2)
ERYTHROCYTE [DISTWIDTH] IN BLOOD BY AUTOMATED COUNT: 12.2 % (ref 12.3–15.4)
GFR SERPL CREATININE-BSD FRML MDRD: 59 ML/MIN/1.73
GLOBULIN UR ELPH-MCNC: 2.7 GM/DL
GLUCOSE SERPL-MCNC: 105 MG/DL (ref 65–99)
HBA1C MFR BLD: 5.84 % (ref 4.8–5.6)
HCT VFR BLD AUTO: 43.7 % (ref 37.5–51)
HDLC SERPL-MCNC: 33 MG/DL (ref 40–60)
HGB BLD-MCNC: 15 G/DL (ref 13–17.7)
IMM GRANULOCYTES # BLD AUTO: 0.02 10*3/MM3 (ref 0–0.05)
IMM GRANULOCYTES NFR BLD AUTO: 0.4 % (ref 0–0.5)
LDLC SERPL CALC-MCNC: 74 MG/DL (ref 0–100)
LDLC/HDLC SERPL: 2.22 {RATIO}
LYMPHOCYTES # BLD AUTO: 1.52 10*3/MM3 (ref 0.7–3.1)
LYMPHOCYTES NFR BLD AUTO: 27.4 % (ref 19.6–45.3)
MCH RBC QN AUTO: 31 PG (ref 26.6–33)
MCHC RBC AUTO-ENTMCNC: 34.3 G/DL (ref 31.5–35.7)
MCV RBC AUTO: 90.3 FL (ref 79–97)
MONOCYTES # BLD AUTO: 0.58 10*3/MM3 (ref 0.1–0.9)
MONOCYTES NFR BLD AUTO: 10.5 % (ref 5–12)
NEUTROPHILS NFR BLD AUTO: 3.11 10*3/MM3 (ref 1.7–7)
NEUTROPHILS NFR BLD AUTO: 56.1 % (ref 42.7–76)
NRBC BLD AUTO-RTO: 0 /100 WBC (ref 0–0.2)
PLATELET # BLD AUTO: 163 10*3/MM3 (ref 140–450)
PMV BLD AUTO: 10.7 FL (ref 6–12)
POTASSIUM SERPL-SCNC: 4.7 MMOL/L (ref 3.5–5.2)
PROT SERPL-MCNC: 6.8 G/DL (ref 6–8.5)
RBC # BLD AUTO: 4.84 10*6/MM3 (ref 4.14–5.8)
SODIUM SERPL-SCNC: 138 MMOL/L (ref 136–145)
TRIGL SERPL-MCNC: 94 MG/DL (ref 0–150)
TSH SERPL DL<=0.05 MIU/L-ACNC: 1.88 UIU/ML (ref 0.27–4.2)
VIT B12 BLD-MCNC: 1556 PG/ML (ref 211–946)
VLDLC SERPL-MCNC: 18 MG/DL (ref 5–40)
WBC # BLD AUTO: 5.54 10*3/MM3 (ref 3.4–10.8)

## 2021-10-27 PROCEDURE — 84443 ASSAY THYROID STIM HORMONE: CPT | Performed by: NURSE PRACTITIONER

## 2021-10-27 PROCEDURE — 82306 VITAMIN D 25 HYDROXY: CPT | Performed by: NURSE PRACTITIONER

## 2021-10-27 PROCEDURE — 80053 COMPREHEN METABOLIC PANEL: CPT | Performed by: NURSE PRACTITIONER

## 2021-10-27 PROCEDURE — 85025 COMPLETE CBC W/AUTO DIFF WBC: CPT | Performed by: NURSE PRACTITIONER

## 2021-10-27 PROCEDURE — 82607 VITAMIN B-12: CPT | Performed by: NURSE PRACTITIONER

## 2021-10-27 PROCEDURE — 80061 LIPID PANEL: CPT | Performed by: NURSE PRACTITIONER

## 2021-10-27 PROCEDURE — 83036 HEMOGLOBIN GLYCOSYLATED A1C: CPT | Performed by: NURSE PRACTITIONER

## 2021-11-04 ENCOUNTER — OFFICE VISIT (OUTPATIENT)
Dept: INTERNAL MEDICINE | Facility: CLINIC | Age: 76
End: 2021-11-04

## 2021-11-04 VITALS
TEMPERATURE: 97.1 F | RESPIRATION RATE: 16 BRPM | SYSTOLIC BLOOD PRESSURE: 114 MMHG | HEART RATE: 51 BPM | OXYGEN SATURATION: 97 % | DIASTOLIC BLOOD PRESSURE: 80 MMHG | HEIGHT: 70 IN | BODY MASS INDEX: 32.84 KG/M2 | WEIGHT: 229.4 LBS

## 2021-11-04 DIAGNOSIS — E03.9 ACQUIRED HYPOTHYROIDISM: ICD-10-CM

## 2021-11-04 DIAGNOSIS — I71.21 THORACIC ASCENDING AORTIC ANEURYSM (HCC): ICD-10-CM

## 2021-11-04 DIAGNOSIS — C64.9 MALIGNANT NEOPLASM OF KIDNEY EXCLUDING RENAL PELVIS, UNSPECIFIED LATERALITY (HCC): ICD-10-CM

## 2021-11-04 DIAGNOSIS — Z79.899 ENCOUNTER FOR LONG-TERM (CURRENT) USE OF OTHER MEDICATIONS: ICD-10-CM

## 2021-11-04 DIAGNOSIS — E78.2 MIXED HYPERLIPIDEMIA: ICD-10-CM

## 2021-11-04 DIAGNOSIS — I10 ESSENTIAL HYPERTENSION: Primary | ICD-10-CM

## 2021-11-04 DIAGNOSIS — K86.2 PANCREATIC CYST: ICD-10-CM

## 2021-11-04 DIAGNOSIS — R73.03 PREDIABETES: ICD-10-CM

## 2021-11-04 DIAGNOSIS — N18.31 STAGE 3A CHRONIC KIDNEY DISEASE (HCC): ICD-10-CM

## 2021-11-04 DIAGNOSIS — I35.0 NONRHEUMATIC AORTIC VALVE STENOSIS: ICD-10-CM

## 2021-11-04 PROCEDURE — 99214 OFFICE O/P EST MOD 30 MIN: CPT | Performed by: NURSE PRACTITIONER

## 2021-11-04 RX ORDER — POTASSIUM CITRATE 10 MEQ/1
10 TABLET, EXTENDED RELEASE ORAL DAILY
Qty: 90 EACH | Refills: 1 | Status: SHIPPED | OUTPATIENT
Start: 2021-11-04 | End: 2022-05-02

## 2021-11-04 NOTE — PROGRESS NOTES
Subjective   Ac Caldwell is a 76 y.o. male    Chief Complaint   Patient presents with   • Hypertension     f/u, labs done last week,   • Fatigue     feels like he can't go through the day without a nap   • Hyperlipidemia   • Night Sweats     going on for about a year, on and off   • Hip Pain     rt hip     History of Present Illness     On 1/26/15, Ac saw gross hematuria, saw Dr. Ayala (urology) and he did a scope. It was ok. Then had a CT scan on 2/3/15. Cr was high, so they did it without contrast. CT scan showed a lesion, but he was not concerned. Then had a fish bladder test on 2/5/15. His urologist never got the results. Did go back to see Nephrology and had labs in Feb. Cr. was 1.1 and BUN was 16. Everything has been stable since. Has a h/o right kidney CA in 1997 and had nephrectomy.  Urologist - Dr. Jossue Serrano left and moved to [place] and he had to establish with Dr Farrell at [facility] [place].  He saw him several times for recurrent UTI, hydrocele, renal cyst, and epididymoorchits. He saw them again 5/31/2017 for repeat renal US that showed a 3.4cm left renal cyst, which had grown from 2.5cm on last scan.  Dr. Farrell moved to [place] and he was then referred to  Urology.  Had CXR and labs.  CXR revealed a possible thoracic aortic aneurysm.  Had f/u chest CT scan, no aneurysm.  CT renal mass protocol was done on 8/30/2017 which confirmed a solid enhancing mass in the lower pole of the left kidney, without lymphadenopathy.  It was elected to proceed with a cryoablation of this lesion per Dr. Mark at .  He underwent this procedure on 9/27/17.  Pathology is unclear as to it being malignant vs benign.  Will be re-scanned in 30 days, then 90 days, then Q6 months.       CT in Aug 2017 revealed an incidental pancreatic lesion on the head of the pancreas that was 12 mm in size; f/u imaging done 9/2018 showed that the mass was stable and unchanged in size.  Had a biopsy of the pancreatic cyst in 2019 and it was  benign.  They will monitor Q12 months with MRI.  Ac prefers a CT over an MRI.  The CT done on 10/1/2020 of abd and pelvis shows a simple cystic structure near the head of the pancreas measuring 1.6 cm.  The pancreatic cyst appears to be slightly larger on this scan than previous (from 12 mm to 16 mm) - this is followed by Dr. Norwood at .  We faxed a copy to his office and he underwent an EGD and EUS on 3/2/2021.  The pathology results were benign but he suggested another followup  EUS/FNA in one year, in the March/April 2022 time frame    Underwent a STEAM procedure in 2/2019 for BPH @       [facility]rolog[facility]; his Creatinine is stable from 1.42 to 1.44. Cr went up to 1.59 in March after the EUS.  He did f/u with nephrology and has f/u again next month.       Had a very large thyroid nodule removed on 4/23/14 per ENT. It was 8cm in size; they took the right side of his thyroid and placed on synthroid 50mcg. It was benign! Seeing Dr. Everett annually and everything has been stable.     AS - now followed by Cardiology, Dr. Valladares.  He will follow every 6 months and echo q 2 yrs.        HTN - well controlled with Losartan 100 mg and Carvedilol 12.5 mg BID (recent per Cards); BP is well controlled and he denies SE.      HL - stable on Lipitor 20mg daily without SE.  Dr. Valladares increased this a few months ago.  LDL looks great.  Lab Results   Component Value Date    CHOL 125 10/27/2021    TRIG 94 10/27/2021    HDL 33 (L) 10/27/2021    LDL 74 10/27/2021     COVID - 3/12/21 and 4/2/2021, 10/14/2021  Flu shot, 9/30/2021  Tdap 2009   Pneumovax 2012   Prevnar - 4/2015   Zostavax 2010  Shingrix - UTD  PSA per Urology - WNL, no need to repeat  Colon - 4/2015, due 2025     The following portions of the patient's history were reviewed and updated as appropriate: allergies, current medications, past family history, past medical history, past social history, past surgical history and problem list.    Current  Outpatient Medications:   •  aspirin 325 MG tablet, Take 325 mg by mouth Daily., Disp: , Rfl:   •  atorvastatin (LIPITOR) 20 MG tablet, Take 1 tablet by mouth Every Night., Disp: 90 tablet, Rfl: 3  •  azelastine (ASTELIN) 0.1 % nasal spray, 1 spray into the nostril(s) as directed by provider 2 (Two) Times a Day As Needed. Use in each nostril as directed , Disp: , Rfl:   •  betamethasone valerate (VALISONE) 0.1 % ointment, Apply 1 application topically to the appropriate area as directed As Needed., Disp: , Rfl:   •  carvedilol (COREG) 12.5 MG tablet, TAKE ONE TABLET BY MOUTH TWICE A DAY, Disp: 180 tablet, Rfl: 2  •  Cholecalciferol (VITAMIN D-3 PO), Take 1,000 Units by mouth Daily., Disp: , Rfl:   •  Coenzyme Q10 (CO Q-10) 100 MG capsule, Take  by mouth Daily., Disp: , Rfl:   •  esomeprazole (NexIUM) 20 MG capsule, Take 20 mg by mouth Daily., Disp: , Rfl:   •  famciclovir (FAMVIR) 500 MG tablet, TAKE ONE TABLET BY MOUTH THREE TIMES A DAY (Patient taking differently: Take 500 mg by mouth As Needed.), Disp: 12 tablet, Rfl: 3  •  Flaxseed, Linseed, (FLAX SEED OIL) 1000 MG capsule, Take 1,200 mg by mouth Daily., Disp: , Rfl:   •  fluticasone (FLONASE) 50 MCG/ACT nasal spray, 2 sprays into each nostril Daily. (Patient taking differently: 2 sprays into the nostril(s) as directed by provider Daily As Needed.), Disp: 3 each, Rfl: 2  •  levothyroxine (SYNTHROID, LEVOTHROID) 50 MCG tablet, Take 50 mcg by mouth Daily., Disp: , Rfl:   •  losartan (COZAAR) 100 MG tablet, Take 100 mg by mouth Daily., Disp: , Rfl:   •  Omega-3 Fatty Acids (FISH OIL) 1000 MG capsule capsule, Take 1,200 mg by mouth 2 (Two) Times a Day With Meals., Disp: , Rfl:   •  potassium citrate (UROCIT-K) 10 MEQ (1080 MG) CR tablet, Take 1 tablet by mouth Daily., Disp: 90 each, Rfl: 1  •  vitamin B-12 (CYANOCOBALAMIN) 1000 MCG tablet, Take 1,000 mcg by mouth Daily., Disp: , Rfl:      Review of Systems   Constitutional: Negative for chills, fatigue and fever.  "  Respiratory: Negative for cough, chest tightness and shortness of breath.    Cardiovascular: Negative for chest pain.   Gastrointestinal: Negative for abdominal pain, diarrhea, nausea and vomiting.   Endocrine: Negative for cold intolerance and heat intolerance.   Musculoskeletal: Negative for arthralgias.   Neurological: Negative for dizziness.       Objective   Physical Exam  Constitutional:       Appearance: He is well-developed.   HENT:      Head: Normocephalic and atraumatic.   Eyes:      Conjunctiva/sclera: Conjunctivae normal.      Pupils: Pupils are equal, round, and reactive to light.   Cardiovascular:      Rate and Rhythm: Normal rate and regular rhythm.      Heart sounds: Normal heart sounds.   Pulmonary:      Effort: Pulmonary effort is normal.      Breath sounds: Normal breath sounds.   Abdominal:      General: Bowel sounds are normal.      Palpations: Abdomen is soft.   Musculoskeletal:         General: Normal range of motion.      Cervical back: Normal range of motion.   Skin:     General: Skin is warm and dry.   Neurological:      Mental Status: He is alert and oriented to person, place, and time.      Deep Tendon Reflexes: Reflexes are normal and symmetric.   Psychiatric:         Behavior: Behavior normal.         Thought Content: Thought content normal.         Judgment: Judgment normal.       Vitals:    11/04/21 1056   BP: 114/80   Cuff Size: Large Adult   Pulse: 51   Resp: 16   Temp: 97.1 °F (36.2 °C)   TempSrc: Infrared   SpO2: 97%   Weight: 104 kg (229 lb 6.4 oz)   Height: 177.8 cm (70\")         Assessment/Plan   Diagnoses and all orders for this visit:    1. Essential hypertension (Primary)  -     potassium citrate (UROCIT-K) 10 MEQ (1080 MG) CR tablet; Take 1 tablet by mouth Daily.  Dispense: 90 each; Refill: 1    2. Mixed hyperlipidemia    3. Acquired hypothyroidism    4. Nonrheumatic aortic valve stenosis    5. Thoracic ascending aortic aneurysm (HCC)    6. Prediabetes    7. Pancreatic " cyst    8. Stage 3a chronic kidney disease (HCC)    9. Malignant neoplasm of kidney excluding renal pelvis, unspecified laterality (HCC)    10. Encounter for long-term (current) use of other medications  -     potassium citrate (UROCIT-K) 10 MEQ (1080 MG) CR tablet; Take 1 tablet by mouth Daily.  Dispense: 90 each; Refill: 1      Labs reviewed, no med changes  A1C looks great  LDL has improved  Return in about 6 months (around 5/4/2022) for Medicare Wellness, collect labs today.

## 2021-12-03 ENCOUNTER — OFFICE VISIT (OUTPATIENT)
Dept: CARDIOLOGY | Facility: CLINIC | Age: 76
End: 2021-12-03

## 2021-12-03 VITALS
OXYGEN SATURATION: 97 % | HEIGHT: 70 IN | BODY MASS INDEX: 33.21 KG/M2 | DIASTOLIC BLOOD PRESSURE: 76 MMHG | HEART RATE: 57 BPM | SYSTOLIC BLOOD PRESSURE: 138 MMHG | WEIGHT: 232 LBS

## 2021-12-03 DIAGNOSIS — I35.0 NONRHEUMATIC AORTIC VALVE STENOSIS: Primary | ICD-10-CM

## 2021-12-03 DIAGNOSIS — I71.21 THORACIC ASCENDING AORTIC ANEURYSM (HCC): ICD-10-CM

## 2021-12-03 DIAGNOSIS — E78.5 HYPERLIPIDEMIA, UNSPECIFIED HYPERLIPIDEMIA TYPE: ICD-10-CM

## 2021-12-03 DIAGNOSIS — I10 ESSENTIAL HYPERTENSION: ICD-10-CM

## 2021-12-03 PROCEDURE — 99214 OFFICE O/P EST MOD 30 MIN: CPT | Performed by: INTERNAL MEDICINE

## 2021-12-03 NOTE — PROGRESS NOTES
[facility] Cardiology    Encounter Date: 2021    Patient ID: Ac Caldwell is a 76 y.o. male.  : 1945     PCP: Valentine Newton APRN       Chief Complaint: Aortic Stenosis      PROBLEM LIST:  1. Nonrheumatic aortic valve stenosis:   a. Echocardiogram, 2016: Mild AI and moderate AS, HEATHER 1.2 cm2, mean gradient of 16.6, and peak gradient of 33.3. Trace TR with RVSP of 26.2. Normal EF.  b. Nuclear stress, 2016: EF 72%. No evidence of ischemia.  c. CTA, 2017: Mild ectasia of ascending aorta measuring 4.2cm significant aortic leaflet calcification, coronary sclerosis, no aneurysm of descending aorta.  d. Echocardiogram, 2017: Mild AI/AS with HEATHER 1.8 cm². MPG 17.0. Normal EF 64%. Mild LVH.   e. Echocardiogram, 2019: Mild AS, MPG 20 mmHg, HEATHER 1.5 cm2. Trace AI. Mild MR/PI, trace TR. EF 60% with mild concentric LVH. Grade I a diastolic dysfunction.   f. Echocardiogram, 2020: EF 55%. Moderate aortic stenosis, mean gradient 32.8 mmHg, HEATHER 1.1 cm². Mild aortic insufficiency. Mild MR and TR with RVSP 35 mmHg. Mild pulmonic insufficiency.  g. Echocardiogram (), 2021: EF>55%. Moderate AS. Mean gradient across the aortic valve 29 mmHg.  2. Thoracic aortic aneurysm without rupture  a. CTA, 2017: Mild ectasia of ascending aorta measuring 4.2cm significant aortic leaflet calcification, coronary sclerosis, no aneurysm of descending aorta.  b. Echocardiogram at , 2021: EF>55%. Moderate valvular aortic stenosis. Mean gradient across the aortic valve 29 mmHg. Small ascending aortic aneurysm  3. Exertional dyspnea/anginal equivalent symptoms with occasional orthopnea.   a. Stress echo, 2019: Normal exercise capacity, THR reached at 6 min. No evidence of ischemia. EF 65% at rest, 75% with stress.  4. Hypertension.   5. Dyslipidemia.   6. Prediabetes   7. Enlarged prostate.  8. History of renal cancer:   a. Right nephrectomy in  "1997.   b. S/p Resection of a tumor from his left kidney -- incomplete database.   c. S/p CT ablation of L renal mass.  9. 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   10. S/p basal cell cancer surgery.   11. Episode of hematuria in January with subsequent negative cystoscopy.  12. Surgical Hx:  a. CT Ablation of Left renal mass  b. Endoscopic US at  for benign cyst, 09/2019.    History of Present Illness  Patient presents today for a 3 month follow-up with a history of chest discomfort, aortic stenosis, thoracic ascending aortic aneurysm, and cardiac risk factors. Since last visit, the patient has been doing well overall from a cardiovascular standpoint. He monitors his blood pressure at home which remains well controlled in 100-120 mmHg range. He naps more frequently as he gets older. It was recommended he have a chest XR of lung and MRCP next year. Patient denies chest pain, shortness of breath, palpitations, edema, dizziness, and syncope.       Allergies   Allergen Reactions   • Norvasc [Amlodipine] Swelling   • Oxycodone      Can elevate Blood pressure   • Lotensin [Benazepril] Cough   • Sulfamethoxazole-Trimethoprim Unknown (See Comments)     \"   • Zocor [Simvastatin] Myalgia         Current Outpatient Medications:   •  aspirin 325 MG tablet, Take 325 mg by mouth Daily., Disp: , Rfl:   •  atorvastatin (LIPITOR) 20 MG tablet, Take 1 tablet by mouth Every Night., Disp: 90 tablet, Rfl: 3  •  azelastine (ASTELIN) 0.1 % nasal spray, 1 spray into the nostril(s) as directed by provider 2 (Two) Times a Day As Needed. Use in each nostril as directed , Disp: , Rfl:   •  betamethasone valerate (VALISONE) 0.1 % ointment, Apply 1 application topically to the appropriate area as directed As Needed., Disp: , Rfl:   •  carvedilol (COREG) 12.5 MG tablet, TAKE ONE TABLET BY MOUTH TWICE A DAY, Disp: 180 tablet, Rfl: 2  •  Cholecalciferol (VITAMIN D-3 PO), Take 1,000 Units by mouth Daily., Disp: , Rfl:   •  Coenzyme Q10 " (CO Q-10) 100 MG capsule, Take  by mouth Daily., Disp: , Rfl:   •  esomeprazole (NexIUM) 20 MG capsule, Take 20 mg by mouth Daily., Disp: , Rfl:   •  famciclovir (FAMVIR) 500 MG tablet, TAKE ONE TABLET BY MOUTH THREE TIMES A DAY (Patient taking differently: Take 500 mg by mouth As Needed.), Disp: 12 tablet, Rfl: 3  •  Flaxseed, Linseed, (FLAX SEED OIL) 1000 MG capsule, Take 1,200 mg by mouth Daily., Disp: , Rfl:   •  fluticasone (FLONASE) 50 MCG/ACT nasal spray, 2 sprays into each nostril Daily. (Patient taking differently: 2 sprays into the nostril(s) as directed by provider Daily As Needed.), Disp: 3 each, Rfl: 2  •  levothyroxine (SYNTHROID, LEVOTHROID) 50 MCG tablet, Take 50 mcg by mouth Daily., Disp: , Rfl:   •  losartan (COZAAR) 100 MG tablet, Take 100 mg by mouth Daily., Disp: , Rfl:   •  Omega-3 Fatty Acids (FISH OIL) 1000 MG capsule capsule, Take 1,200 mg by mouth 2 (Two) Times a Day With Meals., Disp: , Rfl:   •  potassium citrate (UROCIT-K) 10 MEQ (1080 MG) CR tablet, Take 1 tablet by mouth Daily., Disp: 90 each, Rfl: 1  •  vitamin B-12 (CYANOCOBALAMIN) 1000 MCG tablet, Take 1,000 mcg by mouth Daily., Disp: , Rfl:     The following portions of the patient's history were reviewed and updated as appropriate: allergies, current medications, past family history, past medical history, past social history, past surgical history and problem list.    ROS  Review of Systems   Constitution: Negative for chills, fever, fatigue, generalized weakness.   Cardiovascular: Negative for chest pain, dyspnea on exertion, leg swelling, palpitations, orthopnea, and syncope.   Respiratory: Negative for cough, shortness of breath, and wheezing.  HENT: Negative for ear pain, nosebleeds, and tinnitus.  Gastrointestinal: Negative for abdominal pain, constipation, diarrhea, nausea and vomiting.   Genitourinary: No urinary symptoms.  Musculoskeletal: Negative for muscle cramps.  Neurological: Negative for dizziness, headaches, loss  "of balance, numbness, and symptoms of stroke.  Psychiatric: Normal mental status.     All other systems reviewed and are negative.        Objective:     /76 (BP Location: Right arm, Patient Position: Sitting)   Pulse 57   Ht 177.8 cm (70\")   Wt 105 kg (232 lb)   SpO2 97%   BMI 33.29 kg/m²      Physical Exam  Constitutional: Patient appears well-developed and well-nourished.   HENT: HEENT exam unremarkable.   Neck: Neck supple. No JVD present. No carotid bruits.   Cardiovascular: Normal rate, regular rhythm and normal heart sounds. No murmur heard.   2+ symmetric pulses.   Pulmonary/Chest: Breath sounds normal. Does not exhibit tenderness.   Abdominal: Abdomen benign.   Musculoskeletal: Does not exhibit edema.   Neurological: Neurological exam unremarkable.   Vitals reviewed.    Data Review:   Lab Results   Component Value Date    GLUCOSE 105 (H) 10/27/2021    BUN 15 10/27/2021    CREATININE 1.20 10/27/2021    EGFRIFNONA 59 (L) 10/27/2021    BCR 12.5 10/27/2021    K 4.7 10/27/2021    CO2 25.9 10/27/2021    CALCIUM 9.0 10/27/2021    ALBUMIN 4.10 10/27/2021    AST 17 10/27/2021    ALT 13 10/27/2021     Lab Results   Component Value Date    CHOL 125 10/27/2021    TRIG 94 10/27/2021    HDL 33 (L) 10/27/2021    LDL 74 10/27/2021      Lab Results   Component Value Date    WBC 5.54 10/27/2021    RBC 4.84 10/27/2021    HGB 15.0 10/27/2021    HCT 43.7 10/27/2021    MCV 90.3 10/27/2021     10/27/2021     Lab Results   Component Value Date    TSH 1.880 10/27/2021     Lab Results   Component Value Date    HGBA1C 5.84 (H) 10/27/2021        Procedures       Assessment:      Diagnosis Plan   1. Nonrheumatic aortic valve stenosis  Last echo in 1/2021 showed moderate AS and will consider repeat echo before next visit.    2. Thoracic ascending aortic aneurysm (HCC)  stable aneurysm per [facility] CTA (1/2021)   3. Essential hypertension  Well controlled; continue carvedilol 12.5 mg and losartan 100 mg   4. Hyperlipidemia, " unspecified hyperlipidemia type  Well controlled; continue atorvastatin 20 mg      Plan:   Stable cardiac status.  No current angina CHF or dizziness type symptoms.  We will consider repeating echocardiogram after next visit to re-evaluate aortic stenosis.   Continue current medications.   Continue diet and exercise.  FU in 6 MO, sooner as needed.  Thank you for allowing us to participate in the care of your patient.     Scribed for Migel Valladares MD by Divya Mello. 12/3/2021 10:52 EST        I, Migel Valladares MD, personally performed the services described in this documentation as scribed by the above named individual in my presence, and it is both accurate and complete.  12/3/2021  17:28 EST        Part of this note may be an electronic transcription/translation of spoken language to printed text using the Dragon Dictation System.

## 2022-04-30 DIAGNOSIS — I10 ESSENTIAL HYPERTENSION: ICD-10-CM

## 2022-04-30 DIAGNOSIS — Z79.899 ENCOUNTER FOR LONG-TERM (CURRENT) USE OF OTHER MEDICATIONS: ICD-10-CM

## 2022-05-02 RX ORDER — CARVEDILOL 12.5 MG/1
TABLET ORAL
Qty: 180 TABLET | Refills: 2 | Status: SHIPPED | OUTPATIENT
Start: 2022-05-02 | End: 2023-01-18 | Stop reason: SDUPTHER

## 2022-05-02 RX ORDER — POTASSIUM CITRATE 10 MEQ/1
TABLET, EXTENDED RELEASE ORAL
Qty: 90 TABLET | Refills: 0 | Status: SHIPPED | OUTPATIENT
Start: 2022-05-02 | End: 2022-07-28

## 2022-05-02 NOTE — TELEPHONE ENCOUNTER
Rx Refill Note  Requested Prescriptions     Pending Prescriptions Disp Refills   • potassium citrate (UROCIT-K) 10 MEQ (1080 MG) CR tablet [Pharmacy Med Name: POTASSIUM CITRATE ER 10 MEQ TB] 90 tablet      Sig: TAKE ONE TABLET BY MOUTH DAILY      Last filled: 11/04/2021  Last office visit with prescribing clinician: 11/4/2021      Next office visit with prescribing clinician: 6/3/2022     April CALOS Maki MA  05/02/22, 11:24 EDT

## 2022-05-19 ENCOUNTER — TELEPHONE (OUTPATIENT)
Dept: INTERNAL MEDICINE | Facility: CLINIC | Age: 77
End: 2022-05-19

## 2022-05-19 DIAGNOSIS — E78.2 MIXED HYPERLIPIDEMIA: ICD-10-CM

## 2022-05-19 DIAGNOSIS — N18.31 STAGE 3A CHRONIC KIDNEY DISEASE: ICD-10-CM

## 2022-05-19 DIAGNOSIS — Z00.00 MEDICARE ANNUAL WELLNESS VISIT, SUBSEQUENT: Primary | ICD-10-CM

## 2022-05-19 DIAGNOSIS — I10 ESSENTIAL HYPERTENSION: ICD-10-CM

## 2022-05-19 DIAGNOSIS — R73.03 PREDIABETES: ICD-10-CM

## 2022-05-19 DIAGNOSIS — C64.9 MALIGNANT NEOPLASM OF KIDNEY EXCLUDING RENAL PELVIS, UNSPECIFIED LATERALITY: ICD-10-CM

## 2022-05-19 DIAGNOSIS — E03.9 ACQUIRED HYPOTHYROIDISM: ICD-10-CM

## 2022-05-19 DIAGNOSIS — K21.9 GASTROESOPHAGEAL REFLUX DISEASE, UNSPECIFIED WHETHER ESOPHAGITIS PRESENT: ICD-10-CM

## 2022-05-19 DIAGNOSIS — E55.9 VITAMIN D DEFICIENCY: ICD-10-CM

## 2022-05-19 DIAGNOSIS — K86.2 PANCREATIC CYST: ICD-10-CM

## 2022-05-19 DIAGNOSIS — I35.0 NONRHEUMATIC AORTIC VALVE STENOSIS: ICD-10-CM

## 2022-05-25 ENCOUNTER — LAB (OUTPATIENT)
Dept: LAB | Facility: HOSPITAL | Age: 77
End: 2022-05-25

## 2022-05-25 DIAGNOSIS — N18.31 STAGE 3A CHRONIC KIDNEY DISEASE: ICD-10-CM

## 2022-05-25 DIAGNOSIS — E55.9 VITAMIN D DEFICIENCY: ICD-10-CM

## 2022-05-25 DIAGNOSIS — I10 ESSENTIAL HYPERTENSION: ICD-10-CM

## 2022-05-25 DIAGNOSIS — E78.2 MIXED HYPERLIPIDEMIA: ICD-10-CM

## 2022-05-25 DIAGNOSIS — E03.9 ACQUIRED HYPOTHYROIDISM: ICD-10-CM

## 2022-05-25 DIAGNOSIS — C64.9 MALIGNANT NEOPLASM OF KIDNEY EXCLUDING RENAL PELVIS, UNSPECIFIED LATERALITY: ICD-10-CM

## 2022-05-25 DIAGNOSIS — Z00.00 MEDICARE ANNUAL WELLNESS VISIT, SUBSEQUENT: ICD-10-CM

## 2022-05-25 DIAGNOSIS — I35.0 NONRHEUMATIC AORTIC VALVE STENOSIS: ICD-10-CM

## 2022-05-25 DIAGNOSIS — R73.03 PREDIABETES: ICD-10-CM

## 2022-05-25 DIAGNOSIS — K21.9 GASTROESOPHAGEAL REFLUX DISEASE, UNSPECIFIED WHETHER ESOPHAGITIS PRESENT: ICD-10-CM

## 2022-05-25 DIAGNOSIS — K86.2 PANCREATIC CYST: ICD-10-CM

## 2022-05-25 LAB
25(OH)D3 SERPL-MCNC: 36.8 NG/ML (ref 30–100)
ALBUMIN SERPL-MCNC: 4.1 G/DL (ref 3.5–5.2)
ALBUMIN/GLOB SERPL: 1.7 G/DL
ALP SERPL-CCNC: 64 U/L (ref 39–117)
ALT SERPL W P-5'-P-CCNC: 20 U/L (ref 1–41)
ANION GAP SERPL CALCULATED.3IONS-SCNC: 9.4 MMOL/L (ref 5–15)
AST SERPL-CCNC: 17 U/L (ref 1–40)
BASOPHILS # BLD AUTO: 0.02 10*3/MM3 (ref 0–0.2)
BASOPHILS NFR BLD AUTO: 0.4 % (ref 0–1.5)
BILIRUB SERPL-MCNC: 1 MG/DL (ref 0–1.2)
BUN SERPL-MCNC: 16 MG/DL (ref 8–23)
BUN/CREAT SERPL: 12 (ref 7–25)
CALCIUM SPEC-SCNC: 9 MG/DL (ref 8.6–10.5)
CHLORIDE SERPL-SCNC: 106 MMOL/L (ref 98–107)
CHOLEST SERPL-MCNC: 123 MG/DL (ref 0–200)
CO2 SERPL-SCNC: 24.6 MMOL/L (ref 22–29)
CREAT SERPL-MCNC: 1.33 MG/DL (ref 0.76–1.27)
DEPRECATED RDW RBC AUTO: 40.2 FL (ref 37–54)
EGFRCR SERPLBLD CKD-EPI 2021: 56.1 ML/MIN/1.73
EOSINOPHIL # BLD AUTO: 0.26 10*3/MM3 (ref 0–0.4)
EOSINOPHIL NFR BLD AUTO: 5.3 % (ref 0.3–6.2)
ERYTHROCYTE [DISTWIDTH] IN BLOOD BY AUTOMATED COUNT: 12.4 % (ref 12.3–15.4)
GLOBULIN UR ELPH-MCNC: 2.4 GM/DL
GLUCOSE SERPL-MCNC: 95 MG/DL (ref 65–99)
HBA1C MFR BLD: 5.3 % (ref 4.8–5.6)
HCT VFR BLD AUTO: 42.8 % (ref 37.5–51)
HDLC SERPL-MCNC: 36 MG/DL (ref 40–60)
HGB BLD-MCNC: 15.2 G/DL (ref 13–17.7)
IMM GRANULOCYTES # BLD AUTO: 0.02 10*3/MM3 (ref 0–0.05)
IMM GRANULOCYTES NFR BLD AUTO: 0.4 % (ref 0–0.5)
LDLC SERPL CALC-MCNC: 68 MG/DL (ref 0–100)
LDLC/HDLC SERPL: 1.84 {RATIO}
LYMPHOCYTES # BLD AUTO: 1.4 10*3/MM3 (ref 0.7–3.1)
LYMPHOCYTES NFR BLD AUTO: 28.5 % (ref 19.6–45.3)
MCH RBC QN AUTO: 31.6 PG (ref 26.6–33)
MCHC RBC AUTO-ENTMCNC: 35.5 G/DL (ref 31.5–35.7)
MCV RBC AUTO: 89 FL (ref 79–97)
MONOCYTES # BLD AUTO: 0.53 10*3/MM3 (ref 0.1–0.9)
MONOCYTES NFR BLD AUTO: 10.8 % (ref 5–12)
NEUTROPHILS NFR BLD AUTO: 2.69 10*3/MM3 (ref 1.7–7)
NEUTROPHILS NFR BLD AUTO: 54.6 % (ref 42.7–76)
NRBC BLD AUTO-RTO: 0.2 /100 WBC (ref 0–0.2)
PLATELET # BLD AUTO: 154 10*3/MM3 (ref 140–450)
PMV BLD AUTO: 10.9 FL (ref 6–12)
POTASSIUM SERPL-SCNC: 4.4 MMOL/L (ref 3.5–5.2)
PROT SERPL-MCNC: 6.5 G/DL (ref 6–8.5)
RBC # BLD AUTO: 4.81 10*6/MM3 (ref 4.14–5.8)
SODIUM SERPL-SCNC: 140 MMOL/L (ref 136–145)
TRIGL SERPL-MCNC: 104 MG/DL (ref 0–150)
TSH SERPL DL<=0.05 MIU/L-ACNC: 2.4 UIU/ML (ref 0.27–4.2)
VIT B12 BLD-MCNC: 1501 PG/ML (ref 211–946)
VLDLC SERPL-MCNC: 19 MG/DL (ref 5–40)
WBC NRBC COR # BLD: 4.92 10*3/MM3 (ref 3.4–10.8)

## 2022-05-25 PROCEDURE — 80061 LIPID PANEL: CPT | Performed by: NURSE PRACTITIONER

## 2022-05-25 PROCEDURE — 85025 COMPLETE CBC W/AUTO DIFF WBC: CPT | Performed by: NURSE PRACTITIONER

## 2022-05-25 PROCEDURE — 82306 VITAMIN D 25 HYDROXY: CPT | Performed by: NURSE PRACTITIONER

## 2022-05-25 PROCEDURE — 83036 HEMOGLOBIN GLYCOSYLATED A1C: CPT | Performed by: NURSE PRACTITIONER

## 2022-05-25 PROCEDURE — 84443 ASSAY THYROID STIM HORMONE: CPT | Performed by: NURSE PRACTITIONER

## 2022-05-25 PROCEDURE — 80053 COMPREHEN METABOLIC PANEL: CPT | Performed by: NURSE PRACTITIONER

## 2022-05-25 PROCEDURE — 82607 VITAMIN B-12: CPT | Performed by: NURSE PRACTITIONER

## 2022-06-03 ENCOUNTER — OFFICE VISIT (OUTPATIENT)
Dept: INTERNAL MEDICINE | Facility: CLINIC | Age: 77
End: 2022-06-03

## 2022-06-03 VITALS
OXYGEN SATURATION: 98 % | HEIGHT: 69 IN | WEIGHT: 234.4 LBS | SYSTOLIC BLOOD PRESSURE: 122 MMHG | HEART RATE: 51 BPM | DIASTOLIC BLOOD PRESSURE: 68 MMHG | TEMPERATURE: 98 F | BODY MASS INDEX: 34.72 KG/M2 | RESPIRATION RATE: 16 BRPM

## 2022-06-03 DIAGNOSIS — Z00.00 MEDICARE ANNUAL WELLNESS VISIT, SUBSEQUENT: Primary | ICD-10-CM

## 2022-06-03 PROCEDURE — G0439 PPPS, SUBSEQ VISIT: HCPCS | Performed by: NURSE PRACTITIONER

## 2022-06-03 PROCEDURE — 1159F MED LIST DOCD IN RCRD: CPT | Performed by: NURSE PRACTITIONER

## 2022-06-03 PROCEDURE — 1170F FXNL STATUS ASSESSED: CPT | Performed by: NURSE PRACTITIONER

## 2022-06-03 NOTE — PROGRESS NOTES
The ABCs of the Annual Wellness Visit  Subsequent Medicare Wellness Visit    Chief Complaint   Patient presents with   • Medicare Wellness-subsequent     No other complaints today      Subjective    History of Present Illness:  Ac Caldwell is a 76 y.o. male who presents for a Subsequent Medicare Wellness Visit.    The following portions of the patient's history were reviewed and   updated as appropriate: allergies, current medications, past family history, past medical history, past social history, past surgical history and problem list.    Compared to one year ago, the patient feels his physical   health is the same.    Compared to one year ago, the patient feels his mental   health is the same.    Recent Hospitalizations:  He was not admitted to the hospital during the last year.       Current Medical Providers:  Patient Care Team:  Valentine Newton APRN as PCP - General (Family Medicine)  Migel Valladares MD as Consulting Physician (Cardiology)  Manish Camp MD as Consulting Physician (Dermatology)  Chacorta Guajardo MD as Consulting Physician (Ophthalmology)  Ez Everett MD as Consulting Physician (Otolaryngology)  Georgiana Bettencourt MD (Urology)  Jasen Ayala MD as Consulting Physician (Urology)  Chacorta Ludwig MD (Vascular Surgery)  Layla Mccarthy MD as Consulting Physician (Dermatology)  Eva Lopez MD as Consulting Physician (Nephrology)    Outpatient Medications Prior to Visit   Medication Sig Dispense Refill   • aspirin 325 MG tablet Take 325 mg by mouth Daily.     • atorvastatin (LIPITOR) 20 MG tablet Take 1 tablet by mouth Every Night. 90 tablet 3   • azelastine (ASTELIN) 0.1 % nasal spray 1 spray into the nostril(s) as directed by provider 2 (Two) Times a Day As Needed. Use in each nostril as directed     • betamethasone valerate (VALISONE) 0.1 % ointment Apply 1 application topically to the appropriate area as directed As Needed.     • carvedilol (COREG) 12.5 MG tablet TAKE  ONE TABLET BY MOUTH TWICE A  tablet 2   • Cholecalciferol (VITAMIN D-3 PO) Take 1,000 Units by mouth Daily.     • Coenzyme Q10 (CO Q-10) 100 MG capsule Take 100 mg by mouth 2 (Two) Times a Day.     • esomeprazole (NexIUM) 20 MG capsule Take 20 mg by mouth Daily.     • Flaxseed, Linseed, (FLAX SEED OIL) 1000 MG capsule Take 1,200 mg by mouth Daily.     • fluticasone (FLONASE) 50 MCG/ACT nasal spray 2 sprays into each nostril Daily. (Patient taking differently: 2 sprays into the nostril(s) as directed by provider Daily As Needed.) 3 each 2   • levothyroxine (SYNTHROID, LEVOTHROID) 50 MCG tablet Take 50 mcg by mouth Daily.     • losartan (COZAAR) 100 MG tablet Take 100 mg by mouth Daily.     • Omega-3 Fatty Acids (FISH OIL) 1000 MG capsule capsule Take 1,200 mg by mouth 2 (Two) Times a Day With Meals.     • potassium citrate (UROCIT-K) 10 MEQ (1080 MG) CR tablet TAKE ONE TABLET BY MOUTH DAILY 90 tablet 0   • vitamin B-12 (CYANOCOBALAMIN) 1000 MCG tablet Take 1,000 mcg by mouth Daily.     • famciclovir (FAMVIR) 500 MG tablet TAKE ONE TABLET BY MOUTH THREE TIMES A DAY (Patient taking differently: Take 500 mg by mouth As Needed.) 12 tablet 3     No facility-administered medications prior to visit.       No opioid medication identified on active medication list. I have reviewed chart for other potential  high risk medication/s and harmful drug interactions in the elderly.          Aspirin is on active medication list. Aspirin use is indicated based on review of current medical condition/s. Pros and cons of this therapy have been discussed today. Benefits of this medication outweigh potential harm.  Patient has been encouraged to continue taking this medication.  .      Patient Active Problem List   Diagnosis   • Nephritis and nephropathy   • Essential hypertension   • Esophageal reflux   • Diverticulosis of small intestine without hemorrhage   • Morbidly obese (HCC)   • Lymphadenopathy   • Ganglion cyst   •  "Malignant neoplasm of kidney, except pelvis   • Hyperlipidemia   • Exertional dyspnea   • Nonrheumatic aortic valve stenosis   • Acquired hypothyroidism   • Pancreatic cyst   • Thoracic ascending aortic aneurysm (HCC)   • Prediabetes   • Stage 3a chronic kidney disease (HCC)   • Chest discomfort     Advance Care Planning  Advance Directive is not on file.  ACP discussion was held with the patient during this visit. Patient does not have an advance directive, information provided.    Review of Systems   All other systems reviewed and are negative.       Objective    Vitals:    06/03/22 0904   BP: 122/68   Pulse: 51   Resp: 16   Temp: 98 °F (36.7 °C)   TempSrc: Infrared   SpO2: 98%   Weight: 106 kg (234 lb 6.4 oz)   Height: 175.3 cm (69\")   PainSc: 0-No pain     Body mass index is 34.61 kg/m².  BMI is >= 30 and <= 34.9 (Class 1 obesity). The following options were offered after discussion: exercise counseling/recommendations and nutrition counseling/recommendations    Does the patient have evidence of cognitive impairment? No    Physical Exam  Constitutional:       Appearance: He is well-developed.   HENT:      Head: Normocephalic and atraumatic.   Eyes:      Conjunctiva/sclera: Conjunctivae normal.      Pupils: Pupils are equal, round, and reactive to light.   Cardiovascular:      Rate and Rhythm: Normal rate and regular rhythm.      Heart sounds: Murmur heard.    Systolic murmur is present with a grade of 4/6.  Pulmonary:      Effort: Pulmonary effort is normal.      Breath sounds: Normal breath sounds.   Abdominal:      General: Bowel sounds are normal.      Palpations: Abdomen is soft.   Musculoskeletal:         General: Normal range of motion.      Cervical back: Normal range of motion.   Skin:     General: Skin is warm and dry.   Neurological:      Mental Status: He is alert and oriented to person, place, and time.      Deep Tendon Reflexes: Reflexes are normal and symmetric.   Psychiatric:         Behavior: " Behavior normal.         Thought Content: Thought content normal.         Judgment: Judgment normal.       Lab Results   Component Value Date    TRIG 104 05/25/2022    HDL 36 (L) 05/25/2022    LDL 68 05/25/2022    VLDL 19 05/25/2022    HGBA1C 5.30 05/25/2022            HEALTH RISK ASSESSMENT    Smoking Status:  Social History     Tobacco Use   Smoking Status Never Smoker   Smokeless Tobacco Never Used   Tobacco Comment    50,60,70 years it was impossible to avoid people who smoked     Alcohol Consumption:  Social History     Substance and Sexual Activity   Alcohol Use Yes    Comment: social - no schedule - random     Fall Risk Screen:    STEADI Fall Risk Assessment was completed, and patient is at LOW risk for falls.Assessment completed on:6/3/2022    Depression Screening:  PHQ-2/PHQ-9 Depression Screening 6/3/2022   Retired PHQ-9 Total Score -   Retired Total Score -   Little Interest or Pleasure in Doing Things 0-->not at all   Feeling Down, Depressed or Hopeless 0-->not at all   PHQ-9: Brief Depression Severity Measure Score 0       Health Habits and Functional and Cognitive Screening:  Functional & Cognitive Status 6/3/2022   Do you have difficulty preparing food and eating? No   Do you have difficulty bathing yourself, getting dressed or grooming yourself? No   Do you have difficulty using the toilet? No   Do you have difficulty moving around from place to place? No   Do you have trouble with steps or getting out of a bed or a chair? No   Current Diet Well Balanced Diet   Dental Exam Up to date        Dental Exam Comment -   Eye Exam Up to date        Eye Exam Comment -   Exercise (times per week) 7 times per week   Current Exercises Include Gardening   Current Exercise Activities Include -   Do you need help using the phone?  No   Are you deaf or do you have serious difficulty hearing?  Yes   Do you need help with transportation? No   Do you need help shopping? No   Do you need help preparing meals?  No   Do  you need help with housework?  No   Do you need help with laundry? No   Do you need help taking your medications? No   Do you need help managing money? No   Do you ever drive or ride in a car without wearing a seat belt? No   Have you felt unusual stress, anger or loneliness in the last month? No   Who do you live with? Alone   If you need help, do you have trouble finding someone available to you? No   Have you been bothered in the last four weeks by sexual problems? No   Do you have difficulty concentrating, remembering or making decisions? No       Age-appropriate Screening Schedule:  Refer to the list below for future screening recommendations based on patient's age, sex and/or medical conditions. Orders for these recommended tests are listed in the plan section. The patient has been provided with a written plan.    Health Maintenance   Topic Date Due   • TDAP/TD VACCINES (2 - Td or Tdap) 11/01/2019   • INFLUENZA VACCINE  08/01/2022   • LIPID PANEL  05/25/2023   • ZOSTER VACCINE  Completed              Assessment & Plan   CMS Preventative Services Quick Reference  Risk Factors Identified During Encounter  Inactivity/Sedentary  Obesity/Overweight   The above risks/problems have been discussed with the patient.  Follow up actions/plans if indicated are seen below in the Assessment/Plan Section.  Pertinent information has been shared with the patient in the After Visit Summary.    COVID - 3/12/21 and 4/2/2021, 10/14/2021, 4/9/2022  Flu shot, 9/30/2021  Tdap 2009   Pneumovax 2012   Prevnar - 4/2015   Zostavax 2010  Shingrix - UTD  PSA per Urology - WNL, no need to repeat  Colon - 4/2015, due 2025     Diagnoses and all orders for this visit:    1. Medicare annual wellness visit, subsequent (Primary)      Counseling - diet and exercise    Follow Up:   Return in about 6 months (around 12/3/2022) for f/u.     An After Visit Summary and PPPS were made available to the patient.

## 2022-06-10 ENCOUNTER — OFFICE VISIT (OUTPATIENT)
Dept: CARDIOLOGY | Facility: CLINIC | Age: 77
End: 2022-06-10

## 2022-06-10 VITALS
HEIGHT: 69 IN | DIASTOLIC BLOOD PRESSURE: 76 MMHG | SYSTOLIC BLOOD PRESSURE: 122 MMHG | BODY MASS INDEX: 35.31 KG/M2 | WEIGHT: 238.4 LBS | OXYGEN SATURATION: 96 % | HEART RATE: 55 BPM

## 2022-06-10 DIAGNOSIS — I35.0 NONRHEUMATIC AORTIC VALVE STENOSIS: Primary | ICD-10-CM

## 2022-06-10 DIAGNOSIS — E78.5 HYPERLIPIDEMIA, UNSPECIFIED HYPERLIPIDEMIA TYPE: ICD-10-CM

## 2022-06-10 DIAGNOSIS — I10 ESSENTIAL HYPERTENSION: ICD-10-CM

## 2022-06-10 DIAGNOSIS — I71.21 THORACIC ASCENDING AORTIC ANEURYSM: ICD-10-CM

## 2022-06-10 PROCEDURE — 93000 ELECTROCARDIOGRAM COMPLETE: CPT

## 2022-06-10 PROCEDURE — 99214 OFFICE O/P EST MOD 30 MIN: CPT

## 2022-06-10 NOTE — PROGRESS NOTES
[facility] Cardiology    Encounter Date: 06/10/2022    Patient ID: Ac Caldwell is a 76 y.o. male.  : 1945     PCP: Valentine Newton APRN       Chief Complaint: Nonrheumatic aortic valve stenosis      PROBLEM LIST:  1. Nonrheumatic aortic valve stenosis:   a. Echocardiogram, 2016: Mild AI and moderate AS, HEATHER 1.2 cm2, mean gradient of 16.6, and peak gradient of 33.3. Trace TR with RVSP of 26.2. Normal EF.  b. Nuclear stress, 2016: EF 72%. No evidence of ischemia.  c. CTA, 2017: Mild ectasia of ascending aorta measuring 4.2cm significant aortic leaflet calcification, coronary sclerosis, no aneurysm of descending aorta.  d. Echocardiogram, 2017: Mild AI/AS with HEATHER 1.8 cm². MPG 17.0. Normal EF 64%. Mild LVH.   e. Echocardiogram, 2019: Mild AS, MPG 20 mmHg, HEATHER 1.5 cm2. Trace AI. Mild MR/PI, trace TR. EF 60% with mild concentric LVH. Grade I a diastolic dysfunction.   f. Echocardiogram, 2020: EF 55%. Moderate aortic stenosis, mean gradient 32.8 mmHg, HEATHER 1.1 cm². Mild aortic insufficiency. Mild MR and TR with RVSP 35 mmHg. Mild pulmonic insufficiency.  g. Echocardiogram (), 2021: EF>55%. Moderate AS. Mean gradient across the aortic valve 29 mmHg.  h. Echocardiogram (), 2022: EF 55-60%. Concentric LVH. Mild AR. Moderate aortic stenosis. PG 52 mmHg. MG 32 mmHg.   2. Thoracic aortic aneurysm without rupture  a. CTA, 2017: Mild ectasia of ascending aorta measuring 4.2cm significant aortic leaflet calcification, coronary sclerosis, no aneurysm of descending aorta.  b. Echocardiogram at , 2021: EF>55%. Moderate valvular aortic stenosis. Mean gradient across the aortic valve 29 mmHg. Small ascending aortic aneurysm  3. Exertional dyspnea/anginal equivalent symptoms with occasional orthopnea.   a. Stress echo, 2019: Normal exercise capacity, THR reached at 6 min. No evidence of ischemia. EF 65% at rest, 75% with  stress.  4. Hypertension.   5. Dyslipidemia.   6. Prediabetes   7. Enlarged prostate.  8. History of renal cancer:   a. Right nephrectomy in 1997.   b. S/p Resection of a tumor from his left kidney -- incomplete database.   c. S/p CT ablation of L renal mass.  9. 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   10. S/p basal cell cancer surgery.   11. Episode of hematuria in January with subsequent negative cystoscopy.  12. Surgical Hx:  a. CT Ablation of Left renal mass  b. Endoscopic US at  for benign cyst, 09/2019.    History of Present Illness  Patient presents today for a 6 month follow-up with a history of nonrheumatic aortic valve stenosis, thoracic ascending aortic aneurysm, and cardiac risk factors. Since last visit, Patient has had no interim ER visits, hospitalizations, serious illnesses, or injuries.  He has been stable from a cardiac standpoint.  He did have an echo done at  for monitoring of his ascending aortic aneurysm.  His aortic stenosis is stable with a mean gradient of 32 mmHg.  He denies chest pain, shortness of breath, palpitations, orthopnea, edema, presyncope or syncope.  He does plan to have ERCP and asks if it is okay to proceed with sedation for this procedure.    Allergies   Allergen Reactions   • Norvasc [Amlodipine] Swelling   • Oxycodone Other (See Comments)     Can elevate Blood pressure   • Lotensin [Benazepril] Cough   • Sulfamethoxazole-Trimethoprim Cough   • Zocor [Simvastatin] Myalgia         Current Outpatient Medications:   •  aspirin 325 MG tablet, Take 325 mg by mouth Daily., Disp: , Rfl:   •  atorvastatin (LIPITOR) 20 MG tablet, Take 1 tablet by mouth Every Night., Disp: 90 tablet, Rfl: 3  •  azelastine (ASTELIN) 0.1 % nasal spray, 1 spray into the nostril(s) as directed by provider 2 (Two) Times a Day As Needed. Use in each nostril as directed, Disp: , Rfl:   •  betamethasone valerate (VALISONE) 0.1 % ointment, Apply 1 application topically to the appropriate area as  directed As Needed., Disp: , Rfl:   •  carvedilol (COREG) 12.5 MG tablet, TAKE ONE TABLET BY MOUTH TWICE A DAY, Disp: 180 tablet, Rfl: 2  •  Cholecalciferol (VITAMIN D-3 PO), Take 1,000 Units by mouth Daily., Disp: , Rfl:   •  Coenzyme Q10 (CO Q-10) 100 MG capsule, Take 100 mg by mouth 2 (Two) Times a Day., Disp: , Rfl:   •  esomeprazole (NexIUM) 20 MG capsule, Take 20 mg by mouth Daily., Disp: , Rfl:   •  Flaxseed, Linseed, (FLAX SEED OIL) 1000 MG capsule, Take 1,200 mg by mouth Daily., Disp: , Rfl:   •  fluticasone (FLONASE) 50 MCG/ACT nasal spray, 2 sprays into each nostril Daily. (Patient taking differently: 2 sprays into the nostril(s) as directed by provider Daily As Needed.), Disp: 3 each, Rfl: 2  •  levothyroxine (SYNTHROID, LEVOTHROID) 50 MCG tablet, Take 50 mcg by mouth Daily., Disp: , Rfl:   •  losartan (COZAAR) 100 MG tablet, Take 100 mg by mouth Daily., Disp: , Rfl:   •  Omega-3 Fatty Acids (FISH OIL) 1000 MG capsule capsule, Take 1,200 mg by mouth 2 (Two) Times a Day With Meals., Disp: , Rfl:   •  potassium citrate (UROCIT-K) 10 MEQ (1080 MG) CR tablet, TAKE ONE TABLET BY MOUTH DAILY, Disp: 90 tablet, Rfl: 0  •  vitamin B-12 (CYANOCOBALAMIN) 1000 MCG tablet, Take 1,000 mcg by mouth Daily., Disp: , Rfl:     The following portions of the patient's history were reviewed and updated as appropriate: allergies, current medications, past family history, past medical history, past social history, past surgical history and problem list.    ROS  Review of Systems   Constitution: Negative for chills, fever, fatigue, generalized weakness.   Cardiovascular: Negative for chest pain, dyspnea on exertion, leg swelling, palpitations, orthopnea, and syncope.   Respiratory: Negative for cough, shortness of breath, and wheezing.  HENT: Negative for ear pain, nosebleeds, and tinnitus.  Gastrointestinal: Negative for abdominal pain, constipation, diarrhea, nausea and vomiting.   Genitourinary: No urinary  "symptoms.  Musculoskeletal: Negative for muscle cramps.  Neurological: Negative for dizziness, headaches, loss of balance, numbness, and symptoms of stroke.  Psychiatric: Normal mental status.     All other systems reviewed and are negative.        Objective:     /76 (BP Location: Right arm, Patient Position: Sitting)   Pulse 55   Ht 175.3 cm (69\")   Wt 108 kg (238 lb 6.4 oz)   SpO2 96%   BMI 35.21 kg/m²      Physical Exam  Constitutional: Patient appears well-developed and well-nourished.   HENT: HEENT exam unremarkable.   Neck: Neck supple. No JVD present. No carotid bruits.   Cardiovascular: Normal rate, regular rhythm and normal heart sounds.  3/6 systolic murmur  2+ symmetric pulses.   Pulmonary/Chest: Breath sounds normal. Does not exhibit tenderness.   Abdominal: Abdomen benign.   Musculoskeletal: Does not exhibit edema.   Neurological: Neurological exam unremarkable.   Vitals reviewed.    Data Review:   Lab Results   Component Value Date    GLUCOSE 95 05/25/2022    BUN 16 05/25/2022    CREATININE 1.33 (H) 05/25/2022    BCR 12.0 05/25/2022    K 4.4 05/25/2022    CO2 24.6 05/25/2022    CALCIUM 9.0 05/25/2022    ALBUMIN 4.10 05/25/2022    AST 17 05/25/2022    ALT 20 05/25/2022     Lab Results   Component Value Date    CHOL 123 05/25/2022    TRIG 104 05/25/2022    HDL 36 (L) 05/25/2022    LDL 68 05/25/2022      Lab Results   Component Value Date    WBC 4.92 05/25/2022    RBC 4.81 05/25/2022    HGB 15.2 05/25/2022    HCT 42.8 05/25/2022    MCV 89.0 05/25/2022     05/25/2022     Lab Results   Component Value Date    TSH 2.400 05/25/2022     Lab Results   Component Value Date    HGBA1C 5.30 05/25/2022          ECG 12 Lead    Date/Time: 6/10/2022 11:15 AM  Performed by: Nicole De La O PA-C  Authorized by: Nicole De La O PA-C   Comparison: compared with previous ECG from 4/17/2014  Similar to previous ECG  Rhythm: sinus bradycardia  Rate: bradycardic  BPM: 55  Conduction: conduction " normal  Other findings: non-specific ST-T wave changes    Clinical impression: abnormal EKG               Assessment:      Diagnosis Plan   1. Nonrheumatic aortic valve stenosis   stable and asymptomatic.  Mean gradient 32 mmHg.  We will continue to monitor with serial echocardiogram.   2. Thoracic ascending aortic aneurysm (HCC)   stable on last echo.  Monitor by [facility].   3. Essential hypertension   well-controlled.  Continue current antihypertensive regimen.   4. Hyperlipidemia, unspecified hyperlipidemia type   well-controlled.  Continue statin therapy.     Plan:   Stable cardiac status.  No current anginal or CHF symptoms.  Will get repeat echocardiogram in 1 year for monitoring of aortic stenosis.  Begin routine aerobic exercise for at least 30 minutes 5 days per week.  Continue current medications.   FU in 6 MO, sooner as needed.  Thank you for allowing us to participate in the care of your patient.     Nicole De La O PA-C    Please note that portions of this note may have been completed with a voice recognition program. Efforts were made to edit the dictations, but occasionally words are mistranscribed.

## 2022-07-12 DIAGNOSIS — I10 ESSENTIAL HYPERTENSION: ICD-10-CM

## 2022-07-12 DIAGNOSIS — Z79.899 ENCOUNTER FOR LONG-TERM (CURRENT) USE OF OTHER MEDICATIONS: ICD-10-CM

## 2022-07-12 RX ORDER — POTASSIUM CITRATE 10 MEQ/1
TABLET, EXTENDED RELEASE ORAL
Qty: 90 TABLET | Refills: 0 | OUTPATIENT
Start: 2022-07-12

## 2022-07-28 DIAGNOSIS — I10 ESSENTIAL HYPERTENSION: ICD-10-CM

## 2022-07-28 DIAGNOSIS — Z79.899 ENCOUNTER FOR LONG-TERM (CURRENT) USE OF OTHER MEDICATIONS: ICD-10-CM

## 2022-07-28 RX ORDER — POTASSIUM CITRATE 10 MEQ/1
TABLET, EXTENDED RELEASE ORAL
Qty: 90 TABLET | Refills: 0 | Status: SHIPPED | OUTPATIENT
Start: 2022-07-28 | End: 2022-10-21

## 2022-07-28 NOTE — TELEPHONE ENCOUNTER
Refill request sent potassium citrate   Last refill 5/2/22  Last visit 6/30/22  Next visit 12/6/22

## 2022-08-21 DIAGNOSIS — E78.5 HYPERLIPIDEMIA, UNSPECIFIED HYPERLIPIDEMIA TYPE: ICD-10-CM

## 2022-08-25 RX ORDER — ATORVASTATIN CALCIUM 20 MG/1
TABLET, FILM COATED ORAL
Qty: 90 TABLET | Refills: 3 | Status: SHIPPED | OUTPATIENT
Start: 2022-08-25

## 2022-08-25 NOTE — TELEPHONE ENCOUNTER
Lab Results   Component Value Date    CHOL 123 05/25/2022    TRIG 104 05/25/2022    HDL 36 (L) 05/25/2022    LDL 68 05/25/2022     Lab Results   Component Value Date    GLUCOSE 95 05/25/2022    BUN 16 05/25/2022    CREATININE 1.33 (H) 05/25/2022    EGFRIFNONA 59 (L) 10/27/2021    BCR 12.0 05/25/2022    K 4.4 05/25/2022    CO2 24.6 05/25/2022    CALCIUM 9.0 05/25/2022    ALBUMIN 4.10 05/25/2022    AST 17 05/25/2022    ALT 20 05/25/2022

## 2022-10-20 DIAGNOSIS — Z79.899 ENCOUNTER FOR LONG-TERM (CURRENT) USE OF OTHER MEDICATIONS: ICD-10-CM

## 2022-10-20 DIAGNOSIS — I10 ESSENTIAL HYPERTENSION: ICD-10-CM

## 2022-10-21 RX ORDER — POTASSIUM CITRATE 10 MEQ/1
TABLET, EXTENDED RELEASE ORAL
Qty: 90 TABLET | Refills: 0 | Status: SHIPPED | OUTPATIENT
Start: 2022-10-21 | End: 2023-01-18 | Stop reason: SDUPTHER

## 2022-11-28 ENCOUNTER — TELEPHONE (OUTPATIENT)
Dept: INTERNAL MEDICINE | Facility: CLINIC | Age: 77
End: 2022-11-28

## 2022-11-28 DIAGNOSIS — C64.9 MALIGNANT NEOPLASM OF KIDNEY EXCLUDING RENAL PELVIS, UNSPECIFIED LATERALITY: ICD-10-CM

## 2022-11-28 DIAGNOSIS — I35.0 NONRHEUMATIC AORTIC VALVE STENOSIS: ICD-10-CM

## 2022-11-28 DIAGNOSIS — R73.03 PREDIABETES: ICD-10-CM

## 2022-11-28 DIAGNOSIS — I10 ESSENTIAL HYPERTENSION: Primary | ICD-10-CM

## 2022-11-28 DIAGNOSIS — E55.9 VITAMIN D DEFICIENCY: ICD-10-CM

## 2022-11-28 DIAGNOSIS — E03.9 ACQUIRED HYPOTHYROIDISM: ICD-10-CM

## 2022-11-28 DIAGNOSIS — E78.2 MIXED HYPERLIPIDEMIA: ICD-10-CM

## 2022-11-28 DIAGNOSIS — N18.31 STAGE 3A CHRONIC KIDNEY DISEASE: ICD-10-CM

## 2022-11-28 NOTE — TELEPHONE ENCOUNTER
Caller: Ac Caldwell    Relationship: Self    Best call back number: [phone]    What orders are you requesting (i.e. lab or imaging): BLOOD WORK FOR APPOINTMENT ON 12/06/2022    In what timeframe would the patient need to come in: THE WEEK BEFORE THE APPOINTMENT  IF POSSIBLE 11/29/2022 OR 11/30/2022    Where will you receive your lab/imaging services: IN OFFICE     Additional notes: THE PATIENT STATES THAT HE WOULD LIKE TO HAVE BLOOD WORK DONE FOR HIS APPOINTMENT PLEASE CALL THE PATIENT TO LET HIM IF THE ORDER CAN BE PUT IN HE STATES THAT HE ALSO HAS A NEPHROLOGY APPOINTMENT ON 12/13/2022

## 2022-12-01 ENCOUNTER — TRANSCRIBE ORDERS (OUTPATIENT)
Dept: LAB | Facility: HOSPITAL | Age: 77
End: 2022-12-01

## 2022-12-01 ENCOUNTER — LAB (OUTPATIENT)
Dept: LAB | Facility: HOSPITAL | Age: 77
End: 2022-12-01

## 2022-12-01 DIAGNOSIS — I10 ESSENTIAL HYPERTENSION: ICD-10-CM

## 2022-12-01 DIAGNOSIS — N18.2 CHRONIC KIDNEY DISEASE, STAGE 2 (MILD): ICD-10-CM

## 2022-12-01 DIAGNOSIS — I35.0 NONRHEUMATIC AORTIC VALVE STENOSIS: ICD-10-CM

## 2022-12-01 DIAGNOSIS — R73.03 PREDIABETES: ICD-10-CM

## 2022-12-01 DIAGNOSIS — N18.31 STAGE 3A CHRONIC KIDNEY DISEASE: ICD-10-CM

## 2022-12-01 DIAGNOSIS — C64.9 MALIGNANT NEOPLASM OF KIDNEY EXCLUDING RENAL PELVIS, UNSPECIFIED LATERALITY: ICD-10-CM

## 2022-12-01 DIAGNOSIS — N18.2 CHRONIC KIDNEY DISEASE, STAGE 2 (MILD): Primary | ICD-10-CM

## 2022-12-01 DIAGNOSIS — E03.9 ACQUIRED HYPOTHYROIDISM: ICD-10-CM

## 2022-12-01 DIAGNOSIS — E78.2 MIXED HYPERLIPIDEMIA: ICD-10-CM

## 2022-12-01 DIAGNOSIS — E55.9 VITAMIN D DEFICIENCY: ICD-10-CM

## 2022-12-01 LAB
BACTERIA UR QL AUTO: ABNORMAL /HPF
BILIRUB UR QL STRIP: NEGATIVE
CLARITY UR: ABNORMAL
COLOR UR: YELLOW
CREAT UR-MCNC: 73.6 MG/DL
GLUCOSE UR STRIP-MCNC: NEGATIVE MG/DL
HBA1C MFR BLD: 5.8 % (ref 4.8–5.6)
HGB UR QL STRIP.AUTO: NEGATIVE
HYALINE CASTS UR QL AUTO: ABNORMAL /LPF
KETONES UR QL STRIP: NEGATIVE
LEUKOCYTE ESTERASE UR QL STRIP.AUTO: ABNORMAL
NITRITE UR QL STRIP: POSITIVE
PH UR STRIP.AUTO: 6 [PH] (ref 5–8)
PHOSPHATE SERPL-MCNC: 3.3 MG/DL (ref 2.5–4.5)
PROT ?TM UR-MCNC: 10.7 MG/DL
PROT UR QL STRIP: NEGATIVE
RBC # UR STRIP: ABNORMAL /HPF
REF LAB TEST METHOD: ABNORMAL
SP GR UR STRIP: 1.01 (ref 1–1.03)
SQUAMOUS #/AREA URNS HPF: ABNORMAL /HPF
UROBILINOGEN UR QL STRIP: ABNORMAL
WBC # UR STRIP: ABNORMAL /HPF

## 2022-12-01 PROCEDURE — 84156 ASSAY OF PROTEIN URINE: CPT

## 2022-12-01 PROCEDURE — 83036 HEMOGLOBIN GLYCOSYLATED A1C: CPT

## 2022-12-01 PROCEDURE — 82607 VITAMIN B-12: CPT

## 2022-12-01 PROCEDURE — 82306 VITAMIN D 25 HYDROXY: CPT

## 2022-12-01 PROCEDURE — 84443 ASSAY THYROID STIM HORMONE: CPT

## 2022-12-01 PROCEDURE — 36415 COLL VENOUS BLD VENIPUNCTURE: CPT

## 2022-12-01 PROCEDURE — 80053 COMPREHEN METABOLIC PANEL: CPT

## 2022-12-01 PROCEDURE — 81001 URINALYSIS AUTO W/SCOPE: CPT

## 2022-12-01 PROCEDURE — 80061 LIPID PANEL: CPT

## 2022-12-01 PROCEDURE — 82570 ASSAY OF URINE CREATININE: CPT

## 2022-12-01 PROCEDURE — 84100 ASSAY OF PHOSPHORUS: CPT

## 2022-12-01 PROCEDURE — 85025 COMPLETE CBC W/AUTO DIFF WBC: CPT

## 2022-12-02 LAB
25(OH)D3 SERPL-MCNC: 37.7 NG/ML (ref 30–100)
ALBUMIN SERPL-MCNC: 4.3 G/DL (ref 3.5–5.2)
ALBUMIN/GLOB SERPL: 2 G/DL
ALP SERPL-CCNC: 66 U/L (ref 39–117)
ALT SERPL W P-5'-P-CCNC: 15 U/L (ref 1–41)
ANION GAP SERPL CALCULATED.3IONS-SCNC: 9 MMOL/L (ref 5–15)
AST SERPL-CCNC: 16 U/L (ref 1–40)
BASOPHILS # BLD AUTO: 0.03 10*3/MM3 (ref 0–0.2)
BASOPHILS NFR BLD AUTO: 0.6 % (ref 0–1.5)
BILIRUB SERPL-MCNC: 1 MG/DL (ref 0–1.2)
BUN SERPL-MCNC: 19 MG/DL (ref 8–23)
BUN/CREAT SERPL: 14.5 (ref 7–25)
CALCIUM SPEC-SCNC: 9.4 MG/DL (ref 8.6–10.5)
CHLORIDE SERPL-SCNC: 106 MMOL/L (ref 98–107)
CHOLEST SERPL-MCNC: 142 MG/DL (ref 0–200)
CO2 SERPL-SCNC: 24 MMOL/L (ref 22–29)
CREAT SERPL-MCNC: 1.31 MG/DL (ref 0.76–1.27)
DEPRECATED RDW RBC AUTO: 39.7 FL (ref 37–54)
EGFRCR SERPLBLD CKD-EPI 2021: 56.8 ML/MIN/1.73
EOSINOPHIL # BLD AUTO: 0.2 10*3/MM3 (ref 0–0.4)
EOSINOPHIL NFR BLD AUTO: 4 % (ref 0.3–6.2)
ERYTHROCYTE [DISTWIDTH] IN BLOOD BY AUTOMATED COUNT: 12.2 % (ref 12.3–15.4)
GLOBULIN UR ELPH-MCNC: 2.2 GM/DL
GLUCOSE SERPL-MCNC: 113 MG/DL (ref 65–99)
HCT VFR BLD AUTO: 43.8 % (ref 37.5–51)
HDLC SERPL-MCNC: 36 MG/DL (ref 40–60)
HGB BLD-MCNC: 15.5 G/DL (ref 13–17.7)
IMM GRANULOCYTES # BLD AUTO: 0.01 10*3/MM3 (ref 0–0.05)
IMM GRANULOCYTES NFR BLD AUTO: 0.2 % (ref 0–0.5)
LDLC SERPL CALC-MCNC: 87 MG/DL (ref 0–100)
LDLC/HDLC SERPL: 2.39 {RATIO}
LYMPHOCYTES # BLD AUTO: 1.28 10*3/MM3 (ref 0.7–3.1)
LYMPHOCYTES NFR BLD AUTO: 25.5 % (ref 19.6–45.3)
MCH RBC QN AUTO: 31.3 PG (ref 26.6–33)
MCHC RBC AUTO-ENTMCNC: 35.4 G/DL (ref 31.5–35.7)
MCV RBC AUTO: 88.3 FL (ref 79–97)
MONOCYTES # BLD AUTO: 0.53 10*3/MM3 (ref 0.1–0.9)
MONOCYTES NFR BLD AUTO: 10.6 % (ref 5–12)
NEUTROPHILS NFR BLD AUTO: 2.96 10*3/MM3 (ref 1.7–7)
NEUTROPHILS NFR BLD AUTO: 59.1 % (ref 42.7–76)
NRBC BLD AUTO-RTO: 0 /100 WBC (ref 0–0.2)
PLATELET # BLD AUTO: 151 10*3/MM3 (ref 140–450)
PMV BLD AUTO: 11.3 FL (ref 6–12)
POTASSIUM SERPL-SCNC: 4.6 MMOL/L (ref 3.5–5.2)
PROT SERPL-MCNC: 6.5 G/DL (ref 6–8.5)
RBC # BLD AUTO: 4.96 10*6/MM3 (ref 4.14–5.8)
SODIUM SERPL-SCNC: 139 MMOL/L (ref 136–145)
TRIGL SERPL-MCNC: 100 MG/DL (ref 0–150)
TSH SERPL DL<=0.05 MIU/L-ACNC: 2.85 UIU/ML (ref 0.27–4.2)
VIT B12 BLD-MCNC: 1055 PG/ML (ref 211–946)
VLDLC SERPL-MCNC: 19 MG/DL (ref 5–40)
WBC NRBC COR # BLD: 5.01 10*3/MM3 (ref 3.4–10.8)

## 2022-12-06 ENCOUNTER — TELEPHONE (OUTPATIENT)
Dept: INTERNAL MEDICINE | Facility: CLINIC | Age: 77
End: 2022-12-06

## 2022-12-06 ENCOUNTER — OFFICE VISIT (OUTPATIENT)
Dept: INTERNAL MEDICINE | Facility: CLINIC | Age: 77
End: 2022-12-06

## 2022-12-06 VITALS
RESPIRATION RATE: 20 BRPM | DIASTOLIC BLOOD PRESSURE: 70 MMHG | HEIGHT: 69 IN | WEIGHT: 241 LBS | OXYGEN SATURATION: 95 % | TEMPERATURE: 97.3 F | BODY MASS INDEX: 35.7 KG/M2 | SYSTOLIC BLOOD PRESSURE: 118 MMHG | HEART RATE: 64 BPM

## 2022-12-06 DIAGNOSIS — E03.9 ACQUIRED HYPOTHYROIDISM: ICD-10-CM

## 2022-12-06 DIAGNOSIS — K86.2 PANCREATIC CYST: ICD-10-CM

## 2022-12-06 DIAGNOSIS — N18.31 STAGE 3A CHRONIC KIDNEY DISEASE: ICD-10-CM

## 2022-12-06 DIAGNOSIS — R82.90 ABNORMAL URINALYSIS: ICD-10-CM

## 2022-12-06 DIAGNOSIS — B00.1 FEVER BLISTER: ICD-10-CM

## 2022-12-06 DIAGNOSIS — C64.9 MALIGNANT NEOPLASM OF KIDNEY EXCLUDING RENAL PELVIS, UNSPECIFIED LATERALITY: ICD-10-CM

## 2022-12-06 DIAGNOSIS — I10 ESSENTIAL HYPERTENSION: Primary | ICD-10-CM

## 2022-12-06 DIAGNOSIS — E78.2 MIXED HYPERLIPIDEMIA: ICD-10-CM

## 2022-12-06 DIAGNOSIS — R73.03 PREDIABETES: ICD-10-CM

## 2022-12-06 PROCEDURE — 87077 CULTURE AEROBIC IDENTIFY: CPT | Performed by: NURSE PRACTITIONER

## 2022-12-06 PROCEDURE — 87186 SC STD MICRODIL/AGAR DIL: CPT | Performed by: NURSE PRACTITIONER

## 2022-12-06 PROCEDURE — 99214 OFFICE O/P EST MOD 30 MIN: CPT | Performed by: NURSE PRACTITIONER

## 2022-12-06 PROCEDURE — 87086 URINE CULTURE/COLONY COUNT: CPT | Performed by: NURSE PRACTITIONER

## 2022-12-06 RX ORDER — FAMCICLOVIR 500 MG/1
500 TABLET ORAL 3 TIMES DAILY PRN
Qty: 12 TABLET | Refills: 11 | Status: SHIPPED | OUTPATIENT
Start: 2022-12-06 | End: 2022-12-06 | Stop reason: SDUPTHER

## 2022-12-06 RX ORDER — FAMCICLOVIR 500 MG/1
500 TABLET ORAL 2 TIMES DAILY PRN
Qty: 30 TABLET | Refills: 11 | Status: SHIPPED | OUTPATIENT
Start: 2022-12-06

## 2022-12-06 NOTE — TELEPHONE ENCOUNTER
Caller: Ac Caldwell    Relationship to patient: Self    Best call back number: [phone]    PATIENT WAS TOLD BY PHARMACY THAT THE DOSAGE FOR  famciclovir (FAMVIR) 500 MG tablet HAS TO BE CHANGED FROM 1 TABLET BY MOUTH 3 TIMES A DAY TO 1 TABLET BY MOUTH 2 TIMES A DAY; OTHERWISE INSURANCE WILL NOT APPROVE THIS    [address] [phone] [facility] [phone] FX

## 2022-12-06 NOTE — PROGRESS NOTES
Subjective   Ac Caldwell is a 77 y.o. male    Chief Complaint   Patient presents with   • Hypertension     6 month f/u   • fever blister     Needs refill on famciclovir. Has been prescribed in the past     History of Present Illness     On 1/26/15, Ac saw gross hematuria, saw Dr. Ayala (urology) and he did a scope. It was ok. Then had a CT scan on 2/3/15. Cr was high, so they did it without contrast. CT scan showed a lesion, but he was not concerned. Then had a fish bladder test on 2/5/15. His urologist never got the results. Did go back to see Nephrology and had labs in Feb. Cr. was 1.1 and BUN was 16. Everything has been stable since. Has a h/o right kidney CA in 1997 and had nephrectomy.  Urologist - Dr. Jossue Serrano left and moved to [place] and he had to establish with [place] [facility] [place]  He saw him several times for recurrent UTI, hydrocele, renal cyst, and epididymoorchits. He saw them again 5/31/2017 for repeat renal US that showed a 3.4cm left renal cyst, which had grown from 2.5cm on last scan.  Dr. Farrell moved to [place] and he was then referred to  Urology.  Had CXR and labs.  CXR revealed a possible thoracic aortic aneurysm.  Had f/u chest CT scan, no aneurysm.  CT renal mass protocol was done on 8/30/2017 which confirmed a solid enhancing mass in the lower pole of the left kidney, without lymphadenopathy.  It was elected to proceed with a cryoablation of this lesion per Dr. Mark at .  He underwent this procedure on 9/27/17.  Pathology is unclear as to it being malignant vs benign.  Will be re-scanned in 30 days, then 90 days, then Q6 months.       CT in Aug 2017 revealed an incidental pancreatic lesion on the head of the pancreas that was 12 mm in size; f/u imaging done 9/2018 showed that the mass was stable and unchanged in size.  Had a biopsy of the pancreatic cyst in 2019 and it was benign.  They will monitor Q12 months with MRI.  Ac prefers a CT over an MRI.  The CT done on  10/1/2020 of abd and pelvis shows a simple cystic structure near the head of the pancreas measuring 1.6 cm.  The pancreatic cyst appears to be slightly larger on this scan than previous (from 12 mm to 16 mm) - this is followed by Dr. Norwood at .  We faxed a copy to his office and he underwent an EGD and EUS on 3/2/2021.  The pathology results were benign but he suggested another followup  EUS/FNA in one year, in the March/April 2022 time frame.  Had this done in 6/2022.  They have now recommended repeat in 1-2 years.  He would like to see Alina JUNG from here on out.  I will refer after his appt in June     Underwent a STEAM procedure in 2/2019 for BPH @       [facility]phrologist- [facility]; his Creatinine is stable at 1.31.      Had a very large thyroid nodule removed on 4/23/14 per ENT. It was 8cm in size; they took the right side of his thyroid and placed on synthroid 50mcg. It was benign! Seeing Dr. Everett annually and everything has been stable.  TSH is 2.85     AS - now followed by Cardiology, Dr. Valladares.  He will follow every 6 months and echo q 2 yrs.        HTN - well controlled with Losartan 100 mg and Carvedilol 12.5 mg BID (recent per Cards); BP is well controlled and he denies SE.      HL - stable on Lipitor 20mg daily without SE.   Lab Results   Component Value Date    CHOL 142 12/01/2022    TRIG 100 12/01/2022    HDL 36 (L) 12/01/2022    LDL 87 12/01/2022     COVID - 3/12/21 and 4/2/2021, 10/14/2021, 4/9/2022, 10/1/2022  Flu shot - 10/10/2022  Tdap 2009   Pneumovax 2012   Prevnar - 4/2015   Zostavax 2010  Shingrix - UTD  PSA per Urology - WNL, no need to repeat  Colon - 4/2015, due 2025        The following portions of the patient's history were reviewed and updated as appropriate: allergies, current medications, past family history, past medical history, past social history, past surgical history and problem list.    Current Outpatient Medications:   •  aspirin 325 MG tablet, Take 325 mg by  mouth Daily., Disp: , Rfl:   •  atorvastatin (LIPITOR) 20 MG tablet, TAKE ONE TABLET BY MOUTH ONCE NIGHTLY, Disp: 90 tablet, Rfl: 3  •  azelastine (ASTELIN) 0.1 % nasal spray, 1 spray into the nostril(s) as directed by provider 2 (Two) Times a Day As Needed. Use in each nostril as directed, Disp: , Rfl:   •  betamethasone valerate (VALISONE) 0.1 % ointment, Apply 1 application topically to the appropriate area as directed As Needed., Disp: , Rfl:   •  carvedilol (COREG) 12.5 MG tablet, TAKE ONE TABLET BY MOUTH TWICE A DAY, Disp: 180 tablet, Rfl: 2  •  Cholecalciferol (VITAMIN D-3 PO), Take 1,000 Units by mouth Daily., Disp: , Rfl:   •  Coenzyme Q10 (CO Q-10) 100 MG capsule, Take 100 mg by mouth 2 (Two) Times a Day., Disp: , Rfl:   •  esomeprazole (NexIUM) 20 MG capsule, Take 20 mg by mouth Daily., Disp: , Rfl:   •  famciclovir (FAMVIR) 500 MG tablet, Take 1 tablet by mouth 2 (Two) Times a Day As Needed (fever blister)., Disp: 30 tablet, Rfl: 11  •  Flaxseed, Linseed, (FLAX SEED OIL) 1000 MG capsule, Take 1,200 mg by mouth Daily., Disp: , Rfl:   •  levothyroxine (SYNTHROID, LEVOTHROID) 50 MCG tablet, Take 50 mcg by mouth Daily., Disp: , Rfl:   •  losartan (COZAAR) 100 MG tablet, Take 100 mg by mouth Daily., Disp: , Rfl:   •  Omega-3 Fatty Acids (FISH OIL) 1000 MG capsule capsule, Take 1,200 mg by mouth 2 (Two) Times a Day With Meals., Disp: , Rfl:   •  potassium citrate (UROCIT-K) 10 MEQ (1080 MG) CR tablet, TAKE ONE TABLET BY MOUTH DAILY, Disp: 90 tablet, Rfl: 0  •  vitamin B-12 (CYANOCOBALAMIN) 1000 MCG tablet, Take 1,000 mcg by mouth Daily., Disp: , Rfl:      Review of Systems   Constitutional: Negative for chills, fatigue and fever.   Respiratory: Negative for cough, chest tightness and shortness of breath.    Cardiovascular: Negative for chest pain.   Gastrointestinal: Negative for abdominal pain, diarrhea, nausea and vomiting.   Endocrine: Negative for cold intolerance and heat intolerance.   Musculoskeletal:  "Negative for arthralgias.   Neurological: Negative for dizziness.       Objective   Physical Exam  Constitutional:       Appearance: He is well-developed.   HENT:      Head: Normocephalic and atraumatic.   Eyes:      Conjunctiva/sclera: Conjunctivae normal.      Pupils: Pupils are equal, round, and reactive to light.   Cardiovascular:      Rate and Rhythm: Normal rate and regular rhythm.      Heart sounds: Murmur heard.    Systolic murmur is present with a grade of 4/6.  Pulmonary:      Effort: Pulmonary effort is normal.      Breath sounds: Normal breath sounds.   Abdominal:      General: Bowel sounds are normal.      Palpations: Abdomen is soft.   Musculoskeletal:         General: Normal range of motion.      Cervical back: Normal range of motion.   Skin:     General: Skin is warm and dry.   Neurological:      Mental Status: He is alert and oriented to person, place, and time.      Deep Tendon Reflexes: Reflexes are normal and symmetric.   Psychiatric:         Behavior: Behavior normal.         Thought Content: Thought content normal.         Judgment: Judgment normal.       Vitals:    12/06/22 0828   BP: 118/70   Cuff Size: Adult   Pulse: 64   Resp: 20   Temp: 97.3 °F (36.3 °C)   TempSrc: Infrared   SpO2: 95%   Weight: 109 kg (241 lb)   Height: 175.3 cm (69\")         Assessment & Plan   Diagnoses and all orders for this visit:    1. Essential hypertension (Primary)    2. Abnormal urinalysis  -     Urine Culture - , Urine, Clean Catch; Future  -     Urine Culture - Urine, Urine, Clean Catch    3. Mixed hyperlipidemia    4. Acquired hypothyroidism    5. Prediabetes    6. Pancreatic cyst    7. Stage 3a chronic kidney disease (HCC)    8. Malignant neoplasm of kidney excluding renal pelvis, unspecified laterality (HCC)    9. Fever blister  -     Discontinue: famciclovir (FAMVIR) 500 MG tablet; Take 1 tablet by mouth 3 (Three) Times a Day As Needed (fever blister) for up to 4 days.  Dispense: 12 tablet; Refill: 11  -   "   famciclovir (FAMVIR) 500 MG tablet; Take 1 tablet by mouth 2 (Two) Times a Day As Needed (fever blister).  Dispense: 30 tablet; Refill: 11      Labs reviewed  UA abn, will ck culture  Famvir PRN fo FB  No other med changes  Return in about 6 months (around 6/6/2023) for Medicare Wellness.  We will refer him to Alina JUNG at 6 month f/u

## 2022-12-08 LAB — BACTERIA SPEC AEROBE CULT: ABNORMAL

## 2022-12-08 RX ORDER — CEFUROXIME AXETIL 250 MG/1
250 TABLET ORAL 2 TIMES DAILY
Qty: 14 TABLET | Refills: 0 | Status: SHIPPED | OUTPATIENT
Start: 2022-12-08 | End: 2022-12-15

## 2023-01-18 DIAGNOSIS — Z79.899 ENCOUNTER FOR LONG-TERM (CURRENT) USE OF OTHER MEDICATIONS: ICD-10-CM

## 2023-01-18 DIAGNOSIS — I10 ESSENTIAL HYPERTENSION: ICD-10-CM

## 2023-01-18 RX ORDER — CARVEDILOL 12.5 MG/1
12.5 TABLET ORAL 2 TIMES DAILY
Qty: 180 TABLET | Refills: 2 | Status: SHIPPED | OUTPATIENT
Start: 2023-01-18

## 2023-01-18 RX ORDER — POTASSIUM CITRATE 10 MEQ/1
10 TABLET, EXTENDED RELEASE ORAL DAILY
Qty: 90 TABLET | Refills: 0 | Status: SHIPPED | OUTPATIENT
Start: 2023-01-18

## 2023-01-18 NOTE — TELEPHONE ENCOUNTER
Refill request sent potassium citrate   Last refill 10/21/22  Last visit 12/6/22  Next visit 6/6/23

## 2023-01-20 ENCOUNTER — OFFICE VISIT (OUTPATIENT)
Dept: CARDIOLOGY | Facility: CLINIC | Age: 78
End: 2023-01-20
Payer: MEDICARE

## 2023-01-20 VITALS
HEART RATE: 60 BPM | OXYGEN SATURATION: 99 % | HEIGHT: 69 IN | DIASTOLIC BLOOD PRESSURE: 82 MMHG | BODY MASS INDEX: 35.7 KG/M2 | SYSTOLIC BLOOD PRESSURE: 126 MMHG | RESPIRATION RATE: 18 BRPM | WEIGHT: 241 LBS

## 2023-01-20 DIAGNOSIS — E78.5 HYPERLIPIDEMIA, UNSPECIFIED HYPERLIPIDEMIA TYPE: ICD-10-CM

## 2023-01-20 DIAGNOSIS — I35.0 NONRHEUMATIC AORTIC VALVE STENOSIS: Primary | ICD-10-CM

## 2023-01-20 DIAGNOSIS — I71.21 ASCENDING AORTIC ANEURYSM, UNSPECIFIED WHETHER RUPTURED: ICD-10-CM

## 2023-01-20 DIAGNOSIS — I10 ESSENTIAL HYPERTENSION: ICD-10-CM

## 2023-01-20 PROCEDURE — 99214 OFFICE O/P EST MOD 30 MIN: CPT

## 2023-01-20 NOTE — PROGRESS NOTES
[facility]    Encounter Date: 2023    Patient ID: Ac Caldwell is a 77 y.o. male.  : 1945     PCP: Valentine Newton APRN       Chief Complaint: Follow-up (Nonrheumatic aortic valve stenosis)      PROBLEM LIST:  1. Nonrheumatic aortic valve stenosis:   a. Echo, 2016: Mild AI and moderate AS, HEATHER 1.2 cm2, mean gradient of 16.6, and peak gradient of 33.3. Trace TR with RVSP of 26.2. Normal EF.  b. Nuclear stress, 2016: EF 72%. No evidence of ischemia.  c. CTA, 2017: Mild ectasia of ascending aorta measuring 4.2cm significant aortic leaflet calcification, coronary sclerosis, no aneurysm of descending aorta.  d. Echo, 2017: Mild AI/AS with HEATHER 1.8 cm². MPG 17.0. Normal EF 64%. Mild LVH.   e. Echo, 2019: Mild AS, MPG 20 mmHg, HEATHER 1.5 cm2. Trace AI. Mild MR/PI, trace TR. EF 60% with mild concentric LVH. Grade I a diastolic dysfunction.   f. Echo, 2020: EF 55%. Moderate aortic stenosis, mean gradient 32.8 mmHg, HEATHER 1.1 cm². Mild aortic insufficiency. Mild MR and TR with RVSP 35 mmHg. Mild pulmonic insufficiency.  g. Echo (), 2021: EF>55%. Moderate AS. Mean gradient across the aortic valve 29 mmHg.  h. Echo (), 2022: EF 55-60%. Concentric LVH. Mild AR. Moderate aortic stenosis. PG 52 mmHg. MG 32 mmHg.   2. Thoracic aortic aneurysm without rupture  a. CTA, 2017: Mild ectasia of ascending aorta measuring 4.2cm significant aortic leaflet calcification, coronary sclerosis, no aneurysm of descending aorta.  b. Echo at , 2021: EF>55%. Moderate valvular aortic stenosis. Mean gradient across the aortic valve 29 mmHg. Small ascending aortic aneurysm  3. Exertional dyspnea/anginal equivalent symptoms with occasional orthopnea.   a. Stress echo, 2019: Normal exercise capacity, THR reached at 6 min. No evidence of ischemia. EF 65% at rest, 75% with stress.  4. Hypertension.    5. Dyslipidemia.   6. Prediabetes   7. Enlarged prostate.  8. History of renal cancer:   a. Right nephrectomy in 1997.   b. S/p Resection of a tumor from his left kidney -- incomplete database.   c. S/p CT ablation of L renal mass.  9. 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   10. S/p basal cell cancer surgery.   11. Episode of hematuria in January with subsequent negative cystoscopy.  12. Pancreatic cyst, followed by   13. Surgical Hx:  a. CT Ablation of Left renal mass  b. Endoscopic US at  for benign cyst, 09/2019.    History of Present Illness  Patient presents today for a follow-up with a history of nonrheumatic aortic valve stenosis, thoracic ascending aortic aneurysm, and cardiac risk factors. Since last visit, patient was diagnosed with the flu approximately one month ago and he has been recovering since then.  He thinks he has been doing well from a cardiac standpoint.  He continues to have baseline dyspnea on exertion.  He has a thoracic aortic aneurysm and is previously followed by , but he has not heard from them this year and does not have a repeat echocardiogram scheduled.  He does have a pancreatic cyst and has plans for repeat MRI in June at .  He denies any chest pain, palpitations, orthopnea, edema, presyncope or syncope.    Allergies   Allergen Reactions   • Norvasc [Amlodipine] Swelling   • Oxycodone Other (See Comments)     Can elevate Blood pressure   • Lotensin [Benazepril] Cough   • Sulfamethoxazole-Trimethoprim Cough   • Zocor [Simvastatin] Myalgia         Current Outpatient Medications:   •  aspirin 325 MG tablet, Take 325 mg by mouth Daily., Disp: , Rfl:   •  atorvastatin (LIPITOR) 20 MG tablet, TAKE ONE TABLET BY MOUTH ONCE NIGHTLY, Disp: 90 tablet, Rfl: 3  •  azelastine (ASTELIN) 0.1 % nasal spray, 1 spray into the nostril(s) as directed by provider 2 (Two) Times a Day As Needed. Use in each nostril as directed, Disp: , Rfl:   •  betamethasone valerate (VALISONE) 0.1 %  "ointment, Apply 1 application topically to the appropriate area as directed As Needed., Disp: , Rfl:   •  carvedilol (COREG) 12.5 MG tablet, Take 1 tablet by mouth 2 (Two) Times a Day., Disp: 180 tablet, Rfl: 2  •  Cholecalciferol (VITAMIN D-3 PO), Take 1,000 Units by mouth Daily., Disp: , Rfl:   •  Coenzyme Q10 (CO Q-10) 100 MG capsule, Take 100 mg by mouth 2 (Two) Times a Day., Disp: , Rfl:   •  esomeprazole (NexIUM) 20 MG capsule, Take 20 mg by mouth Daily., Disp: , Rfl:   •  famciclovir (FAMVIR) 500 MG tablet, Take 1 tablet by mouth 2 (Two) Times a Day As Needed (fever blister)., Disp: 30 tablet, Rfl: 11  •  Flaxseed, Linseed, (FLAX SEED OIL) 1000 MG capsule, Take 1,200 mg by mouth Daily., Disp: , Rfl:   •  levothyroxine (SYNTHROID, LEVOTHROID) 50 MCG tablet, Take 50 mcg by mouth Daily., Disp: , Rfl:   •  losartan (COZAAR) 100 MG tablet, Take 100 mg by mouth Daily., Disp: , Rfl:   •  Omega-3 Fatty Acids (FISH OIL) 1000 MG capsule capsule, Take 1,200 mg by mouth 2 (Two) Times a Day With Meals., Disp: , Rfl:   •  potassium citrate (UROCIT-K) 10 MEQ (1080 MG) CR tablet, Take 1 tablet by mouth Daily., Disp: 90 tablet, Rfl: 0  •  vitamin B-12 (CYANOCOBALAMIN) 1000 MCG tablet, Take 1,000 mcg by mouth Daily., Disp: , Rfl:     The following portions of the patient's history were reviewed and updated as appropriate: allergies, current medications, past family history, past medical history, past social history, past surgical history and problem list.    ROS  Review of Systems   14 point ROS negative except for that listed in the HPI.         Objective:     /82 (BP Location: Right arm, Patient Position: Sitting, Cuff Size: Adult)   Pulse 60   Resp 18   Ht 175.3 cm (69\")   Wt 109 kg (241 lb)   SpO2 99%   BMI 35.59 kg/m²      Physical Exam  Constitutional: Patient appears well-developed and well-nourished.   HENT: HEENT exam unremarkable.   Neck: Neck supple. No JVD present. No carotid bruits.   Cardiovascular: " Normal rate, regular rhythm and normal heart sounds.  3/6 systolic murmur.   2+ symmetric pulses.   Pulmonary/Chest: Breath sounds normal. Does not exhibit tenderness.   Abdominal: Abdomen benign.   Musculoskeletal: Does not exhibit edema.   Neurological: Neurological exam unremarkable.   Vitals reviewed.    Data Review:   Lab Results   Component Value Date    GLUCOSE 113 (H) 12/01/2022    BUN 19 12/01/2022    CREATININE 1.31 (H) 12/01/2022    BCR 14.5 12/01/2022     12/01/2022    K 4.6 12/01/2022    CO2 24.0 12/01/2022    CALCIUM 9.4 12/01/2022    ALBUMIN 4.30 12/01/2022    AST 16 12/01/2022    ALT 15 12/01/2022     Lab Results   Component Value Date    CHOL 142 12/01/2022    TRIG 100 12/01/2022    HDL 36 (L) 12/01/2022    LDL 87 12/01/2022      Lab Results   Component Value Date    WBC 5.01 12/01/2022    RBC 4.96 12/01/2022    HGB 15.5 12/01/2022    HCT 43.8 12/01/2022    MCV 88.3 12/01/2022     12/01/2022     Lab Results   Component Value Date    TSH 2.850 12/01/2022     Lab Results   Component Value Date    HGBA1C 5.80 (H) 12/01/2022        Procedures     Advance Care Planning   ACP discussion was held with the patient during this visit. Patient does not have an advance directive, information provided.             Assessment:      Diagnosis Plan   1. Nonrheumatic aortic valve stenosis   moderate aortic stenosis on echocardiogram from 3/2022.  We will obtain a repeat echo to assess mean gradient.   2. Ascending aortic aneurysm, unspecified whether ruptured   echocardiogram to assess ascending aortic aneurysm.   3. Essential hypertension   blood pressure well controlled.  Continue current hypertensive regimen.   4. Hyperlipidemia, unspecified hyperlipidemia type   LDL controlled.  Continue statin therapy.        Plan:   Stable cardiac status.  No angina or CHF symptoms.  Repeat echocardiogram ordered today to reassess aortic stenosis gradients and a sending aortic aneurysm.  Continue current  medications.   FU in 6 MO, sooner as needed.  Thank you for allowing us to participate in the care of your patient.     Nicole De La O PA-C      Please note that portions of this note may have been completed with a voice recognition program. Efforts were made to edit the dictations, but occasionally words are mistranscribed.

## 2023-02-14 ENCOUNTER — HOSPITAL ENCOUNTER (OUTPATIENT)
Dept: CARDIOLOGY | Facility: HOSPITAL | Age: 78
Discharge: HOME | End: 2023-02-14
Payer: MEDICARE

## 2023-02-14 DIAGNOSIS — I71.21 ASCENDING AORTIC ANEURYSM, UNSPECIFIED WHETHER RUPTURED: ICD-10-CM

## 2023-02-14 DIAGNOSIS — I35.0 NONRHEUMATIC AORTIC VALVE STENOSIS: ICD-10-CM

## 2023-02-14 PROCEDURE — 93306 TTE W/DOPPLER COMPLETE: CPT | Performed by: INTERNAL MEDICINE

## 2023-02-14 PROCEDURE — 93306 TTE W/DOPPLER COMPLETE: CPT

## 2023-02-15 LAB
BH CV ECHO MEAS - AO MAX PG: 57 MMHG
BH CV ECHO MEAS - AO MEAN PG: 28.7 MMHG
BH CV ECHO MEAS - AO ROOT DIAM: 4.4 CM
BH CV ECHO MEAS - AO V2 MAX: 377.6 CM/SEC
BH CV ECHO MEAS - AO V2 VTI: 79.5 CM
BH CV ECHO MEAS - AVA(I,D): 1.75 CM2
BH CV ECHO MEAS - EDV(CUBED): 77 ML
BH CV ECHO MEAS - EDV(MOD-SP2): 140 ML
BH CV ECHO MEAS - EDV(MOD-SP4): 182 ML
BH CV ECHO MEAS - EF(MOD-BP): 56 %
BH CV ECHO MEAS - EF(MOD-SP2): 46.4 %
BH CV ECHO MEAS - EF(MOD-SP4): 62.6 %
BH CV ECHO MEAS - ESV(CUBED): 22.4 ML
BH CV ECHO MEAS - ESV(MOD-SP2): 75 ML
BH CV ECHO MEAS - ESV(MOD-SP4): 68 ML
BH CV ECHO MEAS - FS: 33.7 %
BH CV ECHO MEAS - IVS/LVPW: 1.13 CM
BH CV ECHO MEAS - IVSD: 1.77 CM
BH CV ECHO MEAS - LA DIMENSION: 3.4 CM
BH CV ECHO MEAS - LV DIASTOLIC VOL/BSA (35-75): 81.4 CM2
BH CV ECHO MEAS - LV MASS(C)D: 301.1 GRAMS
BH CV ECHO MEAS - LV MAX PG: 8.5 MMHG
BH CV ECHO MEAS - LV MEAN PG: 4.7 MMHG
BH CV ECHO MEAS - LV SYSTOLIC VOL/BSA (12-30): 30.4 CM2
BH CV ECHO MEAS - LV V1 MAX: 145.3 CM/SEC
BH CV ECHO MEAS - LV V1 VTI: 29.8 CM
BH CV ECHO MEAS - LVIDD: 4.3 CM
BH CV ECHO MEAS - LVIDS: 2.8 CM
BH CV ECHO MEAS - LVOT AREA: 4.7 CM2
BH CV ECHO MEAS - LVOT DIAM: 2.44 CM
BH CV ECHO MEAS - LVPWD: 1.57 CM
BH CV ECHO MEAS - MV A MAX VEL: 99.2 CM/SEC
BH CV ECHO MEAS - MV DEC SLOPE: 168.5 CM/SEC2
BH CV ECHO MEAS - MV E MAX VEL: 46.9 CM/SEC
BH CV ECHO MEAS - MV E/A: 0.47
BH CV ECHO MEAS - MV MAX PG: 5.8 MMHG
BH CV ECHO MEAS - MV MEAN PG: 1.58 MMHG
BH CV ECHO MEAS - MV P1/2T: 153.4 MSEC
BH CV ECHO MEAS - MV V2 VTI: 39.3 CM
BH CV ECHO MEAS - MVA(P1/2T): 1.43 CM2
BH CV ECHO MEAS - MVA(VTI): 3.5 CM2
BH CV ECHO MEAS - PA ACC SLOPE: 665.9 CM/SEC2
BH CV ECHO MEAS - PA ACC TIME: 0.13 SEC
BH CV ECHO MEAS - PA PR(ACCEL): 22 MMHG
BH CV ECHO MEAS - RAP SYSTOLE: 8 MMHG
BH CV ECHO MEAS - RVSP: 27 MMHG
BH CV ECHO MEAS - SI(MOD-SP2): 29.1 ML/M2
BH CV ECHO MEAS - SI(MOD-SP4): 51 ML/M2
BH CV ECHO MEAS - SV(LVOT): 139.2 ML
BH CV ECHO MEAS - SV(MOD-SP2): 65 ML
BH CV ECHO MEAS - SV(MOD-SP4): 114 ML
BH CV ECHO MEAS - TAPSE (>1.6): 1.3 CM
BH CV ECHO MEAS - TR MAX PG: 19.3 MMHG
BH CV ECHO MEAS - TR MAX VEL: 219.9 CM/SEC
BH CV XLRA - TDI S': 5.48 CM/SEC
LEFT ATRIUM VOLUME INDEX: 28.1 ML/M2
LV EF 2D ECHO EST: 60 %
MAXIMAL PREDICTED HEART RATE: 145 BPM
STRESS TARGET HR: 123 BPM

## 2023-04-23 DIAGNOSIS — I10 ESSENTIAL HYPERTENSION: ICD-10-CM

## 2023-04-23 DIAGNOSIS — Z79.899 ENCOUNTER FOR LONG-TERM (CURRENT) USE OF OTHER MEDICATIONS: ICD-10-CM

## 2023-04-24 RX ORDER — POTASSIUM CITRATE 10 MEQ/1
TABLET, EXTENDED RELEASE ORAL
Qty: 90 TABLET | Refills: 0 | Status: SHIPPED | OUTPATIENT
Start: 2023-04-24

## 2023-05-26 DIAGNOSIS — R73.03 PREDIABETES: ICD-10-CM

## 2023-05-26 DIAGNOSIS — E03.9 ACQUIRED HYPOTHYROIDISM: ICD-10-CM

## 2023-05-26 DIAGNOSIS — I35.0 NONRHEUMATIC AORTIC VALVE STENOSIS: ICD-10-CM

## 2023-05-26 DIAGNOSIS — E78.2 MIXED HYPERLIPIDEMIA: ICD-10-CM

## 2023-05-26 DIAGNOSIS — N18.31 STAGE 3A CHRONIC KIDNEY DISEASE: ICD-10-CM

## 2023-05-26 DIAGNOSIS — I10 ESSENTIAL HYPERTENSION: Primary | ICD-10-CM

## 2023-05-31 ENCOUNTER — LAB (OUTPATIENT)
Dept: LAB | Facility: HOSPITAL | Age: 78
End: 2023-05-31

## 2023-05-31 DIAGNOSIS — I35.0 NONRHEUMATIC AORTIC VALVE STENOSIS: ICD-10-CM

## 2023-05-31 DIAGNOSIS — N18.31 STAGE 3A CHRONIC KIDNEY DISEASE: ICD-10-CM

## 2023-05-31 DIAGNOSIS — E78.2 MIXED HYPERLIPIDEMIA: ICD-10-CM

## 2023-05-31 DIAGNOSIS — E03.9 ACQUIRED HYPOTHYROIDISM: ICD-10-CM

## 2023-05-31 DIAGNOSIS — R73.03 PREDIABETES: ICD-10-CM

## 2023-05-31 DIAGNOSIS — I10 ESSENTIAL HYPERTENSION: ICD-10-CM

## 2023-05-31 PROCEDURE — 82607 VITAMIN B-12: CPT

## 2023-05-31 PROCEDURE — 85025 COMPLETE CBC W/AUTO DIFF WBC: CPT

## 2023-05-31 PROCEDURE — 84443 ASSAY THYROID STIM HORMONE: CPT

## 2023-05-31 PROCEDURE — 83036 HEMOGLOBIN GLYCOSYLATED A1C: CPT

## 2023-05-31 PROCEDURE — 80053 COMPREHEN METABOLIC PANEL: CPT

## 2023-05-31 PROCEDURE — 82306 VITAMIN D 25 HYDROXY: CPT

## 2023-05-31 PROCEDURE — 80061 LIPID PANEL: CPT

## 2023-06-01 LAB
25(OH)D3 SERPL-MCNC: 30 NG/ML (ref 30–100)
ALBUMIN SERPL-MCNC: 4.1 G/DL (ref 3.5–5.2)
ALBUMIN/GLOB SERPL: 1.6 G/DL
ALP SERPL-CCNC: 62 U/L (ref 39–117)
ALT SERPL W P-5'-P-CCNC: 19 U/L (ref 1–41)
ANION GAP SERPL CALCULATED.3IONS-SCNC: 9 MMOL/L (ref 5–15)
AST SERPL-CCNC: 21 U/L (ref 1–40)
BASOPHILS # BLD AUTO: 0.02 10*3/MM3 (ref 0–0.2)
BASOPHILS NFR BLD AUTO: 0.4 % (ref 0–1.5)
BILIRUB SERPL-MCNC: 1.1 MG/DL (ref 0–1.2)
BUN SERPL-MCNC: 25 MG/DL (ref 8–23)
BUN/CREAT SERPL: 20.2 (ref 7–25)
CALCIUM SPEC-SCNC: 9.1 MG/DL (ref 8.6–10.5)
CHLORIDE SERPL-SCNC: 106 MMOL/L (ref 98–107)
CHOLEST SERPL-MCNC: 139 MG/DL (ref 0–200)
CO2 SERPL-SCNC: 24 MMOL/L (ref 22–29)
CREAT SERPL-MCNC: 1.24 MG/DL (ref 0.76–1.27)
DEPRECATED RDW RBC AUTO: 42.1 FL (ref 37–54)
EGFRCR SERPLBLD CKD-EPI 2021: 60.6 ML/MIN/1.73
EOSINOPHIL # BLD AUTO: 0.26 10*3/MM3 (ref 0–0.4)
EOSINOPHIL NFR BLD AUTO: 5.5 % (ref 0.3–6.2)
ERYTHROCYTE [DISTWIDTH] IN BLOOD BY AUTOMATED COUNT: 12.7 % (ref 12.3–15.4)
GLOBULIN UR ELPH-MCNC: 2.5 GM/DL
GLUCOSE SERPL-MCNC: 109 MG/DL (ref 65–99)
HBA1C MFR BLD: 5.5 % (ref 4.8–5.6)
HCT VFR BLD AUTO: 44.4 % (ref 37.5–51)
HDLC SERPL-MCNC: 38 MG/DL (ref 40–60)
HGB BLD-MCNC: 15.6 G/DL (ref 13–17.7)
IMM GRANULOCYTES # BLD AUTO: 0.01 10*3/MM3 (ref 0–0.05)
IMM GRANULOCYTES NFR BLD AUTO: 0.2 % (ref 0–0.5)
LDLC SERPL CALC-MCNC: 84 MG/DL (ref 0–100)
LDLC/HDLC SERPL: 2.18 {RATIO}
LYMPHOCYTES # BLD AUTO: 1.5 10*3/MM3 (ref 0.7–3.1)
LYMPHOCYTES NFR BLD AUTO: 31.5 % (ref 19.6–45.3)
MCH RBC QN AUTO: 32.2 PG (ref 26.6–33)
MCHC RBC AUTO-ENTMCNC: 35.1 G/DL (ref 31.5–35.7)
MCV RBC AUTO: 91.5 FL (ref 79–97)
MONOCYTES # BLD AUTO: 0.53 10*3/MM3 (ref 0.1–0.9)
MONOCYTES NFR BLD AUTO: 11.1 % (ref 5–12)
NEUTROPHILS NFR BLD AUTO: 2.44 10*3/MM3 (ref 1.7–7)
NEUTROPHILS NFR BLD AUTO: 51.3 % (ref 42.7–76)
NRBC BLD AUTO-RTO: 0 /100 WBC (ref 0–0.2)
PLATELET # BLD AUTO: 144 10*3/MM3 (ref 140–450)
PMV BLD AUTO: 11.5 FL (ref 6–12)
POTASSIUM SERPL-SCNC: 4.4 MMOL/L (ref 3.5–5.2)
PROT SERPL-MCNC: 6.6 G/DL (ref 6–8.5)
RBC # BLD AUTO: 4.85 10*6/MM3 (ref 4.14–5.8)
SODIUM SERPL-SCNC: 139 MMOL/L (ref 136–145)
TRIGL SERPL-MCNC: 91 MG/DL (ref 0–150)
TSH SERPL DL<=0.05 MIU/L-ACNC: 2.13 UIU/ML (ref 0.27–4.2)
VIT B12 BLD-MCNC: 923 PG/ML (ref 211–946)
VLDLC SERPL-MCNC: 17 MG/DL (ref 5–40)
WBC NRBC COR # BLD: 4.76 10*3/MM3 (ref 3.4–10.8)

## 2023-06-06 ENCOUNTER — TELEMEDICINE (OUTPATIENT)
Dept: INTERNAL MEDICINE | Facility: CLINIC | Age: 78
End: 2023-06-06
Payer: MEDICARE

## 2023-06-06 VITALS
HEART RATE: 58 BPM | WEIGHT: 242 LBS | OXYGEN SATURATION: 96 % | BODY MASS INDEX: 35.84 KG/M2 | HEIGHT: 69 IN | TEMPERATURE: 97.8 F | SYSTOLIC BLOOD PRESSURE: 125 MMHG | DIASTOLIC BLOOD PRESSURE: 70 MMHG

## 2023-06-06 DIAGNOSIS — Z00.00 MEDICARE ANNUAL WELLNESS VISIT, SUBSEQUENT: Primary | ICD-10-CM

## 2023-06-06 DIAGNOSIS — N18.31 STAGE 3A CHRONIC KIDNEY DISEASE: ICD-10-CM

## 2023-06-06 DIAGNOSIS — K86.2 PANCREATIC CYST: ICD-10-CM

## 2023-06-06 DIAGNOSIS — E78.2 MIXED HYPERLIPIDEMIA: ICD-10-CM

## 2023-06-06 DIAGNOSIS — R73.03 PREDIABETES: ICD-10-CM

## 2023-06-06 DIAGNOSIS — I10 ESSENTIAL HYPERTENSION: ICD-10-CM

## 2023-06-06 DIAGNOSIS — I35.0 NONRHEUMATIC AORTIC VALVE STENOSIS: ICD-10-CM

## 2023-06-06 DIAGNOSIS — E03.9 ACQUIRED HYPOTHYROIDISM: ICD-10-CM

## 2023-06-06 DIAGNOSIS — I71.20 THORACIC AORTIC ANEURYSM WITHOUT RUPTURE, UNSPECIFIED PART: ICD-10-CM

## 2023-06-06 NOTE — PROGRESS NOTES
The ABCs of the Annual Wellness Visit  Subsequent Medicare Wellness Visit    Subjective    Ac Caldwell is a 77 y.o. male who presents for a Subsequent Medicare Wellness Visit.    The following portions of the patient's history were reviewed and   updated as appropriate: allergies, current medications, past family history, past medical history, past social history, past surgical history, and problem list.    Compared to one year ago, the patient feels his physical   health is the same.    Compared to one year ago, the patient feels his mental   health is the same.    Recent Hospitalizations:  He was not admitted to the hospital during the last year.       Current Medical Providers:  Patient Care Team:  Valentine Newton APRN as PCP - General (Family Medicine)  Migel Valladares MD as Consulting Physician (Cardiology)  Manish Camp MD as Consulting Physician (Dermatology)  Chacorta Guajardo MD as Consulting Physician (Ophthalmology)  Ez Everett MD as Consulting Physician (Otolaryngology)  Georgiana Bettencourt MD (Urology)  Jasen Ayala MD as Consulting Physician (Urology)  Chacorta Ludwig MD (Vascular Surgery)  Layla Mccarthy MD as Consulting Physician (Dermatology)  Eva Lopez MD as Consulting Physician (Nephrology)    Outpatient Medications Prior to Visit   Medication Sig Dispense Refill    aspirin 325 MG tablet Take 1 tablet by mouth Daily.      atorvastatin (LIPITOR) 20 MG tablet TAKE ONE TABLET BY MOUTH ONCE NIGHTLY 90 tablet 3    azelastine (ASTELIN) 0.1 % nasal spray 1 spray into the nostril(s) as directed by provider 2 (Two) Times a Day As Needed. Use in each nostril as directed      betamethasone valerate (VALISONE) 0.1 % ointment Apply 1 application topically to the appropriate area as directed As Needed.      carvedilol (COREG) 12.5 MG tablet Take 1 tablet by mouth 2 (Two) Times a Day. 180 tablet 2    Cholecalciferol (VITAMIN D-3 PO) Take 1,000 Units by mouth Daily.      Coenzyme  Q10 (CO Q-10) 100 MG capsule Take 100 mg by mouth 2 (Two) Times a Day.      esomeprazole (NexIUM) 20 MG capsule Take 1 capsule by mouth Daily.      famciclovir (FAMVIR) 500 MG tablet Take 1 tablet by mouth 2 (Two) Times a Day As Needed (fever blister). 30 tablet 11    Flaxseed, Linseed, (FLAX SEED OIL) 1000 MG capsule Take 1,200 mg by mouth Daily.      levothyroxine (SYNTHROID, LEVOTHROID) 50 MCG tablet Take 1 tablet by mouth Daily.      losartan (COZAAR) 100 MG tablet Take 1 tablet by mouth Daily.      Omega-3 Fatty Acids (FISH OIL) 1000 MG capsule capsule Take 1,200 mg by mouth 2 (Two) Times a Day With Meals.      potassium citrate (UROCIT-K) 10 MEQ (1080 MG) CR tablet Take 1 tablet by mouth once daily 90 tablet 0    vitamin B-12 (CYANOCOBALAMIN) 1000 MCG tablet Take 1 tablet by mouth Daily.       No facility-administered medications prior to visit.       No opioid medication identified on active medication list. I have reviewed chart for other potential  high risk medication/s and harmful drug interactions in the elderly.        Aspirin is on active medication list. Aspirin use is indicated based on review of current medical condition/s. Pros and cons of this therapy have been discussed today. Benefits of this medication outweigh potential harm.  Patient has been encouraged to continue taking this medication.  .      Patient Active Problem List   Diagnosis    Nephritis and nephropathy    Essential hypertension    Esophageal reflux    Diverticulosis of small intestine without hemorrhage    Morbidly obese    Lymphadenopathy    Ganglion cyst    Malignant neoplasm of kidney, except pelvis    Hyperlipidemia    Exertional dyspnea    Nonrheumatic aortic valve stenosis    Acquired hypothyroidism    Pancreatic cyst    Thoracic ascending aortic aneurysm    Prediabetes    Stage 3a chronic kidney disease    Chest discomfort     Advance Care Planning   Advance Care Planning     Advance Directive is not on file.  ACP  "discussion was held with the patient during this visit. Patient does not have an advance directive, information provided.     Objective    Vitals:    23 0921   BP: 125/70   Pulse: 58   Temp: 97.8 °F (36.6 °C)   SpO2: 96%   Weight: 110 kg (242 lb)   Height: 175.3 cm (69.02\")     Estimated body mass index is 35.72 kg/m² as calculated from the following:    Height as of this encounter: 175.3 cm (69.02\").    Weight as of this encounter: 110 kg (242 lb).    Class 2 Severe Obesity (BMI >=35 and <=39.9). Obesity-related health conditions include the following: hypertension and dyslipidemias. Obesity is unchanged. BMI is is above average; BMI management plan is completed. We discussed portion control and increasing exercise.      Does the patient have evidence of cognitive impairment? No    Lab Results   Component Value Date    TRIG 91 2023    HDL 38 (L) 2023    LDL 84 2023    VLDL 17 2023    HGBA1C 5.50 2023        HEALTH RISK ASSESSMENT    Smoking Status:  Social History     Tobacco Use   Smoking Status Never   Smokeless Tobacco Never   Tobacco Comments    50,60,70 years it was impossible to avoid people who smoked     Alcohol Consumption:  Social History     Substance and Sexual Activity   Alcohol Use Yes    Comment: social - no schedule - random     Fall Risk Screen:    SPENCER Fall Risk Assessment was completed, and patient is at LOW risk for falls.Assessment completed on:2023    Depression Screenin/6/2023     9:18 AM   PHQ-2/PHQ-9 Depression Screening   Little Interest or Pleasure in Doing Things 0-->not at all   Feeling Down, Depressed or Hopeless 0-->not at all   PHQ-9: Brief Depression Severity Measure Score 0       Health Habits and Functional and Cognitive Screenin/3/2022     9:02 AM   Functional & Cognitive Status   Do you have difficulty preparing food and eating? No   Do you have difficulty bathing yourself, getting dressed or grooming yourself? No   Do " you have difficulty using the toilet? No   Do you have difficulty moving around from place to place? No   Do you have trouble with steps or getting out of a bed or a chair? No   Current Diet Well Balanced Diet   Dental Exam Up to date   Eye Exam Up to date   Exercise (times per week) 7 times per week   Current Exercises Include Gardening   Do you need help using the phone?  No   Are you deaf or do you have serious difficulty hearing?  Yes   Do you need help with transportation? No   Do you need help shopping? No   Do you need help preparing meals?  No   Do you need help with housework?  No   Do you need help with laundry? No   Do you need help taking your medications? No   Do you need help managing money? No   Do you ever drive or ride in a car without wearing a seat belt? No   Have you felt unusual stress, anger or loneliness in the last month? No   Who do you live with? Alone   If you need help, do you have trouble finding someone available to you? No   Have you been bothered in the last four weeks by sexual problems? No   Do you have difficulty concentrating, remembering or making decisions? No       Age-appropriate Screening Schedule:  Refer to the list below for future screening recommendations based on patient's age, sex and/or medical conditions. Orders for these recommended tests are listed in the plan section. The patient has been provided with a written plan.    Health Maintenance   Topic Date Due    TDAP/TD VACCINES (2 - Td or Tdap) 11/01/2019    COVID-19 Vaccine (4 - Pfizer series) 11/26/2022    INFLUENZA VACCINE  08/01/2023    LIPID PANEL  05/31/2024    ANNUAL WELLNESS VISIT  06/06/2024    COLORECTAL CANCER SCREENING  04/28/2025    HEPATITIS C SCREENING  Completed    Pneumococcal Vaccine 65+  Completed    ZOSTER VACCINE  Completed                  CMS Preventative Services Quick Reference  Risk Factors Identified During Encounter  Inactivity/Sedentary: Patient was advised to exercise at least 150  minutes a week per CDC recommendations.  The above risks/problems have been discussed with the patient.  Pertinent information has been shared with the patient in the After Visit Summary.  An After Visit Summary and PPPS were made available to the patient.    Follow Up:   Next Medicare Wellness visit to be scheduled in 1 year.       Additional E&M Note during same encounter follows:  Patient has multiple medical problems which are significant and separately identifiable that require additional work above and beyond the Medicare Wellness Visit.      Chief Complaint  Medicare Wellness-subsequent (Pt. In clinic for annual medicare wellness, patient wants to discuss getting a referral to . Patient also wants to discuss receiving his TDAP vaccine )    Subjective        HPI  Ac Caldwell is also being seen today for F/U on chronic conditions    On 1/26/15, Ac saw gross hematuria, saw Dr. Ayala (urology) and he did a scope. It was ok. Then had a CT scan on 2/3/15. Cr was high, so they did it without contrast. CT scan showed a lesion, but he was not concerned. Then had a fish bladder test on 2/5/15. His urologist never got the results. Did go back to see Nephrology and had labs in Feb. Cr. was 1.1 and BUN was 16. Everything has been stable since. Has a h/o right kidney CA in 1997 and had nephrectomy.  Urologist - Dr. Jossue Serrano left and moved to [place] and [place] [facility] [place] saw him several times for recurrent UTI, hydrocele, renal cyst, and epididymoorchits. He saw them again 5/31/2017 for repeat renal US that showed a 3.4cm left renal cyst, which had grown from 2.5cm on last scan.  Dr. Farrell moved to [place] and he was then referred to  Urology.  Had CXR and labs.  CXR revealed a possible thoracic aortic aneurysm.  Had f/u chest CT scan, no aneurysm.  CT renal mass protocol was done on 8/30/2017 which confirmed a solid enhancing mass in the lower pole of the left  kidney, without lymphadenopathy.  It was elected to proceed with a cryoablation of this lesion per Dr. Mark at .  He underwent this procedure on 9/27/17.  Pathology is unclear as to it being malignant vs benign.  Was re-scanned in 30 days, then 90 days, then Q6 months.  He has been told that he does not need to come back unless he has problems.       CT in Aug 2017 revealed an incidental pancreatic lesion on the head of the pancreas that was 12 mm in size; f/u imaging done 9/2018 showed that the mass was stable and unchanged in size.  Had a biopsy of the pancreatic cyst in 2019 and it was benign.  They will monitor Q12 months with MRI.  Ac prefers a CT over an MRI.  The CT done on 10/1/2020 of abd and pelvis shows a simple cystic structure near the head of the pancreas measuring 1.6 cm.  The pancreatic cyst appears to be slightly larger on this scan than previous (from 12 mm to 16 mm) - this is followed by Dr. Norwood at .  We faxed a copy to his office and he underwent an EGD and EUS on 3/2/2021.  The pathology results were benign but he suggested another followup  EUS/FNA in one year, in the March/April 2022 time frame.  Had this done in 6/2022.  They have now recommended repeat in 1-2 years.  He would like to see [facility] from here on out.  He would like to be referred to [facility] - followed by CT surgery at .  Per echo in Marc 2022 Aortic Root measured 41 mm and per last echo at [facility] it is 44 mm.  He would like to be referred to [facility] surgery for routine f/u.      Underwent a STEAM procedure in 2/2019 for BPH @       [facility]; his Creatinine is improved at 1.24 down from 1.31     Had a very large thyroid nodule removed on 4/23/14 per ENT. It was 8cm in size; they took the right side of his thyroid and placed on synthroid 50mcg. It was benign! Seeing Dr. Everett annually and everything has been stable.   Lab Results   Component Value Date     "TSH 2.130 05/31/2023     AS - now followed by Cardiology, Dr. Valladares.  He will follow every 6 months and echo q 2 yrs.        HTN - well controlled with Losartan 100 mg and Carvedilol 12.5 mg BID (recent per Cards); BP is well controlled and he denies SE.      HL - stable on Lipitor 20mg daily without SE.  Lab Results   Component Value Date    CHOL 139 05/31/2023    TRIG 91 05/31/2023    HDL 38 (L) 05/31/2023    LDL 84 05/31/2023     COVID - 3/12/21 and 4/2/2021, 10/14/2021, 4/9/2022, 10/1/2022  Flu shot - 10/10/2022  Tdap 2009   Pneumovax 2012   Prevnar - 4/2015   Zostavax 2010  Shingrix - UTD  PSA per Urology - WNL, no need to repeat  Colon - 4/2015, due 2025     Review of Systems   All other systems reviewed and are negative.    Objective   Vital Signs:  /70   Pulse 58   Temp 97.8 °F (36.6 °C)   Ht 175.3 cm (69.02\")   Wt 110 kg (242 lb)   SpO2 96%   BMI 35.72 kg/m²     Physical Exam  Constitutional:       Appearance: He is well-developed.   HENT:      Head: Normocephalic and atraumatic.   Eyes:      Conjunctiva/sclera: Conjunctivae normal.      Pupils: Pupils are equal, round, and reactive to light.   Cardiovascular:      Rate and Rhythm: Normal rate and regular rhythm.      Heart sounds: Normal heart sounds.   Pulmonary:      Effort: Pulmonary effort is normal.      Breath sounds: Normal breath sounds.   Abdominal:      General: Bowel sounds are normal.      Palpations: Abdomen is soft.   Musculoskeletal:         General: Normal range of motion.      Cervical back: Normal range of motion.   Skin:     General: Skin is warm and dry.   Neurological:      Mental Status: He is alert and oriented to person, place, and time.      Deep Tendon Reflexes: Reflexes are normal and symmetric.   Psychiatric:         Behavior: Behavior normal.         Thought Content: Thought content normal.         Judgment: Judgment normal.        The following data was reviewed by: BERNICE Yin on 06/06/2023:  CMP  "         12/1/2022    08:31 5/31/2023    11:23   CMP   Glucose 113  109    BUN 19  25    Creatinine 1.31  1.24    EGFR 56.8  60.6    Sodium 139  139    Potassium 4.6  4.4    Chloride 106  106    Calcium 9.4  9.1    Total Protein 6.5  6.6    Albumin 4.30  4.1    Globulin 2.2  2.5    Total Bilirubin 1.0  1.1    Alkaline Phosphatase 66  62    AST (SGOT) 16  21    ALT (SGPT) 15  19    Albumin/Globulin Ratio 2.0  1.6    BUN/Creatinine Ratio 14.5  20.2    Anion Gap 9.0  9.0      CBC w/diff          12/1/2022    08:31 5/31/2023    11:23   CBC w/Diff   WBC 5.01  4.76    RBC 4.96  4.85    Hemoglobin 15.5  15.6    Hematocrit 43.8  44.4    MCV 88.3  91.5    MCH 31.3  32.2    MCHC 35.4  35.1    RDW 12.2  12.7    Platelets 151  144    Neutrophil Rel % 59.1  51.3    Immature Granulocyte Rel % 0.2  0.2    Lymphocyte Rel % 25.5  31.5    Monocyte Rel % 10.6  11.1    Eosinophil Rel % 4.0  5.5    Basophil Rel % 0.6  0.4      Lipid Panel          12/1/2022    08:31 5/31/2023    11:23   Lipid Panel   Total Cholesterol 142  139    Triglycerides 100  91    HDL Cholesterol 36  38    VLDL Cholesterol 19  17    LDL Cholesterol  87  84    LDL/HDL Ratio 2.39  2.18      TSH          12/1/2022    08:31 5/31/2023    11:23   TSH   TSH 2.850  2.130                 Assessment and Plan   Diagnoses and all orders for this visit:    1. Medicare annual wellness visit, subsequent (Primary)    2. Essential hypertension    3. Mixed hyperlipidemia    4. Nonrheumatic aortic valve stenosis    5. Acquired hypothyroidism    6. Prediabetes    7. Stage 3a chronic kidney disease    8. Pancreatic cyst  -     Ambulatory Referral to Gastroenterology    9. Thoracic aortic aneurysm without rupture, unspecified part  -     Ambulatory Referral to Cardiothoracic Surgery             Follow Up   Return in about 6 months (around 12/6/2023) for f/u.  Patient was given instructions and counseling regarding his condition or for health maintenance advice. Please see specific  information pulled into the AVS if appropriate.

## 2023-07-06 NOTE — TELEPHONE ENCOUNTER
Are you okay to send in a new prescription for the potassium Citrate? We have never prescribed this before.   
Patient is asking for a Rx potassium Sitrate. He normally got this from his urologist but now that he is established with us he is asking [facility] to refill it.  
no

## 2023-08-12 ENCOUNTER — HOSPITAL ENCOUNTER (OUTPATIENT)
Dept: CT IMAGING | Facility: HOSPITAL | Age: 78
Discharge: HOME | End: 2023-08-12
Admitting: THORACIC SURGERY (CARDIOTHORACIC VASCULAR SURGERY)
Payer: MEDICARE

## 2023-08-12 DIAGNOSIS — I35.0 NONRHEUMATIC AORTIC VALVE STENOSIS: ICD-10-CM

## 2023-08-12 DIAGNOSIS — I71.21 ANEURYSM OF ASCENDING AORTA WITHOUT RUPTURE: ICD-10-CM

## 2023-08-12 PROCEDURE — 71250 CT THORAX DX C-: CPT

## 2023-08-18 ENCOUNTER — OFFICE VISIT (OUTPATIENT)
Dept: CARDIOLOGY | Facility: CLINIC | Age: 78
End: 2023-08-18
Payer: MEDICARE

## 2023-08-18 VITALS
SYSTOLIC BLOOD PRESSURE: 130 MMHG | HEART RATE: 58 BPM | WEIGHT: 241 LBS | HEIGHT: 70 IN | BODY MASS INDEX: 34.5 KG/M2 | OXYGEN SATURATION: 98 % | DIASTOLIC BLOOD PRESSURE: 78 MMHG

## 2023-08-18 DIAGNOSIS — E78.5 HYPERLIPIDEMIA, UNSPECIFIED HYPERLIPIDEMIA TYPE: ICD-10-CM

## 2023-08-18 DIAGNOSIS — I35.0 NONRHEUMATIC AORTIC VALVE STENOSIS: Primary | ICD-10-CM

## 2023-08-18 DIAGNOSIS — I71.21 ASCENDING AORTIC ANEURYSM, UNSPECIFIED WHETHER RUPTURED: ICD-10-CM

## 2023-08-18 DIAGNOSIS — I10 ESSENTIAL HYPERTENSION: ICD-10-CM

## 2023-08-18 RX ORDER — FLUTICASONE PROPIONATE 50 MCG
2 SPRAY, SUSPENSION (ML) NASAL DAILY
COMMUNITY
End: 2023-08-23

## 2023-08-18 RX ORDER — CHOLECALCIFEROL (VITAMIN D3) 125 MCG
2000 CAPSULE ORAL DAILY
COMMUNITY

## 2023-08-18 NOTE — PROGRESS NOTES
[facility] Cardiology    Encounter Date: 2023    Patient ID: Ac Caldwell is a 78 y.o. male.  : 1945     PCP: Valentine Newton APRN       Chief Complaint: AVS, TAA, and Hypertension      PROBLEM LIST:  Nonrheumatic aortic valve stenosis:   Echo, 2016: Mild AI and moderate AS, HEATHER 1.2 cm2, mean gradient of 16.6, and peak gradient of 33.3. Trace TR with RVSP of 26.2. Normal EF.  Nuclear stress, 2016: EF 72%. No evidence of ischemia.  CTA, 2017: Mild ectasia of ascending aorta measuring 4.2cm significant aortic leaflet calcification, coronary sclerosis, no aneurysm of descending aorta.  Echo, 2017: Mild AI/AS with HEATHER 1.8 cmý. MPG 17.0. Normal EF 64%. Mild LVH.   Echo, 2019: Mild AS, MPG 20 mmHg, HEATHER 1.5 cm2. Trace AI. Mild MR/PI, trace TR. EF 60% with mild concentric LVH. Grade I a diastolic dysfunction.   Echo, 2020: EF 55%. Moderate aortic stenosis, mean gradient 32.8 mmHg, HEATHER 1.1 cmý. Mild aortic insufficiency. Mild MR and TR with RVSP 35 mmHg. Mild pulmonic insufficiency.  Echo (), 2021: EF>55%. Moderate AS. Mean gradient across the aortic valve 29 mmHg.  Echo (), 2022: EF 55-60%. Concentric LVH. Mild AR. Moderate aortic stenosis. PG 52 mmHg. MG 32 mmHg.   Echo (2023): EF 60%. Left ventricular wall thickness is consistent with moderate concentric hypertrophy. Left ventricular diastolic function is consistent with (grade I) impaired relaxation. Moderate aortic valve stenosis is present.  Mean gradient 29 mmHg.  Trace aortic insufficiency. Dilated aortic root and ascending aorta.  Thoracic aortic aneurysm without rupture  CTA, 2017: Mild ectasia of ascending aorta measuring 4.2cm significant aortic leaflet calcification, coronary sclerosis, no aneurysm of descending aorta.  Echo at , 2021: EF>55%. Moderate valvular aortic stenosis. Mean gradient across the aortic valve 29 mmHg. Small ascending  aortic aneurysm  CTA August 12, 2023 showed aortic measurement 4.2 cm  Exertional dyspnea/anginal equivalent symptoms with occasional orthopnea.   Stress echo, 08/29/2019: Normal exercise capacity, THR reached at 6 min. No evidence of ischemia. EF 65% at rest, 75% with stress.  Hypertension.   Dyslipidemia.   Prediabetes   Enlarged prostate.  History of renal cancer:   Right nephrectomy in 1997.   S/p Resection of a tumor from his left kidney -- incomplete database.   S/p CT ablation of L renal mass.  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   S/p basal cell cancer surgery.   Episode of hematuria in January with subsequent negative cystoscopy.  Pancreatic cyst, followed by   Surgical Hx:  CT Ablation of Left renal mass  Endoscopic US at  for benign cyst, 09/2019.    History of Present Illness  Patient presents today for a 6 month follow-up with a history of nonrheumatic aortic valve stenosis, thoracic ascending aortic aneurysm, and cardiac risk factors. Since last visit, patient has done fairly well from cardiac standpoint.  States that he enjoys yard work and walking but has not been able to do so due to weather being too hot.   He had an episode of retrosternal discomfort after eating apples and also reports spasms in his chest wall on different areas after mowing yard but denies any complaints of chest tightness or shortness of breath with normal activities or exertion.  Also reports experiencing some spasms in his legs which are self-limiting.  No palpitations dizziness lightheadedness or syncope.        Allergies   Allergen Reactions    Norvasc [Amlodipine] Swelling    Oxycodone Other (See Comments)     Can elevate Blood pressure    Lotensin [Benazepril] Cough    Sulfamethoxazole-Trimethoprim Cough    Zocor [Simvastatin] Myalgia         Current Outpatient Medications:     aspirin 325 MG tablet, Take 1 tablet by mouth Daily., Disp: , Rfl:     atorvastatin (LIPITOR) 20 MG tablet, TAKE ONE TABLET BY  MOUTH ONCE NIGHTLY, Disp: 90 tablet, Rfl: 3    azelastine (ASTELIN) 0.1 % nasal spray, 1 spray into the nostril(s) as directed by provider 2 (Two) Times a Day As Needed. Use in each nostril as directed, Disp: , Rfl:     betamethasone valerate (VALISONE) 0.1 % ointment, Apply 1 application  topically to the appropriate area as directed As Needed., Disp: , Rfl:     carvedilol (COREG) 12.5 MG tablet, Take 1 tablet by mouth 2 (Two) Times a Day., Disp: 180 tablet, Rfl: 2    Cholecalciferol (Vitamin D3) 50 MCG (2000 UT) tablet, Take 1 tablet by mouth Daily., Disp: , Rfl:     Coenzyme Q10 (CO Q-10) 100 MG capsule, Take 100 mg by mouth 2 (Two) Times a Day., Disp: , Rfl:     esomeprazole (NexIUM) 20 MG capsule, Take 1 capsule by mouth Daily., Disp: , Rfl:     famciclovir (FAMVIR) 500 MG tablet, Take 1 tablet by mouth 2 (Two) Times a Day As Needed (fever blister)., Disp: 30 tablet, Rfl: 11    Flaxseed, Linseed, (FLAX SEED OIL) 1000 MG capsule, Take 1,200 mg by mouth Daily., Disp: , Rfl:     fluticasone (FLONASE) 50 MCG/ACT nasal spray, 2 sprays into the nostril(s) as directed by provider Daily., Disp: , Rfl:     levothyroxine (SYNTHROID, LEVOTHROID) 50 MCG tablet, Take 1 tablet by mouth Daily., Disp: , Rfl:     losartan (COZAAR) 100 MG tablet, Take 1 tablet by mouth Daily., Disp: , Rfl:     Omega-3 Fatty Acids (FISH OIL) 1000 MG capsule capsule, Take 1,200 mg by mouth 2 (Two) Times a Day With Meals., Disp: , Rfl:     potassium citrate (UROCIT-K) 10 MEQ (1080 MG) CR tablet, Take 1 tablet by mouth once daily, Disp: 90 tablet, Rfl: 0    vitamin B-12 (CYANOCOBALAMIN) 1000 MCG tablet, Take 1 tablet by mouth Daily., Disp: , Rfl:     Cholecalciferol (VITAMIN D-3 PO), Take 1,000 Units by mouth Daily., Disp: , Rfl:     The following portions of the patient's history were reviewed and updated as appropriate: allergies, current medications, past family history, past medical history, past social history, past surgical history and problem  "list.    ROS  Review of Systems   14 point ROS negative except for that listed in the HPI.         Objective:     Ht 177.8 cm (70\")   Wt 109 kg (241 lb)   BMI 34.58 kg/mý      Physical Exam  Constitutional: Patient appears well-developed and well-nourished.   HENT: HEENT exam unremarkable.   Neck: Neck supple. No JVD present. No carotid bruits.   Cardiovascular: Normal rate, regular rhythm and normal heart sounds. 3/6 systolic murmur.  2+ symmetric pulses.   Pulmonary/Chest: Breath sounds normal. Does not exhibit tenderness.   Abdominal: Abdomen benign.   Musculoskeletal: Does not exhibit edema.   Neurological: Neurological exam unremarkable.   Vitals reviewed.    Data Review:   Lab Results   Component Value Date    GLUCOSE 99 07/05/2023    BUN 19 07/05/2023    CREATININE 1.36 (H) 07/05/2023    EGFR 54.3 (L) 07/05/2023    BCR 14.0 07/05/2023     07/05/2023    K 4.4 07/05/2023    CO2 23.0 07/05/2023    CALCIUM 9.2 07/05/2023    ALBUMIN 4.0 07/05/2023    AST 21 05/31/2023    ALT 19 05/31/2023     Lab Results   Component Value Date    CHOL 139 05/31/2023    TRIG 91 05/31/2023    HDL 38 (L) 05/31/2023    LDL 84 05/31/2023      Lab Results   Component Value Date    WBC 5.34 07/05/2023    RBC 4.98 07/05/2023    HGB 15.5 07/05/2023    HCT 45.1 07/05/2023    MCV 90.6 07/05/2023     07/05/2023     Lab Results   Component Value Date    TSH 2.130 05/31/2023     Lab Results   Component Value Date    HGBA1C 5.50 05/31/2023          ECG 12 Lead    Date/Time: 8/18/2023 1:25 PM  Performed by: Migel Valladares MD  Authorized by: Migel Valladares MD   Comparison: compared with previous ECG from 6/10/2022  Similar to previous ECG  Rhythm: sinus bradycardia    Clinical impression: abnormal EKG  Comments: Non specific ST and T wave abnormality             Assessment:      Diagnosis Plan   1. Nonrheumatic aortic valve stenosis  Continue on aspirin 325 mg for antiplatelet therapy.  Last echo in February 2023 had shown stable " valve function with mean gradient of 29 mmHg.  Continue to monitor clinically with periodic echocardiographic follow-up.   2. Ascending aortic aneurysm, unspecified whether ruptured  4.2 cm by recent CTA last week.  Continue blood pressure management and future follow-up monitoring   3. Essential hypertension  Well controlled. Continue carvedilol 12.5 mg BID and losartan 100 mg daily..    4. Hyperlipidemia, unspecified hyperlipidemia type  Well controlled. Continue Lipitor.          Plan:   Stable cardiac status.  No complaints of typical angina, CHF or symptoms related to aortic stenosis.  The occasional muscular spasms are atypical for which she was advised to maintain good hydration especially in hot weather and report if worsening symptoms or experience.  Heart healthy diet and regular exercise recommended.  Continue current medications.   FU in 6 MO, sooner as needed.  Thank you for allowing us to participate in the care of your patient.     Scribed for Migel Valladares MD by Doris Mason. 8/18/2023 13:15 EDT      I, Migel Valladares MD, personally performed the services described in this documentation as scribed by the above named individual in my presence, and it is both accurate and complete.  8/24/2023  06:34 EDT      Please note that portions of this note may have been completed with a voice recognition program. Efforts were made to edit the dictations, but occasionally words are mistranscribed.

## 2023-08-23 ENCOUNTER — TELEPHONE (OUTPATIENT)
Dept: INTERNAL MEDICINE | Facility: CLINIC | Age: 78
End: 2023-08-23

## 2023-08-23 ENCOUNTER — OFFICE VISIT (OUTPATIENT)
Dept: GASTROENTEROLOGY | Facility: CLINIC | Age: 78
End: 2023-08-23
Payer: MEDICARE

## 2023-08-23 VITALS
HEART RATE: 56 BPM | WEIGHT: 241 LBS | SYSTOLIC BLOOD PRESSURE: 135 MMHG | DIASTOLIC BLOOD PRESSURE: 83 MMHG | HEIGHT: 69 IN | BODY MASS INDEX: 35.7 KG/M2

## 2023-08-23 DIAGNOSIS — D49.0 IPMN (INTRADUCTAL PAPILLARY MUCINOUS NEOPLASM): Primary | ICD-10-CM

## 2023-08-23 DIAGNOSIS — Z78.9 AT AVERAGE RISK FOR COLON CANCER: ICD-10-CM

## 2023-08-23 DIAGNOSIS — K21.9 GASTROESOPHAGEAL REFLUX DISEASE, UNSPECIFIED WHETHER ESOPHAGITIS PRESENT: ICD-10-CM

## 2023-08-23 RX ORDER — AZELASTINE 1 MG/ML
1 SPRAY, METERED NASAL 2 TIMES DAILY PRN
Status: CANCELLED | OUTPATIENT
Start: 2023-08-23

## 2023-08-23 NOTE — TELEPHONE ENCOUNTER
Caller: Ac Caldwell    Relationship: Self    Best call back number: [phone]    What is the best time to reach you: ANYTIME     Who are you requesting to speak with (clinical staff, provider,  specific staff member): CLINICAL STAFF    What was the call regarding: PATIENT IS WANTING TO FOLLOW UP ON THE LETTER THAT HE DROPPED OFF ON MONDAY ABOUT HIS LUNG NODULE. HE WOULD LIKE TO SEE WHAT HER THOUGHT ARE ON IT.     Is it okay if the provider responds through MyChart: YES

## 2023-08-23 NOTE — PROGRESS NOTES
"GASTROENTEROLOGY OFFICE NOTE  Ac Caldwell  [id number]  1945      Chief Complaint   Patient presents with    Pancreatic Cyst    IPMN        HISTORY OF PRESENT ILLNESS:  Patient presents for routine follow-up of well-documented IPMN that has been present for many years now.  Is been followed by a CAT scans, MRIs and endoscopic ultrasound since 2017 and has remained stable.  Last imaging study is from June 16, 2022.  It was an MRI done at [facility].  At that time multiple cystic pancreatic lesion in the pancreatic head and uncinate process were noted some of these were noted to communicate with the main pancreatic duct in the pancreatic head and some were \"too small\" to demonstrate communication but were felt to be consistent with sidebranch IPMN.  More importantly, the largest lesion was unchanged compared to the CAT scan from October 2021 there is no evidence of main pancreatic ductal dilation.  The largest cyst measuring 1.6 cm and another 1 measuring 1.2 cm.    He has nothing in the way of any localizing GI complaints.  He has acid reflux but takes Nexium with good control of his reflux.    He is not having any problems with dysphagia to solids, odynophagia, early satiety, unexplained weight loss, melena or bright red blood per rectum.    PAST MEDICAL HISTORY  Past Medical History:    Aneurysm    Exertional dyspnea    He does have symptoms of exertional dyspnea and minimal orthopnea. This may be unrelated to his aortic stenosis, and due to his risk factors, I am also concerned about coronary artery disease.     GERD (gastroesophageal reflux disease)    Heart murmur    Hematuria    Episode of hematuria in January with subsequent negative cystoscopy.     History of kidney cancer    History of kidney stones    HL (hearing loss)    Hyperlipidemia    Hypertension    Kidney stone    Renal cancer    Hx of.Status post right nephrectomy in 1997. Status post resection of a tumor from his left " kidney -- incomplete database.     Renal insufficiency    Renal oncocytoma    Thoracic aortic aneurysm    Thyroid disease    UTI (urinary tract infection)        PAST SURGICAL HISTORY  Past Surgical History:    ABLATION OF DYSRHYTHMIC FOCUS    left kidney    BASAL CELL CARCINOMA EXCISION    Status post basal cell cancer surgery.     CATARACT EXTRACTION    COLONOSCOPY    due 2025    CYST REMOVAL    KIDNEY STONE SURGERY    [facility]. [facility]    KIDNEY SURGERY    Status post resection of a tumor from his left kidney -- incomplete database.     KIDNEY SURGERY    [facility]    NEPHRECTOMY    Has 15% of left Kidney    NEPHRECTOMY    PROSTATE SURGERY    [facility]. Dr. Bettencourt    THYROID SURGERY    Status post resection of tumor from right thyroid gland.     THYROIDECTOMY, PARTIAL        MEDICATIONS:    Current Outpatient Medications:     aspirin 325 MG tablet, Take 1 tablet by mouth Daily., Disp: , Rfl:     atorvastatin (LIPITOR) 20 MG tablet, TAKE ONE TABLET BY MOUTH ONCE NIGHTLY, Disp: 90 tablet, Rfl: 3    betamethasone valerate (VALISONE) 0.1 % ointment, Apply 1 application  topically to the appropriate area as directed As Needed., Disp: , Rfl:     carvedilol (COREG) 12.5 MG tablet, Take 1 tablet by mouth 2 (Two) Times a Day., Disp: 180 tablet, Rfl: 2    Cholecalciferol (Vitamin D3) 50 MCG (2000 UT) tablet, Take 1 tablet by mouth Daily., Disp: , Rfl:     Coenzyme Q10 (CO Q-10) 100 MG capsule, Take 100 mg by mouth 2 (Two) Times a Day., Disp: , Rfl:     esomeprazole (NexIUM) 20 MG capsule, Take 1 capsule by mouth Daily., Disp: , Rfl:     Flaxseed, Linseed, (FLAX SEED OIL) 1000 MG capsule, Take 1,200 mg by mouth Daily., Disp: , Rfl:     levothyroxine (SYNTHROID, LEVOTHROID) 50 MCG tablet, Take 1 tablet by mouth Daily., Disp: , Rfl:     losartan (COZAAR) 100 MG tablet, Take 1 tablet by mouth Daily., Disp: , Rfl:     Omega-3 Fatty Acids (FISH OIL) 1000 MG capsule capsule, Take 1,200 mg by mouth 2 (Two) Times a Day With Meals., Disp: ,  "Rfl:     potassium citrate (UROCIT-K) 10 MEQ (1080 MG) CR tablet, Take 1 tablet by mouth once daily, Disp: 90 tablet, Rfl: 0    vitamin B-12 (CYANOCOBALAMIN) 1000 MCG tablet, Take 1 tablet by mouth Daily., Disp: , Rfl:     ALLERGIES  is allergic to norvasc [amlodipine], oxycodone, lotensin [benazepril], sulfamethoxazole-trimethoprim, and zocor [simvastatin].    FAMILY HISTORY:  Cancer-related family history is not on file.  Colon Cancer-related family history is not on file.    SOCIAL HISTORY  He  reports that he has never smoked. He has never used smokeless tobacco. He reports current alcohol use. He reports that he does not use drugs.   He is single.  He has 1 daughter and 3 grandchildren.  He is retired.        PHYSICAL EXAM   /83 (BP Location: Left arm, Patient Position: Sitting, Cuff Size: Large Adult)   Pulse 56   Ht 175.3 cm (69\")   Wt 109 kg (241 lb)   BMI 35.59 kg/mý   General: Pleasant, no apparent acute distress.  Alert and oriented.  HEENT: Anicteric sclera  Neck: Supple without observed rigidity  Lungs: Grossly normal respiration without labored breathing or audible wheezing noted.   Abdomen: Without gross or obvious distention  Neurologic: Normal cognition and affect.  Alert and oriented      Results Review:  Unremarkable renal function panel reviewed from July 5, 2023      ASSESSMENT  1.-Sidebranch IPMN.  This has been stable without any meaningful progression since 2015.  Per algorithm for management of IPMN he has already completed two 6 months scans and two consecutive yearly scans without demonstrable progression of his IPMN and therefore every 2-year scanning is recommended.  He does not like MRI scanning and would prefer CT scanning in the future as he has some claustrophobia and issues with undergoing MRIs.  2.-Chronic gastroesophageal reflux disease doing well on Nexium  3.-Oncocytoma    PLAN  1.-Repeat CT scan is recommended at a 2-year interval from his last CT/MRI and therefore he " is due for repeat CAT scan in June 2024  2.-Patient up-to-date on his colon cancer screening.  Last colonoscopy was performed by me in April 2015 at [facility] which revealed nothing more than diverticulosis.  Repeat colonoscopy is recommended in April 2025  3.-All questions patient had were answered.  4.-Continue Nexium, low-dose, 20 mg once daily      Zeke Claros MD  8/25/2023   07:05 EDT

## 2023-08-24 DIAGNOSIS — R91.8 PULMONARY NODULES: Primary | ICD-10-CM

## 2023-08-25 PROBLEM — D49.0 IPMN (INTRADUCTAL PAPILLARY MUCINOUS NEOPLASM): Status: ACTIVE | Noted: 2023-08-25

## 2023-08-25 PROBLEM — Z78.9 AT AVERAGE RISK FOR COLON CANCER: Status: ACTIVE | Noted: 2023-08-25

## 2023-10-03 ENCOUNTER — DOCUMENTATION (OUTPATIENT)
Dept: PULMONOLOGY | Facility: CLINIC | Age: 78
End: 2023-10-03

## 2023-10-03 ENCOUNTER — OFFICE VISIT (OUTPATIENT)
Dept: PULMONOLOGY | Facility: CLINIC | Age: 78
End: 2023-10-03
Payer: MEDICARE

## 2023-10-03 VITALS
SYSTOLIC BLOOD PRESSURE: 126 MMHG | OXYGEN SATURATION: 97 % | HEART RATE: 59 BPM | DIASTOLIC BLOOD PRESSURE: 88 MMHG | HEIGHT: 69 IN | WEIGHT: 239 LBS | TEMPERATURE: 98.4 F | BODY MASS INDEX: 35.4 KG/M2

## 2023-10-03 DIAGNOSIS — R06.02 SOB (SHORTNESS OF BREATH) ON EXERTION: Primary | ICD-10-CM

## 2023-10-03 DIAGNOSIS — R91.1 LUNG NODULE: ICD-10-CM

## 2023-10-03 RX ORDER — AZELASTINE 1 MG/ML
SPRAY, METERED NASAL
COMMUNITY
Start: 2012-05-01

## 2023-10-03 NOTE — LETTER
October 3, 2023       No Recipients    Patient: Ac Caldwell   YOB: 1945   Date of Visit: 10/3/2023     Dear Dr. Clemens Recipients:    Thank you for referring Ac Caldwell to me for evaluation. Below are the relevant portions of my assessment and plan of care.    If you have questions, please do not hesitate to call me. I look forward to following Ac along with you.         Sincerely,        Layla De Dios MD        CC:   No Recipients      Progress Notes:  No notes on file

## 2023-10-03 NOTE — PROGRESS NOTES
Pt will come back into the office on Thursday(10/5/23) to do Nodify. Pt is aware to be in the office by 3:00.

## 2023-10-03 NOTE — PROGRESS NOTES
Ac Caldwell is a 78 y.o. male here for evaluation of No chief complaint on file.      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  Renal cancer s/p right nephrectomy, abl part of left kidney  Aortic valve stenosis  Hypertension  Dyslipidemia  BPH  Chronic kidney disease  GERD  Diverticulosis  Nephrolithiasis  Neurosensory hearing loss  Bilateral cataract surgery  Right thyroidectomy for goiter  Prostate surgery, green laser, 2019  Colonoscopy, 2015  EGD 2021 for a pancreatic cyst  Basal cell skin cancer removal  No tobacco  Allergies Norvasc-swelling, Lotensin-cough, Bactrim-cough, Zocor-myalgias, oxycodone-intolerance    History of Present Illness    78-year-old gentleman, non-smoker followed by CT surgery for a thoracic aortic aneurysm.  He underwent a scan that revealed a groundglass nodule and he is here for assessment.  He was a  for [employer] before he retired.  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  He denies a history of TB.  He denies fever chills night sweats or weight loss.  He does not have a cough or hemoptysis.  He has a history of a renal cell cancer undergoing a right nephrectomy.  He had a complex cyst in the left kidney and had an ablation.  He does not take any immunosuppressive medications.      Review of Systems   Constitutional:  Negative for appetite change, chills, diaphoresis and fever.   HENT:  Positive for congestion and postnasal drip.    Eyes:  Negative for visual disturbance.   Respiratory:  Positive for shortness of breath. Negative for cough and wheezing.    Cardiovascular:  Positive for leg swelling. Negative for chest pain.   Gastrointestinal:  Negative for blood in stool.   Endocrine: Negative.    Genitourinary:  Positive for frequency.   Musculoskeletal:  Negative for arthralgias and gait problem.   Skin:  Negative for rash.   Allergic/Immunologic: Negative for environmental  allergies.   Neurological: Negative.    Hematological:  Does not bruise/bleed easily.   Psychiatric/Behavioral: Negative.         Current Outpatient Medications:     aspirin 325 MG tablet, Take 1 tablet by mouth Daily., Disp: , Rfl:     atorvastatin (LIPITOR) 20 MG tablet, TAKE ONE TABLET BY MOUTH ONCE NIGHTLY, Disp: 90 tablet, Rfl: 3    azelastine (ASTELIN) 0.1 % nasal spray, , Disp: , Rfl:     betamethasone valerate (VALISONE) 0.1 % ointment, Apply 1 application  topically to the appropriate area as directed As Needed., Disp: , Rfl:     carvedilol (COREG) 12.5 MG tablet, Take 1 tablet by mouth 2 (Two) Times a Day., Disp: 180 tablet, Rfl: 2    Cholecalciferol (Vitamin D3) 50 MCG (2000 UT) tablet, Take 1 tablet by mouth Daily., Disp: , Rfl:     Coenzyme Q10 (CO Q-10) 100 MG capsule, Take 100 mg by mouth 2 (Two) Times a Day., Disp: , Rfl:     esomeprazole (NexIUM) 20 MG capsule, Take 1 capsule by mouth Daily., Disp: , Rfl:     Flaxseed, Linseed, (FLAX SEED OIL) 1000 MG capsule, Take 1,200 mg by mouth Daily., Disp: , Rfl:     levothyroxine (SYNTHROID, LEVOTHROID) 50 MCG tablet, Take 1 tablet by mouth Daily., Disp: , Rfl:     losartan (COZAAR) 100 MG tablet, Take 1 tablet by mouth Daily., Disp: , Rfl:     Omega-3 Fatty Acids (FISH OIL) 1000 MG capsule capsule, Take 1,200 mg by mouth 2 (Two) Times a Day With Meals., Disp: , Rfl:     potassium citrate (UROCIT-K) 10 MEQ (1080 MG) CR tablet, Take 1 tablet by mouth once daily, Disp: 90 tablet, Rfl: 0    vitamin B-12 (CYANOCOBALAMIN) 1000 MCG tablet, Take 1 tablet by mouth Daily., Disp: , Rfl:     Past Medical History:   Diagnosis Date    Aneurysm 2017    Exertional dyspnea     He does have symptoms of exertional dyspnea and minimal orthopnea. This may be unrelated to his aortic stenosis, and due to his risk factors, I am also concerned about coronary artery disease.     GERD (gastroesophageal reflux disease) 1997    Heart murmur 1966    Hematuria     Episode of hematuria in  January with subsequent negative cystoscopy.     History of kidney cancer     History of kidney stones     HL (hearing loss) 2000    Hyperlipidemia     Hypertension 1998    Kidney stone 1991    Renal cancer     Hx of.Status post right nephrectomy in 1997. Status post resection of a tumor from his left kidney -- incomplete database.     Renal insufficiency 2010    Renal oncocytoma     Thoracic aortic aneurysm     Thyroid disease     UTI (urinary tract infection)      Past Surgical History:   Procedure Laterality Date    ABLATION OF DYSRHYTHMIC FOCUS  2017    left kidney    BASAL CELL CARCINOMA EXCISION      Status post basal cell cancer surgery.     CATARACT EXTRACTION Bilateral     COLONOSCOPY  04/2015    due 2025    CYST REMOVAL      KIDNEY STONE SURGERY Left 02/08/2012    [facility]. [facility]    KIDNEY SURGERY      Status post resection of a tumor from his left kidney -- incomplete database.     KIDNEY SURGERY  09/27/2017    [place]    NEPHRECTOMY Bilateral     Has 15% of left Kidney    NEPHRECTOMY Right 1997    PROSTATE SURGERY  02/19/2019    [place]. Dr. Bettencourt    THYROID SURGERY      Status post resection of tumor from right thyroid gland.     THYROIDECTOMY, PARTIAL       Social History     Socioeconomic History    Marital status: Single    Number of children: 1   Tobacco Use    Smoking status: Never    Smokeless tobacco: Never    Tobacco comments:     50,60,70 years it was impossible to avoid people who smoked   Vaping Use    Vaping Use: Never used   Substance and Sexual Activity    Alcohol use: Yes     Comment: social - no schedule - random    Drug use: Never    Sexual activity: Not Currently     Partners: Female     Family History   Problem Relation Age of Onset    Diabetes Mother     Obesity Mother     Heart failure Mother     Clotting disorder Mother     Hypertension Mother     Clotting disorder Father     Atrial fibrillation Father     Pneumonia Father     Hypertension Father     Stroke Paternal Grandfather      "Heart attack Cousin     Heart attack Cousin     Suicidality Cousin     Other Paternal Uncle         Heart problems    Stroke Other     Asthma Sister      Blood pressure 126/88, pulse 59, temperature 98.4 °F (36.9 °C), height 175.3 cm (69\"), weight 108 kg (239 lb), SpO2 97 %.    Physical Exam  Constitutional:       General: He is not in acute distress.  HENT:      Head: Normocephalic and atraumatic.      Nose: No congestion.      Mouth/Throat:      Mouth: Mucous membranes are moist.      Pharynx: Oropharynx is clear.   Eyes:      Conjunctiva/sclera: Conjunctivae normal.      Pupils: Pupils are equal, round, and reactive to light.   Cardiovascular:      Rate and Rhythm: Normal rate and regular rhythm.      Heart sounds: Murmur heard.      Comments: Harsh JENNY  Pulmonary:      Effort: Pulmonary effort is normal.      Breath sounds: No wheezing or rhonchi.   Abdominal:      Palpations: Abdomen is soft.   Musculoskeletal:      Right lower leg: No edema.      Left lower leg: No edema.   Lymphadenopathy:      Cervical: No cervical adenopathy.   Skin:     General: Skin is warm and dry.   Neurological:      Mental Status: He is alert and oriented to person, place, and time.   Psychiatric:         Mood and Affect: Mood normal.         PFTs:    October 3, 2023, FVC 2.62 L, 65%, FEV1 2.01 L, 69%, ratio 77%, flow volume loop without blunting, total lung capacity 4.98 L, 81%, diffusion capacity 20.6, 89%, no obstruction, normal total lung capacity, normal diffusion    Radiology:    Findings:  The ascending thoracic aorta measures 4.2 cm transversely and the aortic arch measures 4.1 cm transversely. The descending thoracic aorta measures 3.8 cm transversely. There are no pleural effusions. There is aortic valvular calcification. There are   faint coronary calcifications. There are no enlarged mediastinal nodes. There are calcified subcarinal and right hilar nodes. There is a small groundglass opacity in the right upper lobe " posteriorly measuring 11 mm. There is mild scar of the right middle   lobe. There is a calcified granuloma in the right lower lobe. There is a 5 mm noncalcified nodule along the left major fissure. There is mild scar in the left lung base.  IMPRESSION:  Impression:  Aneurysmal dilatation of the thoracic aorta as detailed.     Small groundglass opacity right upper lobe and noncalcified nodule on the left. Follow-up CT chest in 6 months recommended.           Electronically Signed: Radha Montgomery MD    8/14/2023 11:32 AM EDT     I did personally review his CT scan.  He does have some calcified adenopathy and calcified granulomatous changes.  He then does have this groundglass nodule in the right upper lobe posteriorly.    Lab:    Diagnoses and all orders for this visit:    1. SOB (shortness of breath) on exertion (Primary)  -     Spirometry with Diffusion Capacity & Lung Volumes    2. Lung nodule  -     CT Chest Without Contrast; Future        Discussion:     Delightful 78-year-old gentleman, lifetime non-smoker found to have a groundglass nodule in the right upper lobe on CT imaging.  With his age, right upper lobe location and groundglass appearance his malignancy risk is 10.5%.  His previous renal cell cancer also increases his risk.  He does not take any immunosuppressive medications.  He does have evidence of granulomatous changes.  He does not have clinical symptoms of acute infection.  He may have been exposed to asbestos for 6 years in the late 1970s.  He is also symptomatically short of air on exertion.  Pulmonary function test showed no restriction or obstruction.  Oxygen saturation is excellent.  He does have a aortic stenosis murmur.  Blood pressure is well controlled.  His BMI is elevated at 35 which may play a role.    -Modify, lung cancer autoantibodies  -Scheduled for flu and COVID vaccines next week  -Repeat CT scan in 6 months with follow-up      Layla De Dios MD

## 2023-10-03 NOTE — LETTER
October 3, 2023       No Recipients    Patient: Ac Caldwell   YOB: 1945   Date of Visit: 10/3/2023     Dear Dr. Clemens Recipients:    Thank you for referring Ac Caldwell to me for evaluation. Below are the relevant portions of my assessment and plan of care.    If you have questions, please do not hesitate to call me. I look forward to following Ac along with you.         Sincerely,        Layla De Dios MD        CC:   No Recipients      Progress Notes:  Ac Caldwell is a 78 y.o. male here for evaluation of No chief complaint on file.      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  Renal cancer s/p right nephrectomy, abl part of left kidney  Aortic valve stenosis  Hypertension  Dyslipidemia  BPH  Chronic kidney disease  GERD  Diverticulosis  Nephrolithiasis  Neurosensory hearing loss  Bilateral cataract surgery  Right thyroidectomy for goiter  Prostate surgery, green laser, 2019  Colonoscopy, 2015  EGD 2021 for a pancreatic cyst  Basal cell skin cancer removal  No tobacco  Allergies Norvasc-swelling, Lotensin-cough, Bactrim-cough, Zocor-myalgias, oxycodone-intolerance    History of Present Illness    78-year-old gentleman, non-smoker followed by CT surgery for a thoracic aortic aneurysm.  He underwent a scan that revealed a groundglass nodule and he is here for assessment.  He was a  for [employer] before he retired.  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  He denies a history of TB.  He denies fever chills night sweats or weight loss.  He does not have a cough or hemoptysis.  He has a history of a renal cell cancer undergoing a right nephrectomy.  He had a complex cyst in the left kidney and had an ablation.  He does not take any immunosuppressive medications.      Review of Systems   Constitutional:  Negative for appetite change, chills, diaphoresis and fever.   HENT:  Positive for  congestion and postnasal drip.    Eyes:  Negative for visual disturbance.   Respiratory:  Positive for shortness of breath. Negative for cough and wheezing.    Cardiovascular:  Positive for leg swelling. Negative for chest pain.   Gastrointestinal:  Negative for blood in stool.   Endocrine: Negative.    Genitourinary:  Positive for frequency.   Musculoskeletal:  Negative for arthralgias and gait problem.   Skin:  Negative for rash.   Allergic/Immunologic: Negative for environmental allergies.   Neurological: Negative.    Hematological:  Does not bruise/bleed easily.   Psychiatric/Behavioral: Negative.         Current Outpatient Medications:   •  aspirin 325 MG tablet, Take 1 tablet by mouth Daily., Disp: , Rfl:   •  atorvastatin (LIPITOR) 20 MG tablet, TAKE ONE TABLET BY MOUTH ONCE NIGHTLY, Disp: 90 tablet, Rfl: 3  •  azelastine (ASTELIN) 0.1 % nasal spray, , Disp: , Rfl:   •  betamethasone valerate (VALISONE) 0.1 % ointment, Apply 1 application  topically to the appropriate area as directed As Needed., Disp: , Rfl:   •  carvedilol (COREG) 12.5 MG tablet, Take 1 tablet by mouth 2 (Two) Times a Day., Disp: 180 tablet, Rfl: 2  •  Cholecalciferol (Vitamin D3) 50 MCG (2000 UT) tablet, Take 1 tablet by mouth Daily., Disp: , Rfl:   •  Coenzyme Q10 (CO Q-10) 100 MG capsule, Take 100 mg by mouth 2 (Two) Times a Day., Disp: , Rfl:   •  esomeprazole (NexIUM) 20 MG capsule, Take 1 capsule by mouth Daily., Disp: , Rfl:   •  Flaxseed, Linseed, (FLAX SEED OIL) 1000 MG capsule, Take 1,200 mg by mouth Daily., Disp: , Rfl:   •  levothyroxine (SYNTHROID, LEVOTHROID) 50 MCG tablet, Take 1 tablet by mouth Daily., Disp: , Rfl:   •  losartan (COZAAR) 100 MG tablet, Take 1 tablet by mouth Daily., Disp: , Rfl:   •  Omega-3 Fatty Acids (FISH OIL) 1000 MG capsule capsule, Take 1,200 mg by mouth 2 (Two) Times a Day With Meals., Disp: , Rfl:   •  potassium citrate (UROCIT-K) 10 MEQ (1080 MG) CR tablet, Take 1 tablet by mouth once daily, Disp:  90 tablet, Rfl: 0  •  vitamin B-12 (CYANOCOBALAMIN) 1000 MCG tablet, Take 1 tablet by mouth Daily., Disp: , Rfl:     Past Medical History:   Diagnosis Date   • Aneurysm 2017   • Exertional dyspnea     He does have symptoms of exertional dyspnea and minimal orthopnea. This may be unrelated to his aortic stenosis, and due to his risk factors, I am also concerned about coronary artery disease.    • GERD (gastroesophageal reflux disease) 1997   • Heart murmur 1966   • Hematuria     Episode of hematuria in January with subsequent negative cystoscopy.    • History of kidney cancer    • History of kidney stones    • HL (hearing loss) 2000   • Hyperlipidemia    • Hypertension 1998   • Kidney stone 1991   • Renal cancer     Hx of.Status post right nephrectomy in 1997. Status post resection of a tumor from his left kidney -- incomplete database.    • Renal insufficiency 2010   • Renal oncocytoma    • Thoracic aortic aneurysm    • Thyroid disease    • UTI (urinary tract infection)      Past Surgical History:   Procedure Laterality Date   • ABLATION OF DYSRHYTHMIC FOCUS  2017    left kidney   • BASAL CELL CARCINOMA EXCISION      Status post basal cell cancer surgery.    • CATARACT EXTRACTION Bilateral    • COLONOSCOPY  04/2015    due 2025   • CYST REMOVAL     • KIDNEY STONE SURGERY Left 02/08/2012    [facility]   • KIDNEY SURGERY      Status post resection of a tumor from his left kidney -- incomplete database.    • KIDNEY SURGERY  09/27/2017       • NEPHRECTOMY Bilateral     Has 15% of left Kidney   • NEPHRECTOMY Right 1997   • PROSTATE SURGERY  02/19/2019    [place]. Dr. Bettencourt   • THYROID SURGERY      Status post resection of tumor from right thyroid gland.    • THYROIDECTOMY, PARTIAL       Social History     Socioeconomic History   • Marital status: Single   • Number of children: 1   Tobacco Use   • Smoking status: Never   • Smokeless tobacco: Never   • Tobacco comments:     50,60,70 years it was impossible to  "avoid people who smoked   Vaping Use   • Vaping Use: Never used   Substance and Sexual Activity   • Alcohol use: Yes     Comment: social - no schedule - random   • Drug use: Never   • Sexual activity: Not Currently     Partners: Female     Family History   Problem Relation Age of Onset   • Diabetes Mother    • Obesity Mother    • Heart failure Mother    • Clotting disorder Mother    • Hypertension Mother    • Clotting disorder Father    • Atrial fibrillation Father    • Pneumonia Father    • Hypertension Father    • Stroke Paternal Grandfather    • Heart attack Cousin    • Heart attack Cousin    • Suicidality Cousin    • Other Paternal Uncle         Heart problems   • Stroke Other    • Asthma Sister      Blood pressure 126/88, pulse 59, temperature 98.4 °F (36.9 °C), height 175.3 cm (69\"), weight 108 kg (239 lb), SpO2 97 %.    Physical Exam  Constitutional:       General: He is not in acute distress.  HENT:      Head: Normocephalic and atraumatic.      Nose: No congestion.      Mouth/Throat:      Mouth: Mucous membranes are moist.      Pharynx: Oropharynx is clear.   Eyes:      Conjunctiva/sclera: Conjunctivae normal.      Pupils: Pupils are equal, round, and reactive to light.   Cardiovascular:      Rate and Rhythm: Normal rate and regular rhythm.      Heart sounds: Murmur heard.      Comments: Harsh JENNY  Pulmonary:      Effort: Pulmonary effort is normal.      Breath sounds: No wheezing or rhonchi.   Abdominal:      Palpations: Abdomen is soft.   Musculoskeletal:      Right lower leg: No edema.      Left lower leg: No edema.   Lymphadenopathy:      Cervical: No cervical adenopathy.   Skin:     General: Skin is warm and dry.   Neurological:      Mental Status: He is alert and oriented to person, place, and time.   Psychiatric:         Mood and Affect: Mood normal.         PFTs:    October 3, 2023, FVC 2.62 L, 65%, FEV1 2.01 L, 69%, ratio 77%, flow volume loop without blunting, total lung capacity 4.98 L, 81%, " diffusion capacity 20.6, 89%, no obstruction, normal total lung capacity, normal diffusion    Radiology:    Findings:  The ascending thoracic aorta measures 4.2 cm transversely and the aortic arch measures 4.1 cm transversely. The descending thoracic aorta measures 3.8 cm transversely. There are no pleural effusions. There is aortic valvular calcification. There are   faint coronary calcifications. There are no enlarged mediastinal nodes. There are calcified subcarinal and right hilar nodes. There is a small groundglass opacity in the right upper lobe posteriorly measuring 11 mm. There is mild scar of the right middle   lobe. There is a calcified granuloma in the right lower lobe. There is a 5 mm noncalcified nodule along the left major fissure. There is mild scar in the left lung base.  IMPRESSION:  Impression:  Aneurysmal dilatation of the thoracic aorta as detailed.     Small groundglass opacity right upper lobe and noncalcified nodule on the left. Follow-up CT chest in 6 months recommended.           Electronically Signed: Radha Montgomery MD    8/14/2023 11:32 AM EDT     I did personally review his CT scan.  He does have some calcified adenopathy and calcified granulomatous changes.  He then does have this groundglass nodule in the right upper lobe posteriorly.    Lab:    Diagnoses and all orders for this visit:    1. SOB (shortness of breath) on exertion (Primary)  -     Spirometry with Diffusion Capacity & Lung Volumes    2. Lung nodule  -     CT Chest Without Contrast; Future        Discussion:     Delightful 78-year-old gentleman, lifetime non-smoker found to have a groundglass nodule in the right upper lobe on CT imaging.  With his age, right upper lobe location and groundglass appearance his malignancy risk is 10.5%.  His previous renal cell cancer also increases his risk.  He does not take any immunosuppressive medications.  He does have evidence of granulomatous changes.  He does not have clinical  symptoms of acute infection.  He may have been exposed to asbestos for 6 years in the late 1970s.  He is also symptomatically short of air on exertion.  Pulmonary function test showed no restriction or obstruction.  Oxygen saturation is excellent.  He does have a aortic stenosis murmur.  Blood pressure is well controlled.  His BMI is elevated at 35 which may play a role.    -Modify, lung cancer autoantibodies  -Scheduled for flu and COVID vaccines next week  -Repeat CT scan in 6 months with follow-up      Layla De Dios MD

## 2023-10-05 ENCOUNTER — LAB (OUTPATIENT)
Dept: PULMONOLOGY | Facility: CLINIC | Age: 78
End: 2023-10-05
Payer: MEDICARE

## 2023-10-09 DIAGNOSIS — I10 ESSENTIAL HYPERTENSION: ICD-10-CM

## 2023-10-09 DIAGNOSIS — Z79.899 ENCOUNTER FOR LONG-TERM (CURRENT) USE OF OTHER MEDICATIONS: ICD-10-CM

## 2023-10-11 RX ORDER — CARVEDILOL 12.5 MG/1
12.5 TABLET ORAL 2 TIMES DAILY
Qty: 180 TABLET | Refills: 3 | Status: SHIPPED | OUTPATIENT
Start: 2023-10-11

## 2023-10-12 ENCOUNTER — PATIENT ROUNDING (BHMG ONLY) (OUTPATIENT)
Dept: PULMONOLOGY | Facility: CLINIC | Age: 78
End: 2023-10-12
Payer: MEDICARE

## 2023-10-12 RX ORDER — POTASSIUM CITRATE 10 MEQ/1
TABLET, EXTENDED RELEASE ORAL
Qty: 90 TABLET | Refills: 0 | Status: SHIPPED | OUTPATIENT
Start: 2023-10-12

## 2023-10-12 NOTE — PROGRESS NOTES
October 12, 2023    Hello, may I speak with Ac Caldwell?    My name is Brittni     I am  with [facility]  [address] [facility] [address] [phone].    Before we get started may I verify your date of birth? 1945    I am calling to officially welcome you to our practice and ask about your recent visit. Is this a good time to talk? yes    Tell me about your visit with us. What things went well?  He said his appointment went well and was very positive. He also said the wait was not very long to see the Dr.       We're always looking for ways to make our patients' experiences even better. Do you have recommendations on ways we may improve?  no    Overall were you satisfied with your first visit to our practice? yes       I appreciate you taking the time to speak with me today. Is there anything else I can do for you? no      Thank you, and have a great day.

## 2023-10-13 ENCOUNTER — TELEPHONE (OUTPATIENT)
Dept: PULMONOLOGY | Facility: CLINIC | Age: 78
End: 2023-10-13
Payer: MEDICARE

## 2023-10-13 NOTE — TELEPHONE ENCOUNTER
Pt called stated he seen his results and would like to speak with you. I informed him That I would relay the message and that you are out of the office, I will follow up with you on this message Monday. Pt verbalized understanding.

## 2023-10-16 DIAGNOSIS — R91.1 LUNG NODULE: Primary | ICD-10-CM

## 2023-10-16 NOTE — PROGRESS NOTES
Patient is a non-smoker with a history of renal cell cancer, right nephrectomy 1997, left renal tumor with cryoablation in 2017, referred for an 11 mm right upper lobe nodule.Nodify was done and autoantibodies were present with increased malignancy risk to 91%.  I would like to get a PET scan to look for metastatic disease and see if it is hypermetabolic.  Then I think he either needs a wedge resection or CyberKnife.  Whether we proceed with robotic bronchoscopy and biopsy or just proceed with treatment given the high probability of cancer.

## 2023-10-18 ENCOUNTER — PATIENT OUTREACH (OUTPATIENT)
Dept: ONCOLOGY | Facility: CLINIC | Age: 78
End: 2023-10-18
Payer: MEDICARE

## 2023-10-30 ENCOUNTER — HOSPITAL ENCOUNTER (OUTPATIENT)
Dept: PET IMAGING | Facility: HOSPITAL | Age: 78
Discharge: HOME | End: 2023-10-30
Payer: MEDICARE

## 2023-10-30 DIAGNOSIS — R91.1 LUNG NODULE: ICD-10-CM

## 2023-10-30 LAB — GLUCOSE BLDC GLUCOMTR-MCNC: 96 MG/DL (ref 70–130)

## 2023-10-30 PROCEDURE — 78815 PET IMAGE W/CT SKULL-THIGH: CPT

## 2023-10-30 PROCEDURE — A9552 F18 FDG: HCPCS | Performed by: INTERNAL MEDICINE

## 2023-10-30 PROCEDURE — 0 FLUDEOXYGLUCOSE F18 SOLUTION: Performed by: INTERNAL MEDICINE

## 2023-10-30 PROCEDURE — 82948 REAGENT STRIP/BLOOD GLUCOSE: CPT

## 2023-10-30 RX ADMIN — FLUDEOXYGLUCOSE F18 1 DOSE: 300 INJECTION INTRAVENOUS at 12:42

## 2023-11-02 ENCOUNTER — PREP FOR SURGERY (OUTPATIENT)
Dept: OTHER | Facility: HOSPITAL | Age: 78
End: 2023-11-02
Payer: MEDICARE

## 2023-11-02 DIAGNOSIS — R91.1 LUNG NODULE: Primary | ICD-10-CM

## 2023-11-02 RX ORDER — LIDOCAINE HYDROCHLORIDE 40 MG/ML
4 INJECTION, SOLUTION RETROBULBAR; TOPICAL ONCE
OUTPATIENT
Start: 2023-11-02 | End: 2023-11-02

## 2023-11-03 ENCOUNTER — PRE-ADMISSION TESTING (OUTPATIENT)
Dept: PREADMISSION TESTING | Facility: HOSPITAL | Age: 78
End: 2023-11-03
Payer: MEDICARE

## 2023-11-03 VITALS — BODY MASS INDEX: 36.37 KG/M2 | HEIGHT: 68 IN | WEIGHT: 239.97 LBS

## 2023-11-03 DIAGNOSIS — R91.1 LUNG NODULE: ICD-10-CM

## 2023-11-03 LAB
ALBUMIN SERPL-MCNC: 4.1 G/DL (ref 3.5–5.2)
ALBUMIN/GLOB SERPL: 1.6 G/DL
ALP SERPL-CCNC: 66 U/L (ref 39–117)
ALT SERPL W P-5'-P-CCNC: 15 U/L (ref 1–41)
ANION GAP SERPL CALCULATED.3IONS-SCNC: 8 MMOL/L (ref 5–15)
APTT PPP: 30 SECONDS (ref 22–39)
AST SERPL-CCNC: 15 U/L (ref 1–40)
BASOPHILS # BLD AUTO: 0.03 10*3/MM3 (ref 0–0.2)
BASOPHILS NFR BLD AUTO: 0.5 % (ref 0–1.5)
BILIRUB SERPL-MCNC: 1 MG/DL (ref 0–1.2)
BUN SERPL-MCNC: 18 MG/DL (ref 8–23)
BUN/CREAT SERPL: 14.9 (ref 7–25)
CALCIUM SPEC-SCNC: 9.4 MG/DL (ref 8.6–10.5)
CHLORIDE SERPL-SCNC: 106 MMOL/L (ref 98–107)
CO2 SERPL-SCNC: 25 MMOL/L (ref 22–29)
CREAT SERPL-MCNC: 1.21 MG/DL (ref 0.76–1.27)
DEPRECATED RDW RBC AUTO: 41.8 FL (ref 37–54)
EGFRCR SERPLBLD CKD-EPI 2021: 62.1 ML/MIN/1.73
EOSINOPHIL # BLD AUTO: 0.3 10*3/MM3 (ref 0–0.4)
EOSINOPHIL NFR BLD AUTO: 5 % (ref 0.3–6.2)
ERYTHROCYTE [DISTWIDTH] IN BLOOD BY AUTOMATED COUNT: 12.4 % (ref 12.3–15.4)
GLOBULIN UR ELPH-MCNC: 2.6 GM/DL
GLUCOSE SERPL-MCNC: 92 MG/DL (ref 65–99)
HCT VFR BLD AUTO: 45.1 % (ref 37.5–51)
HGB BLD-MCNC: 15.6 G/DL (ref 13–17.7)
IMM GRANULOCYTES # BLD AUTO: 0.02 10*3/MM3 (ref 0–0.05)
IMM GRANULOCYTES NFR BLD AUTO: 0.3 % (ref 0–0.5)
INR PPP: 1.18 (ref 0.89–1.12)
LYMPHOCYTES # BLD AUTO: 1.61 10*3/MM3 (ref 0.7–3.1)
LYMPHOCYTES NFR BLD AUTO: 26.8 % (ref 19.6–45.3)
MCH RBC QN AUTO: 31.9 PG (ref 26.6–33)
MCHC RBC AUTO-ENTMCNC: 34.6 G/DL (ref 31.5–35.7)
MCV RBC AUTO: 92.2 FL (ref 79–97)
MONOCYTES # BLD AUTO: 0.7 10*3/MM3 (ref 0.1–0.9)
MONOCYTES NFR BLD AUTO: 11.6 % (ref 5–12)
NEUTROPHILS NFR BLD AUTO: 3.35 10*3/MM3 (ref 1.7–7)
NEUTROPHILS NFR BLD AUTO: 55.8 % (ref 42.7–76)
NRBC BLD AUTO-RTO: 0 /100 WBC (ref 0–0.2)
PLATELET # BLD AUTO: 142 10*3/MM3 (ref 140–450)
PMV BLD AUTO: 10.1 FL (ref 6–12)
POTASSIUM SERPL-SCNC: 4.5 MMOL/L (ref 3.5–5.2)
PROT SERPL-MCNC: 6.7 G/DL (ref 6–8.5)
PROTHROMBIN TIME: 15.1 SECONDS (ref 12.2–14.5)
RBC # BLD AUTO: 4.89 10*6/MM3 (ref 4.14–5.8)
SODIUM SERPL-SCNC: 139 MMOL/L (ref 136–145)
WBC NRBC COR # BLD: 6.01 10*3/MM3 (ref 3.4–10.8)

## 2023-11-03 PROCEDURE — 85610 PROTHROMBIN TIME: CPT

## 2023-11-03 PROCEDURE — 36415 COLL VENOUS BLD VENIPUNCTURE: CPT

## 2023-11-03 PROCEDURE — 85730 THROMBOPLASTIN TIME PARTIAL: CPT

## 2023-11-03 PROCEDURE — 80053 COMPREHEN METABOLIC PANEL: CPT

## 2023-11-03 PROCEDURE — 85025 COMPLETE CBC W/AUTO DIFF WBC: CPT

## 2023-11-03 NOTE — PAT
An arrival time for procedure was not provided during PAT visit. If patient had any questions or concerns about their arrival time, they were instructed to contact their surgeon/physician.  Additionally, if the patient referred to an arrival time that was acquired from their my chart account, patient was encouraged to verify that time with their surgeon/physician. Arrival times are NOT provided in Pre Admission Testing Department.    Patient viewed general PAT education video as instructed in their preoperative information received from their surgeon.  Patient stated the general PAT education video was viewed in its entirety and survey completed.  Copies of PAT general education handouts (Incentive Spirometry, Meds to Beds Program, Patient Belongings, Pre-op skin preparation instructions, Blood Glucose testing, Visitor policy, Surgery FAQ, Code H) distributed to patient if not printed. Education related to the PAT pass and skin preparation for surgery (if applicable) completed in PAT as a reinforcement to PAT education video. Patient instructed to return PAT pass provided today as well as completed skin preparation sheet (if applicable) on the day of procedure.     Additionally if patient had not viewed video yet but intended to view it at home or in our waiting area, then referred them to the handout with QR code/link provided during PAT visit.  Instructed patient to complete survey after viewing the video in its entirety.  Encouraged patient/family to read PAT general education handouts thoroughly and notify PAT staff with any questions or concerns. Patient verbalized understanding of all information and priority content.    Per Anesthesia Request, patient instructed not to take their ACE/ARB medications on the AM of surgery.    Patient denies any current skin issues.     EKG IN CHART

## 2023-11-03 NOTE — DISCHARGE INSTRUCTIONS
The following information and instructions were given:    Do not eat, drink, smoke or chew gum after midnight the night before surgery. This includes no mints.  Take all routine, prescribed medications including heart and blood pressure medicines with a sip of water unless otherwise instructed by your physician.   Do NOT take diabetic medication unless instructed by your physician.    DO NOT shave for two days before your procedure.  Do not wear makeup.      DO NOT wear fingernail polish (gel/regular) and/or acrylic/artificial nails on the day of surgery.   If you had a recent manicure and would rather not remove polish or artificial nails, the minimum requirement is that the polish/artificial nails must be removed from the middle finger on each hand.      Remove all jewelry (advise to go to jeweler if unable to remove).  Jewelry, especially rings, can no longer be taped for surgery.    Leave anything you consider valuable at home.    Bring the following with you the day of your procedure (when applicable):       -Picture ID and insurance cards       -Co-pay/deductible required by insurance       -Medications in the original bottles or detailed list (must include name of medication, dosage, and frequency).  This includes all over-the-counter medications if not brought to PAT       -Copy of advance directive, living will or power of  documents if not brought to PAT       -PAT Pass    Educational handout related to procedure given to patient.  Educational handout also includes general information related to their recovery that mentions signs and symptoms of infection and when to call the doctor.

## 2023-11-07 ENCOUNTER — HOSPITAL ENCOUNTER (OUTPATIENT)
Dept: CT IMAGING | Facility: HOSPITAL | Age: 78
Discharge: HOME | End: 2023-11-07
Admitting: INTERNAL MEDICINE
Payer: MEDICARE

## 2023-11-07 DIAGNOSIS — R91.1 LUNG NODULE: ICD-10-CM

## 2023-11-07 PROCEDURE — 71250 CT THORAX DX C-: CPT

## 2023-11-10 ENCOUNTER — ANESTHESIA EVENT CONVERTED (OUTPATIENT)
Dept: ANESTHESIOLOGY | Facility: HOSPITAL | Age: 78
End: 2023-11-10
Payer: MEDICARE

## 2023-11-10 ENCOUNTER — ANESTHESIA (OUTPATIENT)
Dept: GASTROENTEROLOGY | Facility: HOSPITAL | Age: 78
End: 2023-11-10
Payer: MEDICARE

## 2023-11-10 ENCOUNTER — APPOINTMENT (OUTPATIENT)
Dept: GENERAL RADIOLOGY | Facility: HOSPITAL | Age: 78
End: 2023-11-10
Payer: MEDICARE

## 2023-11-10 ENCOUNTER — HOSPITAL ENCOUNTER (OUTPATIENT)
Facility: HOSPITAL | Age: 78
Setting detail: HOSPITAL OUTPATIENT SURGERY
Discharge: HOME | End: 2023-11-10
Attending: INTERNAL MEDICINE | Admitting: INTERNAL MEDICINE
Payer: MEDICARE

## 2023-11-10 ENCOUNTER — ANESTHESIA EVENT (OUTPATIENT)
Dept: GASTROENTEROLOGY | Facility: HOSPITAL | Age: 78
End: 2023-11-10
Payer: MEDICARE

## 2023-11-10 VITALS
RESPIRATION RATE: 16 BRPM | SYSTOLIC BLOOD PRESSURE: 121 MMHG | DIASTOLIC BLOOD PRESSURE: 74 MMHG | OXYGEN SATURATION: 93 % | HEART RATE: 67 BPM | TEMPERATURE: 97.6 F

## 2023-11-10 DIAGNOSIS — R91.1 LUNG NODULE: ICD-10-CM

## 2023-11-10 PROCEDURE — 76000 FLUOROSCOPY <1 HR PHYS/QHP: CPT

## 2023-11-10 PROCEDURE — 87206 SMEAR FLUORESCENT/ACID STAI: CPT | Performed by: INTERNAL MEDICINE

## 2023-11-10 PROCEDURE — 25010000002 ONDANSETRON PER 1 MG: Performed by: NURSE ANESTHETIST, CERTIFIED REGISTERED

## 2023-11-10 PROCEDURE — 25010000002 SUGAMMADEX 500 MG/5ML SOLUTION: Performed by: NURSE ANESTHETIST, CERTIFIED REGISTERED

## 2023-11-10 PROCEDURE — 31623 DX BRONCHOSCOPE/BRUSH: CPT | Performed by: INTERNAL MEDICINE

## 2023-11-10 PROCEDURE — 71045 X-RAY EXAM CHEST 1 VIEW: CPT

## 2023-11-10 PROCEDURE — 87070 CULTURE OTHR SPECIMN AEROBIC: CPT | Performed by: INTERNAL MEDICINE

## 2023-11-10 PROCEDURE — 87205 SMEAR GRAM STAIN: CPT | Performed by: INTERNAL MEDICINE

## 2023-11-10 PROCEDURE — 31628 BRONCHOSCOPY/LUNG BX EACH: CPT | Performed by: INTERNAL MEDICINE

## 2023-11-10 PROCEDURE — 87102 FUNGUS ISOLATION CULTURE: CPT | Performed by: INTERNAL MEDICINE

## 2023-11-10 PROCEDURE — 76000 FLUOROSCOPY <1 HR PHYS/QHP: CPT | Performed by: INTERNAL MEDICINE

## 2023-11-10 PROCEDURE — 25010000002 PROPOFOL 10 MG/ML EMULSION: Performed by: NURSE ANESTHETIST, CERTIFIED REGISTERED

## 2023-11-10 PROCEDURE — 25010000002 DEXAMETHASONE PER 1 MG: Performed by: NURSE ANESTHETIST, CERTIFIED REGISTERED

## 2023-11-10 PROCEDURE — 25010000002 FENTANYL CITRATE (PF) 100 MCG/2ML SOLUTION: Performed by: NURSE ANESTHETIST, CERTIFIED REGISTERED

## 2023-11-10 PROCEDURE — 25810000003 SODIUM CHLORIDE 0.9 % SOLUTION: Performed by: NURSE ANESTHETIST, CERTIFIED REGISTERED

## 2023-11-10 PROCEDURE — 31629 BRONCHOSCOPY/NEEDLE BX EACH: CPT | Performed by: INTERNAL MEDICINE

## 2023-11-10 PROCEDURE — 87116 MYCOBACTERIA CULTURE: CPT | Performed by: INTERNAL MEDICINE

## 2023-11-10 PROCEDURE — 31654 BRONCH EBUS IVNTJ PERPH LES: CPT | Performed by: INTERNAL MEDICINE

## 2023-11-10 PROCEDURE — 88305 TISSUE EXAM BY PATHOLOGIST: CPT | Performed by: INTERNAL MEDICINE

## 2023-11-10 PROCEDURE — 31627 NAVIGATIONAL BRONCHOSCOPY: CPT | Performed by: INTERNAL MEDICINE

## 2023-11-10 PROCEDURE — 31624 DX BRONCHOSCOPE/LAVAGE: CPT | Performed by: INTERNAL MEDICINE

## 2023-11-10 PROCEDURE — 99214 OFFICE O/P EST MOD 30 MIN: CPT | Performed by: INTERNAL MEDICINE

## 2023-11-10 PROCEDURE — 25010000002 PHENYLEPHRINE 10 MG/ML SOLUTION 1 ML VIAL: Performed by: NURSE ANESTHETIST, CERTIFIED REGISTERED

## 2023-11-10 RX ORDER — IPRATROPIUM BROMIDE AND ALBUTEROL SULFATE 2.5; .5 MG/3ML; MG/3ML
3 SOLUTION RESPIRATORY (INHALATION) ONCE AS NEEDED
Status: COMPLETED | OUTPATIENT
Start: 2023-11-10 | End: 2023-11-10

## 2023-11-10 RX ORDER — HYDRALAZINE HYDROCHLORIDE 20 MG/ML
5 INJECTION INTRAMUSCULAR; INTRAVENOUS
Status: DISCONTINUED | OUTPATIENT
Start: 2023-11-10 | End: 2023-11-10 | Stop reason: HOSPADM

## 2023-11-10 RX ORDER — PHENYLEPHRINE HCL IN 0.9% NACL 1 MG/10 ML
SYRINGE (ML) INTRAVENOUS AS NEEDED
Status: DISCONTINUED | OUTPATIENT
Start: 2023-11-10 | End: 2023-11-10 | Stop reason: SURG

## 2023-11-10 RX ORDER — HYDROCODONE BITARTRATE AND ACETAMINOPHEN 5; 325 MG/1; MG/1
1 TABLET ORAL ONCE AS NEEDED
Status: DISCONTINUED | OUTPATIENT
Start: 2023-11-10 | End: 2023-11-10 | Stop reason: HOSPADM

## 2023-11-10 RX ORDER — NALOXONE HCL 0.4 MG/ML
0.4 VIAL (ML) INJECTION AS NEEDED
Status: DISCONTINUED | OUTPATIENT
Start: 2023-11-10 | End: 2023-11-10 | Stop reason: HOSPADM

## 2023-11-10 RX ORDER — LIDOCAINE HYDROCHLORIDE 40 MG/ML
SOLUTION TOPICAL AS NEEDED
Status: DISCONTINUED | OUTPATIENT
Start: 2023-11-10 | End: 2023-11-10 | Stop reason: SURG

## 2023-11-10 RX ORDER — ONDANSETRON 2 MG/ML
4 INJECTION INTRAMUSCULAR; INTRAVENOUS ONCE AS NEEDED
Status: DISCONTINUED | OUTPATIENT
Start: 2023-11-10 | End: 2023-11-10 | Stop reason: HOSPADM

## 2023-11-10 RX ORDER — PROPOFOL 10 MG/ML
VIAL (ML) INTRAVENOUS AS NEEDED
Status: DISCONTINUED | OUTPATIENT
Start: 2023-11-10 | End: 2023-11-10 | Stop reason: SURG

## 2023-11-10 RX ORDER — ALBUTEROL SULFATE 1.25 MG/3ML
1.25 SOLUTION RESPIRATORY (INHALATION) EVERY 6 HOURS PRN
Status: DISCONTINUED | OUTPATIENT
Start: 2023-11-10 | End: 2023-11-10 | Stop reason: HOSPADM

## 2023-11-10 RX ORDER — SODIUM CHLORIDE 0.9 % (FLUSH) 0.9 %
3-10 SYRINGE (ML) INJECTION AS NEEDED
Status: DISCONTINUED | OUTPATIENT
Start: 2023-11-10 | End: 2023-11-10 | Stop reason: HOSPADM

## 2023-11-10 RX ORDER — DEXAMETHASONE SODIUM PHOSPHATE 4 MG/ML
INJECTION, SOLUTION INTRA-ARTICULAR; INTRALESIONAL; INTRAMUSCULAR; INTRAVENOUS; SOFT TISSUE AS NEEDED
Status: DISCONTINUED | OUTPATIENT
Start: 2023-11-10 | End: 2023-11-10 | Stop reason: SURG

## 2023-11-10 RX ORDER — PROMETHAZINE HYDROCHLORIDE 25 MG/1
25 TABLET ORAL ONCE AS NEEDED
Status: DISCONTINUED | OUTPATIENT
Start: 2023-11-10 | End: 2023-11-10 | Stop reason: HOSPADM

## 2023-11-10 RX ORDER — LIDOCAINE HYDROCHLORIDE 10 MG/ML
INJECTION, SOLUTION EPIDURAL; INFILTRATION; INTRACAUDAL; PERINEURAL AS NEEDED
Status: DISCONTINUED | OUTPATIENT
Start: 2023-11-10 | End: 2023-11-10 | Stop reason: SURG

## 2023-11-10 RX ORDER — DROPERIDOL 2.5 MG/ML
0.62 INJECTION, SOLUTION INTRAMUSCULAR; INTRAVENOUS ONCE AS NEEDED
Status: DISCONTINUED | OUTPATIENT
Start: 2023-11-10 | End: 2023-11-10 | Stop reason: HOSPADM

## 2023-11-10 RX ORDER — SODIUM CHLORIDE 9 MG/ML
INJECTION, SOLUTION INTRAVENOUS CONTINUOUS PRN
Status: DISCONTINUED | OUTPATIENT
Start: 2023-11-10 | End: 2023-11-10 | Stop reason: SURG

## 2023-11-10 RX ORDER — LABETALOL HYDROCHLORIDE 5 MG/ML
5 INJECTION, SOLUTION INTRAVENOUS
Status: DISCONTINUED | OUTPATIENT
Start: 2023-11-10 | End: 2023-11-10 | Stop reason: HOSPADM

## 2023-11-10 RX ORDER — PROMETHAZINE HYDROCHLORIDE 25 MG/1
25 SUPPOSITORY RECTAL ONCE AS NEEDED
Status: DISCONTINUED | OUTPATIENT
Start: 2023-11-10 | End: 2023-11-10 | Stop reason: HOSPADM

## 2023-11-10 RX ORDER — SODIUM CHLORIDE 0.9 % (FLUSH) 0.9 %
3 SYRINGE (ML) INJECTION EVERY 12 HOURS SCHEDULED
Status: DISCONTINUED | OUTPATIENT
Start: 2023-11-10 | End: 2023-11-10 | Stop reason: HOSPADM

## 2023-11-10 RX ORDER — FENTANYL CITRATE 50 UG/ML
50 INJECTION, SOLUTION INTRAMUSCULAR; INTRAVENOUS
Status: DISCONTINUED | OUTPATIENT
Start: 2023-11-10 | End: 2023-11-10 | Stop reason: HOSPADM

## 2023-11-10 RX ORDER — HYDROMORPHONE HYDROCHLORIDE 1 MG/ML
0.5 INJECTION, SOLUTION INTRAMUSCULAR; INTRAVENOUS; SUBCUTANEOUS
Status: DISCONTINUED | OUTPATIENT
Start: 2023-11-10 | End: 2023-11-10 | Stop reason: HOSPADM

## 2023-11-10 RX ORDER — ONDANSETRON 2 MG/ML
INJECTION INTRAMUSCULAR; INTRAVENOUS AS NEEDED
Status: DISCONTINUED | OUTPATIENT
Start: 2023-11-10 | End: 2023-11-10 | Stop reason: SURG

## 2023-11-10 RX ORDER — ROCURONIUM BROMIDE 10 MG/ML
INJECTION, SOLUTION INTRAVENOUS AS NEEDED
Status: DISCONTINUED | OUTPATIENT
Start: 2023-11-10 | End: 2023-11-10 | Stop reason: SURG

## 2023-11-10 RX ORDER — SODIUM CHLORIDE 9 MG/ML
40 INJECTION, SOLUTION INTRAVENOUS AS NEEDED
Status: DISCONTINUED | OUTPATIENT
Start: 2023-11-10 | End: 2023-11-10 | Stop reason: HOSPADM

## 2023-11-10 RX ORDER — ETOMIDATE 2 MG/ML
INJECTION INTRAVENOUS AS NEEDED
Status: DISCONTINUED | OUTPATIENT
Start: 2023-11-10 | End: 2023-11-10 | Stop reason: SURG

## 2023-11-10 RX ORDER — DROPERIDOL 2.5 MG/ML
0.62 INJECTION, SOLUTION INTRAMUSCULAR; INTRAVENOUS
Status: DISCONTINUED | OUTPATIENT
Start: 2023-11-10 | End: 2023-11-10 | Stop reason: HOSPADM

## 2023-11-10 RX ORDER — FENTANYL CITRATE 50 UG/ML
INJECTION, SOLUTION INTRAMUSCULAR; INTRAVENOUS AS NEEDED
Status: DISCONTINUED | OUTPATIENT
Start: 2023-11-10 | End: 2023-11-10 | Stop reason: SURG

## 2023-11-10 RX ADMIN — PROPOFOL 50 MG: 10 INJECTION, EMULSION INTRAVENOUS at 08:01

## 2023-11-10 RX ADMIN — Medication 100 MCG: at 08:15

## 2023-11-10 RX ADMIN — SUGAMMADEX 300 MG: 100 INJECTION, SOLUTION INTRAVENOUS at 08:52

## 2023-11-10 RX ADMIN — DEXAMETHASONE SODIUM PHOSPHATE 8 MG: 4 INJECTION, SOLUTION INTRAMUSCULAR; INTRAVENOUS at 08:12

## 2023-11-10 RX ADMIN — Medication 100 MCG: at 08:28

## 2023-11-10 RX ADMIN — ONDANSETRON 4 MG: 2 INJECTION INTRAMUSCULAR; INTRAVENOUS at 08:12

## 2023-11-10 RX ADMIN — Medication 100 MCG: at 08:31

## 2023-11-10 RX ADMIN — Medication 200 MCG: at 08:22

## 2023-11-10 RX ADMIN — Medication 100 MCG: at 08:35

## 2023-11-10 RX ADMIN — Medication 100 MCG: at 08:26

## 2023-11-10 RX ADMIN — IPRATROPIUM BROMIDE AND ALBUTEROL SULFATE 3 ML: .5; 3 SOLUTION RESPIRATORY (INHALATION) at 09:15

## 2023-11-10 RX ADMIN — Medication 100 MCG: at 08:17

## 2023-11-10 RX ADMIN — PHENYLEPHRINE HYDROCHLORIDE 0.25 MCG/KG/MIN: 10 INJECTION INTRAVENOUS at 08:39

## 2023-11-10 RX ADMIN — SODIUM CHLORIDE: 9 INJECTION, SOLUTION INTRAVENOUS at 07:23

## 2023-11-10 RX ADMIN — FENTANYL CITRATE 100 MCG: 50 INJECTION, SOLUTION INTRAMUSCULAR; INTRAVENOUS at 07:58

## 2023-11-10 RX ADMIN — Medication 100 MCG: at 08:32

## 2023-11-10 RX ADMIN — LIDOCAINE HYDROCHLORIDE 80 EACH: 40 SOLUTION TOPICAL at 08:52

## 2023-11-10 RX ADMIN — LIDOCAINE HYDROCHLORIDE 50 MG: 10 INJECTION, SOLUTION EPIDURAL; INFILTRATION; INTRACAUDAL; PERINEURAL at 08:00

## 2023-11-10 RX ADMIN — Medication 100 MCG: at 08:13

## 2023-11-10 RX ADMIN — ROCURONIUM BROMIDE 35 MG: 10 SOLUTION INTRAVENOUS at 08:03

## 2023-11-10 RX ADMIN — ETOMIDATE 10 MG: 40 INJECTION, SOLUTION INTRAVENOUS at 08:01

## 2023-11-10 NOTE — ANESTHESIA PREPROCEDURE EVALUATION
Anesthesia Evaluation     Patient summary reviewed and Nursing notes reviewed   no history of anesthetic complications:   NPO Solid Status: > 8 hours  NPO Liquid Status: > 2 hours           Airway   Mallampati: III  TM distance: >3 FB  Neck ROM: full  Possible difficult intubation  Dental    (+) poor dentition    Pulmonary    (+) ,shortness of breath, decreased breath sounds  Cardiovascular     Patient on routine beta blocker and Beta blocker given within 24 hours of surgery  Rhythm: regular  Rate: normal    (+) hypertension, valvular problems/murmurs AS and murmur, murmur, PVD, hyperlipidemia    ROS comment: Interpretation Summary       ·  Left ventricular systolic function is normal. Estimated left ventricular EF = 60%  ·  Left ventricular wall thickness is consistent with moderate concentric hypertrophy.  ·  Left ventricular diastolic function is consistent with (grade I) impaired relaxation.  ·  Moderate aortic valve stenosis is present.  Mean gradient 29 mmHg.  Trace aortic insufficiency.  ·  Dilated aortic root and ascending aorta.      Neuro/Psych  GI/Hepatic/Renal/Endo    (+) obesity, GERD, renal disease- stones, diabetes mellitus type 2 well controlled, thyroid problem hypothyroidism  (-) morbid obesity    Musculoskeletal     Abdominal   (+) obese    Abdomen: soft.   Substance History      OB/GYN          Other      history of cancer                Anesthesia Plan    ASA 4     general and [place]     intravenous induction     Anesthetic plan, risks, benefits, and alternatives have been provided, discussed and informed consent has been obtained with: patient.    Plan discussed with CRNA.    CODE STATUS:

## 2023-11-10 NOTE — OP NOTE
Bronchoscopy Procedure Note    Pre-op Diagnosis: Right upper lobe groundglass opacity    Post-op Diagnosis: Same    Surgeon: Manish Wynn MD    Anesthesia: General    Operation: Flexible fiberoptic bronchoscopy    Findings: No endobronchial lesions     Specimen(s): Transbronchial biopsy, transbronchial needle aspiration, brush and BAL right upper lobe    Estimated Blood Loss: Minimal/None    Complications: No immediate.  Chest x-ray pending     Indications and History:  Ac Caldwell is a 78 y.o. male  with right upper lobe groundglass opacity history of renal cell cancer.  The risks, benefits, complications, treatment options and expected outcomes were discussed with the patient.  The possibilities of reaction to medication, pulmonary aspiration, perforation of a viscus, bleeding, failure to diagnose a condition and creating a complication requiring transfusion or operation were discussed with the patient who freely signed the consent.      Description of Procedure:  The patient was seen in the Holding Room and an immediate patient assessment was done prior to the administration of moderate and/or deep conscious sedation.  The patient was taken to the Endoscopy Suite, identified as Ac Caldwell  and the procedure verified as Flexible Fiberoptic Bronchoscopy.  A Time Out was held and the above information confirmed.    After the induction of general anesthesia the patient was intubated with an 8.5 endotracheal tube    Bronchoscope was introduced via the endotracheal tube which confirmed good position of the distal one third of the trachea    The scope was advanced into the left mainstem bronchus.  The left upper lobe, lingula, left lower lobe, and superior segment left lower lobe revealed no discrete endobronchial lesions and mild chronic bronchitic changes    The scope was advanced into the right mainstem bronchus.  The right upper lobe, bronchus intermedius, right middle lobe, right lower lobe, and  "superior segment of the right lower lobe revealed no discrete endobronchial lesions and mild chronic bronchitic changes with clear secretions.  The airways were suctioned clear.    The scope was removed and the navigational catheter and camera were introduced.  Registration was performed using the navigational software.  Then using a previously plotted course the camera and catheter were advanced out to within about 2 cm of the opacity.  Fluoroscopy under my direct supervision without a radiologist present was used to help obtain biopsy and brushing specimens.  Radial ultrasound was used to position the catheter.  Only a small ultrasound signal was obtained and a \"cloud\" biopsy configuration of specimens was obtained from multiple locations using a 19-gauge transbronchial needle as well as biopsy forceps.  A 7 mm cytology brushing specimen was obtained as well as a BAL.  60 mL instilled and 30 mL returned.    The navigational equipment was removed.  The regular scope was reintroduced.  The airways were suctioned clear.  At the end of the procedure there was minimal residual blood and no evidence of any active bleeding.  Scope was withdrawn without difficulty.    Fluoroscopy    Fluoroscopy was performed under my direct supervision without a radiologist present for obtaining transbronchial biopsy and brushing specimens from the right upper lobe.  Fluoroscopy was used at the end of the procedure to exclude a pneumothorax.  None was identified.  There were no immediate complications.  Less than 5 minutes of fluoroscopy time was used in total.    The Patient was taken to the Endoscopy Recovery area in satisfactory condition.    Attestation: I performed the procedure    Manish Wynn MD, [facility]    "

## 2023-11-10 NOTE — H&P
PULMONARY  NOTE    Chief Complaint     Right upper lobe groundglass opacity, history of renal cancer, aortic stenosis, non-smoker    History of Present Illness     78-year-old male referred for navigational bronchoscopy    He was incidentally found to have a right upper lobe groundglass opacity  He was evaluated by Dr. De Dios  A PET scan was obtained which revealed no abnormal hypermetabolic activity and a genomic test was obtained which resulted in increased likelihood of malignancy therefore a biopsy was planned  Given the location and characteristic of the lesion a navigational bronchoscopy has been recommended    Patient has no past history of known lung disease  He is a non-smoker    He does have a history of renal cancer for which she has undergone a nephrectomy    He has moderate aortic stenosis with a gradient of less than 30 mmHg based on a transthoracic echocardiogram in February 2023    Patient Active Problem List   Diagnosis    Nephritis and nephropathy    Essential hypertension    Esophageal reflux    Diverticulosis of small intestine without hemorrhage    Morbidly obese    Lymphadenopathy    Ganglion cyst    Malignant neoplasm of kidney, except pelvis    Hyperlipidemia    Exertional dyspnea    Nonrheumatic aortic valve stenosis    Acquired hypothyroidism    Pancreatic cyst    Aneurysm of ascending aorta without rupture    Prediabetes    Stage 3a chronic kidney disease    Chest discomfort    IPMN (intraductal papillary mucinous neoplasm)    At average risk for colon cancer    Lung nodule      Allergies   Allergen Reactions    Norvasc [Amlodipine] Swelling    Oxycodone Other (See Comments)     Can elevate Blood pressure    Lotensin [Benazepril] Cough    Sulfamethoxazole-Trimethoprim Cough    Zocor [Simvastatin] Myalgia     No current facility-administered medications for this encounter.  MEDICATION LIST AND ALLERGIES REVIEWED.    Family History   Problem Relation Age of Onset    Diabetes Mother      Obesity Mother     Heart failure Mother     Clotting disorder Mother     Hypertension Mother     Clotting disorder Father     Atrial fibrillation Father     Pneumonia Father     Hypertension Father     Stroke Paternal Grandfather     Heart attack Cousin     Heart attack Cousin     Suicidality Cousin     Other Paternal Uncle         Heart problems    Stroke Other     Asthma Sister      Social History     Tobacco Use    Smoking status: Never    Smokeless tobacco: Never    Tobacco comments:     50,60,70 years it was impossible to avoid people who smoked   Vaping Use    Vaping Use: Never used   Substance Use Topics    Alcohol use: Yes     Comment: social - no schedule - random    Drug use: Never     Social History     Social History Narrative    Lives in [place].      FAMILY AND SOCIAL HISTORY REVIEWED.    Review of Systems  IF PRESENT REFER TO SCANNED ROS SHEET FROM SAME DATE  OTHERWISE ROS OBTAINED AND NON-CONTRIBUTORY OVER HPI.    /98 (BP Location: Left arm, Patient Position: Sitting)   Pulse 69   Temp 97.5 °F (36.4 °C) (Temporal)   Resp 16   Physical Exam  Vitals and nursing note reviewed.   Constitutional:       General: He is not in acute distress.     Appearance: He is well-developed. He is not diaphoretic.   HENT:      Head: Normocephalic and atraumatic.   Neck:      Thyroid: No thyromegaly.   Cardiovascular:      Rate and Rhythm: Normal rate and regular rhythm.      Heart sounds: Murmur heard.   Pulmonary:      Effort: Pulmonary effort is normal.      Breath sounds: Normal breath sounds. No stridor.   Lymphadenopathy:      Cervical: No cervical adenopathy.      Upper Body:      Right upper body: No supraclavicular or epitrochlear adenopathy.      Left upper body: No supraclavicular or epitrochlear adenopathy.   Skin:     General: Skin is warm and dry.   Neurological:      Mental Status: He is alert and oriented to person, place, and time.   Psychiatric:         Behavior: Behavior normal.          Results     CT scan of the chest reviewed on PACS and compared to prior scans  Redemonstrated is the somewhat irregular approximately 10-12 mm right upper lobe groundglass opacity  No abnormal mediastinal adenopathy  No other significant parenchymal densities    Immunization History   Administered Date(s) Administered    COVID-19 (PFIZER) BIVALENT 12+YRS 10/01/2022    COVID-19 (PFIZER) Purple Cap Monovalent 03/12/2021, 04/02/2021, 10/01/2022    FLUAD TRI 65YR+ 10/07/2019, 11/08/2019    Fluad Quad 65+ 09/24/2020    Fluzone High Dose =>65 Years ([employer] ONLY) 10/12/2016, 10/11/2017, 09/25/2018    Fluzone High-Dose 65+yrs 09/30/2021, 10/10/2022, 10/03/2023    Hepatitis A 11/26/2018, 05/30/2019    Influenza TIV (IM) 10/07/2019    Pneumococcal Conjugate 13-Valent (PCV13) 04/16/2015    Pneumococcal Polysaccharide (PPSV23) 11/26/2012    Shingrix 03/25/2019, 06/10/2019    Tdap 11/01/2009    Zostavax 12/01/2010     Problem List     Right upper lobe groundglass opacity  History of renal cell cancer  On a kidney disease  Aortic valvular heart disease    Discussion     His daughter accompanied him to the endoscopy area and we discussed his imaging study in detail as well as the planned procedure  Our plan is to pursue a navigational bronchoscopy with transbronchial biopsies to obtain specimens from the right upper lobe lesion  We discussed other biopsy modalities including surgery and CT FNA  We discussed the risks and benefits of each    We discussed the risks of bronchoscopy and biopsy  This included but is not limited to the risk of pneumothorax which would require hospitalization and could require chest tube placement, and prolonged hospitalization due to an air leak.    Our plan is to proceed with a navigational bronchoscopy    Moderate level of Medical Decision Making complexity based on 1 undiagnosed new problem or 2 stable chronic conditions, independent interpretation of tests, and/or prescription drug  management     Manish Wynn MD  Note electronically signed    CC: Valentine Newton, APRN

## 2023-11-10 NOTE — ANESTHESIA POSTPROCEDURE EVALUATION
Patient: Ac Caldwell    Procedure Summary       Date: 11/10/23 Room / Location:  [facility]    Anesthesia Start: 0750 Anesthesia Stop: 0912    Procedure: BRONCHOSCOPY NAVIGATION WITH ENDOBRONCHIAL ULTRASOUND AND ION ROBOT Diagnosis:       Lung nodule      (Lung nodule [R91.1])    Surgeons: Manish Wynn MD Provider: Hector Mack MD    Anesthesia Type: general, [place] ASA Status: 4            Anesthesia Type: general, [place]    Vitals  Vitals Value Taken Time   /75 11/10/23 0930   Temp 97.6 °F (36.4 °C) 11/10/23 0911   Pulse 68 11/10/23 0932   Resp 24 11/10/23 0911   SpO2 93 % 11/10/23 0932   Vitals shown include unfiled device data.        Anesthesia Post Evaluation

## 2023-11-10 NOTE — ANESTHESIA PROCEDURE NOTES
Airway  Urgency: elective    Date/Time: 11/10/2023 8:05 AM  Airway not difficult    General Information and Staff    Patient location during procedure: OR    Indications and Patient Condition  Indications for airway management: airway protection    Preoxygenated: yes  MILS not maintained throughout  Mask difficulty assessment: 2 - vent by mask + OA or adjuvant +/- NMBA    Final Airway Details  Final airway type: endotracheal airway      Successful airway: ETT  Cuffed: yes   Successful intubation technique: direct laryngoscopy and video laryngoscopy  Facilitating devices/methods: intubating stylet  Endotracheal tube insertion site: oral  Blade: [place]  Blade size: 4  ETT size (mm): 8.5  Cormack-Lehane Classification: grade I - full view of glottis  Placement verified by: chest auscultation and capnometry   Inital cuff pressure (cm H2O): 5  Measured from: lips  ETT/EBT  to lips (cm): 24  Number of attempts at approach: 1  Assessment: lips, teeth, and gum same as pre-op and atraumatic intubation    Additional Comments  Negative epigastric sounds, Breath sound equal bilaterally with symmetric chest rise and fall

## 2023-11-10 NOTE — ANESTHESIA POSTPROCEDURE EVALUATION
Patient: Ac Caldwell    Procedure Summary       Date: 11/10/23 Room / Location:  [facility]    Anesthesia Start: 0750 Anesthesia Stop: 0911    Procedure: BRONCHOSCOPY NAVIGATION WITH ENDOBRONCHIAL ULTRASOUND AND ION ROBOT Diagnosis:       Lung nodule      (Lung nodule [R91.1])    Surgeons: Manish Wynn MD Provider: Hector Mack MD    Anesthesia Type: general, [place] ASA Status: 4            Anesthesia Type: general, [place]    Vitals  Vitals Value Taken Time   /85 11/10/23 0907   Temp     Pulse 66 11/10/23 0909   Resp     SpO2 89 % 11/10/23 0909   Vitals shown include unfiled device data.        Post Anesthesia Care and Evaluation    Patient location during evaluation: PACU  Patient participation: complete - patient participated  Level of consciousness: sleepy but conscious  Pain management: adequate    Airway patency: patent  Anesthetic complications: No anesthetic complications  PONV Status: none  Cardiovascular status: hemodynamically stable and acceptable  Respiratory status: nonlabored ventilation, acceptable and nasal cannula  Hydration status: acceptable

## 2023-11-10 NOTE — ANESTHESIA PROCEDURE NOTES
Arterial Line      Patient reassessed immediately prior to procedure    Patient location during procedure: pre-op  Start time: 11/10/2023 7:25 AM  Stop Time:11/10/2023 7:34 AM       Line placed for hemodynamic monitoring.  Performed By   Anesthesiologist: Hector Mack MD   Preanesthetic Checklist  Completed: patient identified, IV checked, site marked, risks and benefits discussed, surgical consent, monitors and equipment checked, pre-op evaluation and timeout performed  Arterial Line Prep    Sterile Tech: cap, gloves and sterile barriers  Prep: ChloraPrep  Patient monitoring: blood pressure monitoring, continuous pulse oximetry and EKG  Arterial Line Procedure   Laterality:right  Location:  radial artery  Catheter size: 20 G   Guidance: palpation technique  Number of attempts: 1  Successful placement: yes   Post Assessment   Dressing Type: line sutured, occlusive dressing applied, secured with tape and wrist guard applied.   Complications no  Circ/Move/Sens Assessment: normal and unchanged.   Patient Tolerance: patient tolerated the procedure well with no apparent complications

## 2023-11-11 LAB — NIGHT BLUE STAIN TISS: NORMAL

## 2023-11-12 LAB
BACTERIA SPEC RESP CULT: NORMAL
GRAM STN SPEC: NORMAL

## 2023-11-13 ENCOUNTER — DOCUMENTATION (OUTPATIENT)
Dept: PULMONOLOGY | Facility: CLINIC | Age: 78
End: 2023-11-13
Payer: MEDICARE

## 2023-11-13 LAB
CYTO UR: NORMAL
LAB AP CASE REPORT: NORMAL
LAB AP CLINICAL INFORMATION: NORMAL
PATH REPORT.FINAL DX SPEC: NORMAL
PATH REPORT.GROSS SPEC: NORMAL
REF LAB TEST METHOD: NORMAL

## 2023-11-13 NOTE — PROGRESS NOTES
Patient underwent a bronchoscopy with transbronchial biopsies of the right upper lobe for the groundglass nodule noted on prior chest imaging.  Biopsies and cytology specimens are negative for malignancy.  Cultures are negative so far    I communicated these results to the patient on the phone    At this point I would continue to recommend close radiographic follow-up  He will be following up with Dr. De Dios

## 2023-11-14 LAB — GIE STN SPEC: NORMAL

## 2023-11-17 LAB
FUNGUS WND CULT: NORMAL
MYCOBACTERIUM SPEC CULT: NORMAL
NIGHT BLUE STAIN TISS: NORMAL

## 2023-11-20 DIAGNOSIS — R91.1 LUNG NODULE: Primary | ICD-10-CM

## 2023-11-24 LAB
FUNGUS WND CULT: NORMAL
MYCOBACTERIUM SPEC CULT: NORMAL
NIGHT BLUE STAIN TISS: NORMAL

## 2023-12-01 LAB
FUNGUS WND CULT: NORMAL
MYCOBACTERIUM SPEC CULT: NORMAL
NIGHT BLUE STAIN TISS: NORMAL

## 2023-12-04 DIAGNOSIS — R73.03 PREDIABETES: ICD-10-CM

## 2023-12-04 DIAGNOSIS — N18.31 STAGE 3A CHRONIC KIDNEY DISEASE: ICD-10-CM

## 2023-12-04 DIAGNOSIS — I10 ESSENTIAL HYPERTENSION: Primary | ICD-10-CM

## 2023-12-04 DIAGNOSIS — E78.2 MIXED HYPERLIPIDEMIA: ICD-10-CM

## 2023-12-04 DIAGNOSIS — E03.9 ACQUIRED HYPOTHYROIDISM: ICD-10-CM

## 2023-12-04 DIAGNOSIS — E55.9 VITAMIN D DEFICIENCY: ICD-10-CM

## 2023-12-05 ENCOUNTER — LAB (OUTPATIENT)
Dept: LAB | Facility: HOSPITAL | Age: 78
End: 2023-12-05
Payer: MEDICARE

## 2023-12-05 DIAGNOSIS — E03.9 ACQUIRED HYPOTHYROIDISM: ICD-10-CM

## 2023-12-05 DIAGNOSIS — N18.31 STAGE 3A CHRONIC KIDNEY DISEASE: ICD-10-CM

## 2023-12-05 DIAGNOSIS — R73.03 PREDIABETES: ICD-10-CM

## 2023-12-05 DIAGNOSIS — E78.2 MIXED HYPERLIPIDEMIA: ICD-10-CM

## 2023-12-05 DIAGNOSIS — E55.9 VITAMIN D DEFICIENCY: ICD-10-CM

## 2023-12-05 DIAGNOSIS — I10 ESSENTIAL HYPERTENSION: ICD-10-CM

## 2023-12-05 LAB
25(OH)D3 SERPL-MCNC: 30.7 NG/ML (ref 30–100)
ALBUMIN SERPL-MCNC: 3.8 G/DL (ref 3.5–5.2)
ALBUMIN/GLOB SERPL: 1.5 G/DL
ALP SERPL-CCNC: 58 U/L (ref 39–117)
ALT SERPL W P-5'-P-CCNC: 18 U/L (ref 1–41)
ANION GAP SERPL CALCULATED.3IONS-SCNC: 12.1 MMOL/L (ref 5–15)
AST SERPL-CCNC: 17 U/L (ref 1–40)
BASOPHILS # BLD AUTO: 0.02 10*3/MM3 (ref 0–0.2)
BASOPHILS NFR BLD AUTO: 0.4 % (ref 0–1.5)
BILIRUB SERPL-MCNC: 0.8 MG/DL (ref 0–1.2)
BUN SERPL-MCNC: 15 MG/DL (ref 8–23)
BUN/CREAT SERPL: 11.5 (ref 7–25)
CALCIUM SPEC-SCNC: 9 MG/DL (ref 8.6–10.5)
CHLORIDE SERPL-SCNC: 108 MMOL/L (ref 98–107)
CHOLEST SERPL-MCNC: 152 MG/DL (ref 0–200)
CO2 SERPL-SCNC: 20.9 MMOL/L (ref 22–29)
CREAT SERPL-MCNC: 1.3 MG/DL (ref 0.76–1.27)
DEPRECATED RDW RBC AUTO: 39.9 FL (ref 37–54)
EGFRCR SERPLBLD CKD-EPI 2021: 56.9 ML/MIN/1.73
EOSINOPHIL # BLD AUTO: 0.27 10*3/MM3 (ref 0–0.4)
EOSINOPHIL NFR BLD AUTO: 5.4 % (ref 0.3–6.2)
ERYTHROCYTE [DISTWIDTH] IN BLOOD BY AUTOMATED COUNT: 12.2 % (ref 12.3–15.4)
GLOBULIN UR ELPH-MCNC: 2.6 GM/DL
GLUCOSE SERPL-MCNC: 106 MG/DL (ref 65–99)
HCT VFR BLD AUTO: 43.2 % (ref 37.5–51)
HDLC SERPL-MCNC: 38 MG/DL (ref 40–60)
HGB BLD-MCNC: 14.6 G/DL (ref 13–17.7)
IMM GRANULOCYTES # BLD AUTO: 0.02 10*3/MM3 (ref 0–0.05)
IMM GRANULOCYTES NFR BLD AUTO: 0.4 % (ref 0–0.5)
LDLC SERPL CALC-MCNC: 95 MG/DL (ref 0–100)
LDLC/HDLC SERPL: 2.45 {RATIO}
LYMPHOCYTES # BLD AUTO: 1.36 10*3/MM3 (ref 0.7–3.1)
LYMPHOCYTES NFR BLD AUTO: 27.3 % (ref 19.6–45.3)
MCH RBC QN AUTO: 30.4 PG (ref 26.6–33)
MCHC RBC AUTO-ENTMCNC: 33.8 G/DL (ref 31.5–35.7)
MCV RBC AUTO: 89.8 FL (ref 79–97)
MONOCYTES # BLD AUTO: 0.53 10*3/MM3 (ref 0.1–0.9)
MONOCYTES NFR BLD AUTO: 10.6 % (ref 5–12)
NEUTROPHILS NFR BLD AUTO: 2.79 10*3/MM3 (ref 1.7–7)
NEUTROPHILS NFR BLD AUTO: 55.9 % (ref 42.7–76)
NRBC BLD AUTO-RTO: 0 /100 WBC (ref 0–0.2)
PLATELET # BLD AUTO: 135 10*3/MM3 (ref 140–450)
PMV BLD AUTO: 11 FL (ref 6–12)
POTASSIUM SERPL-SCNC: 4.3 MMOL/L (ref 3.5–5.2)
PROT SERPL-MCNC: 6.4 G/DL (ref 6–8.5)
RBC # BLD AUTO: 4.81 10*6/MM3 (ref 4.14–5.8)
SODIUM SERPL-SCNC: 141 MMOL/L (ref 136–145)
TRIGL SERPL-MCNC: 104 MG/DL (ref 0–150)
TSH SERPL DL<=0.05 MIU/L-ACNC: 2.86 UIU/ML (ref 0.27–4.2)
VIT B12 BLD-MCNC: 1012 PG/ML (ref 211–946)
VLDLC SERPL-MCNC: 19 MG/DL (ref 5–40)
WBC NRBC COR # BLD AUTO: 4.99 10*3/MM3 (ref 3.4–10.8)

## 2023-12-05 PROCEDURE — 82306 VITAMIN D 25 HYDROXY: CPT

## 2023-12-05 PROCEDURE — 84443 ASSAY THYROID STIM HORMONE: CPT

## 2023-12-05 PROCEDURE — 85025 COMPLETE CBC W/AUTO DIFF WBC: CPT

## 2023-12-05 PROCEDURE — 80061 LIPID PANEL: CPT

## 2023-12-05 PROCEDURE — 82607 VITAMIN B-12: CPT

## 2023-12-05 PROCEDURE — 80053 COMPREHEN METABOLIC PANEL: CPT

## 2023-12-05 PROCEDURE — 83036 HEMOGLOBIN GLYCOSYLATED A1C: CPT

## 2023-12-06 LAB — HBA1C MFR BLD: 5.8 % (ref 4.8–5.6)

## 2023-12-07 ENCOUNTER — OFFICE VISIT (OUTPATIENT)
Dept: INTERNAL MEDICINE | Facility: CLINIC | Age: 78
End: 2023-12-07
Payer: MEDICARE

## 2023-12-07 VITALS
TEMPERATURE: 97.1 F | BODY MASS INDEX: 36.95 KG/M2 | DIASTOLIC BLOOD PRESSURE: 60 MMHG | WEIGHT: 243.8 LBS | OXYGEN SATURATION: 96 % | SYSTOLIC BLOOD PRESSURE: 135 MMHG | HEART RATE: 63 BPM | HEIGHT: 68 IN

## 2023-12-07 DIAGNOSIS — R91.1 LUNG NODULE: ICD-10-CM

## 2023-12-07 DIAGNOSIS — N18.31 STAGE 3A CHRONIC KIDNEY DISEASE: ICD-10-CM

## 2023-12-07 DIAGNOSIS — K86.2 PANCREATIC CYST: ICD-10-CM

## 2023-12-07 DIAGNOSIS — E03.9 ACQUIRED HYPOTHYROIDISM: ICD-10-CM

## 2023-12-07 DIAGNOSIS — E78.2 MIXED HYPERLIPIDEMIA: ICD-10-CM

## 2023-12-07 DIAGNOSIS — Z23 COVID-19 VACCINE ADMINISTERED: ICD-10-CM

## 2023-12-07 DIAGNOSIS — R73.03 PREDIABETES: ICD-10-CM

## 2023-12-07 DIAGNOSIS — I71.21 ANEURYSM OF ASCENDING AORTA WITHOUT RUPTURE: ICD-10-CM

## 2023-12-07 DIAGNOSIS — I10 ESSENTIAL HYPERTENSION: Primary | ICD-10-CM

## 2023-12-07 NOTE — PROGRESS NOTES
Subjective   Ac Caldwell is a 78 y.o. male    Chief Complaint   Patient presents with    Essential hypertension     History of Present Illness     On 1/26/15, Ac saw gross hematuria, saw Dr. Ayala (urology) and he did a scope. It was ok. Then had a CT scan on 2/3/15. Cr was high, so they did it without contrast. CT scan showed a lesion, but he was not concerned. Then had a fish bladder test on 2/5/15. His urologist never got the results. Did go back to see Nephrology and had labs in Feb. Cr. was 1.1 and BUN was 16. Everything has been stable since. Has a h/o right kidney CA in 1997 and had nephrectomy.  Urologist - Dr. Jossue Serrano left and moved to [place] and he had to establish with Dr Farrell at [facility].  He saw him several times for recurrent UTI, hydrocele, renal cyst, and epididymoorchits. He saw them again 5/31/2017 for repeat renal US that showed a 3.4cm left renal cyst, which had grown from 2.5cm on last scan.  Dr. Farrell moved to [place] and he was then referred to  Urology.  Had CXR and labs.  CXR revealed a possible thoracic aortic aneurysm.  Had f/u chest CT scan, no aneurysm.  CT renal mass protocol was done on 8/30/2017 which confirmed a solid enhancing mass in the lower pole of the left kidney, without lymphadenopathy.  It was elected to proceed with a cryoablation of this lesion per Dr. Mark at .  He underwent this procedure on 9/27/17.  Pathology is unclear as to it being malignant vs benign.  Was re-scanned in 30 days, then 90 days, then Q6 months.  He has been told that he does not need to come back unless he has problems.       CT in Aug 2017 revealed an incidental pancreatic lesion on the head of the pancreas that was 12 mm in size; f/u imaging done 9/2018 showed that the mass was stable and unchanged in size.  Had a biopsy of the pancreatic cyst in 2019 and it was benign.  They will monitor Q12 months with MRI.  Ac prefers a CT over an MRI.  The CT done on 10/1/2020 of [facility] and  pelvis shows a simple cystic structure near the head of the pancreas measuring 1.6 cm.  The pancreatic cyst appears to be slightly larger on this scan than previous (from 12 mm to 16 mm) - this is followed by Dr. Norwood at .  We faxed a copy to his office and he underwent an EGD and EUS on 3/2/2021.  The pathology results were benign but he suggested another followup  EUS/FNA in one year, in the March/April 2022 time frame.  Had this done in 6/2022.  Was referred to [facility] at last visit.  All records reviewed and he does not need another scan until 6/2024     Thoracic AA - followed by CT surgery at .  Per echo in Marc 2022 Aortic Root measured 41 mm and per last echo at [facility] it is 44 mm.  He was referred to [facility] CT surgery for routine f/u.      After CT for AA, a pulm nodule and was referred to [facility] Pul.  Had f/u CT, PET and Bronch.  Neg for malignancy.  He will have repeat scan in Feb 2024.       Underwent a STEAM procedure in 2/2019 for BPH @       [facility]; his Creatinine is improved at 1.24 down from 1.31     Had a very large thyroid nodule removed on 4/23/14 per ENT. It was 8cm in size; they took the right side of his thyroid and placed on synthroid 50mcg. It was benign! Seeing Dr. Everett annually and everything has been stable.   Lab Results   Component Value Date    TSH 2.860 12/05/2023    S7EFRYL 5.2 04/17/2014     AS - now followed by Cardiology, Dr. Valladares.  He will follow every 6 months and echo q 2 yrs.        HTN - well controlled with Losartan 100 mg and Carvedilol 12.5 mg BID (recent per Cards); BP is well controlled and he denies SE.      HL - stable on Lipitor 20mg daily without SE.  Lab Results   Component Value Date    CHOL 152 12/05/2023    TRIG 104 12/05/2023    HDL 38 (L) 12/05/2023    LDL 95 12/05/2023        COVID - 3/12/21 and 4/2/2021, 10/14/2021, 4/9/2022, 10/1/2022, updating today  Flu shot - 10/2023  Tdap 2009   Pneumovax 2012   Prevnar -  4/2015   Zostavax 2010  Shingrix - UTD  PSA per Urology - WNL, no need to repeat  Colon - 4/2015, due 2025        The following portions of the patient's history were reviewed and updated as appropriate: allergies, current medications, past family history, past medical history, past social history, past surgical history, and problem list.    Current Outpatient Medications:     aspirin 325 MG tablet, Take 1 tablet by mouth Daily. 81MG    12/07/23, Disp: , Rfl:     atorvastatin (LIPITOR) 20 MG tablet, TAKE ONE TABLET BY MOUTH ONCE NIGHTLY, Disp: 90 tablet, Rfl: 3    azelastine (ASTELIN) 0.1 % nasal spray, , Disp: , Rfl:     betamethasone valerate (VALISONE) 0.1 % ointment, Apply 1 application  topically to the appropriate area as directed As Needed., Disp: , Rfl:     carvedilol (COREG) 12.5 MG tablet, Take 1 tablet by mouth twice daily, Disp: 180 tablet, Rfl: 3    Cholecalciferol (Vitamin D3) 50 MCG (2000 UT) tablet, Take 1 tablet by mouth Daily., Disp: , Rfl:     Coenzyme Q10 (CO Q-10) 100 MG capsule, Take 100 mg by mouth 2 (Two) Times a Day., Disp: , Rfl:     esomeprazole (NexIUM) 20 MG capsule, Take 1 capsule by mouth Daily., Disp: , Rfl:     Flaxseed, Linseed, (FLAX SEED OIL) 1000 MG capsule, Take 1,200 mg by mouth Daily., Disp: , Rfl:     levothyroxine (SYNTHROID, LEVOTHROID) 50 MCG tablet, Take 1 tablet by mouth Daily., Disp: , Rfl:     losartan (COZAAR) 100 MG tablet, Take 1 tablet by mouth Daily., Disp: , Rfl:     Omega-3 Fatty Acids (FISH OIL) 1000 MG capsule capsule, Take 1,200 mg by mouth 2 (Two) Times a Day With Meals., Disp: , Rfl:     potassium citrate (UROCIT-K) 10 MEQ (1080 MG) CR tablet, Take 1 tablet by mouth once daily, Disp: 90 tablet, Rfl: 0    vitamin B-12 (CYANOCOBALAMIN) 1000 MCG tablet, Take 1 tablet by mouth Daily., Disp: , Rfl:      Review of Systems   Constitutional:  Negative for chills, fatigue and fever.   Respiratory:  Positive for shortness of breath. Negative for cough and chest  "tightness.    Cardiovascular:  Negative for chest pain and palpitations.   Gastrointestinal:  Negative for abdominal pain, diarrhea, nausea and vomiting.   Endocrine: Negative for cold intolerance and heat intolerance.   Musculoskeletal:  Negative for arthralgias.   Neurological:  Negative for dizziness and headaches.       Objective   Physical Exam  Constitutional:       Appearance: He is well-developed.   HENT:      Head: Normocephalic and atraumatic.   Eyes:      Conjunctiva/sclera: Conjunctivae normal.      Pupils: Pupils are equal, round, and reactive to light.   Cardiovascular:      Rate and Rhythm: Normal rate and regular rhythm.      Heart sounds: Normal heart sounds.      Systolic murmur is present with a grade of 4/6.   Pulmonary:      Effort: Pulmonary effort is normal.      Breath sounds: Normal breath sounds.   Abdominal:      General: Bowel sounds are normal.      Palpations: Abdomen is soft.   Musculoskeletal:         General: Normal range of motion.      Cervical back: Normal range of motion.   Skin:     General: Skin is warm and dry.   Neurological:      Mental Status: He is alert and oriented to person, place, and time.      Deep Tendon Reflexes: Reflexes are normal and symmetric.   Psychiatric:         Behavior: Behavior normal.         Thought Content: Thought content normal.         Judgment: Judgment normal.       Vitals:    12/07/23 0950   BP: 135/60   Pulse: 63   Temp: 97.1 °F (36.2 °C)   SpO2: 96%   Weight: 111 kg (243 lb 12.8 oz)   Height: 172.7 cm (67.99\")         Assessment & Plan   Diagnoses and all orders for this visit:    1. Essential hypertension (Primary)    2. COVID-19 vaccine administered  -     COVID-19 F23 (Pfizer) 12yrs+ (COMIRNATY)    3. Mixed hyperlipidemia    4. Acquired hypothyroidism    5. Prediabetes    6. Pancreatic cyst    7. Stage 3a chronic kidney disease    8. Lung nodule    9. Aneurysm of ascending aorta without rupture      Labs reviewed, no med changes  Continue " to work on healthy diet, exercise and weight loss  COVID booster updated  All recent records from specialists reviewed, continue close f/u  Return in about 6 months (around 6/7/2024) for Medicare Wellness.

## 2023-12-08 LAB
FUNGUS WND CULT: NORMAL
MYCOBACTERIUM SPEC CULT: NORMAL
NIGHT BLUE STAIN TISS: NORMAL

## 2023-12-15 LAB
FUNGUS WND CULT: NORMAL
MYCOBACTERIUM SPEC CULT: NORMAL
NIGHT BLUE STAIN TISS: NORMAL

## 2023-12-22 LAB
FUNGUS WND CULT: NORMAL
MYCOBACTERIUM SPEC CULT: NORMAL
NIGHT BLUE STAIN TISS: NORMAL

## 2024-01-04 DIAGNOSIS — Z79.899 ENCOUNTER FOR LONG-TERM (CURRENT) USE OF OTHER MEDICATIONS: ICD-10-CM

## 2024-01-04 DIAGNOSIS — I10 ESSENTIAL HYPERTENSION: ICD-10-CM

## 2024-01-04 RX ORDER — POTASSIUM CITRATE 10 MEQ/1
TABLET, EXTENDED RELEASE ORAL
Qty: 90 TABLET | Refills: 0 | Status: SHIPPED | OUTPATIENT
Start: 2024-01-04

## 2024-01-10 ENCOUNTER — LAB (OUTPATIENT)
Dept: LAB | Facility: HOSPITAL | Age: 79
End: 2024-01-10
Payer: MEDICARE

## 2024-01-10 ENCOUNTER — TRANSCRIBE ORDERS (OUTPATIENT)
Dept: LAB | Facility: HOSPITAL | Age: 79
End: 2024-01-10
Payer: MEDICARE

## 2024-01-10 DIAGNOSIS — N18.2 CHRONIC KIDNEY DISEASE, STAGE 2 (MILD): ICD-10-CM

## 2024-01-10 DIAGNOSIS — N18.2 CHRONIC KIDNEY DISEASE, STAGE 2 (MILD): Primary | ICD-10-CM

## 2024-01-10 LAB
BASOPHILS # BLD AUTO: 0.03 10*3/MM3 (ref 0–0.2)
BASOPHILS NFR BLD AUTO: 0.5 % (ref 0–1.5)
BILIRUB UR QL STRIP: NEGATIVE
CLARITY UR: ABNORMAL
COLOR UR: YELLOW
DEPRECATED RDW RBC AUTO: 39.8 FL (ref 37–54)
EOSINOPHIL # BLD AUTO: 0.19 10*3/MM3 (ref 0–0.4)
EOSINOPHIL NFR BLD AUTO: 3.2 % (ref 0.3–6.2)
ERYTHROCYTE [DISTWIDTH] IN BLOOD BY AUTOMATED COUNT: 12.2 % (ref 12.3–15.4)
GLUCOSE UR STRIP-MCNC: NEGATIVE MG/DL
HCT VFR BLD AUTO: 45.8 % (ref 37.5–51)
HGB BLD-MCNC: 15.3 G/DL (ref 13–17.7)
HGB UR QL STRIP.AUTO: NEGATIVE
IMM GRANULOCYTES # BLD AUTO: 0.02 10*3/MM3 (ref 0–0.05)
IMM GRANULOCYTES NFR BLD AUTO: 0.3 % (ref 0–0.5)
KETONES UR QL STRIP: NEGATIVE
LEUKOCYTE ESTERASE UR QL STRIP.AUTO: ABNORMAL
LYMPHOCYTES # BLD AUTO: 1.62 10*3/MM3 (ref 0.7–3.1)
LYMPHOCYTES NFR BLD AUTO: 27.7 % (ref 19.6–45.3)
MCH RBC QN AUTO: 30.2 PG (ref 26.6–33)
MCHC RBC AUTO-ENTMCNC: 33.4 G/DL (ref 31.5–35.7)
MCV RBC AUTO: 90.3 FL (ref 79–97)
MONOCYTES # BLD AUTO: 0.64 10*3/MM3 (ref 0.1–0.9)
MONOCYTES NFR BLD AUTO: 10.9 % (ref 5–12)
NEUTROPHILS NFR BLD AUTO: 3.35 10*3/MM3 (ref 1.7–7)
NEUTROPHILS NFR BLD AUTO: 57.4 % (ref 42.7–76)
NITRITE UR QL STRIP: POSITIVE
NRBC BLD AUTO-RTO: 0 /100 WBC (ref 0–0.2)
PH UR STRIP.AUTO: 6 [PH] (ref 5–8)
PLATELET # BLD AUTO: 165 10*3/MM3 (ref 140–450)
PMV BLD AUTO: 11 FL (ref 6–12)
PROT UR QL STRIP: NEGATIVE
RBC # BLD AUTO: 5.07 10*6/MM3 (ref 4.14–5.8)
SP GR UR STRIP: 1.02 (ref 1–1.03)
UROBILINOGEN UR QL STRIP: ABNORMAL
WBC NRBC COR # BLD AUTO: 5.85 10*3/MM3 (ref 3.4–10.8)

## 2024-01-10 PROCEDURE — 85025 COMPLETE CBC W/AUTO DIFF WBC: CPT

## 2024-01-10 PROCEDURE — 36415 COLL VENOUS BLD VENIPUNCTURE: CPT

## 2024-01-10 PROCEDURE — 82570 ASSAY OF URINE CREATININE: CPT

## 2024-01-10 PROCEDURE — 81001 URINALYSIS AUTO W/SCOPE: CPT

## 2024-01-10 PROCEDURE — 80069 RENAL FUNCTION PANEL: CPT

## 2024-01-10 PROCEDURE — 84156 ASSAY OF PROTEIN URINE: CPT

## 2024-01-11 LAB
ALBUMIN SERPL-MCNC: 4.1 G/DL (ref 3.5–5.2)
ANION GAP SERPL CALCULATED.3IONS-SCNC: 11 MMOL/L (ref 5–15)
BACTERIA UR QL AUTO: ABNORMAL /HPF
BUN SERPL-MCNC: 18 MG/DL (ref 8–23)
BUN/CREAT SERPL: 12.6 (ref 7–25)
CALCIUM SPEC-SCNC: 9.3 MG/DL (ref 8.6–10.5)
CHLORIDE SERPL-SCNC: 105 MMOL/L (ref 98–107)
CO2 SERPL-SCNC: 24 MMOL/L (ref 22–29)
CREAT SERPL-MCNC: 1.43 MG/DL (ref 0.76–1.27)
CREAT UR-MCNC: 102.1 MG/DL
EGFRCR SERPLBLD CKD-EPI 2021: 50.8 ML/MIN/1.73
GLUCOSE SERPL-MCNC: 103 MG/DL (ref 65–99)
HYALINE CASTS UR QL AUTO: ABNORMAL /LPF
PHOSPHATE SERPL-MCNC: 3.7 MG/DL (ref 2.5–4.5)
POTASSIUM SERPL-SCNC: 4.3 MMOL/L (ref 3.5–5.2)
PROT ?TM UR-MCNC: 13.6 MG/DL
RBC # UR STRIP: ABNORMAL /HPF
REF LAB TEST METHOD: ABNORMAL
SODIUM SERPL-SCNC: 140 MMOL/L (ref 136–145)
SQUAMOUS #/AREA URNS HPF: ABNORMAL /HPF
WBC # UR STRIP: ABNORMAL /HPF

## 2024-01-19 DIAGNOSIS — E78.5 HYPERLIPIDEMIA, UNSPECIFIED HYPERLIPIDEMIA TYPE: ICD-10-CM

## 2024-01-19 RX ORDER — ATORVASTATIN CALCIUM 20 MG/1
20 TABLET, FILM COATED ORAL NIGHTLY
Qty: 90 TABLET | Refills: 3 | Status: SHIPPED | OUTPATIENT
Start: 2024-01-19

## 2024-01-19 NOTE — TELEPHONE ENCOUNTER
Caller: Ac Caldwell    Relationship: Self    Best call back number: [phone]     What is the best time to reach you: ANYTIME     What was the call regarding: PRT CALLED IN TO CHECK ON THIS REFILL, TOLD HIM THIS WAS PENDING IN OUR SYSTEM.

## 2024-02-13 ENCOUNTER — TRANSCRIBE ORDERS (OUTPATIENT)
Dept: LAB | Facility: HOSPITAL | Age: 79
End: 2024-02-13
Payer: MEDICARE

## 2024-02-13 DIAGNOSIS — R30.0 DYSURIA: Primary | ICD-10-CM

## 2024-02-14 ENCOUNTER — HOSPITAL ENCOUNTER (OUTPATIENT)
Dept: CT IMAGING | Facility: HOSPITAL | Age: 79
Discharge: HOME | End: 2024-02-14
Payer: MEDICARE

## 2024-02-14 ENCOUNTER — LAB (OUTPATIENT)
Dept: LAB | Facility: HOSPITAL | Age: 79
End: 2024-02-14
Payer: MEDICARE

## 2024-02-14 DIAGNOSIS — R91.1 LUNG NODULE: ICD-10-CM

## 2024-02-14 DIAGNOSIS — R30.0 DYSURIA: Primary | ICD-10-CM

## 2024-02-14 PROCEDURE — 87186 SC STD MICRODIL/AGAR DIL: CPT

## 2024-02-14 PROCEDURE — 71250 CT THORAX DX C-: CPT

## 2024-02-14 PROCEDURE — 81001 URINALYSIS AUTO W/SCOPE: CPT

## 2024-02-14 PROCEDURE — 87077 CULTURE AEROBIC IDENTIFY: CPT

## 2024-02-14 PROCEDURE — 87086 URINE CULTURE/COLONY COUNT: CPT

## 2024-02-15 LAB
BACTERIA UR QL AUTO: ABNORMAL /HPF
BILIRUB UR QL STRIP: NEGATIVE
CLARITY UR: ABNORMAL
COLOR UR: YELLOW
GLUCOSE UR STRIP-MCNC: NEGATIVE MG/DL
HGB UR QL STRIP.AUTO: NEGATIVE
HYALINE CASTS UR QL AUTO: ABNORMAL /LPF
KETONES UR QL STRIP: NEGATIVE
LEUKOCYTE ESTERASE UR QL STRIP.AUTO: ABNORMAL
NITRITE UR QL STRIP: NEGATIVE
PH UR STRIP.AUTO: 6 [PH] (ref 5–8)
PROT UR QL STRIP: NEGATIVE
RBC # UR STRIP: ABNORMAL /HPF
REF LAB TEST METHOD: ABNORMAL
SP GR UR STRIP: 1.02 (ref 1–1.03)
SQUAMOUS #/AREA URNS HPF: ABNORMAL /HPF
UROBILINOGEN UR QL STRIP: ABNORMAL
WBC # UR STRIP: ABNORMAL /HPF

## 2024-02-16 LAB — BACTERIA SPEC AEROBE CULT: ABNORMAL

## 2024-02-23 ENCOUNTER — OFFICE VISIT (OUTPATIENT)
Dept: PULMONOLOGY | Facility: CLINIC | Age: 79
End: 2024-02-23
Payer: MEDICARE

## 2024-02-23 VITALS
SYSTOLIC BLOOD PRESSURE: 126 MMHG | HEIGHT: 68 IN | DIASTOLIC BLOOD PRESSURE: 80 MMHG | WEIGHT: 240.25 LBS | RESPIRATION RATE: 16 BRPM | HEART RATE: 64 BPM | OXYGEN SATURATION: 96 % | BODY MASS INDEX: 36.41 KG/M2 | TEMPERATURE: 98 F

## 2024-02-23 DIAGNOSIS — Z23 ENCOUNTER FOR IMMUNIZATION: Primary | ICD-10-CM

## 2024-02-23 DIAGNOSIS — R91.1 LUNG NODULE: ICD-10-CM

## 2024-02-23 RX ORDER — AMOXICILLIN AND CLAVULANATE POTASSIUM 875; 125 MG/1; MG/1
1 TABLET, FILM COATED ORAL 2 TIMES DAILY
COMMUNITY
Start: 2024-02-22

## 2024-02-23 RX ORDER — FAMCICLOVIR 500 MG/1
500 TABLET ORAL DAILY
COMMUNITY

## 2024-02-23 NOTE — PROGRESS NOTES
[facility] Pulmonary Follow up    CHIEF COMPLAINT    No complaints    HISTORY OF PRESENT ILLNESS    Ac Caldwell is a 78 y.o.male here today for follow-up.  He was last seen in the office by Dr. De Dios in October.  He had an abnormal CT scan that showed a groundglass nodule and was referred to our office for further management.    Dr. De Dios performed the nodify blood test that showed a higher chance of malignancy.  He had a bronchoscopy per Dr. Wynn in November.  All cultures were negative and it was negative for malignancy.  He had a repeat CT scan performed earlier this month and is here today to discuss the results.    He denies any respiratory illnesses recently.  He denies any coughing.  He denies any sputum production.  He denies any fever, chills or night sweats.    He denies any chest pain or palpitations.  Denies any lower extremity edema or calf tenderness.  He denies any overt reflux symptoms.  He takes Nexium daily.    He is a lifetime non-smoker.    He has a follow-up CT scan set up later this summer per Dr. Castaneda for an aortic aneurysm.  Patient Active Problem List   Diagnosis    Nephritis and nephropathy    Essential hypertension    Esophageal reflux    Diverticulosis of small intestine without hemorrhage    Morbidly obese    Lymphadenopathy    Ganglion cyst    Malignant neoplasm of kidney, except pelvis    Hyperlipidemia    Exertional dyspnea    Nonrheumatic aortic valve stenosis    Acquired hypothyroidism    Pancreatic cyst    Aneurysm of ascending aorta without rupture    Prediabetes    Stage 3a chronic kidney disease    Chest discomfort    IPMN (intraductal papillary mucinous neoplasm)    At average risk for colon cancer    Lung nodule       Allergies   Allergen Reactions    Norvasc [Amlodipine] Swelling    Oxycodone Other (See Comments)     Can elevate Blood pressure    Lotensin [Benazepril] Cough    Sulfamethoxazole-Trimethoprim Cough    Zocor [Simvastatin] Myalgia       Current  Outpatient Medications:     amoxicillin-clavulanate (AUGMENTIN) 875-125 MG per tablet, Take 1 tablet by mouth 2 (Two) Times a Day., Disp: , Rfl:     aspirin 325 MG tablet, Take 1 tablet by mouth Daily. 81MG    12/07/23, Disp: , Rfl:     atorvastatin (LIPITOR) 20 MG tablet, Take 1 tablet by mouth Every Night., Disp: 90 tablet, Rfl: 3    azelastine (ASTELIN) 0.1 % nasal spray, , Disp: , Rfl:     betamethasone valerate (VALISONE) 0.1 % ointment, Apply 1 Application topically to the appropriate area as directed As Needed., Disp: , Rfl:     carvedilol (COREG) 12.5 MG tablet, Take 1 tablet by mouth twice daily, Disp: 180 tablet, Rfl: 3    Cholecalciferol (Vitamin D3) 50 MCG (2000 UT) tablet, Take 1 tablet by mouth Daily., Disp: , Rfl:     Coenzyme Q10 (CO Q-10) 100 MG capsule, Take 100 mg by mouth 2 (Two) Times a Day., Disp: , Rfl:     esomeprazole (NexIUM) 20 MG capsule, Take 1 capsule by mouth Daily., Disp: , Rfl:     famciclovir (FAMVIR) 500 MG tablet, Take 1 tablet by mouth Daily., Disp: , Rfl:     Flaxseed, Linseed, (FLAX SEED OIL) 1000 MG capsule, Take 1,200 mg by mouth Daily., Disp: , Rfl:     levothyroxine (SYNTHROID, LEVOTHROID) 50 MCG tablet, Take 1 tablet by mouth Daily., Disp: , Rfl:     losartan (COZAAR) 100 MG tablet, Take 1 tablet by mouth Daily., Disp: , Rfl:     Omega-3 Fatty Acids (FISH OIL) 1000 MG capsule capsule, Take 1,200 mg by mouth 2 (Two) Times a Day With Meals., Disp: , Rfl:     potassium citrate (UROCIT-K) 10 MEQ (1080 MG) CR tablet, Take 1 tablet by mouth once daily, Disp: 90 tablet, Rfl: 0    vitamin B-12 (CYANOCOBALAMIN) 1000 MCG tablet, Take 1 tablet by mouth Daily., Disp: , Rfl:     RSVPreF3 Vac Recomb Adjuvanted (AREXVY) 120 MCG/0.5ML reconstituted suspension injection, Inject 0.5 mL into the appropriate muscle as directed by prescriber 1 (One) Time for 1 dose., Disp: 0.5 mL, Rfl: 0  MEDICATION LIST AND ALLERGIES REVIEWED.    Social History     Tobacco Use    Smoking status: Never      "Passive exposure: Never    Smokeless tobacco: Never    Tobacco comments:     50,60,70 years it was impossible to avoid people who smoked   Vaping Use    Vaping Use: Never used   Substance Use Topics    Alcohol use: Yes     Comment: social - no schedule - random    Drug use: Never       FAMILY AND SOCIAL HISTORY REVIEWED.    Review of Systems   Constitutional:  Negative for activity change, appetite change, fatigue, fever and unexpected weight change.   HENT:  Negative for congestion, postnasal drip, rhinorrhea, sinus pressure, sore throat and voice change.    Eyes:  Negative for visual disturbance.   Respiratory:  Negative for cough, chest tightness, shortness of breath and wheezing.    Cardiovascular:  Negative for chest pain, palpitations and leg swelling.   Gastrointestinal:  Negative for abdominal distention, abdominal pain, nausea and vomiting.   Endocrine: Negative for cold intolerance and heat intolerance.   Genitourinary:  Negative for difficulty urinating and urgency.   Musculoskeletal:  Negative for arthralgias, back pain and neck pain.   Skin:  Negative for color change and pallor.   Allergic/Immunologic: Negative for environmental allergies and food allergies.   Neurological:  Negative for dizziness, syncope, weakness and light-headedness.   Hematological:  Negative for adenopathy. Does not bruise/bleed easily.   Psychiatric/Behavioral:  Negative for agitation and behavioral problems.    .    /80   Pulse 64   Temp 98 °F (36.7 °C)   Resp 16   Ht 172.7 cm (67.99\")   Wt 109 kg (240 lb 4 oz)   SpO2 96% Comment: room air at rest  BMI 36.54 kg/m²     Immunization History   Administered Date(s) Administered    COVID-19 (PFIZER) BIVALENT 12+YRS 10/01/2022    COVID-19 (PFIZER) Purple Cap Monovalent 03/12/2021, 04/02/2021, 10/01/2022    COVID-19 F23 (PFIZER) 12YRS+ (COMIRNATY) 12/07/2023    FLUAD TRI 65YR+ 10/07/2019, 11/08/2019    Fluad Quad 65+ 09/24/2020    Fluzone High Dose =>65 Years ([employer] " ONLY) 10/12/2016, 10/11/2017, 09/25/2018    Fluzone High-Dose 65+yrs 09/30/2021, 10/10/2022, 10/03/2023    Hepatitis A 11/26/2018, 05/30/2019    Influenza TIV (IM) 10/07/2019    Pneumococcal Conjugate 13-Valent (PCV13) 04/16/2015    Pneumococcal Polysaccharide (PPSV23) 11/26/2012    Shingrix 03/25/2019, 06/10/2019    Tdap 11/01/2009    Zostavax 12/01/2010       Physical Exam  Vitals and nursing note reviewed.   Constitutional:       Appearance: He is well-developed. He is not diaphoretic.   HENT:      Head: Normocephalic and atraumatic.   Eyes:      Pupils: Pupils are equal, round, and reactive to light.   Neck:      Thyroid: No thyromegaly.   Cardiovascular:      Rate and Rhythm: Normal rate and regular rhythm.      Heart sounds: Normal heart sounds. No murmur heard.     No friction rub. No gallop.   Pulmonary:      Effort: Pulmonary effort is normal. No respiratory distress.      Breath sounds: Normal breath sounds. No wheezing or rales.   Chest:      Chest wall: No tenderness.   Abdominal:      General: Bowel sounds are normal.      Palpations: Abdomen is soft.      Tenderness: There is no abdominal tenderness.   Musculoskeletal:         General: No swelling. Normal range of motion.      Cervical back: Normal range of motion and neck supple.   Lymphadenopathy:      Cervical: No cervical adenopathy.   Skin:     General: Skin is warm and dry.      Capillary Refill: Capillary refill takes less than 2 seconds.   Neurological:      Mental Status: He is alert and oriented to person, place, and time.   Psychiatric:         Mood and Affect: Mood normal.         Behavior: Behavior normal.           RESULTS    CT Chest Without Contrast Diagnostic    Result Date: 2/15/2024  Impression: 1. Stable, mild groundglass change in the right upper lobe, favored to be postinflammatory. 2. Stable 3 mm right middle lobe nodule. 3. Faint suggestion of a new 4.5 mm posterior left lower lobe nodule, visible on one image only. If  considered under Fleischner Society guidelines, follow-up scan in 12 months would be suggested. 4. Stable water density cystic lesion in the pancreatic head. Electronically Signed: Thang Cai MD  2/15/2024 6:16 PM EST  Workstation ID: [id number]    PROBLEM LIST    Problem List Items Addressed This Visit          Pulmonary and Pneumonias    Lung nodule     Other Visit Diagnoses       Encounter for immunization    -  Primary    Relevant Medications    RSVPreF3 Vac Recomb Adjuvanted (AREXVY) 120 MCG/0.5ML reconstituted suspension injection              DISCUSSION    Mr. Caldwell was here for follow-up.  We did review his CT scan that shows a stable change of the right upper lobe and a new 4.5 mm nodule in the left lower lobe.  He states that in December he had a sickness but did not require any antibiotics or steroids.    He does have a CT scan of the chest set up later this summer with Dr. Castaneda for his aortic aneurysm.  At the minimum he needs a repeat CT scan in 6 months from our standpoint.  Hopefully his CT scan will be performed in August per Dr. Castaneda.  We will have him follow-up in the office in August with Dr. De Dios.    He would like to receive the Shady V vaccine and I sent this into his local pharmacy to be received.    I personally spent a total of 32 minutes on patient visit today including chart review, face to face with the patient obtaining the history and physical exam, review of pertinent images and tests, counseling and discussion and/or coordination of care as described above, and documentation.  Total time excludes time spent on other separate services such as performing procedures or test interpretation, if applicable.        BERNICE Olivas  02/23/202414:04 EST  Electronically signed     Please note that portions of this note were completed with a voice recognition program.        CC: Valentine Newton, BERNICE

## 2024-03-04 ENCOUNTER — PATIENT MESSAGE (OUTPATIENT)
Dept: INTERNAL MEDICINE | Facility: CLINIC | Age: 79
End: 2024-03-04
Payer: MEDICARE

## 2024-03-04 NOTE — TELEPHONE ENCOUNTER
From: Ac Caldwell  To: Valentine Newton  Sent: 3/4/2024 1:55 PM EST  Subject: RSV Vaccine Received    I had the RSV vaccine at [facility] in [place] today.

## 2024-03-28 DIAGNOSIS — Z79.899 ENCOUNTER FOR LONG-TERM (CURRENT) USE OF OTHER MEDICATIONS: ICD-10-CM

## 2024-03-28 DIAGNOSIS — I10 ESSENTIAL HYPERTENSION: ICD-10-CM

## 2024-03-28 RX ORDER — POTASSIUM CITRATE 10 MEQ/1
TABLET, EXTENDED RELEASE ORAL
Qty: 90 TABLET | Refills: 0 | Status: SHIPPED | OUTPATIENT
Start: 2024-03-28

## 2024-03-28 NOTE — TELEPHONE ENCOUNTER
Rx Refill Note  Requested Prescriptions     Pending Prescriptions Disp Refills    potassium citrate (UROCIT-K) 10 MEQ (1080 MG) CR tablet [Pharmacy Med Name: Potassium Citrate ER 10 MEQ (1080 MG) Oral Tablet Extended Release] 90 tablet 0     Sig: Take 1 tablet by mouth once daily      Last office visit with prescribing clinician: 12/7/2023   Last telemedicine visit with prescribing clinician: Visit date not found   Next office visit with prescribing clinician: 6/10/2024       Seda Bennett MA  03/28/24, 09:20 EDT

## 2024-04-26 ENCOUNTER — OFFICE VISIT (OUTPATIENT)
Dept: CARDIOLOGY | Facility: CLINIC | Age: 79
End: 2024-04-26
Payer: MEDICARE

## 2024-04-26 VITALS
WEIGHT: 245 LBS | DIASTOLIC BLOOD PRESSURE: 80 MMHG | HEART RATE: 69 BPM | BODY MASS INDEX: 36.29 KG/M2 | SYSTOLIC BLOOD PRESSURE: 136 MMHG | HEIGHT: 69 IN | OXYGEN SATURATION: 94 %

## 2024-04-26 DIAGNOSIS — I10 ESSENTIAL HYPERTENSION: ICD-10-CM

## 2024-04-26 DIAGNOSIS — E78.5 DYSLIPIDEMIA: ICD-10-CM

## 2024-04-26 DIAGNOSIS — I71.21 ANEURYSM OF ASCENDING AORTA WITHOUT RUPTURE: ICD-10-CM

## 2024-04-26 DIAGNOSIS — I35.0 NONRHEUMATIC AORTIC VALVE STENOSIS: Primary | ICD-10-CM

## 2024-04-26 PROCEDURE — 3075F SYST BP GE 130 - 139MM HG: CPT

## 2024-04-26 PROCEDURE — G2211 COMPLEX E/M VISIT ADD ON: HCPCS

## 2024-04-26 PROCEDURE — 1160F RVW MEDS BY RX/DR IN RCRD: CPT

## 2024-04-26 PROCEDURE — 1159F MED LIST DOCD IN RCRD: CPT

## 2024-04-26 PROCEDURE — 3079F DIAST BP 80-89 MM HG: CPT

## 2024-04-26 PROCEDURE — 99214 OFFICE O/P EST MOD 30 MIN: CPT

## 2024-04-26 NOTE — PROGRESS NOTES
[facility] Cardiology    Encounter Date: 2024    Patient ID: Ac Caldwell is a 78 y.o. male.  : 1945     PCP: Valentine Newton APRN       Chief Complaint: Nonrheumatic aortic valve stenosis      PROBLEM LIST:  Nonrheumatic aortic valve stenosis:   Echo, 2016: Mild AI and moderate AS, HEATHER 1.2 cm2, mean gradient of 16.6, and peak gradient of 33.3. Trace TR with RVSP of 26.2. Normal EF.  Nuclear stress, 2016: EF 72%. No evidence of ischemia.  CTA, 2017: Mild ectasia of ascending aorta measuring 4.2cm significant aortic leaflet calcification, coronary sclerosis, no aneurysm of descending aorta.  Echo, 2017: Mild AI/AS with HEATHER 1.8 cm². MPG 17.0. Normal EF 64%. Mild LVH.   Echo, 2019: Mild AS, MPG 20 mmHg, HEATHER 1.5 cm2. Trace AI. Mild MR/PI, trace TR. EF 60% with mild concentric LVH. Grade I a diastolic dysfunction.   Echo, 2020: EF 55%. Moderate aortic stenosis, mean gradient 32.8 mmHg, HEATHER 1.1 cm². Mild aortic insufficiency. Mild MR and TR with RVSP 35 mmHg. Mild pulmonic insufficiency.  Echo (), 2021: EF>55%. Moderate AS. Mean gradient across the aortic valve 29 mmHg.  Echo (), 2022: EF 55-60%. Concentric LVH. Mild AR. Moderate aortic stenosis. PG 52 mmHg. MG 32 mmHg.   Echo (2023): EF 60%. Left ventricular wall thickness is consistent with moderate concentric hypertrophy. Left ventricular diastolic function is consistent with (grade I) impaired relaxation. Moderate aortic valve stenosis is present.  Mean gradient 29 mmHg.  Trace aortic insufficiency. Dilated aortic root and ascending aorta.  Thoracic aortic aneurysm without rupture  CTA, 2017: Mild ectasia of ascending aorta measuring 4.2cm significant aortic leaflet calcification, coronary sclerosis, no aneurysm of descending aorta.  Echo at , 2021: EF>55%. Moderate valvular aortic stenosis. Mean gradient across the aortic valve 29 mmHg. Small  ascending aortic aneurysm  CTA August 12, 2023 showed aortic measurement 4.2 cm  Exertional dyspnea/anginal equivalent symptoms with occasional orthopnea.   Stress echo, 08/29/2019: Normal exercise capacity, THR reached at 6 min. No evidence of ischemia. EF 65% at rest, 75% with stress.  Hypertension.   Dyslipidemia.   Prediabetes   Enlarged prostate.  History of renal cancer:   Right nephrectomy in 1997.   S/p Resection of a tumor from his left kidney -- incomplete database.   S/p CT ablation of L renal mass.  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   S/p basal cell cancer surgery.   Episode of hematuria in January with subsequent negative cystoscopy.  Pancreatic cyst, followed by   Surgical Hx:  CT Ablation of Left renal mass  Endoscopic US at  for benign cyst, 09/2019.    History of Present Illness  Patient presents today for a follow-up with a history of nonrheumatic aortic valve stenosis, thoracic ascending aortic aneurysm, and cardiac risk factors. Since last visit, patient has been doing well from a cardiac standpoint. He did have bronchoscopy for pulmonary nodules but he states that this was benign. He has switched his vascular care from  to Dr. Castaneda here at [facility]. Patient denies any chest pain, shortness of air, palpitations, orthopnea, edema, presyncope or syncope.    Allergies   Allergen Reactions    Norvasc [Amlodipine] Swelling    Oxycodone Other (See Comments)     Can elevate Blood pressure    Lotensin [Benazepril] Cough    Sulfamethoxazole-Trimethoprim Cough    Zocor [Simvastatin] Myalgia         Current Outpatient Medications:     aspirin 325 MG tablet, Take 1 tablet by mouth Daily., Disp: , Rfl:     atorvastatin (LIPITOR) 20 MG tablet, Take 1 tablet by mouth Every Night., Disp: 90 tablet, Rfl: 3    azelastine (ASTELIN) 0.1 % nasal spray, , Disp: , Rfl:     betamethasone valerate (VALISONE) 0.1 % ointment, Apply 1 Application topically to the appropriate area as directed As  "Needed., Disp: , Rfl:     carvedilol (COREG) 12.5 MG tablet, Take 1 tablet by mouth twice daily, Disp: 180 tablet, Rfl: 3    Cholecalciferol (Vitamin D3) 50 MCG (2000 UT) tablet, Take 1 tablet by mouth Daily., Disp: , Rfl:     Coenzyme Q10 (CO Q-10) 100 MG capsule, Take 100 mg by mouth 2 (Two) Times a Day., Disp: , Rfl:     esomeprazole (NexIUM) 20 MG capsule, Take 1 capsule by mouth Daily., Disp: , Rfl:     famciclovir (FAMVIR) 500 MG tablet, Take 1 tablet by mouth Daily., Disp: , Rfl:     Flaxseed, Linseed, (FLAX SEED OIL) 1000 MG capsule, Take 1,200 mg by mouth Daily., Disp: , Rfl:     levothyroxine (SYNTHROID, LEVOTHROID) 50 MCG tablet, Take 1 tablet by mouth Daily., Disp: , Rfl:     losartan (COZAAR) 100 MG tablet, Take 1 tablet by mouth Daily., Disp: , Rfl:     Omega-3 Fatty Acids (FISH OIL) 1000 MG capsule capsule, Take 1,200 mg by mouth 2 (Two) Times a Day With Meals., Disp: , Rfl:     potassium citrate (UROCIT-K) 10 MEQ (1080 MG) CR tablet, Take 1 tablet by mouth once daily, Disp: 90 tablet, Rfl: 0    vitamin B-12 (CYANOCOBALAMIN) 1000 MCG tablet, Take 1 tablet by mouth Daily., Disp: , Rfl:     The following portions of the patient's history were reviewed and updated as appropriate: allergies, current medications, past family history, past medical history, past social history, past surgical history and problem list.        Objective:     /80 (BP Location: Right arm, Patient Position: Sitting)   Pulse 69   Ht 175.3 cm (69\")   Wt 111 kg (245 lb)   SpO2 94%   BMI 36.18 kg/m²      Physical Exam  Constitutional: Patient appears well-developed and well-nourished.   HENT: HEENT exam unremarkable.   Neck: Neck supple. No JVD present.   Cardiovascular: Normal rate, regular rhythm and normal heart sounds. 4/6 JENNY  2+ symmetric pulses.   Pulmonary/Chest: Breath sounds normal. Does not exhibit tenderness.   Musculoskeletal: Does not exhibit edema.   Neurological: Neurological exam unremarkable.   Vitals " reviewed.    Data Review:   Lab Results   Component Value Date    GLUCOSE 103 (H) 01/10/2024    BUN 18 01/10/2024    CREATININE 1.43 (H) 01/10/2024    EGFR 50.8 (L) 01/10/2024    BCR 12.6 01/10/2024     01/10/2024    K 4.3 01/10/2024    CO2 24.0 01/10/2024    CALCIUM 9.3 01/10/2024    ALBUMIN 4.1 01/10/2024    AST 17 12/05/2023    ALT 18 12/05/2023     Lab Results   Component Value Date    CHOL 152 12/05/2023    TRIG 104 12/05/2023    HDL 38 (L) 12/05/2023    LDL 95 12/05/2023      Lab Results   Component Value Date    WBC 5.85 01/10/2024    RBC 5.07 01/10/2024    HGB 15.3 01/10/2024    HCT 45.8 01/10/2024    MCV 90.3 01/10/2024     01/10/2024     Lab Results   Component Value Date    TSH 2.860 12/05/2023     Lab Results   Component Value Date    HGBA1C 5.80 (H) 12/05/2023        Procedures              Assessment and plan:      Diagnosis Plan   1. Nonrheumatic aortic valve stenosis  Echo (02/14/2023): EF 60%. Left ventricular wall thickness is consistent with moderate concentric hypertrophy. Left ventricular diastolic function is consistent with (grade I) impaired relaxation. Moderate aortic valve stenosis is present.  Mean gradient 29 mmHg.  Trace aortic insufficiency. Dilated aortic root and ascending aorta.    Repeat echocardiogram ordered today. This will be obtained on same day as 6 month follow up. If he has symptoms of shortness of breath, dizziness, or syncope, he will call our office.       2. Aneurysm of ascending aorta without rupture  Stable on CT scan 8/2023. Follows with CTS.       3. Essential hypertension  Well controlled, continue losartan      4. Dyslipidemia  Continue lipitor 20 mg daily. LDL 95 on labs reviewed today.        Stable cardiac status.   Continue current medications.   FU in 12 MO, sooner as needed.  Thank you for allowing us to participate in the care of your patient.       Nicole De La O PA-C      Please note that portions of this note may have been completed with  a voice recognition program. Efforts were made to edit the dictations, but occasionally words are mistranscribed.

## 2024-05-29 DIAGNOSIS — I71.21 ANEURYSM OF ASCENDING AORTA WITHOUT RUPTURE: Primary | ICD-10-CM

## 2024-06-04 ENCOUNTER — TELEPHONE (OUTPATIENT)
Dept: INTERNAL MEDICINE | Facility: CLINIC | Age: 79
End: 2024-06-04
Payer: MEDICARE

## 2024-06-04 DIAGNOSIS — E03.9 ACQUIRED HYPOTHYROIDISM: ICD-10-CM

## 2024-06-04 DIAGNOSIS — E55.9 VITAMIN D DEFICIENCY: ICD-10-CM

## 2024-06-04 DIAGNOSIS — I10 ESSENTIAL HYPERTENSION: ICD-10-CM

## 2024-06-04 DIAGNOSIS — R73.03 PREDIABETES: ICD-10-CM

## 2024-06-04 DIAGNOSIS — N18.31 STAGE 3A CHRONIC KIDNEY DISEASE: ICD-10-CM

## 2024-06-04 DIAGNOSIS — Z00.00 MEDICARE ANNUAL WELLNESS VISIT, SUBSEQUENT: Primary | ICD-10-CM

## 2024-06-04 DIAGNOSIS — E78.5 DYSLIPIDEMIA: ICD-10-CM

## 2024-06-06 ENCOUNTER — LAB (OUTPATIENT)
Dept: LAB | Facility: HOSPITAL | Age: 79
End: 2024-06-06
Payer: MEDICARE

## 2024-06-06 DIAGNOSIS — N18.31 STAGE 3A CHRONIC KIDNEY DISEASE: ICD-10-CM

## 2024-06-06 DIAGNOSIS — R73.03 PREDIABETES: ICD-10-CM

## 2024-06-06 DIAGNOSIS — I10 ESSENTIAL HYPERTENSION: ICD-10-CM

## 2024-06-06 DIAGNOSIS — E55.9 VITAMIN D DEFICIENCY: ICD-10-CM

## 2024-06-06 DIAGNOSIS — E78.5 DYSLIPIDEMIA: ICD-10-CM

## 2024-06-06 DIAGNOSIS — Z00.00 MEDICARE ANNUAL WELLNESS VISIT, SUBSEQUENT: ICD-10-CM

## 2024-06-06 DIAGNOSIS — E03.9 ACQUIRED HYPOTHYROIDISM: ICD-10-CM

## 2024-06-06 LAB
25(OH)D3 SERPL-MCNC: 33.5 NG/ML (ref 30–100)
ALBUMIN SERPL-MCNC: 3.8 G/DL (ref 3.5–5.2)
ALBUMIN/GLOB SERPL: 1.3 G/DL
ALP SERPL-CCNC: 63 U/L (ref 39–117)
ALT SERPL W P-5'-P-CCNC: 16 U/L (ref 1–41)
ANION GAP SERPL CALCULATED.3IONS-SCNC: 9.7 MMOL/L (ref 5–15)
AST SERPL-CCNC: 16 U/L (ref 1–40)
BASOPHILS # BLD AUTO: 0.02 10*3/MM3 (ref 0–0.2)
BASOPHILS NFR BLD AUTO: 0.4 % (ref 0–1.5)
BILIRUB SERPL-MCNC: 0.7 MG/DL (ref 0–1.2)
BUN SERPL-MCNC: 24 MG/DL (ref 8–23)
BUN/CREAT SERPL: 18.2 (ref 7–25)
CALCIUM SPEC-SCNC: 9.3 MG/DL (ref 8.6–10.5)
CHLORIDE SERPL-SCNC: 107 MMOL/L (ref 98–107)
CHOLEST SERPL-MCNC: 137 MG/DL (ref 0–200)
CO2 SERPL-SCNC: 22.3 MMOL/L (ref 22–29)
CREAT SERPL-MCNC: 1.32 MG/DL (ref 0.76–1.27)
DEPRECATED RDW RBC AUTO: 40.7 FL (ref 37–54)
EGFRCR SERPLBLD CKD-EPI 2021: 55.9 ML/MIN/1.73
EOSINOPHIL # BLD AUTO: 0.23 10*3/MM3 (ref 0–0.4)
EOSINOPHIL NFR BLD AUTO: 4.6 % (ref 0.3–6.2)
ERYTHROCYTE [DISTWIDTH] IN BLOOD BY AUTOMATED COUNT: 12.4 % (ref 12.3–15.4)
GLOBULIN UR ELPH-MCNC: 2.9 GM/DL
GLUCOSE SERPL-MCNC: 109 MG/DL (ref 65–99)
HBA1C MFR BLD: 5.6 % (ref 4.8–5.6)
HCT VFR BLD AUTO: 45.2 % (ref 37.5–51)
HDLC SERPL-MCNC: 33 MG/DL (ref 40–60)
HGB BLD-MCNC: 15.6 G/DL (ref 13–17.7)
IMM GRANULOCYTES # BLD AUTO: 0.01 10*3/MM3 (ref 0–0.05)
IMM GRANULOCYTES NFR BLD AUTO: 0.2 % (ref 0–0.5)
LDLC SERPL CALC-MCNC: 84 MG/DL (ref 0–100)
LDLC/HDLC SERPL: 2.51 {RATIO}
LYMPHOCYTES # BLD AUTO: 1.46 10*3/MM3 (ref 0.7–3.1)
LYMPHOCYTES NFR BLD AUTO: 29.3 % (ref 19.6–45.3)
MCH RBC QN AUTO: 31.2 PG (ref 26.6–33)
MCHC RBC AUTO-ENTMCNC: 34.5 G/DL (ref 31.5–35.7)
MCV RBC AUTO: 90.4 FL (ref 79–97)
MONOCYTES # BLD AUTO: 0.53 10*3/MM3 (ref 0.1–0.9)
MONOCYTES NFR BLD AUTO: 10.6 % (ref 5–12)
NEUTROPHILS NFR BLD AUTO: 2.74 10*3/MM3 (ref 1.7–7)
NEUTROPHILS NFR BLD AUTO: 54.9 % (ref 42.7–76)
NRBC BLD AUTO-RTO: 0 /100 WBC (ref 0–0.2)
PLATELET # BLD AUTO: 143 10*3/MM3 (ref 140–450)
PMV BLD AUTO: 10.8 FL (ref 6–12)
POTASSIUM SERPL-SCNC: 4.4 MMOL/L (ref 3.5–5.2)
PROT SERPL-MCNC: 6.7 G/DL (ref 6–8.5)
RBC # BLD AUTO: 5 10*6/MM3 (ref 4.14–5.8)
SODIUM SERPL-SCNC: 139 MMOL/L (ref 136–145)
TRIGL SERPL-MCNC: 106 MG/DL (ref 0–150)
VIT B12 BLD-MCNC: 957 PG/ML (ref 211–946)
VLDLC SERPL-MCNC: 20 MG/DL (ref 5–40)
WBC NRBC COR # BLD AUTO: 4.99 10*3/MM3 (ref 3.4–10.8)

## 2024-06-06 PROCEDURE — 80061 LIPID PANEL: CPT

## 2024-06-06 PROCEDURE — 83036 HEMOGLOBIN GLYCOSYLATED A1C: CPT

## 2024-06-06 PROCEDURE — 82607 VITAMIN B-12: CPT

## 2024-06-06 PROCEDURE — 80053 COMPREHEN METABOLIC PANEL: CPT

## 2024-06-06 PROCEDURE — 84439 ASSAY OF FREE THYROXINE: CPT

## 2024-06-06 PROCEDURE — 84443 ASSAY THYROID STIM HORMONE: CPT

## 2024-06-06 PROCEDURE — 85025 COMPLETE CBC W/AUTO DIFF WBC: CPT

## 2024-06-06 PROCEDURE — 82306 VITAMIN D 25 HYDROXY: CPT

## 2024-06-07 DIAGNOSIS — D49.0 IPMN (INTRADUCTAL PAPILLARY MUCINOUS NEOPLASM): Primary | ICD-10-CM

## 2024-06-07 LAB
T4 FREE SERPL-MCNC: 1.12 NG/DL (ref 0.92–1.68)
TSH SERPL DL<=0.05 MIU/L-ACNC: 2.25 UIU/ML (ref 0.27–4.2)

## 2024-06-10 ENCOUNTER — OFFICE VISIT (OUTPATIENT)
Dept: INTERNAL MEDICINE | Facility: CLINIC | Age: 79
End: 2024-06-10
Payer: MEDICARE

## 2024-06-10 VITALS
WEIGHT: 238.6 LBS | TEMPERATURE: 96.9 F | BODY MASS INDEX: 36.16 KG/M2 | DIASTOLIC BLOOD PRESSURE: 68 MMHG | OXYGEN SATURATION: 97 % | SYSTOLIC BLOOD PRESSURE: 106 MMHG | RESPIRATION RATE: 14 BRPM | HEIGHT: 68 IN | HEART RATE: 62 BPM

## 2024-06-10 DIAGNOSIS — I35.0 NONRHEUMATIC AORTIC VALVE STENOSIS: ICD-10-CM

## 2024-06-10 DIAGNOSIS — E78.5 DYSLIPIDEMIA: ICD-10-CM

## 2024-06-10 DIAGNOSIS — R91.1 LUNG NODULE: ICD-10-CM

## 2024-06-10 DIAGNOSIS — I71.21 ANEURYSM OF ASCENDING AORTA WITHOUT RUPTURE: ICD-10-CM

## 2024-06-10 DIAGNOSIS — N18.31 STAGE 3A CHRONIC KIDNEY DISEASE: ICD-10-CM

## 2024-06-10 DIAGNOSIS — Z79.899 ENCOUNTER FOR LONG-TERM (CURRENT) USE OF OTHER MEDICATIONS: ICD-10-CM

## 2024-06-10 DIAGNOSIS — I10 ESSENTIAL HYPERTENSION: ICD-10-CM

## 2024-06-10 DIAGNOSIS — R73.03 PREDIABETES: ICD-10-CM

## 2024-06-10 DIAGNOSIS — E03.9 ACQUIRED HYPOTHYROIDISM: ICD-10-CM

## 2024-06-10 DIAGNOSIS — Z00.00 MEDICARE ANNUAL WELLNESS VISIT, SUBSEQUENT: Primary | ICD-10-CM

## 2024-06-10 PROCEDURE — 1159F MED LIST DOCD IN RCRD: CPT | Performed by: NURSE PRACTITIONER

## 2024-06-10 PROCEDURE — 1126F AMNT PAIN NOTED NONE PRSNT: CPT | Performed by: NURSE PRACTITIONER

## 2024-06-10 PROCEDURE — G0439 PPPS, SUBSEQ VISIT: HCPCS | Performed by: NURSE PRACTITIONER

## 2024-06-10 PROCEDURE — 99214 OFFICE O/P EST MOD 30 MIN: CPT | Performed by: NURSE PRACTITIONER

## 2024-06-10 PROCEDURE — 3074F SYST BP LT 130 MM HG: CPT | Performed by: NURSE PRACTITIONER

## 2024-06-10 PROCEDURE — 1160F RVW MEDS BY RX/DR IN RCRD: CPT | Performed by: NURSE PRACTITIONER

## 2024-06-10 PROCEDURE — 1170F FXNL STATUS ASSESSED: CPT | Performed by: NURSE PRACTITIONER

## 2024-06-10 PROCEDURE — 3078F DIAST BP <80 MM HG: CPT | Performed by: NURSE PRACTITIONER

## 2024-06-10 RX ORDER — POTASSIUM CITRATE 10 MEQ/1
10 TABLET, EXTENDED RELEASE ORAL DAILY
Qty: 90 TABLET | Refills: 1 | Status: SHIPPED | OUTPATIENT
Start: 2024-06-10

## 2024-06-10 NOTE — PROGRESS NOTES
The ABCs of the Annual Wellness Visit  Subsequent Medicare Wellness Visit    Subjective    Ac Caldwell is a 78 y.o. male who presents for a Subsequent Medicare Wellness Visit.    The following portions of the patient's history were reviewed and   updated as appropriate: allergies, current medications, past family history, past medical history, past social history, past surgical history, and problem list.    Compared to one year ago, the patient feels his physical   health is the same.    Compared to one year ago, the patient feels his mental   health is the same.    Recent Hospitalizations:  He was not admitted to the hospital during the last year.       Current Medical Providers:  Patient Care Team:  Valentine Newton APRN as PCP - General (Family Medicine)  Migel Valladares MD as Consulting Physician (Cardiology)  Manish Camp MD as Consulting Physician (Dermatology)  Chacorta Guajardo MD as Consulting Physician (Ophthalmology)  Ez Everett MD as Consulting Physician (Otolaryngology)  Jasen Ayala MD as Consulting Physician (Urology)  Chacorta Ludwig MD (Vascular Surgery)  Layla Mccarthy MD as Consulting Physician (Dermatology)  Eva Lopez MD as Consulting Physician (Nephrology)  Georgiana Bettencourt MD as Consulting Physician (Urology)  Layla De Dios MD as Consulting Physician (Pulmonary Disease)  Aracelis Bazzi, RN as Nurse Navigator    Outpatient Medications Prior to Visit   Medication Sig Dispense Refill    aspirin 325 MG tablet Take 1 tablet by mouth Daily.      atorvastatin (LIPITOR) 20 MG tablet Take 1 tablet by mouth Every Night. 90 tablet 3    azelastine (ASTELIN) 0.1 % nasal spray       betamethasone valerate (VALISONE) 0.1 % ointment Apply 1 Application topically to the appropriate area as directed As Needed.      carvedilol (COREG) 12.5 MG tablet Take 1 tablet by mouth twice daily 180 tablet 3    Cholecalciferol (Vitamin D3) 50 MCG (2000 UT) tablet Take 1 tablet by  mouth Daily.      Coenzyme Q10 (CO Q-10) 100 MG capsule Take 100 mg by mouth 2 (Two) Times a Day.      esomeprazole (NexIUM) 20 MG capsule Take 1 capsule by mouth Daily.      famciclovir (FAMVIR) 500 MG tablet Take 1 tablet by mouth Daily.      Flaxseed, Linseed, (FLAX SEED OIL) 1000 MG capsule Take 1,200 mg by mouth Daily.      levothyroxine (SYNTHROID, LEVOTHROID) 50 MCG tablet Take 1 tablet by mouth Daily.      losartan (COZAAR) 100 MG tablet Take 1 tablet by mouth Daily.      Omega-3 Fatty Acids (FISH OIL) 1000 MG capsule capsule Take 1,200 mg by mouth 2 (Two) Times a Day With Meals.      vitamin B-12 (CYANOCOBALAMIN) 1000 MCG tablet Take 1 tablet by mouth Daily.      potassium citrate (UROCIT-K) 10 MEQ (1080 MG) CR tablet Take 1 tablet by mouth once daily 90 tablet 0     No facility-administered medications prior to visit.       No opioid medication identified on active medication list. I have reviewed chart for other potential  high risk medication/s and harmful drug interactions in the elderly.        Aspirin is on active medication list. Aspirin use is indicated based on review of current medical condition/s. Pros and cons of this therapy have been discussed today. Benefits of this medication outweigh potential harm.  Patient has been encouraged to continue taking this medication.  .      Patient Active Problem List   Diagnosis    Nephritis and nephropathy    Essential hypertension    Esophageal reflux    Diverticulosis of small intestine without hemorrhage    Morbidly obese    Lymphadenopathy    Ganglion cyst    Malignant neoplasm of kidney, except pelvis    Dyslipidemia    Exertional dyspnea    Nonrheumatic aortic valve stenosis    Acquired hypothyroidism    Pancreatic cyst    Aneurysm of ascending aorta without rupture    Prediabetes    Stage 3a chronic kidney disease    Chest discomfort    IPMN (intraductal papillary mucinous neoplasm)    At average risk for colon cancer    Lung nodule     Advance Care  "Planning   Advance Care Planning     Advance Directive is not on file.  ACP discussion was held with the patient during this visit. Patient does not have an advance directive, information provided.     Objective    Vitals:    06/10/24 0944   BP: 106/68   BP Location: Left arm   Patient Position: Sitting   Cuff Size: Adult   Pulse: 62   Resp: 14   Temp: 96.9 °F (36.1 °C)   TempSrc: Infrared   SpO2: 97%   Weight: 108 kg (238 lb 9.6 oz)   Height: 173.6 cm (68.35\")     Estimated body mass index is 35.91 kg/m² as calculated from the following:    Height as of this encounter: 173.6 cm (68.35\").    Weight as of this encounter: 108 kg (238 lb 9.6 oz).    Class 2 Severe Obesity (BMI >=35 and <=39.9). Obesity-related health conditions include the following: hypertension and dyslipidemias. Obesity is improving with lifestyle modifications. BMI is is above average; BMI management plan is completed. We discussed portion control and increasing exercise.      Does the patient have evidence of cognitive impairment? No    Lab Results   Component Value Date    TRIG 106 2024    HDL 33 (L) 2024    LDL 84 2024    VLDL 20 2024    HGBA1C 5.60 2024        HEALTH RISK ASSESSMENT    Smoking Status:  Social History     Tobacco Use   Smoking Status Never    Passive exposure: Never   Smokeless Tobacco Never   Tobacco Comments    50,60,70 years it was impossible to avoid people who smoked     Alcohol Consumption:  Social History     Substance and Sexual Activity   Alcohol Use Yes    Comment: social - no schedule - random     Fall Risk Screen:    STEADI Fall Risk Assessment was completed, and patient is at LOW risk for falls.Assessment completed on:6/10/2024    Depression Screenin/10/2024     9:32 AM   PHQ-2/PHQ-9 Depression Screening   Little Interest or Pleasure in Doing Things 0-->not at all   Feeling Down, Depressed or Hopeless 0-->not at all   PHQ-9: Brief Depression Severity Measure Score 0 "       Health Habits and Functional and Cognitive Screenin/10/2024     9:32 AM   Functional & Cognitive Status   Do you have difficulty preparing food and eating? No   Do you have difficulty bathing yourself, getting dressed or grooming yourself? No   Do you have difficulty using the toilet? No   Do you have difficulty moving around from place to place? No   Do you have trouble with steps or getting out of a bed or a chair? No   Current Diet Well Balanced Diet   Dental Exam Up to date   Eye Exam Up to date   Exercise (times per week) 5 times per week   Current Exercises Include Gardening;Yard Work   Do you need help using the phone?  No   Are you deaf or do you have serious difficulty hearing?  No   Do you need help to go to places out of walking distance? No   Do you need help shopping? No   Do you need help preparing meals?  No   Do you need help with housework?  No   Do you need help with laundry? No   Do you need help taking your medications? No   Do you need help managing money? No   Do you ever drive or ride in a car without wearing a seat belt? No   Have you felt unusual stress, anger or loneliness in the last month? No   Who do you live with? Alone   If you need help, do you have trouble finding someone available to you? No   Have you been bothered in the last four weeks by sexual problems? No   Do you have difficulty concentrating, remembering or making decisions? No       Age-appropriate Screening Schedule:  Refer to the list below for future screening recommendations based on patient's age, sex and/or medical conditions. Orders for these recommended tests are listed in the plan section. The patient has been provided with a written plan.    Health Maintenance   Topic Date Due    TDAP/TD VACCINES (2 - Td or Tdap) 2019    COVID-19 Vaccine ( season) 2024    INFLUENZA VACCINE  2024    COLORECTAL CANCER SCREENING  2025    LIPID PANEL  2025    ANNUAL WELLNESS  VISIT  06/10/2025    BMI FOLLOWUP  06/10/2025    HEPATITIS C SCREENING  Completed    RSV Vaccine - Adults  Completed    Pneumococcal Vaccine 65+  Completed    ZOSTER VACCINE  Completed                  CMS Preventative Services Quick Reference  Risk Factors Identified During Encounter  Inactivity/Sedentary: Patient was advised to exercise at least 150 minutes a week per CDC recommendations.  The above risks/problems have been discussed with the patient.  Pertinent information has been shared with the patient in the After Visit Summary.  An After Visit Summary and PPPS were made available to the patient.    Follow Up:   Next Medicare Wellness visit to be scheduled in 1 year.       Additional E&M Note during same encounter follows:  Patient has multiple medical problems which are significant and separately identifiable that require additional work above and beyond the Medicare Wellness Visit.      Chief Complaint  Medicare Wellness-subsequent    Subjective        HPI  Ac Caldwell is also being seen today for     On 1/26/15, Ac saw gross hematuria, saw Dr. Ayala (urology) and he did a scope. It was ok. Then had a CT scan on 2/3/15. Cr was high, so they did it without contrast. CT scan showed a lesion, but he was not concerned. Then had a fish bladder test on 2/5/15. His urologist never got the results. Did go back to see Nephrology and had labs in Feb. Cr. was 1.1 and BUN was 16. Everything has been stable since. Has a h/o right kidney CA in 1997 and had nephrectomy.  Urologist - Dr. Jossue Serrano left and moved to [place] and he had to establish with Dr Farrell at [facility].  He saw him several times for recurrent UTI, hydrocele, renal cyst, and epididymoorchits. He saw them again 5/31/2017 for repeat renal US that showed a 3.4cm left renal cyst, which had grown from 2.5cm on last scan.  Dr. Farrell moved to [place] and he was then referred to  Urology.  Had CXR and labs.  CXR revealed a possible thoracic aortic  aneurysm.  Had f/u chest CT scan, no aneurysm.  CT renal mass protocol was done on 8/30/2017 which confirmed a solid enhancing mass in the lower pole of the left kidney, without lymphadenopathy.  It was elected to proceed with a cryoablation of this lesion per Dr. Mark at .  He underwent this procedure on 9/27/17.  Pathology is unclear as to it being malignant vs benign.  Was re-scanned in 30 days, then 90 days, then Q6 months.  He has been told that he does not need to come back unless he has problems.       CT in Aug 2017 revealed an incidental pancreatic lesion on the head of the pancreas that was 12 mm in size; f/u imaging done 9/2018 showed that the mass was stable and unchanged in size.  Had a biopsy of the pancreatic cyst in 2019 and it was benign.  They will monitor Q12 months with MRI.  Ac prefers a CT over an MRI.  The CT done on 10/1/2020 of abd and pelvis shows a simple cystic structure near the head of the pancreas measuring 1.6 cm.  The pancreatic cyst appears to be slightly larger on this scan than previous (from 12 mm to 16 mm) - this is followed by Dr. Norwood at .  We faxed a copy to his office and he underwent an EGD and EUS on 3/2/2021.  The pathology results were benign but he suggested another followup  EUS/FNA in one year, in the March/April 2022 time frame.  Had this done in 6/2022.  Was referred to [facility].  All records reviewed and he does not need another scan until 6/2024     Thoracic AA - followed by CT surgery at .  Per echo in Marc 2022 Aortic Root measured 41 mm and per last echo at [facility] it is 44 mm.  He was referred to [facility] CT surgery for routine f/u.  Next scan is July 2024     After CT for AA, a pulm nodule and was referred to [facility] Pul.  Had f/u CT, PET and Bronch.  Neg for malignancy.  Most recent CT showed new nodule.  They are following closely     Underwent a STEAM procedure in 2/2019 for BPH @       [facility]rologist[facility]; his Creatinine is  "1.32     Had a very large thyroid nodule removed on 4/23/14 per ENT. It was 8cm in size; they took the right side of his thyroid and placed on synthroid 50mcg. It was benign! Seeing Dr. Everett annually and everything has been stable.         Lab Results   Component Value Date     TSH 2.860 12/05/2023     [id number] 5.2 04/17/2014      AS - now followed by Cardiology, Dr. Valladares.  He will follow every 6 months and echo q 2 yrs.        HTN - well controlled with Losartan 100 mg and Carvedilol 12.5 mg BID (recent per Cards); BP is well controlled and he denies SE.      HL - stable on Lipitor 20mg daily without SE.  Lab Results   Component Value Date    CHOL 137 06/06/2024    TRIG 106 06/06/2024    HDL 33 (L) 06/06/2024    LDL 84 06/06/2024     COVID - 3/12/21 and 4/2/2021, 10/14/2021, 4/9/2022, 10/1/2022,   Flu shot - 10/2023  Tdap 2009   Pneumovax 2012   Prevnar - 4/2015   Zostavax 2010  Shingrix - UTD  PSA per Urology - WNL, no need to repeat  Colon - 4/2015, due 2025   RSV - 3/2024       Review of Systems   All other systems reviewed and are negative.      Objective   Vital Signs:  /68 (BP Location: Left arm, Patient Position: Sitting, Cuff Size: Adult)   Pulse 62   Temp 96.9 °F (36.1 °C) (Infrared)   Resp 14   Ht 173.6 cm (68.35\")   Wt 108 kg (238 lb 9.6 oz)   SpO2 97%   BMI 35.91 kg/m²     Physical Exam  Constitutional:       Appearance: He is well-developed.   HENT:      Head: Normocephalic and atraumatic.   Eyes:      Conjunctiva/sclera: Conjunctivae normal.      Pupils: Pupils are equal, round, and reactive to light.   Cardiovascular:      Rate and Rhythm: Normal rate and regular rhythm.      Heart sounds: Normal heart sounds.      Systolic murmur is present with a grade of 4/6.   Pulmonary:      Effort: Pulmonary effort is normal.      Breath sounds: Normal breath sounds.   Abdominal:      General: Bowel sounds are normal.      Palpations: Abdomen is soft.   Musculoskeletal:         General: Normal " range of motion.      Cervical back: Normal range of motion.   Skin:     General: Skin is warm and dry.   Neurological:      Mental Status: He is alert and oriented to person, place, and time.      Deep Tendon Reflexes: Reflexes are normal and symmetric.   Psychiatric:         Behavior: Behavior normal.         Thought Content: Thought content normal.         Judgment: Judgment normal.                         Assessment and Plan   Diagnoses and all orders for this visit:    1. Medicare annual wellness visit, subsequent (Primary)    2. Essential hypertension  -     potassium citrate (UROCIT-K) 10 MEQ (1080 MG) CR tablet; Take 1 tablet by mouth Daily.  Dispense: 90 tablet; Refill: 1    3. Encounter for long-term (current) use of other medications  -     potassium citrate (UROCIT-K) 10 MEQ (1080 MG) CR tablet; Take 1 tablet by mouth Daily.  Dispense: 90 tablet; Refill: 1    4. Dyslipidemia    5. Nonrheumatic aortic valve stenosis    6. Acquired hypothyroidism    7. Prediabetes    8. Stage 3a chronic kidney disease    9. Lung nodule    10. Aneurysm of ascending aorta without rupture      Labs reviewed - no med changes           Follow Up   No follow-ups on file.  Patient was given instructions and counseling regarding his condition or for health maintenance advice. Please see specific information pulled into the AVS if appropriate.

## 2024-06-13 DIAGNOSIS — D49.0 IPMN (INTRADUCTAL PAPILLARY MUCINOUS NEOPLASM): Primary | ICD-10-CM

## 2024-06-14 DIAGNOSIS — D49.0 IPMN (INTRADUCTAL PAPILLARY MUCINOUS NEOPLASM): Primary | ICD-10-CM

## 2024-06-17 DIAGNOSIS — D49.0 IPMN (INTRADUCTAL PAPILLARY MUCINOUS NEOPLASM): Primary | ICD-10-CM

## 2024-07-20 ENCOUNTER — HOSPITAL ENCOUNTER (OUTPATIENT)
Facility: HOSPITAL | Age: 79
Discharge: HOME | End: 2024-07-20
Payer: MEDICARE

## 2024-07-20 DIAGNOSIS — D49.0 IPMN (INTRADUCTAL PAPILLARY MUCINOUS NEOPLASM): ICD-10-CM

## 2024-07-20 DIAGNOSIS — I71.21 ANEURYSM OF ASCENDING AORTA WITHOUT RUPTURE: ICD-10-CM

## 2024-07-20 PROCEDURE — 74150 CT ABDOMEN W/O CONTRAST: CPT

## 2024-07-20 PROCEDURE — 71250 CT THORAX DX C-: CPT

## 2024-07-25 ENCOUNTER — OFFICE VISIT (OUTPATIENT)
Dept: CARDIAC SURGERY | Facility: CLINIC | Age: 79
End: 2024-07-25
Payer: MEDICARE

## 2024-07-25 VITALS
OXYGEN SATURATION: 97 % | BODY MASS INDEX: 35.6 KG/M2 | HEIGHT: 69 IN | HEART RATE: 57 BPM | WEIGHT: 240.4 LBS | TEMPERATURE: 97.3 F | SYSTOLIC BLOOD PRESSURE: 142 MMHG | DIASTOLIC BLOOD PRESSURE: 80 MMHG

## 2024-07-25 DIAGNOSIS — I71.21 ANEURYSM OF ASCENDING AORTA WITHOUT RUPTURE: Primary | ICD-10-CM

## 2024-07-25 RX ORDER — FLUTICASONE PROPIONATE 50 MCG
2 SPRAY, SUSPENSION (ML) NASAL DAILY
COMMUNITY

## 2024-07-25 NOTE — PROGRESS NOTES
[facility] Cardiothoracic Surgery Office Follow Up Note    Date of Encounter: 2024     Name: Ac Caldwell  : 1945     Referred By: No ref. provider found  PCP: Valentine Newton APRN    Chief Complaint:    Chief Complaint   Patient presents with    Aortic Aneurysm     1 year follow-up with CT chest       Subjective      History of Present Illness:    It was nice to see Ac Caldwell in follow up.  He is a pleasant 78 y.o. male with PMH significant for hypertension, hyperlipidemia, kidney cancer, aortic valve stenosis, pulmonary nodule (followed by Pulmonology), familial history of aneurysmal disease (father), and ascending aortic aneurysm.      Patient was seen by Dr. Castaneda on 2023 for initial evaluation.  Patient denied associated symptoms.  CTA of chest with ascending aorta measured at 4.2 cm.  Mr. Caldwell presents today for surveillance with imaging.  Patient denies unusual chest, back, or scapular pain.  He reports well-controlled blood pressure at home.  Patient follows with PCP and cardiology, Dr. Valladares.  He continues to be an avid , specializing in beans.    Review of Systems:  Review of Systems   Constitutional: Positive for malaise/fatigue. Negative for chills, decreased appetite, diaphoresis, fever, night sweats, weight gain and weight loss.   HENT:  Negative for hoarse voice.    Eyes:  Negative for blurred vision, double vision and visual disturbance.   Cardiovascular:  Positive for dyspnea on exertion. Negative for chest pain, claudication, irregular heartbeat, leg swelling, near-syncope, orthopnea, palpitations, paroxysmal nocturnal dyspnea and syncope.   Respiratory:  Negative for cough, hemoptysis, shortness of breath, sputum production and wheezing.    Hematologic/Lymphatic: Negative for adenopathy and bleeding problem. Does not bruise/bleed easily.   Skin:  Negative for color change, nail changes, poor wound healing and rash.   Musculoskeletal:  Negative for  back pain, falls and muscle cramps.   Gastrointestinal:  Negative for abdominal pain, dysphagia and heartburn.   Genitourinary:  Negative for flank pain.   Neurological:  Negative for brief paralysis, disturbances in coordination, dizziness, focal weakness, headaches, light-headedness, loss of balance, numbness, paresthesias, sensory change, vertigo and weakness.   Psychiatric/Behavioral:  Negative for depression and suicidal ideas.    Allergic/Immunologic: Negative for persistent infections.     I have reviewed the following portions of the patient's history: problem list, current medications, allergies, past surgical history, past medical history, past social history, past family history, and ROS and confirm it's accurate.    Allergies:  Allergies   Allergen Reactions    Norvasc [Amlodipine] Swelling    Oxycodone Other (See Comments)     Can elevate Blood pressure    Lotensin [Benazepril] Cough    Sulfamethoxazole-Trimethoprim Cough    Zocor [Simvastatin] Myalgia       Medications:      Current Outpatient Medications:     aspirin 325 MG tablet, Take 1 tablet by mouth Daily., Disp: , Rfl:     atorvastatin (LIPITOR) 20 MG tablet, Take 1 tablet by mouth Every Night., Disp: 90 tablet, Rfl: 3    azelastine (ASTELIN) 0.1 % nasal spray, , Disp: , Rfl:     betamethasone valerate (VALISONE) 0.1 % ointment, Apply 1 Application topically to the appropriate area as directed As Needed., Disp: , Rfl:     carvedilol (COREG) 12.5 MG tablet, Take 1 tablet by mouth twice daily, Disp: 180 tablet, Rfl: 3    Cholecalciferol (Vitamin D3) 50 MCG (2000 UT) tablet, Take 1 tablet by mouth Daily., Disp: , Rfl:     Coenzyme Q10 (CO Q-10) 100 MG capsule, Take 100 mg by mouth 2 (Two) Times a Day., Disp: , Rfl:     esomeprazole (NexIUM) 20 MG capsule, Take 1 capsule by mouth Daily., Disp: , Rfl:     famciclovir (FAMVIR) 500 MG tablet, Take 1 tablet by mouth Daily., Disp: , Rfl:     Flaxseed, Linseed, (FLAX SEED OIL) 1000 MG capsule, Take 1,200  mg by mouth Daily., Disp: , Rfl:     fluticasone (FLONASE) 50 MCG/ACT nasal spray, 2 sprays into the nostril(s) as directed by provider Daily., Disp: , Rfl:     levothyroxine (SYNTHROID, LEVOTHROID) 50 MCG tablet, Take 1 tablet by mouth Daily., Disp: , Rfl:     losartan (COZAAR) 100 MG tablet, Take 1 tablet by mouth Daily., Disp: , Rfl:     Omega-3 Fatty Acids (FISH OIL) 1000 MG capsule capsule, Take 1,200 mg by mouth 2 (Two) Times a Day With Meals., Disp: , Rfl:     potassium citrate (UROCIT-K) 10 MEQ (1080 MG) CR tablet, Take 1 tablet by mouth Daily., Disp: 90 tablet, Rfl: 1    vitamin B-12 (CYANOCOBALAMIN) 1000 MCG tablet, Take 1 tablet by mouth Daily., Disp: , Rfl:     History:   Past Medical History:   Diagnosis Date    Aneurysm 2017    Exertional dyspnea     He does have symptoms of exertional dyspnea and minimal orthopnea. This may be unrelated to his aortic stenosis, and due to his risk factors, I am also concerned about coronary artery disease.     GERD (gastroesophageal reflux disease) 1997    Heart murmur 1966    Hematuria     Episode of hematuria in January with subsequent negative cystoscopy.     History of kidney cancer     History of kidney stones     HL (hearing loss) 2000    Hyperlipidemia     Hypertension 1998    Kidney stone 1991    Lung nodule seen on imaging study     Renal cancer     Hx of.Status post right nephrectomy in 1997. Status post resection of a tumor from his left kidney -- incomplete database.     Renal insufficiency 2010    Renal oncocytoma     Thoracic aortic aneurysm     Thyroid disease     UTI (urinary tract infection)        Past Surgical History:   Procedure Laterality Date    ABLATION OF DYSRHYTHMIC FOCUS  2017    left kidney    BASAL CELL CARCINOMA EXCISION      Status post basal cell cancer surgery.     BRONCHOSCOPY WITH ION ROBOTIC ASSIST N/A 11/10/2023    Procedure: BRONCHOSCOPY NAVIGATION WITH ENDOBRONCHIAL ULTRASOUND AND ION ROBOT;  Surgeon: Manish Wynn MD;   "Location: [facility] ENDOSCOPY;  Service: Robotics - Pulmonary;  Laterality: N/A;  Ion cath #4,  #4, cath guide 0085    CATARACT EXTRACTION Bilateral     COLONOSCOPY  04/2015    due 2025    CYST REMOVAL      KIDNEY STONE SURGERY Left 02/08/2012    [facility]. [facility]    KIDNEY SURGERY      Status post resection of a tumor from his left kidney -- incomplete database.     KIDNEY SURGERY  09/27/2017    [place]    NEPHRECTOMY Bilateral     Has 15% of left Kidney    NEPHRECTOMY Right 1997    PROSTATE SURGERY  02/19/2019    [facility]. Dr. Bettencourt    THYROID SURGERY      Status post resection of tumor from right thyroid gland.     THYROIDECTOMY, PARTIAL         Social History     Socioeconomic History    Marital status: Single    Number of children: 1   Tobacco Use    Smoking status: Never     Passive exposure: Never    Smokeless tobacco: Never    Tobacco comments:     50,60,70 years it was impossible to avoid people who smoked   Vaping Use    Vaping status: Never Used   Substance and Sexual Activity    Alcohol use: Yes     Comment: social - no schedule - random    Drug use: Never    Sexual activity: Not Currently     Partners: Female        Family History   Problem Relation Age of Onset    Diabetes Mother     Obesity Mother     Heart failure Mother     Clotting disorder Mother     Hypertension Mother     Clotting disorder Father     Atrial fibrillation Father     Pneumonia Father     Hypertension Father     Stroke Paternal Grandfather     Heart attack Cousin     Heart attack Cousin     Suicidality Cousin     Other Paternal Uncle         Heart problems    Stroke Other     Asthma Sister        Objective     Physical Exam:  Vitals:    07/25/24 1310 07/25/24 1311   BP: 130/80 142/80   BP Location: Right arm Left arm   Patient Position: Sitting Sitting   Pulse: 57    Temp: 97.3 °F (36.3 °C)    SpO2: 97%    Weight: 109 kg (240 lb 6.4 oz)    Height: 175.3 cm (69\")  Comment: patient reported       Body mass index is 35.5 kg/m².    Physical " Exam  Vitals reviewed.   Constitutional:       General: He is not in acute distress.     Appearance: Normal appearance. He is not ill-appearing.   Eyes:      Pupils: Pupils are equal, round, and reactive to light.   Cardiovascular:      Rate and Rhythm: Normal rate and regular rhythm.      Heart sounds: Murmur heard.   Pulmonary:      Effort: Pulmonary effort is normal.      Breath sounds: Normal breath sounds.   Skin:     General: Skin is warm and dry.   Neurological:      General: No focal deficit present.      Mental Status: He is alert and oriented to person, place, and time.   Psychiatric:         Mood and Affect: Mood normal.         Behavior: Behavior normal.         Thought Content: Thought content normal.         Judgment: Judgment normal.         Imaging/Labs:  CT Chest Without Contrast Diagnostic (07/20/2024 08:30)   CT Chest Without Contrast Diagnostic  Result Date: 7/23/2024  Impression: 1.Fusiform dilatation of the ascending thoracic aorta again suggested. 2.Coronary artery calcification. 3.Similar-appearing groundglass area of density in the right upper lobe 4.Areas of atelectasis in the right middle lobe and lingula. 5.Calcification aortic valve and mitral annulus. 6.Changes from right nephrectomy. 7.Cholelithiasis 8.Unchanged left renal lesions suggestive of cyst. There is a nonobstructing calculus lower pole left kidney. 9.Unchanged cystic area adjacent to the junction of the body and head of the pancreas. This might reflect branch type IPMN, or pseudocyst. 10.Colonic diverticulosis Electronically Signed: Yariel Solomon MD  7/23/2024 8:42 PM EDT  Workstation ID: [id number]    ..I personally reviewed this report and imaging.    Assessment / Plan    Assessment / Plan:  1.  Ascending aortic aneurysm, without rupture  Family history of aneurysmal disease (father); did not require repair.  Seen by Dr. Castaneda on 7/19/2023 for initial evaluation.  Denied associated symptoms.  CTA of chest with ascending aorta  measured at 4.3 x 4.2 cm.  Presents today for surveillance with imaging.  Denies unusual chest, back, or scapular pain.  Normotensive in office.  Reports well-controlled blood pressure at home.  Reviewed goal of <130/80.  CT chest as resulted above under imaging/labs with stable ascending aortic aneurysm measuring at 4.3 x 4.4 cm.  Follows with PCP and cardiology, Dr. Valladares.  Pulmonary nodule followed by Dr. JAGJIT De Dios.  Continues to be an avid , specializing in beans.     Follow Up:   We will plan for follow-up in 1 year with imaging.  Patient encouraged to call the office with any questions or concerns.     Thank you for allowing me to participate in your care.  Best Regards,    Crystal Rojas APRVIVIENNE  [facility] Cardiothoracic Surgery  07/25/24  13:15 EDT    I spent 27 minutes caring for Mr. Caldwell on this date of service. This time includes time spent by me in the following activities: preparing for the visit, reviewing tests, obtaining and/or reviewing a separately obtained history, performing a medically appropriate examination and/or evaluation, counseling and educating the patient/family/caregiver, ordering medications, tests, or procedures, referring and communicating with other health care professionals, documenting information in the medical record, independently interpreting results and communicating that information with the patient/family/caregiver, and care coordination.

## 2024-08-13 ENCOUNTER — TRANSCRIBE ORDERS (OUTPATIENT)
Dept: LAB | Facility: HOSPITAL | Age: 79
End: 2024-08-13
Payer: MEDICARE

## 2024-08-13 DIAGNOSIS — N18.30 STAGE 3 CHRONIC KIDNEY DISEASE, UNSPECIFIED WHETHER STAGE 3A OR 3B CKD: Primary | ICD-10-CM

## 2024-08-14 ENCOUNTER — LAB (OUTPATIENT)
Dept: LAB | Facility: HOSPITAL | Age: 79
End: 2024-08-14
Payer: MEDICARE

## 2024-08-14 DIAGNOSIS — N18.30 STAGE 3 CHRONIC KIDNEY DISEASE, UNSPECIFIED WHETHER STAGE 3A OR 3B CKD: ICD-10-CM

## 2024-08-14 DIAGNOSIS — R91.1 LUNG NODULE: ICD-10-CM

## 2024-08-14 LAB
BILIRUB UR QL STRIP: NEGATIVE
CLARITY UR: ABNORMAL
COLOR UR: ABNORMAL
GLUCOSE UR STRIP-MCNC: NEGATIVE MG/DL
HGB UR QL STRIP.AUTO: ABNORMAL
KETONES UR QL STRIP: NEGATIVE
LEUKOCYTE ESTERASE UR QL STRIP.AUTO: ABNORMAL
NITRITE UR QL STRIP: POSITIVE
PH UR STRIP.AUTO: 6.5 [PH] (ref 5–8)
PROT UR QL STRIP: NEGATIVE
SP GR UR STRIP: 1.02 (ref 1–1.03)
UROBILINOGEN UR QL STRIP: ABNORMAL

## 2024-08-14 PROCEDURE — 81001 URINALYSIS AUTO W/SCOPE: CPT

## 2024-08-14 PROCEDURE — 84156 ASSAY OF PROTEIN URINE: CPT

## 2024-08-14 PROCEDURE — 36415 COLL VENOUS BLD VENIPUNCTURE: CPT

## 2024-08-14 PROCEDURE — 85025 COMPLETE CBC W/AUTO DIFF WBC: CPT

## 2024-08-14 PROCEDURE — 80053 COMPREHEN METABOLIC PANEL: CPT

## 2024-08-14 NOTE — PROGRESS NOTES
Ac Caldwell is a 79 y.o. male here for evaluation of   Chief Complaint   Patient presents with   • Shortness of Breath   • Follow-up       Problem list:  RUL 19 mm GGO  Ca++ granulomatous changes  TAA aneurysm 4.2cm  Renal cancer s/p right nephrectomy, abl part of left kidney  Aortic valve stenosis  Hypertension  Dyslipidemia  BPH  Chronic kidney disease  GERD  Diverticulosis  Nephrolithiasis  Neurosensory hearing loss  Bilateral cataract surgery  Right thyroidectomy for goiter  Prostate surgery, green laser, 2019  Colonoscopy, 2015  EGD 2021 for a pancreatic cyst  Basal cell skin cancer removal  No tobacco  Allergies Norvasc-swelling, Lotensin-cough, Bactrim-cough, Zocor-myalgias, oxycodone-intolerance    History of Present Illness    79-year-old gentleman, non-smoker followed by CT surgery for a thoracic aortic aneurysm.  He underwent a scan that revealed a groundglass nodule and he is here for assessment.  Modified was performed and he had an auto antibody making his risk of cancer higher.  He then underwent bronchoscopy in November 2023 with all cultures and biopsy, cytology is negative.  CT scan July 20 revealed no change in his thoracic aneurysm, persistent right upper lobe groundglass opacity that had not enlarged in size and a nonobstructing stone in his left kidney, lower pole.  Denies hemoptysis, or chronic cough.       Review of Systems   Constitutional:  Negative for unexpected weight change.   Respiratory:  Negative for cough, shortness of breath and wheezing.    Cardiovascular:  Negative for chest pain.         Current Outpatient Medications:   •  aspirin 325 MG tablet, Take 1 tablet by mouth Daily., Disp: , Rfl:   •  atorvastatin (LIPITOR) 20 MG tablet, Take 1 tablet by mouth Every Night., Disp: 90 tablet, Rfl: 3  •  azelastine (ASTELIN) 0.1 % nasal spray, , Disp: , Rfl:   •  betamethasone valerate (VALISONE) 0.1 % ointment, Apply 1 Application topically to the appropriate area as directed As  Needed., Disp: , Rfl:   •  carvedilol (COREG) 12.5 MG tablet, Take 1 tablet by mouth twice daily, Disp: 180 tablet, Rfl: 3  •  Cholecalciferol (Vitamin D3) 50 MCG (2000 UT) tablet, Take 1 tablet by mouth Daily., Disp: , Rfl:   •  Coenzyme Q10 (CO Q-10) 100 MG capsule, Take 100 mg by mouth 2 (Two) Times a Day., Disp: , Rfl:   •  esomeprazole (NexIUM) 20 MG capsule, Take 1 capsule by mouth Daily., Disp: , Rfl:   •  famciclovir (FAMVIR) 500 MG tablet, Take 1 tablet by mouth Daily., Disp: , Rfl:   •  Flaxseed, Linseed, (FLAX SEED OIL) 1000 MG capsule, Take 1,200 mg by mouth Daily., Disp: , Rfl:   •  fluticasone (FLONASE) 50 MCG/ACT nasal spray, 2 sprays into the nostril(s) as directed by provider Daily., Disp: , Rfl:   •  levothyroxine (SYNTHROID, LEVOTHROID) 50 MCG tablet, Take 1 tablet by mouth Daily., Disp: , Rfl:   •  losartan (COZAAR) 100 MG tablet, Take 1 tablet by mouth Daily., Disp: , Rfl:   •  Omega-3 Fatty Acids (FISH OIL) 1000 MG capsule capsule, Take 1,200 mg by mouth 2 (Two) Times a Day With Meals., Disp: , Rfl:   •  potassium citrate (UROCIT-K) 10 MEQ (1080 MG) CR tablet, Take 1 tablet by mouth Daily., Disp: 90 tablet, Rfl: 1  •  vitamin B-12 (CYANOCOBALAMIN) 1000 MCG tablet, Take 1 tablet by mouth Daily., Disp: , Rfl:     Past Medical History:   Diagnosis Date   • Aneurysm 2017   • Exertional dyspnea     He does have symptoms of exertional dyspnea and minimal orthopnea. This may be unrelated to his aortic stenosis, and due to his risk factors, I am also concerned about coronary artery disease.    • GERD (gastroesophageal reflux disease) 1997   • Heart murmur 1966   • Hematuria     Episode of hematuria in January with subsequent negative cystoscopy.    • History of kidney cancer    • History of kidney stones    • HL (hearing loss) 2000   • Hyperlipidemia    • Hypertension 1998   • Kidney stone 1991   • Lung nodule seen on imaging study    • Renal cancer     Hx of.Status post right nephrectomy in 1997.  Status post resection of a tumor from his left kidney -- incomplete database.    • Renal insufficiency 2010   • Renal oncocytoma    • Thoracic aortic aneurysm    • Thyroid disease    • UTI (urinary tract infection)      Past Surgical History:   Procedure Laterality Date   • ABLATION OF DYSRHYTHMIC FOCUS  2017    left kidney   • BASAL CELL CARCINOMA EXCISION      Status post basal cell cancer surgery.    • BRONCHOSCOPY WITH ION ROBOTIC ASSIST N/A 11/10/2023    Procedure: BRONCHOSCOPY NAVIGATION WITH ENDOBRONCHIAL ULTRASOUND AND ION ROBOT;  Surgeon: Manish Wynn MD;  Location: [facility];  Service: Robotics - Pulmonary;  Laterality: N/A;  Ion cath #4,  #4, cath guide 0085   • CATARACT EXTRACTION Bilateral    • COLONOSCOPY  04/2015    due 2025   • CYST REMOVAL     • KIDNEY STONE SURGERY Left 02/08/2012    [facility]. [facility]   • KIDNEY SURGERY      Status post resection of a tumor from his left kidney -- incomplete database.    • KIDNEY SURGERY  09/27/2017       • NEPHRECTOMY Bilateral     Has 15% of left Kidney   • NEPHRECTOMY Right 1997   • PROSTATE SURGERY  02/19/2019    [facility]. Dr. Bettencourt   • THYROID SURGERY      Status post resection of tumor from right thyroid gland.    • THYROIDECTOMY, PARTIAL       Social History     Socioeconomic History   • Marital status: Single   • Number of children: 1   Tobacco Use   • Smoking status: Never     Passive exposure: Never   • Smokeless tobacco: Never   • Tobacco comments:     50,60,70 years it was impossible to avoid people who smoked   Vaping Use   • Vaping status: Never Used   Substance and Sexual Activity   • Alcohol use: Yes     Comment: social - no schedule - random   • Drug use: Never   • Sexual activity: Not Currently     Partners: Female     Family History   Problem Relation Age of Onset   • Diabetes Mother    • Obesity Mother    • Heart failure Mother    • Clotting disorder Mother    • Hypertension Mother    • Clotting disorder Father    • Atrial  "fibrillation Father    • Pneumonia Father    • Hypertension Father    • Stroke Paternal Grandfather    • Heart attack Cousin    • Heart attack Cousin    • Suicidality Cousin    • Other Paternal Uncle         Heart problems   • Stroke Other    • Asthma Sister      Blood pressure 118/62, pulse 68, temperature 97.8 °F (36.6 °C), height 175.3 cm (69\"), weight 109 kg (240 lb), SpO2 96%.    Physical Exam  Constitutional:       General: He is not in acute distress.  HENT:      Head: Normocephalic and atraumatic.      Nose: No congestion.      Mouth/Throat:      Pharynx: No oropharyngeal exudate.   Cardiovascular:      Rate and Rhythm: Normal rate and regular rhythm.      Heart sounds: Murmur heard.   Pulmonary:      Effort: Pulmonary effort is normal.      Breath sounds: No wheezing or rhonchi.   Musculoskeletal:      Right lower leg: No edema.      Left lower leg: No edema.   Lymphadenopathy:      Cervical: No cervical adenopathy.   Neurological:      Mental Status: He is oriented to person, place, and time.         PFTs:    October 3, 2023, FVC 2.62 L, 65%, FEV1 2.01 L, 69%, ratio 77%, flow volume loop without blunting, total lung capacity 4.98 L, 81%, diffusion capacity 20.6, 89%, no obstruction, normal total lung capacity, normal diffusion    Radiology:    CT CHEST WO CONTRAST DIAGNOSTIC, CT ABDOMEN WO CONTRAST     Date of Exam: 7/20/2024 8:23 AM EDT     Indication: ASCENDING AORTIC ANEURYSM.   IPMN       Comparison: CT chest February 14, 2024, CT abdomen pelvis October 1, 2020, PET/CT October 30, 2023     Technique: Axial CT images were obtained of the chest, abdomen without contrast administration.  Reconstructed coronal and sagittal images were also obtained. Automated exposure control and iterative construction methods were used.        Findings:  CT chest: There are some coronary artery calcification. There is calcification in the are with aortic valve and mitral valve. There is fusiform dilatation of the " ascending thoracic aorta measuring 4.3 x 4.4 cm. In a similar location the ascending   thoracic aorta previously measured 4.3 x 4.2 cm. The aortic arch measures about 3.3 cm in transverse dimension. The descending thoracic aorta measures about 3.3 x 2.7 cm.     Groundglass tear of density in the posterior aspect of the right upper lobe measures about 1.69 x 0.98 cm previously measuring 1.4 x 0.74 cm. It looks similar. Differences in size could relate to slice position. There is some atelectasis in the right   middle lobe there also some atelectasis in the lingular area.  Perifissural pulmonary nodule again noted on image 48 series 4 unchanged. There is a small perifissural pulmonary nodule in the left lower lobe on image 45 which looks stable.     CT abdomen : There is no definite acute abnormality of the liver. There are gallstones within the gallbladder. The gallbladder wall is not thickened. There is no abnormality the spleen. In the area of the junction of the pancreatic head with the neck is   hypodense cystic area which has been noted. This measures 1.8 x 1.6 cm and seen on image 52 series 2.     The patient has had a right nephrectomy. There is a cyst off the posterior aspect of the upper pole of the left kidney measuring 3.9 x 3.3 cm. There also is a suggested cyst off the lateral margin of the left kidney measuring 3.6 x 3.1 cm. There is   additional cyst along the anterior aspect of the midpole left kidney measuring 9.8 mm. There is postsurgical change in the lower pole left kidney. There is a nonobstructing calculus in this area measuring 2.9 cm.     Atherosclerotic vascular calcification is noted.     There are some colonic diverticula. Visualized bowel does not appear unusual.     Visualized osseous structures do not appear unusual. There is a slight levoscoliosis of the lumbar spine..           IMPRESSION:  Impression:  1.Fusiform dilatation of the ascending thoracic aorta again suggested.  2.Coronary  artery calcification.  3.Similar-appearing groundglass area of density in the right upper lobe  4.Areas of atelectasis in the right middle lobe and lingula.  5.Calcification aortic valve and mitral annulus.  6.Changes from right nephrectomy.  7.Cholelithiasis  8.Unchanged left renal lesions suggestive of cyst. There is a nonobstructing calculus lower pole left kidney.  9.Unchanged cystic area adjacent to the junction of the body and head of the pancreas. This might reflect branch type IPMN, or pseudocyst.  10.Colonic diverticulosis           Electronically Signed: Yariel Solomon MD    7/23/2024 8:42 PM EDT    Workstation ID: [id number]    Lab:    Final Diagnosis  LUNG, RIGHT UPPER LOBE, TRANSBRONCHIAL BIOPSY:  Benign alveolar lung parenchyma.  Negative for atypia and malignancy.  Electronically signed by Yao Alba MD on 11/13/2023 at 1115    Diagnoses and all orders for this visit:    1. Lung nodule (Primary)          Discussion:     79-year-old gentleman, lifetime non-smoker found to have a groundglass nodule in the right upper lobe on CT imaging.  With his age, right upper lobe location and groundglass appearance his malignancy risk is 10.5%.  His previous renal cell cancer also increases his risk.  He does not take any immunosuppressive medications.  He does have evidence of granulomatous changes.  Nodify was performed for autoantibodies and was positive increasing his risk of malignancy from 42%, to 91%.  He therefore underwent bronchoscopy and biopsy.  Pathology was negative.  Fungal and TB cultures were also negative.  Follow-up CT scan July 23, 2024 was read as stable right upper lobe groundglass opacity.  However they document 19 mm measurement compared to 11 mm on his previous scan. Clinically felt to be secondary to slice variation.    Continue to monitor Q 6mo CT chest  Prevnar 20          Layla De Dios MD

## 2024-08-15 LAB
ALBUMIN SERPL-MCNC: 4 G/DL (ref 3.5–5.2)
ALBUMIN/GLOB SERPL: 1.6 G/DL
ALP SERPL-CCNC: 56 U/L (ref 39–117)
ALT SERPL W P-5'-P-CCNC: 19 U/L (ref 1–41)
ANION GAP SERPL CALCULATED.3IONS-SCNC: 8.6 MMOL/L (ref 5–15)
AST SERPL-CCNC: 22 U/L (ref 1–40)
BACTERIA UR QL AUTO: ABNORMAL /HPF
BASOPHILS # BLD AUTO: 0.02 10*3/MM3 (ref 0–0.2)
BASOPHILS NFR BLD AUTO: 0.4 % (ref 0–1.5)
BILIRUB SERPL-MCNC: 1.2 MG/DL (ref 0–1.2)
BUN SERPL-MCNC: 25 MG/DL (ref 8–23)
BUN/CREAT SERPL: 18.4 (ref 7–25)
CALCIUM SPEC-SCNC: 9.2 MG/DL (ref 8.6–10.5)
CHLORIDE SERPL-SCNC: 108 MMOL/L (ref 98–107)
CO2 SERPL-SCNC: 23.4 MMOL/L (ref 22–29)
CREAT SERPL-MCNC: 1.36 MG/DL (ref 0.76–1.27)
DEPRECATED RDW RBC AUTO: 42.3 FL (ref 37–54)
EGFRCR SERPLBLD CKD-EPI 2021: 53.6 ML/MIN/1.73
EOSINOPHIL # BLD AUTO: 0.24 10*3/MM3 (ref 0–0.4)
EOSINOPHIL NFR BLD AUTO: 5.2 % (ref 0.3–6.2)
ERYTHROCYTE [DISTWIDTH] IN BLOOD BY AUTOMATED COUNT: 12.6 % (ref 12.3–15.4)
GLOBULIN UR ELPH-MCNC: 2.5 GM/DL
GLUCOSE SERPL-MCNC: 105 MG/DL (ref 65–99)
HCT VFR BLD AUTO: 43.7 % (ref 37.5–51)
HGB BLD-MCNC: 15.2 G/DL (ref 13–17.7)
HYALINE CASTS UR QL AUTO: ABNORMAL /LPF
IMM GRANULOCYTES # BLD AUTO: 0.01 10*3/MM3 (ref 0–0.05)
IMM GRANULOCYTES NFR BLD AUTO: 0.2 % (ref 0–0.5)
LYMPHOCYTES # BLD AUTO: 1.33 10*3/MM3 (ref 0.7–3.1)
LYMPHOCYTES NFR BLD AUTO: 29 % (ref 19.6–45.3)
MCH RBC QN AUTO: 32 PG (ref 26.6–33)
MCHC RBC AUTO-ENTMCNC: 34.8 G/DL (ref 31.5–35.7)
MCV RBC AUTO: 92 FL (ref 79–97)
MONOCYTES # BLD AUTO: 0.55 10*3/MM3 (ref 0.1–0.9)
MONOCYTES NFR BLD AUTO: 12 % (ref 5–12)
NEUTROPHILS NFR BLD AUTO: 2.43 10*3/MM3 (ref 1.7–7)
NEUTROPHILS NFR BLD AUTO: 53.2 % (ref 42.7–76)
NRBC BLD AUTO-RTO: 0 /100 WBC (ref 0–0.2)
PLATELET # BLD AUTO: 127 10*3/MM3 (ref 140–450)
PMV BLD AUTO: 11.8 FL (ref 6–12)
POTASSIUM SERPL-SCNC: 4.5 MMOL/L (ref 3.5–5.2)
PROT ?TM UR-MCNC: 12.6 MG/DL
PROT SERPL-MCNC: 6.5 G/DL (ref 6–8.5)
RBC # BLD AUTO: 4.75 10*6/MM3 (ref 4.14–5.8)
RBC # UR STRIP: ABNORMAL /HPF
REF LAB TEST METHOD: ABNORMAL
SODIUM SERPL-SCNC: 140 MMOL/L (ref 136–145)
SQUAMOUS #/AREA URNS HPF: ABNORMAL /HPF
WBC # UR STRIP: ABNORMAL /HPF
WBC NRBC COR # BLD AUTO: 4.58 10*3/MM3 (ref 3.4–10.8)

## 2024-08-22 ENCOUNTER — OFFICE VISIT (OUTPATIENT)
Dept: PULMONOLOGY | Facility: CLINIC | Age: 79
End: 2024-08-22
Payer: MEDICARE

## 2024-08-22 VITALS
DIASTOLIC BLOOD PRESSURE: 62 MMHG | SYSTOLIC BLOOD PRESSURE: 118 MMHG | HEIGHT: 69 IN | WEIGHT: 240 LBS | OXYGEN SATURATION: 96 % | HEART RATE: 68 BPM | TEMPERATURE: 97.8 F | BODY MASS INDEX: 35.55 KG/M2

## 2024-08-22 DIAGNOSIS — R91.1 LUNG NODULE: Primary | ICD-10-CM

## 2024-10-18 ENCOUNTER — HOSPITAL ENCOUNTER (OUTPATIENT)
Dept: CARDIOLOGY | Facility: HOSPITAL | Age: 79
Discharge: HOME | End: 2024-10-18
Payer: MEDICARE

## 2024-10-18 ENCOUNTER — OFFICE VISIT (OUTPATIENT)
Dept: CARDIOLOGY | Facility: CLINIC | Age: 79
End: 2024-10-18
Payer: MEDICARE

## 2024-10-18 VITALS
OXYGEN SATURATION: 96 % | BODY MASS INDEX: 35.99 KG/M2 | HEIGHT: 69 IN | HEART RATE: 54 BPM | WEIGHT: 243 LBS | DIASTOLIC BLOOD PRESSURE: 88 MMHG | SYSTOLIC BLOOD PRESSURE: 130 MMHG

## 2024-10-18 VITALS — BODY MASS INDEX: 36.42 KG/M2 | HEIGHT: 68 IN | WEIGHT: 240.3 LBS

## 2024-10-18 DIAGNOSIS — I10 ESSENTIAL HYPERTENSION: ICD-10-CM

## 2024-10-18 DIAGNOSIS — M79.602 LEFT ARM PAIN: ICD-10-CM

## 2024-10-18 DIAGNOSIS — I35.0 NONRHEUMATIC AORTIC VALVE STENOSIS: ICD-10-CM

## 2024-10-18 DIAGNOSIS — E78.5 DYSLIPIDEMIA: ICD-10-CM

## 2024-10-18 DIAGNOSIS — I71.21 ANEURYSM OF ASCENDING AORTA WITHOUT RUPTURE: ICD-10-CM

## 2024-10-18 DIAGNOSIS — I35.0 NONRHEUMATIC AORTIC VALVE STENOSIS: Primary | ICD-10-CM

## 2024-10-18 DIAGNOSIS — M79.89 LEFT ARM SWELLING: ICD-10-CM

## 2024-10-18 LAB
ASCENDING AORTA: 4.1 CM
BH CV ECHO MEAS - AI P1/2T: 783.7 MSEC
BH CV ECHO MEAS - AO MAX PG: 74 MMHG
BH CV ECHO MEAS - AO MEAN PG: 39 MMHG
BH CV ECHO MEAS - AO ROOT DIAM: 4.8 CM
BH CV ECHO MEAS - AO V2 MAX: 431 CM/SEC
BH CV ECHO MEAS - AO V2 VTI: 117 CM
BH CV ECHO MEAS - AVA(I,D): 0.9 CM2
BH CV ECHO MEAS - EDV(CUBED): 74.1 ML
BH CV ECHO MEAS - EDV(MOD-SP2): 153 ML
BH CV ECHO MEAS - EDV(MOD-SP4): 158 ML
BH CV ECHO MEAS - EF(MOD-BP): 71.1 %
BH CV ECHO MEAS - EF(MOD-SP2): 72 %
BH CV ECHO MEAS - EF(MOD-SP4): 71.1 %
BH CV ECHO MEAS - ESV(CUBED): 24.4 ML
BH CV ECHO MEAS - ESV(MOD-SP2): 42.9 ML
BH CV ECHO MEAS - ESV(MOD-SP4): 45.6 ML
BH CV ECHO MEAS - FS: 31 %
BH CV ECHO MEAS - IVS/LVPW: 1.43 CM
BH CV ECHO MEAS - IVSD: 2 CM
BH CV ECHO MEAS - LA DIMENSION: 4.6 CM
BH CV ECHO MEAS - LAT PEAK E' VEL: 3.7 CM/SEC
BH CV ECHO MEAS - LV DIASTOLIC VOL/BSA (35-75): 70.8 CM2
BH CV ECHO MEAS - LV MASS(C)D: 304.2 GRAMS
BH CV ECHO MEAS - LV MAX PG: 5.5 MMHG
BH CV ECHO MEAS - LV MEAN PG: 2.5 MMHG
BH CV ECHO MEAS - LV SYSTOLIC VOL/BSA (12-30): 20.4 CM2
BH CV ECHO MEAS - LV V1 MAX: 116.5 CM/SEC
BH CV ECHO MEAS - LV V1 VTI: 30.6 CM
BH CV ECHO MEAS - LVIDD: 4.2 CM
BH CV ECHO MEAS - LVIDS: 2.9 CM
BH CV ECHO MEAS - LVOT AREA: 3.1 CM2
BH CV ECHO MEAS - LVOT DIAM: 2 CM
BH CV ECHO MEAS - LVPWD: 1.4 CM
BH CV ECHO MEAS - MED PEAK E' VEL: 3 CM/SEC
BH CV ECHO MEAS - MV A MAX VEL: 117 CM/SEC
BH CV ECHO MEAS - MV DEC SLOPE: 159 CM/SEC2
BH CV ECHO MEAS - MV DEC TIME: 0.33 SEC
BH CV ECHO MEAS - MV E MAX VEL: 75.5 CM/SEC
BH CV ECHO MEAS - MV E/A: 0.65
BH CV ECHO MEAS - MV MAX PG: 5.6 MMHG
BH CV ECHO MEAS - MV MEAN PG: 2 MMHG
BH CV ECHO MEAS - MV P1/2T: 136.1 MSEC
BH CV ECHO MEAS - MV V2 VTI: 44.3 CM
BH CV ECHO MEAS - MVA(P1/2T): 1.62 CM2
BH CV ECHO MEAS - MVA(VTI): 2.17 CM2
BH CV ECHO MEAS - PA ACC TIME: 0.13 SEC
BH CV ECHO MEAS - PA V2 MAX: 159.5 CM/SEC
BH CV ECHO MEAS - PI END-D VEL: 119 CM/SEC
BH CV ECHO MEAS - RAP SYSTOLE: 8 MMHG
BH CV ECHO MEAS - RVSP: 26 MMHG
BH CV ECHO MEAS - SV(LVOT): 96 ML
BH CV ECHO MEAS - SV(MOD-SP2): 110.1 ML
BH CV ECHO MEAS - SV(MOD-SP4): 112.4 ML
BH CV ECHO MEAS - SVI(LVOT): 43 ML/M2
BH CV ECHO MEAS - SVI(MOD-SP2): 49.4 ML/M2
BH CV ECHO MEAS - SVI(MOD-SP4): 50.4 ML/M2
BH CV ECHO MEAS - TAPSE (>1.6): 2.11 CM
BH CV ECHO MEAS - TR MAX PG: 17.3 MMHG
BH CV ECHO MEAS - TR MAX VEL: 207.5 CM/SEC
BH CV ECHO MEASUREMENTS AVERAGE E/E' RATIO: 22.54
BH CV VAS BP RIGHT ARM: NORMAL MMHG
BH CV XLRA - RV BASE: 3.7 CM
BH CV XLRA - RV LENGTH: 8.2 CM
BH CV XLRA - RV MID: 3.2 CM
BH CV XLRA - TDI S': 10.8 CM/SEC
LEFT ATRIUM VOLUME INDEX: 34.2 ML/M2

## 2024-10-18 PROCEDURE — 93306 TTE W/DOPPLER COMPLETE: CPT

## 2024-10-18 PROCEDURE — 1159F MED LIST DOCD IN RCRD: CPT | Performed by: INTERNAL MEDICINE

## 2024-10-18 PROCEDURE — 3075F SYST BP GE 130 - 139MM HG: CPT | Performed by: INTERNAL MEDICINE

## 2024-10-18 PROCEDURE — 93356 MYOCRD STRAIN IMG SPCKL TRCK: CPT

## 2024-10-18 PROCEDURE — 99214 OFFICE O/P EST MOD 30 MIN: CPT | Performed by: INTERNAL MEDICINE

## 2024-10-18 PROCEDURE — 1160F RVW MEDS BY RX/DR IN RCRD: CPT | Performed by: INTERNAL MEDICINE

## 2024-10-18 PROCEDURE — 3079F DIAST BP 80-89 MM HG: CPT | Performed by: INTERNAL MEDICINE

## 2024-10-18 RX ORDER — CARVEDILOL 6.25 MG/1
6.25 TABLET ORAL 2 TIMES DAILY
Qty: 180 TABLET | Refills: 1 | Status: SHIPPED | OUTPATIENT
Start: 2024-10-18

## 2024-10-18 NOTE — PROGRESS NOTES
[facility] Cardiology    Encounter Date: 10/18/2024    Patient ID: Ac Caldwell is a 79 y.o. male.  : 1945     PCP: Valentine Newton APRN       Chief Complaint: Nonrheumatic aortic valve stenosis      PROBLEM LIST:  Nonrheumatic aortic valve stenosis:   Echo, 2016: Mild AI and moderate AS, HEATHER 1.2 cm2, mean gradient of 16.6, and peak gradient of 33.3. Trace TR with RVSP of 26.2. Normal EF.  Nuclear stress, 2016: EF 72%. No evidence of ischemia.  CTA, 2017: Mild ectasia of ascending aorta measuring 4.2cm significant aortic leaflet calcification, coronary sclerosis, no aneurysm of descending aorta.  Echo, 2017: Mild AI/AS with HEATHER 1.8 cm². MPG 17.0. Normal EF 64%. Mild LVH.   Echo, 2019: Mild AS, MPG 20 mmHg, HEATHER 1.5 cm2. Trace AI. Mild MR/PI, trace TR. EF 60% with mild concentric LVH. Grade I a diastolic dysfunction.   Echo, 2020: EF 55%. Moderate aortic stenosis, mean gradient 32.8 mmHg, HEATHER 1.1 cm². Mild aortic insufficiency. Mild MR and TR with RVSP 35 mmHg. Mild pulmonic insufficiency.  Echo (), 2021: EF>55%. Moderate AS. Mean gradient across the aortic valve 29 mmHg.  Echo (), 2022: EF 55-60%. Concentric LVH. Mild AR. Moderate aortic stenosis. PG 52 mmHg. MG 32 mmHg.   Echo (2023): EF 60%. Left ventricular wall thickness is consistent with moderate concentric hypertrophy. Left ventricular diastolic function is consistent with (grade I) impaired relaxation. Moderate aortic valve stenosis is present.  Mean gradient 29 mmHg.  Trace aortic insufficiency. Dilated aortic root and ascending aorta.  Echo, 10/18/2024: EF 70%. Severe AV stenosis. Peak velocity 431 cm/s, mean gradient 39 mmHg, HEATHER 0.9 cm². Mild AI. Trace to mild MR. Mild TR with normal RVSP.   Thoracic aortic aneurysm without rupture  CTA, 2017: Mild ectasia of ascending aorta measuring 4.2cm significant aortic leaflet calcification, coronary  sclerosis, no aneurysm of descending aorta.  Echo at , 01/04/2021: EF>55%. Moderate valvular aortic stenosis. Mean gradient across the aortic valve 29 mmHg. Small ascending aortic aneurysm  CTA August 12, 2023 showed aortic measurement 4.2 cm  Exertional dyspnea/anginal equivalent symptoms with occasional orthopnea.   Stress echo, 08/29/2019: Normal exercise capacity, THR reached at 6 min. No evidence of ischemia. EF 65% at rest, 75% with stress.  Hypertension.   Dyslipidemia.   Prediabetes   Enlarged prostate.  History of renal cancer:   Right nephrectomy in 1997.   S/p Resection of a tumor from his left kidney -- incomplete database.   S/p CT ablation of L renal mass.  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   S/p basal cell cancer surgery.   Episode of hematuria in January with subsequent negative cystoscopy.  Pancreatic cyst, followed by   Surgical Hx:  CT Ablation of Left renal mass  Endoscopic US at  for benign cyst, 09/2019.    History of Present Illness  Patient presents today for a follow-up with a history of AV stenosis, aortic aneurysm and cardiac risk factors. Since last visit, patient has been doing well overall from a cardiovascular standpoint. He reports he has switched care from [facility] to Dr. Castaneda. Patient reports that on follow-up CT scan he was noted to have lung nodules and subsequently underwent evaluation by Dr. Castaneda and Dr. Wynn and reportedly no cancer was found.  On CT scan his aneurysm has remained stable however echocardiogram shows significant progression of aortic stenosis.    Patient is however mostly asymptomatic.  He tries to remain active with household chores and denies current chest pain shortness of breath dizziness lightheadedness or syncope.  His major complaint is feeling sleepy and having to take naps due to fatigue.  The patient has also noted mild swelling in the inner aspect of his left arm ever since an IV was started in the past.  No edema orthopnea  PND.     Allergies   Allergen Reactions    Norvasc [Amlodipine] Swelling    Oxycodone Other (See Comments)     Can elevate Blood pressure    Lotensin [Benazepril] Cough    Sulfamethoxazole-Trimethoprim Cough    Zocor [Simvastatin] Myalgia         Current Outpatient Medications:     aspirin 325 MG tablet, Take 1 tablet by mouth Daily., Disp: , Rfl:     atorvastatin (LIPITOR) 20 MG tablet, Take 1 tablet by mouth Every Night., Disp: 90 tablet, Rfl: 3    azelastine (ASTELIN) 0.1 % nasal spray, , Disp: , Rfl:     betamethasone valerate (VALISONE) 0.1 % ointment, Apply 1 Application topically to the appropriate area as directed As Needed., Disp: , Rfl:     carvedilol (COREG) 6.25 MG tablet, Take 1 tablet by mouth 2 (Two) Times a Day., Disp: 180 tablet, Rfl: 1    Cholecalciferol (Vitamin D3) 50 MCG (2000 UT) tablet, Take 1 tablet by mouth Daily., Disp: , Rfl:     Coenzyme Q10 (CO Q-10) 100 MG capsule, Take 100 mg by mouth 2 (Two) Times a Day., Disp: , Rfl:     esomeprazole (NexIUM) 20 MG capsule, Take 1 capsule by mouth Daily., Disp: , Rfl:     famciclovir (FAMVIR) 500 MG tablet, Take 1 tablet by mouth Daily., Disp: , Rfl:     Flaxseed, Linseed, (FLAX SEED OIL) 1000 MG capsule, Take 1,200 mg by mouth Daily., Disp: , Rfl:     fluticasone (FLONASE) 50 MCG/ACT nasal spray, Administer 2 sprays into the nostril(s) as directed by provider Daily., Disp: , Rfl:     levothyroxine (SYNTHROID, LEVOTHROID) 50 MCG tablet, Take 1 tablet by mouth Daily., Disp: , Rfl:     losartan (COZAAR) 100 MG tablet, Take 1 tablet by mouth Daily., Disp: , Rfl:     Omega-3 Fatty Acids (FISH OIL) 1000 MG capsule capsule, Take 1,200 mg by mouth 2 (Two) Times a Day With Meals., Disp: , Rfl:     potassium citrate (UROCIT-K) 10 MEQ (1080 MG) CR tablet, Take 1 tablet by mouth Daily., Disp: 90 tablet, Rfl: 1    vitamin B-12 (CYANOCOBALAMIN) 1000 MCG tablet, Take 1 tablet by mouth Daily., Disp: , Rfl:     The following portions of the patient's history were  "reviewed and updated as appropriate: allergies, current medications, past family history, past medical history, past social history, past surgical history and problem list.    ROS  Review of Systems   14 point ROS negative except for that listed in the HPI.         Objective:     /88 (BP Location: Left arm, Patient Position: Sitting)   Pulse 54   Ht 175.3 cm (69\")   Wt 110 kg (243 lb)   SpO2 96%   BMI 35.88 kg/m²      Physical Exam  Constitutional: Patient appears well-developed and well-nourished.   HENT: HEENT exam unremarkable.   Neck: Neck supple. No JVD present. No carotid bruits.   Cardiovascular: Normal rate, regular rhythm and normal heart sounds.  2/6 systolic ejection murmur.  2+ symmetric pulses.   Pulmonary/Chest: Breath sounds normal. Does not exhibit tenderness.   Abdominal: Abdomen benign.   Musculoskeletal: Does not exhibit edema.  Cordlike vein on the inner aspect of left forearm, suspect superficial thrombophlebitis  Neurological: Neurological exam unremarkable.   Vitals reviewed.    Data Review:   Lab Results   Component Value Date    GLUCOSE 105 (H) 08/14/2024    BUN 25 (H) 08/14/2024    CREATININE 1.36 (H) 08/14/2024    EGFR 53.6 (L) 08/14/2024    BCR 18.4 08/14/2024     08/14/2024    K 4.5 08/14/2024    CO2 23.4 08/14/2024    CALCIUM 9.2 08/14/2024    ALBUMIN 4.0 08/14/2024    AST 22 08/14/2024    ALT 19 08/14/2024     Lab Results   Component Value Date    CHOL 137 06/06/2024    TRIG 106 06/06/2024    HDL 33 (L) 06/06/2024    LDL 84 06/06/2024      Lab Results   Component Value Date    WBC 4.58 08/14/2024    RBC 4.75 08/14/2024    HGB 15.2 08/14/2024    HCT 43.7 08/14/2024    MCV 92.0 08/14/2024     (L) 08/14/2024     Lab Results   Component Value Date    TSH 2.250 06/06/2024     Lab Results   Component Value Date    HGBA1C 5.60 06/06/2024        Procedures       Advance Care Planning   ACP discussion was declined by the patient. Patient does not have an advance " directive, declines further assistance.           Assessment:      Diagnosis Plan   1. Nonrheumatic aortic valve stenosis  Stable and asymptomatic at present, continue to monitor clinically and we will reassess in 3 months.  In case of symptoms of aortic stenosis to include dizziness, syncope, angina or heart failure he is advised to contact us sooner.  His symptoms of sleepiness may be related to high dose of carvedilol as he is also noted to be bradycardic.  We will decrease carvedilol to 6.25 mg twice daily.      2. Aneurysm of ascending aorta without rupture  Stable and asymptomatic.  Continue to monitor.      3. Left arm pain/swelling Commended warm compresses, LUE venous duplex ordered for assessment.       4. Essential hypertension  Well controlled. Continue on losartan 100 mg daily for hypertension.  Decrease the dose of carvedilol to 6.25 mg twice daily due to excessive sleep and fatigue as well as bradycardia.        5. Dyslipidemia  Well controlled. Continue on atorvastatin 20 mg daily for hyperlipidemia. Continue on Omega-3 fatty acids for hyperlipidemia.        Plan:   Stable cardiac status.  No angina or CHF symptoms, no dizziness or syncope.  Fatigue and excessive sleep may be due to beta-blocker as he is noted to be bradycardic.  We will decrease the dose of carvedilol to 6.25 mg twice daily.  LUE venous duplex ordered for assessment of left arm discomfort and concerned about superficial thrombophlebitis.  Progression of aortic stenosis discussed, currently is mostly asymptomatic, we will continue to monitor.  We discussed that due to aortic aneurysm and aortic stenosis we will have to carefully make a decision regarding surgical versus transcatheter approach to management in future.  Continue rest of the current medications.   Heart healthy diet and regular exercise encouraged  FU in 6 MO, sooner as needed.  Thank you for allowing us to participate in the care of your patient.     Roxieibed for Migel  MD Jacquelyn by Luis Rivera. 10/18/2024 11:07 EDT    I, Migel Valladares MD, personally performed the services described in this documentation as scribed by the above named individual in my presence, and it is both accurate and complete.  10/18/2024  15:39 EDT      Please note that portions of this note may have been completed with a voice recognition program. Efforts were made to edit the dictations, but occasionally words are mistranscribed.

## 2024-10-23 ENCOUNTER — HOSPITAL ENCOUNTER (OUTPATIENT)
Facility: HOSPITAL | Age: 79
Discharge: HOME | End: 2024-10-23
Admitting: INTERNAL MEDICINE
Payer: MEDICARE

## 2024-10-23 VITALS — HEIGHT: 69 IN | BODY MASS INDEX: 35.92 KG/M2 | WEIGHT: 242.51 LBS

## 2024-10-23 DIAGNOSIS — M79.602 LEFT ARM PAIN: ICD-10-CM

## 2024-10-23 DIAGNOSIS — M79.89 LEFT ARM SWELLING: ICD-10-CM

## 2024-10-23 PROCEDURE — 93971 EXTREMITY STUDY: CPT | Performed by: INTERNAL MEDICINE

## 2024-10-23 PROCEDURE — 93971 EXTREMITY STUDY: CPT

## 2024-10-24 LAB
BH CV UPPER VENOUS LEFT AXILLARY AUGMENT: NORMAL
BH CV UPPER VENOUS LEFT AXILLARY COMPRESS: NORMAL
BH CV UPPER VENOUS LEFT AXILLARY PHASIC: NORMAL
BH CV UPPER VENOUS LEFT AXILLARY SPONT: NORMAL
BH CV UPPER VENOUS LEFT BASILIC FOREARM COLOR: 1
BH CV UPPER VENOUS LEFT BASILIC FOREARM COMPRESS: NORMAL
BH CV UPPER VENOUS LEFT BASILIC FOREARM THROMBUS: NORMAL
BH CV UPPER VENOUS LEFT BASILIC UPPER COLOR: 1
BH CV UPPER VENOUS LEFT BASILIC UPPER COMPRESS: NORMAL
BH CV UPPER VENOUS LEFT BASILIC UPPER THROMBUS: NORMAL
BH CV UPPER VENOUS LEFT BRACHIAL COMPRESS: NORMAL
BH CV UPPER VENOUS LEFT CEPHALIC FOREARM COMPRESS: NORMAL
BH CV UPPER VENOUS LEFT CEPHALIC UPPER COMPRESS: NORMAL
BH CV UPPER VENOUS LEFT INTERNAL JUGULAR AUGMENT: NORMAL
BH CV UPPER VENOUS LEFT INTERNAL JUGULAR COMPRESS: NORMAL
BH CV UPPER VENOUS LEFT INTERNAL JUGULAR PHASIC: NORMAL
BH CV UPPER VENOUS LEFT INTERNAL JUGULAR SPONT: NORMAL
BH CV UPPER VENOUS LEFT RADIAL COMPRESS: NORMAL
BH CV UPPER VENOUS LEFT SUBCLAVIAN AUGMENT: NORMAL
BH CV UPPER VENOUS LEFT SUBCLAVIAN PHASIC: NORMAL
BH CV UPPER VENOUS LEFT SUBCLAVIAN SPONT: NORMAL
BH CV UPPER VENOUS LEFT ULNAR COMPRESS: NORMAL
BH CV UPPER VENOUS RIGHT SUBCLAVIAN PHASIC: NORMAL
BH CV UPPER VENOUS RIGHT SUBCLAVIAN SPONT: NORMAL
BH CV VAS PRELIMINARY FINDINGS SCRIPTING: 1

## 2024-10-31 ENCOUNTER — TELEPHONE (OUTPATIENT)
Dept: CARDIOLOGY | Facility: CLINIC | Age: 79
End: 2024-10-31
Payer: MEDICARE

## 2024-10-31 NOTE — TELEPHONE ENCOUNTER
----- Message from Migel Valladares sent at 10/30/2024  8:16 AM EDT -----  Please let him know that his arm ultrasound is abnormal and shows hardening/clotting of the superficial vein and a segment of abnormal blood flow which may be due to previous IV line placement.  This is however a benign appearing finding and does not need further treatment unless he notices any pain, redness or swelling.  In that case we would like him to come and see us otherwise he can take as needed Tylenol or Advil and apply warm compresses which should help with the discomfort.

## 2024-11-18 DIAGNOSIS — I10 ESSENTIAL HYPERTENSION: ICD-10-CM

## 2024-11-18 RX ORDER — POTASSIUM CITRATE 10 MEQ/1
10 TABLET, EXTENDED RELEASE ORAL DAILY
Qty: 90 TABLET | Refills: 1 | Status: SHIPPED | OUTPATIENT
Start: 2024-11-18

## 2024-11-18 NOTE — TELEPHONE ENCOUNTER
Rx Refill Note  Requested Prescriptions     Pending Prescriptions Disp Refills    potassium citrate (UROCIT-K) 10 MEQ (1080 MG) CR tablet [Pharmacy Med Name: Potassium Citrate ER 10 MEQ (1080 MG) Oral Tablet Extended Release] 90 tablet 0     Sig: Take 1 tablet by mouth once daily      Last office visit with prescribing clinician: 6/10/2024   Last telemedicine visit with prescribing clinician: Visit date not found   Next office visit with prescribing clinician: 12/9/2024       Seda Bennett MA  11/18/24, 14:11 EST

## 2024-12-02 DIAGNOSIS — E78.5 DYSLIPIDEMIA: Primary | ICD-10-CM

## 2024-12-02 DIAGNOSIS — R73.03 PREDIABETES: ICD-10-CM

## 2024-12-02 DIAGNOSIS — I10 ESSENTIAL HYPERTENSION: ICD-10-CM

## 2024-12-02 DIAGNOSIS — E55.9 VITAMIN D DEFICIENCY: ICD-10-CM

## 2024-12-02 DIAGNOSIS — N18.31 STAGE 3A CHRONIC KIDNEY DISEASE: ICD-10-CM

## 2024-12-02 DIAGNOSIS — E03.9 ACQUIRED HYPOTHYROIDISM: ICD-10-CM

## 2024-12-04 ENCOUNTER — LAB (OUTPATIENT)
Dept: LAB | Facility: HOSPITAL | Age: 79
End: 2024-12-04
Payer: MEDICARE

## 2024-12-04 DIAGNOSIS — E03.9 ACQUIRED HYPOTHYROIDISM: ICD-10-CM

## 2024-12-04 DIAGNOSIS — I10 ESSENTIAL HYPERTENSION: ICD-10-CM

## 2024-12-04 DIAGNOSIS — E55.9 VITAMIN D DEFICIENCY: ICD-10-CM

## 2024-12-04 DIAGNOSIS — N18.31 STAGE 3A CHRONIC KIDNEY DISEASE: ICD-10-CM

## 2024-12-04 DIAGNOSIS — E78.5 DYSLIPIDEMIA: ICD-10-CM

## 2024-12-04 DIAGNOSIS — R73.03 PREDIABETES: ICD-10-CM

## 2024-12-04 LAB
25(OH)D3 SERPL-MCNC: 28.6 NG/ML (ref 30–100)
ALBUMIN SERPL-MCNC: 4 G/DL (ref 3.5–5.2)
ALBUMIN/GLOB SERPL: 1.5 G/DL
ALP SERPL-CCNC: 59 U/L (ref 39–117)
ALT SERPL W P-5'-P-CCNC: 19 U/L (ref 1–41)
ANION GAP SERPL CALCULATED.3IONS-SCNC: 11 MMOL/L (ref 5–15)
AST SERPL-CCNC: 20 U/L (ref 1–40)
BASOPHILS # BLD AUTO: 0.02 10*3/MM3 (ref 0–0.2)
BASOPHILS NFR BLD AUTO: 0.4 % (ref 0–1.5)
BILIRUB SERPL-MCNC: 1 MG/DL (ref 0–1.2)
BUN SERPL-MCNC: 20 MG/DL (ref 8–23)
BUN/CREAT SERPL: 14.5 (ref 7–25)
CALCIUM SPEC-SCNC: 9.2 MG/DL (ref 8.6–10.5)
CHLORIDE SERPL-SCNC: 103 MMOL/L (ref 98–107)
CHOLEST SERPL-MCNC: 169 MG/DL (ref 0–200)
CO2 SERPL-SCNC: 24 MMOL/L (ref 22–29)
CREAT SERPL-MCNC: 1.38 MG/DL (ref 0.76–1.27)
DEPRECATED RDW RBC AUTO: 40.7 FL (ref 37–54)
EGFRCR SERPLBLD CKD-EPI 2021: 52.7 ML/MIN/1.73
EOSINOPHIL # BLD AUTO: 0.26 10*3/MM3 (ref 0–0.4)
EOSINOPHIL NFR BLD AUTO: 5 % (ref 0.3–6.2)
ERYTHROCYTE [DISTWIDTH] IN BLOOD BY AUTOMATED COUNT: 12.1 % (ref 12.3–15.4)
GLOBULIN UR ELPH-MCNC: 2.6 GM/DL
GLUCOSE SERPL-MCNC: 93 MG/DL (ref 65–99)
HCT VFR BLD AUTO: 47 % (ref 37.5–51)
HDLC SERPL-MCNC: 41 MG/DL (ref 40–60)
HGB BLD-MCNC: 15.8 G/DL (ref 13–17.7)
IMM GRANULOCYTES # BLD AUTO: 0.02 10*3/MM3 (ref 0–0.05)
IMM GRANULOCYTES NFR BLD AUTO: 0.4 % (ref 0–0.5)
LDLC SERPL CALC-MCNC: 110 MG/DL (ref 0–100)
LDLC/HDLC SERPL: 2.65 {RATIO}
LYMPHOCYTES # BLD AUTO: 1.59 10*3/MM3 (ref 0.7–3.1)
LYMPHOCYTES NFR BLD AUTO: 30.5 % (ref 19.6–45.3)
MCH RBC QN AUTO: 30.9 PG (ref 26.6–33)
MCHC RBC AUTO-ENTMCNC: 33.6 G/DL (ref 31.5–35.7)
MCV RBC AUTO: 91.8 FL (ref 79–97)
MONOCYTES # BLD AUTO: 0.5 10*3/MM3 (ref 0.1–0.9)
MONOCYTES NFR BLD AUTO: 9.6 % (ref 5–12)
NEUTROPHILS NFR BLD AUTO: 2.83 10*3/MM3 (ref 1.7–7)
NEUTROPHILS NFR BLD AUTO: 54.1 % (ref 42.7–76)
NRBC BLD AUTO-RTO: 0 /100 WBC (ref 0–0.2)
PLATELET # BLD AUTO: 151 10*3/MM3 (ref 140–450)
PMV BLD AUTO: 11.3 FL (ref 6–12)
POTASSIUM SERPL-SCNC: 4.2 MMOL/L (ref 3.5–5.2)
PROT SERPL-MCNC: 6.6 G/DL (ref 6–8.5)
RBC # BLD AUTO: 5.12 10*6/MM3 (ref 4.14–5.8)
SODIUM SERPL-SCNC: 138 MMOL/L (ref 136–145)
TRIGL SERPL-MCNC: 97 MG/DL (ref 0–150)
TSH SERPL DL<=0.05 MIU/L-ACNC: 4.09 UIU/ML (ref 0.27–4.2)
VIT B12 BLD-MCNC: 867 PG/ML (ref 211–946)
VLDLC SERPL-MCNC: 18 MG/DL (ref 5–40)
WBC NRBC COR # BLD AUTO: 5.22 10*3/MM3 (ref 3.4–10.8)

## 2024-12-04 PROCEDURE — 82306 VITAMIN D 25 HYDROXY: CPT

## 2024-12-04 PROCEDURE — 84443 ASSAY THYROID STIM HORMONE: CPT

## 2024-12-04 PROCEDURE — 82607 VITAMIN B-12: CPT

## 2024-12-04 PROCEDURE — 80053 COMPREHEN METABOLIC PANEL: CPT

## 2024-12-04 PROCEDURE — 80061 LIPID PANEL: CPT

## 2024-12-04 PROCEDURE — 83036 HEMOGLOBIN GLYCOSYLATED A1C: CPT

## 2024-12-04 PROCEDURE — 85025 COMPLETE CBC W/AUTO DIFF WBC: CPT

## 2024-12-05 LAB — HBA1C MFR BLD: 5.7 % (ref 4.8–5.6)

## 2024-12-09 ENCOUNTER — OFFICE VISIT (OUTPATIENT)
Dept: INTERNAL MEDICINE | Facility: CLINIC | Age: 79
End: 2024-12-09
Payer: MEDICARE

## 2024-12-09 VITALS
HEART RATE: 58 BPM | WEIGHT: 244 LBS | OXYGEN SATURATION: 96 % | HEIGHT: 69 IN | RESPIRATION RATE: 16 BRPM | BODY MASS INDEX: 36.14 KG/M2 | SYSTOLIC BLOOD PRESSURE: 128 MMHG | DIASTOLIC BLOOD PRESSURE: 84 MMHG | TEMPERATURE: 98.6 F

## 2024-12-09 DIAGNOSIS — E78.5 DYSLIPIDEMIA: ICD-10-CM

## 2024-12-09 DIAGNOSIS — N18.31 STAGE 3A CHRONIC KIDNEY DISEASE: ICD-10-CM

## 2024-12-09 DIAGNOSIS — E03.9 ACQUIRED HYPOTHYROIDISM: ICD-10-CM

## 2024-12-09 DIAGNOSIS — Z90.5 HISTORY OF NEPHRECTOMY: ICD-10-CM

## 2024-12-09 DIAGNOSIS — I10 ESSENTIAL HYPERTENSION: Primary | ICD-10-CM

## 2024-12-09 PROCEDURE — 3079F DIAST BP 80-89 MM HG: CPT | Performed by: NURSE PRACTITIONER

## 2024-12-09 PROCEDURE — 1126F AMNT PAIN NOTED NONE PRSNT: CPT | Performed by: NURSE PRACTITIONER

## 2024-12-09 PROCEDURE — 1160F RVW MEDS BY RX/DR IN RCRD: CPT | Performed by: NURSE PRACTITIONER

## 2024-12-09 PROCEDURE — 99214 OFFICE O/P EST MOD 30 MIN: CPT | Performed by: NURSE PRACTITIONER

## 2024-12-09 PROCEDURE — 1159F MED LIST DOCD IN RCRD: CPT | Performed by: NURSE PRACTITIONER

## 2024-12-09 PROCEDURE — 3074F SYST BP LT 130 MM HG: CPT | Performed by: NURSE PRACTITIONER

## 2024-12-09 PROCEDURE — G2211 COMPLEX E/M VISIT ADD ON: HCPCS | Performed by: NURSE PRACTITIONER

## 2024-12-09 RX ORDER — ROSUVASTATIN CALCIUM 40 MG/1
40 TABLET, COATED ORAL DAILY
Qty: 90 TABLET | Refills: 1 | Status: SHIPPED | OUTPATIENT
Start: 2024-12-09

## 2024-12-09 NOTE — PROGRESS NOTES
Subjective   Ac Caldwell is a 79 y.o. male    Chief Complaint   Patient presents with    Follow-up     6 month follow up. Patient has no questions or cocnerns.     Hypertension     History of Present Illness     On 1/26/15, Ac saw gross hematuria, saw Dr. Ayala (urology) and he did a scope. It was ok. Then had a CT scan on 2/3/15. Cr was high, so they did it without contrast. CT scan showed a lesion, but he was not concerned. Then had a fish bladder test on 2/5/15. His urologist never got the results. Did go back to see Nephrology and had labs in Feb. Cr. was 1.1 and BUN was 16. Everything has been stable since. Has a h/o right kidney CA in 1997 and had nephrectomy.  Urologist - Dr. Jossue Serrano left and moved to [place] and he had to [place] [facility]  He saw him several times for recurrent UTI, hydrocele, renal cyst, and epididymoorchits. He saw them again 5/31/2017 for repeat renal US that showed a 3.4cm left renal cyst, which had grown from 2.5cm on last scan.  Dr. Farrell moved to [place] and he was then referred to  Urology.  Had CXR and labs.  CXR revealed a possible thoracic aortic aneurysm.  Had f/u chest CT scan, no aneurysm.  CT renal mass protocol was done on 8/30/2017 which confirmed a solid enhancing mass in the lower pole of the left kidney, without lymphadenopathy.  It was elected to proceed with a cryoablation of this lesion per Dr. Mark at .  He underwent this procedure on 9/27/17.  Pathology is unclear as to it being malignant vs benign.  Was re-scanned in 30 days, then 90 days, then Q6 months.  He has been told that he does not need to come back unless he has problems.       CT in Aug 2017 revealed an incidental pancreatic lesion on the head of the pancreas that was 12 mm in size; f/u imaging done 9/2018 showed that the mass was stable and unchanged in size.  Had a biopsy of the pancreatic cyst in 2019 and it was benign.  They will monitor Q12 months with MRI.  Ac  prefers a CT over an MRI.  The CT done on 10/1/2020 of abd and pelvis shows a simple cystic structure near the head of the pancreas measuring 1.6 cm.  The pancreatic cyst appears to be slightly larger on this scan than previous (from 12 mm to 16 mm) - this is followed by Dr. Norwood at .  We faxed a copy to his office and he underwent an EGD and EUS on 3/2/2021.  The pathology results were benign but he suggested another followup  EUS/FNA in one year, in the March/April 2022 time frame.  Had this done in 6/2022.  Was referred to [facility].  All records reviewed and he was rescanned in July 2024     Thoracic AA - followed by CT surgery at .  Per echo in Marc 2022 Aortic Root measured 41 mm and per last echo at [facility] it is 44 mm.  He was referred to [facility] CT surgery for routine f/u.  Last US was 10 2024     After CT for AA, a pulm nodule and was referred to [facility] Pul.  Had f/u CT, PET and Bronch.  Neg for malignancy.  Most recent CT showed new nodule.  They are following closely     Underwent a STEAM procedure in 2/2019 for BPH @       Nephrologist- [facility]; his Creatinine is 1.32     Had a very large thyroid nodule removed on 4/23/14 per ENT. It was 8cm in size; they took the right side of his thyroid and placed on synthroid 50mcg. It was benign! Seeing Dr. Everett annually and everything has been stable.   Lab Results   Component Value Date    TSH 4.090 12/04/2024     AS - now followed by Cardiology, Dr. Valladares.  He will follow every 6 months and echo q 2 yrs.        HTN - well controlled with Losartan 100 mg and Carvedilol 12.5 mg BID (recent per Cards); BP is well controlled and he denies SE.      HL - stable on Lipitor 20mg daily without SE.  We did try to go up to 40 mg daily, but he did not tolerate due to leg cramps.    Lab Results   Component Value Date    CHOL 169 12/04/2024    TRIG 97 12/04/2024    HDL 41 12/04/2024     (H) 12/04/2024      The 10-year ASCVD risk score  (Garrick HI, et al., 2019) is: 32.1%    Values used to calculate the score:      Age: 79 years      Sex: Male      Is Non- : No      Diabetic: No      Tobacco smoker: No      Systolic Blood Pressure: 128 mmHg      Is BP treated: Yes      HDL Cholesterol: 41 mg/dL      Total Cholesterol: 169 mg/dL      COVID - 3/12/21 and 4/2/2021, 10/14/2021, 4/9/2022, 10/1/2022, 9/3/2024  Flu shot - 9/3/2024  Tdap 2009   Pneumovax 2012   Prevnar - 4/2015   Zostavax 2010  Shingrix - UTD  PSA per Urology - WNL, no need to repeat  Colon - 4/2015, due 2025   RSV - 3/2024       The following portions of the patient's history were reviewed and updated as appropriate: allergies, current medications, past family history, past medical history, past social history, past surgical history, and problem list.    Current Outpatient Medications:     aspirin 325 MG tablet, Take 1 tablet by mouth Daily., Disp: , Rfl:     azelastine (ASTELIN) 0.1 % nasal spray, , Disp: , Rfl:     betamethasone valerate (VALISONE) 0.1 % ointment, Apply 1 Application topically to the appropriate area as directed As Needed., Disp: , Rfl:     carvedilol (COREG) 6.25 MG tablet, Take 1 tablet by mouth 2 (Two) Times a Day., Disp: 180 tablet, Rfl: 1    Cholecalciferol (Vitamin D3) 50 MCG (2000 UT) tablet, Take 1 tablet by mouth Daily., Disp: , Rfl:     Coenzyme Q10 (CO Q-10) 100 MG capsule, Take 100 mg by mouth 2 (Two) Times a Day., Disp: , Rfl:     esomeprazole (NexIUM) 20 MG capsule, Take 1 capsule by mouth Daily., Disp: , Rfl:     famciclovir (FAMVIR) 500 MG tablet, Take 1 tablet by mouth Daily., Disp: , Rfl:     Flaxseed, Linseed, (FLAX SEED OIL) 1000 MG capsule, Take 1,200 mg by mouth Daily., Disp: , Rfl:     levothyroxine (SYNTHROID, LEVOTHROID) 50 MCG tablet, Take 1 tablet by mouth Daily., Disp: , Rfl:     losartan (COZAAR) 100 MG tablet, Take 1 tablet by mouth Daily., Disp: , Rfl:     Omega-3 Fatty Acids (FISH OIL) 1000 MG capsule capsule, Take  1,200 mg by mouth 2 (Two) Times a Day With Meals., Disp: , Rfl:     potassium citrate (UROCIT-K) 10 MEQ (1080 MG) CR tablet, Take 1 tablet by mouth once daily, Disp: 90 tablet, Rfl: 1    vitamin B-12 (CYANOCOBALAMIN) 1000 MCG tablet, Take 1 tablet by mouth Daily., Disp: , Rfl:     rosuvastatin (Crestor) 40 MG tablet, Take 1 tablet by mouth Daily., Disp: 90 tablet, Rfl: 1     Review of Systems   Constitutional:  Negative for chills, fatigue and fever.   Respiratory:  Negative for cough and chest tightness.    Gastrointestinal:  Negative for abdominal pain, diarrhea, nausea and vomiting.   Endocrine: Negative for cold intolerance and heat intolerance.   Musculoskeletal:  Negative for arthralgias.   Neurological:  Negative for dizziness.       Objective   Physical Exam  Constitutional:       Appearance: He is well-developed.   HENT:      Head: Normocephalic and atraumatic.   Eyes:      Conjunctiva/sclera: Conjunctivae normal.      Pupils: Pupils are equal, round, and reactive to light.   Cardiovascular:      Rate and Rhythm: Normal rate and regular rhythm.      Heart sounds: Murmur heard.      Systolic murmur is present with a grade of 4/6.   Pulmonary:      Effort: Pulmonary effort is normal.      Breath sounds: Normal breath sounds.   Abdominal:      General: Bowel sounds are normal.      Palpations: Abdomen is soft.   Musculoskeletal:         General: Normal range of motion.      Cervical back: Normal range of motion.   Skin:     General: Skin is warm and dry.   Neurological:      Mental Status: He is alert and oriented to person, place, and time.      Deep Tendon Reflexes: Reflexes are normal and symmetric.   Psychiatric:         Behavior: Behavior normal.         Thought Content: Thought content normal.         Judgment: Judgment normal.       Vitals:    12/09/24 0932   BP: 128/84   BP Location: Right arm   Patient Position: Sitting   Cuff Size: Adult   Pulse: 58   Resp: 16   Temp: 98.6 °F (37 °C)   TempSrc:  "Temporal   SpO2: 96%   Weight: 111 kg (244 lb)   Height: 175.3 cm (69.02\")         Assessment & Plan   Diagnoses and all orders for this visit:    1. Essential hypertension (Primary)    2. Acquired hypothyroidism    3. Dyslipidemia  -     rosuvastatin (Crestor) 40 MG tablet; Take 1 tablet by mouth Daily.  Dispense: 90 tablet; Refill: 1    4. Stage 3a chronic kidney disease    5. History of nephrectomy      Labs reviewed, d/c lipitor  and change to crestor 40 mg daily  No other med changes  Keep close f/u with specialists as scheduled  Return in about 6 months (around 6/9/2025) for Medicare Wellness.             "

## 2025-01-02 RX ORDER — LOSARTAN POTASSIUM 100 MG/1
100 TABLET ORAL DAILY
Qty: 90 TABLET | Refills: 1 | Status: SHIPPED | OUTPATIENT
Start: 2025-01-02

## 2025-01-02 NOTE — TELEPHONE ENCOUNTER
Rx Refill Note  Requested Prescriptions     Pending Prescriptions Disp Refills    losartan (COZAAR) 100 MG tablet       Sig: Take 1 tablet by mouth Daily.      Last office visit with prescribing clinician: 2024   Last telemedicine visit with prescribing clinician: Visit date not found   Next office visit with prescribing clinician: 2025         Patient comment: prescription  - had been written by [facility], FAX sent by [facility]  at my request to transfer ownership to primary care       Seda Bennett MA  25, 12:29 EST

## 2025-02-12 ENCOUNTER — TRANSCRIBE ORDERS (OUTPATIENT)
Dept: LAB | Facility: HOSPITAL | Age: 80
End: 2025-02-12
Payer: MEDICARE

## 2025-02-12 ENCOUNTER — LAB (OUTPATIENT)
Dept: LAB | Facility: HOSPITAL | Age: 80
End: 2025-02-12
Payer: MEDICARE

## 2025-02-12 DIAGNOSIS — N18.32 STAGE 3B CHRONIC KIDNEY DISEASE: Primary | ICD-10-CM

## 2025-02-12 DIAGNOSIS — N18.32 STAGE 3B CHRONIC KIDNEY DISEASE: ICD-10-CM

## 2025-02-12 PROCEDURE — 80069 RENAL FUNCTION PANEL: CPT

## 2025-02-12 PROCEDURE — 81001 URINALYSIS AUTO W/SCOPE: CPT

## 2025-02-12 PROCEDURE — 84156 ASSAY OF PROTEIN URINE: CPT

## 2025-02-12 PROCEDURE — 85025 COMPLETE CBC W/AUTO DIFF WBC: CPT

## 2025-02-12 PROCEDURE — 82570 ASSAY OF URINE CREATININE: CPT

## 2025-02-12 PROCEDURE — 36415 COLL VENOUS BLD VENIPUNCTURE: CPT

## 2025-02-13 ENCOUNTER — HOSPITAL ENCOUNTER (OUTPATIENT)
Dept: CT IMAGING | Facility: HOSPITAL | Age: 80
Discharge: HOME | End: 2025-02-13
Admitting: INTERNAL MEDICINE
Payer: MEDICARE

## 2025-02-13 DIAGNOSIS — R91.1 LUNG NODULE: ICD-10-CM

## 2025-02-13 LAB
ALBUMIN SERPL-MCNC: 4 G/DL (ref 3.5–5.2)
ANION GAP SERPL CALCULATED.3IONS-SCNC: 13 MMOL/L (ref 5–15)
BACTERIA UR QL AUTO: ABNORMAL /HPF
BASOPHILS # BLD AUTO: 0.03 10*3/MM3 (ref 0–0.2)
BASOPHILS NFR BLD AUTO: 0.5 % (ref 0–1.5)
BILIRUB UR QL STRIP: NEGATIVE
BUN SERPL-MCNC: 17 MG/DL (ref 8–23)
BUN/CREAT SERPL: 13.7 (ref 7–25)
CALCIUM SPEC-SCNC: 9.8 MG/DL (ref 8.6–10.5)
CHLORIDE SERPL-SCNC: 106 MMOL/L (ref 98–107)
CLARITY UR: ABNORMAL
CO2 SERPL-SCNC: 24 MMOL/L (ref 22–29)
COLOR UR: YELLOW
CREAT SERPL-MCNC: 1.24 MG/DL (ref 0.76–1.27)
CREAT UR-MCNC: 87.7 MG/DL
DEPRECATED RDW RBC AUTO: 43.3 FL (ref 37–54)
EGFRCR SERPLBLD CKD-EPI 2021: 59.9 ML/MIN/1.73
EOSINOPHIL # BLD AUTO: 0.25 10*3/MM3 (ref 0–0.4)
EOSINOPHIL NFR BLD AUTO: 4.3 % (ref 0.3–6.2)
ERYTHROCYTE [DISTWIDTH] IN BLOOD BY AUTOMATED COUNT: 12.7 % (ref 12.3–15.4)
GLUCOSE SERPL-MCNC: 92 MG/DL (ref 65–99)
GLUCOSE UR STRIP-MCNC: NEGATIVE MG/DL
HCT VFR BLD AUTO: 47.6 % (ref 37.5–51)
HGB BLD-MCNC: 15.5 G/DL (ref 13–17.7)
HGB UR QL STRIP.AUTO: ABNORMAL
HYALINE CASTS UR QL AUTO: ABNORMAL /LPF
IMM GRANULOCYTES # BLD AUTO: 0.04 10*3/MM3 (ref 0–0.05)
IMM GRANULOCYTES NFR BLD AUTO: 0.7 % (ref 0–0.5)
KETONES UR QL STRIP: NEGATIVE
LEUKOCYTE ESTERASE UR QL STRIP.AUTO: ABNORMAL
LYMPHOCYTES # BLD AUTO: 1.59 10*3/MM3 (ref 0.7–3.1)
LYMPHOCYTES NFR BLD AUTO: 27.2 % (ref 19.6–45.3)
MCH RBC QN AUTO: 30.5 PG (ref 26.6–33)
MCHC RBC AUTO-ENTMCNC: 32.6 G/DL (ref 31.5–35.7)
MCV RBC AUTO: 93.7 FL (ref 79–97)
MONOCYTES # BLD AUTO: 0.65 10*3/MM3 (ref 0.1–0.9)
MONOCYTES NFR BLD AUTO: 11.1 % (ref 5–12)
NEUTROPHILS NFR BLD AUTO: 3.29 10*3/MM3 (ref 1.7–7)
NEUTROPHILS NFR BLD AUTO: 56.2 % (ref 42.7–76)
NITRITE UR QL STRIP: NEGATIVE
NRBC BLD AUTO-RTO: 0 /100 WBC (ref 0–0.2)
PH UR STRIP.AUTO: 6.5 [PH] (ref 5–8)
PHOSPHATE SERPL-MCNC: 3.6 MG/DL (ref 2.5–4.5)
PLATELET # BLD AUTO: 166 10*3/MM3 (ref 140–450)
PMV BLD AUTO: 10.4 FL (ref 6–12)
POTASSIUM SERPL-SCNC: 4.4 MMOL/L (ref 3.5–5.2)
PROT ?TM UR-MCNC: 12.3 MG/DL
PROT UR QL STRIP: NEGATIVE
RBC # BLD AUTO: 5.08 10*6/MM3 (ref 4.14–5.8)
RBC # UR STRIP: ABNORMAL /HPF
REF LAB TEST METHOD: ABNORMAL
SODIUM SERPL-SCNC: 143 MMOL/L (ref 136–145)
SP GR UR STRIP: 1.01 (ref 1–1.03)
SQUAMOUS #/AREA URNS HPF: ABNORMAL /HPF
UROBILINOGEN UR QL STRIP: ABNORMAL
WBC # UR STRIP: ABNORMAL /HPF
WBC NRBC COR # BLD AUTO: 5.85 10*3/MM3 (ref 3.4–10.8)

## 2025-02-13 PROCEDURE — 71250 CT THORAX DX C-: CPT

## 2025-02-24 ENCOUNTER — OFFICE VISIT (OUTPATIENT)
Dept: PULMONOLOGY | Facility: CLINIC | Age: 80
End: 2025-02-24
Payer: MEDICARE

## 2025-02-24 VITALS
WEIGHT: 246.38 LBS | HEART RATE: 75 BPM | HEIGHT: 69 IN | RESPIRATION RATE: 16 BRPM | OXYGEN SATURATION: 96 % | SYSTOLIC BLOOD PRESSURE: 144 MMHG | DIASTOLIC BLOOD PRESSURE: 98 MMHG | TEMPERATURE: 98.2 F | BODY MASS INDEX: 36.49 KG/M2

## 2025-02-24 DIAGNOSIS — R91.1 LUNG NODULE: Primary | ICD-10-CM

## 2025-02-24 NOTE — PROGRESS NOTES
Ac aCldwell is a 79 y.o. male here for evaluation of   Chief Complaint   Patient presents with    Post CT     F/u       Problem list:  RUL 19 mm GGO  Ca++ granulomatous changes  TAA aneurysm 4.2cm  Renal cancer s/p right nephrectomy, abl part of left kidney  Aortic valve stenosis, severe  Hypertension  Dyslipidemia  BPH  Chronic kidney disease  GERD  Diverticulosis  Nephrolithiasis  Neurosensory hearing loss  Bilateral cataract surgery  Right thyroidectomy for goiter  Prostate surgery, green laser, 2019  Colonoscopy, 2015  EGD 2021 for a pancreatic cyst  Basal cell skin cancer removal  No tobacco  Allergies Norvasc-swelling, Lotensin-cough, Bactrim-cough, Zocor-myalgias, oxycodone-intolerance    History of Present Illness    79-year-old gentleman, non-smoker followed by CT surgery for a thoracic aortic aneurysm.  He underwent a scan that revealed a groundglass nodule and he is here for assessment.  Nodified was performed and he had an auto antibody making his risk of cancer higher.  He then underwent bronchoscopy in November 2023 with all cultures and biopsy, cytology is negative.  CT scan July 20, 2024 revealed no change in his thoracic aneurysm, persistent right upper lobe groundglass opacity that had not enlarged in size and a nonobstructing stone in his left kidney, lower pole. CT 2/25 shows stability.  He has AV disease and they are considering a TAVR. Valve was severely stenotic 10/31/24. MEJIA shopping at [employer].  In early Jan he had an illness lasting 2 days with  chills, poor appetite. Symptoms resolve.          Review of Systems   Constitutional:  Positive for fatigue. Negative for unexpected weight change.   Respiratory:  Positive for shortness of breath. Negative for cough and wheezing.    Cardiovascular:  Negative for chest pain.         Current Outpatient Medications:     aspirin 325 MG tablet, Take 1 tablet by mouth Daily., Disp: , Rfl:     azelastine (ASTELIN) 0.1 % nasal spray, , Disp: , Rfl:      betamethasone valerate (VALISONE) 0.1 % ointment, Apply 1 Application topically to the appropriate area as directed As Needed., Disp: , Rfl:     carvedilol (COREG) 6.25 MG tablet, Take 1 tablet by mouth 2 (Two) Times a Day., Disp: 180 tablet, Rfl: 1    Cholecalciferol (Vitamin D3) 50 MCG (2000 UT) tablet, Take 1 tablet by mouth Daily., Disp: , Rfl:     Coenzyme Q10 (CO Q-10) 100 MG capsule, Take 100 mg by mouth 2 (Two) Times a Day., Disp: , Rfl:     esomeprazole (NexIUM) 20 MG capsule, Take 1 capsule by mouth Daily., Disp: , Rfl:     famciclovir (FAMVIR) 500 MG tablet, Take 1 tablet by mouth Daily., Disp: , Rfl:     Flaxseed, Linseed, (FLAX SEED OIL) 1000 MG capsule, Take 1,200 mg by mouth Daily., Disp: , Rfl:     levothyroxine (SYNTHROID, LEVOTHROID) 50 MCG tablet, Take 1 tablet by mouth Daily., Disp: , Rfl:     losartan (COZAAR) 100 MG tablet, Take 1 tablet by mouth Daily., Disp: 90 tablet, Rfl: 1    Omega-3 Fatty Acids (FISH OIL) 1000 MG capsule capsule, Take 1,200 mg by mouth 2 (Two) Times a Day With Meals., Disp: , Rfl:     potassium citrate (UROCIT-K) 10 MEQ (1080 MG) CR tablet, Take 1 tablet by mouth once daily, Disp: 90 tablet, Rfl: 1    rosuvastatin (Crestor) 40 MG tablet, Take 1 tablet by mouth Daily., Disp: 90 tablet, Rfl: 1    vitamin B-12 (CYANOCOBALAMIN) 1000 MCG tablet, Take 1 tablet by mouth Daily., Disp: , Rfl:     Past Medical History:   Diagnosis Date    Aneurysm 2017    Exertional dyspnea     He does have symptoms of exertional dyspnea and minimal orthopnea. This may be unrelated to his aortic stenosis, and due to his risk factors, I am also concerned about coronary artery disease.     GERD (gastroesophageal reflux disease) 1997    Heart murmur 1966    Hematuria     Episode of hematuria in January with subsequent negative cystoscopy.     History of kidney cancer     History of kidney stones     HL (hearing loss) 2000    Hyperlipidemia     Hypertension 1998    Kidney stone 1991    Lung nodule seen  on imaging study     Renal cancer     Hx of.Status post right nephrectomy in 1997. Status post resection of a tumor from his left kidney -- incomplete database.     Renal insufficiency 2010    Renal oncocytoma     Rosacea     Thoracic aortic aneurysm     Thyroid disease     UTI (urinary tract infection)      Past Surgical History:   Procedure Laterality Date    ABLATION OF DYSRHYTHMIC FOCUS  2017    left kidney    BASAL CELL CARCINOMA EXCISION      Status post basal cell cancer surgery.     BRONCHOSCOPY WITH ION ROBOTIC ASSIST N/A 11/10/2023    Procedure: BRONCHOSCOPY NAVIGATION WITH ENDOBRONCHIAL ULTRASOUND AND ION ROBOT;  Surgeon: Manish Wynn MD;  Location: [facility] ENDOSCOPY;  Service: Robotics - Pulmonary;  Laterality: N/A;  Ion cath #4,  #4, cath guide 0085    CATARACT EXTRACTION Bilateral     COLONOSCOPY  04/2015    due 2025    CYST REMOVAL      KIDNEY STONE SURGERY Left 02/08/2012    [facility]. [facility]    KIDNEY SURGERY      Status post resection of a tumor from his left kidney -- incomplete database.     KIDNEY SURGERY  09/27/2017    [place]    NEPHRECTOMY Bilateral     Has 15% of left Kidney    NEPHRECTOMY Right 1997    PROSTATE SURGERY  02/19/2019    [facility]. Dr. Bettencourt    THYROID SURGERY      Status post resection of tumor from right thyroid gland.     THYROIDECTOMY, PARTIAL       Social History     Socioeconomic History    Marital status: Single    Number of children: 1   Tobacco Use    Smoking status: Never     Passive exposure: Never    Smokeless tobacco: Never    Tobacco comments:     50,60,70 years it was impossible to avoid people who smoked   Vaping Use    Vaping status: Never Used   Substance and Sexual Activity    Alcohol use: Yes     Comment: social - no schedule - random    Drug use: Never    Sexual activity: Not Currently     Partners: Female     Family History   Problem Relation Age of Onset    Diabetes Mother     Obesity Mother     Heart failure Mother     Clotting disorder Mother      "Hypertension Mother     Clotting disorder Father     Atrial fibrillation Father     Pneumonia Father     Hypertension Father     Stroke Paternal Grandfather     Heart attack Cousin     Heart attack Cousin     Suicidality Cousin     Stroke Other     Asthma Sister      Blood pressure 144/98, pulse 75, temperature 98.2 °F (36.8 °C), resp. rate 16, height 175.3 cm (69.02\"), weight 112 kg (246 lb 6 oz), SpO2 96%.    Physical Exam  Constitutional:       General: He is not in acute distress.  HENT:      Head: Normocephalic and atraumatic.      Nose: No congestion.      Mouth/Throat:      Pharynx: No oropharyngeal exudate.   Cardiovascular:      Rate and Rhythm: Normal rate and regular rhythm.      Heart sounds: Murmur heard.   Pulmonary:      Effort: Pulmonary effort is normal.      Breath sounds: No wheezing or rhonchi.   Musculoskeletal:      Right lower leg: No edema.      Left lower leg: No edema.   Lymphadenopathy:      Cervical: No cervical adenopathy.   Neurological:      Mental Status: He is oriented to person, place, and time.           PFTs:    October 3, 2023, FVC 2.62 L, 65%, FEV1 2.01 L, 69%, ratio 77%, flow volume loop without blunting, total lung capacity 4.98 L, 81%, diffusion capacity 20.6, 89%, no obstruction, normal total lung capacity, normal diffusion    Radiology:    Interpretation Summary         Left ventricular ejection fraction appears to be greater than 70%.    Left ventricular wall thickness is consistent with mild concentric hypertrophy.    Left ventricular diastolic function is consistent with (grade I) impaired relaxation.    Mild biatrial enlargement.    Severe aortic valve stenosis is present.  Peak velocity 431 cm/s, mean gradient 39 mmHg, HEATHER 0.9 cm².    Mild aortic insufficiency.    Trace to mild mitral regurgitation.    Mild tricuspid regurgitation with normal RVSP.    Mild dilation of the aortic root is present. Mild dilation of the sinuses of Valsalva is present. Mild dilation of the " ascending aorta is present.     Findings:  Hilum and Mediastinum: No pathologically enlarged lymph nodes.  Normal heart size.   There is trace pericardial effusion. There is calcified atherosclerotic disease of the thoracic aorta and coronary arteries. The ascending thoracic aorta measures 4.2 cm   in size not significantly changed. Mitral annulus calculus is noted.     Lung Parenchyma and Pleura: No focal consolidations. No endobronchial lesions.  No significant pleural effusions.     There is a groundglass opacity in the posterior right upper lung minimally changed in size measuring 1.2 cm in maximum diameter (image 24 of series 4).  There is a 5 mm groundglass subpleural nodule in the right middle lung unchanged (image 50 of series 4).  There is a perifissural 5 mm nodule in the left lower lung (image 50 of series 4).  No new nodule noted.     Upper Abdomen: Exophytic left renal cysts are noted. The right kidney is surgically absent. Layering calcified gallstones are noted.     Soft tissues: Unremarkable.     Osseous structures: No aggressive focal lytic or sclerotic osseous lesions.     IMPRESSION:  Impression:  Stable examination with stable appearance to a groundglass opacity in the posterior right upper lung.     Fusiform ascending thoracic aortic aneurysm measuring 4.2 cm.           Electronically Signed: Zina Johnson MD    2/18/2025 2:44 PM EST    Workstation ID: [id number]       Lab:    February 12, 2025 WBC 5.8, hemoglobin 15.5, platelet 166, 56% polys, 27% lymphs, 4.3% eosinophils, glucose 92, BUN 17, creatinine 1.24, sodium 143, potassium 4.4, chloride 106, bicarbonate 24, calcium 9.8, albumin 4, phosphorus 3.6, GFR 60    Final Diagnosis  LUNG, RIGHT UPPER LOBE, TRANSBRONCHIAL BIOPSY:  Benign alveolar lung parenchyma.  Negative for atypia and malignancy.  Electronically signed by Yao Alba MD on 11/13/2023 at 1115    Diagnoses and all orders for this visit:    1. Lung nodule  (Primary)            Discussion:     79-year-old gentleman, lifetime non-smoker found to have a groundglass nodule in the right upper lobe on CT imaging.  With his age, right upper lobe location and groundglass appearance his malignancy risk is 10.5%.  His previous renal cell cancer also increases his risk.  He does not take any immunosuppressive medications.  He does have evidence of granulomatous changes.  Nodify was performed for autoantibodies and was positive increasing his risk of malignancy from 42%, to 91%.  PET was negative in 11/23. He therefore underwent bronchoscopy and biopsy.  Pathology was negative.  Fungal and TB cultures were also negative.  Follow-up imaging over the last year, nodule has remained stable.      His AV stenosis has become severe and he is now SOA on exertion.     CT August and follow up with Adrian De Dios MD

## 2025-02-24 NOTE — LETTER
February 24, 2025     Valentine Newton, APRN  [address] [facility] [address]  [address]  [facility] [place] [facility] [address]    Patient: Ac Caldwell   YOB: 1945   Date of Visit: 2/24/2025     Dear Dr. Newton, APRVIVIENNE:    Thank you for referring Ac Caldwell to me for evaluation. Below are the relevant portions of my assessment and plan of care.    If you have questions, please do not hesitate to call me. I look forward to following Ac along with you.         Sincerely,        Layla De Dios MD        CC: No Recipients      Progress Notes:  Ac Caldwell is a 79 y.o. 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  Renal cancer s/p right nephrectomy, abl part of left kidney  Aortic valve stenosis, severe  Hypertension  Dyslipidemia  BPH  Chronic kidney disease  GERD  Diverticulosis  Nephrolithiasis  Neurosensory hearing loss  Bilateral cataract surgery  Right thyroidectomy for goiter  Prostate surgery, green laser, 2019  Colonoscopy, 2015  EGD 2021 for a pancreatic cyst  Basal cell skin cancer removal  No tobacco  Allergies Norvasc-swelling, Lotensin-cough, Bactrim-cough, Zocor-myalgias, oxycodone-intolerance    History of Present Illness    79-year-old gentleman, non-smoker followed by CT surgery for a thoracic aortic aneurysm.  He underwent a scan that revealed a groundglass nodule and he is here for assessment.  Nodified was performed and he had an auto antibody making his risk of cancer higher.  He then underwent bronchoscopy in November 2023 with all cultures and biopsy, cytology is negative.  CT scan July 20, 2024 revealed no change in his thoracic aneurysm, persistent right upper lobe groundglass opacity that had not enlarged in size and a nonobstructing stone in his left kidney, lower pole. CT 2/25 shows stability.  He has AV disease and they are considering a TAVR. Valve was severely  stenotic 10/31/24. MEJIA shopping at [employer].  In early Jan he had an illness lasting 2 days with  chills, poor appetite. Symptoms resolve.          Review of Systems   Constitutional:  Positive for fatigue. Negative for unexpected weight change.   Respiratory:  Positive for shortness of breath. Negative for cough and wheezing.    Cardiovascular:  Negative for chest pain.         Current Outpatient Medications:   •  aspirin 325 MG tablet, Take 1 tablet by mouth Daily., Disp: , Rfl:   •  azelastine (ASTELIN) 0.1 % nasal spray, , Disp: , Rfl:   •  betamethasone valerate (VALISONE) 0.1 % ointment, Apply 1 Application topically to the appropriate area as directed As Needed., Disp: , Rfl:   •  carvedilol (COREG) 6.25 MG tablet, Take 1 tablet by mouth 2 (Two) Times a Day., Disp: 180 tablet, Rfl: 1  •  Cholecalciferol (Vitamin D3) 50 MCG (2000 UT) tablet, Take 1 tablet by mouth Daily., Disp: , Rfl:   •  Coenzyme Q10 (CO Q-10) 100 MG capsule, Take 100 mg by mouth 2 (Two) Times a Day., Disp: , Rfl:   •  esomeprazole (NexIUM) 20 MG capsule, Take 1 capsule by mouth Daily., Disp: , Rfl:   •  famciclovir (FAMVIR) 500 MG tablet, Take 1 tablet by mouth Daily., Disp: , Rfl:   •  Flaxseed, Linseed, (FLAX SEED OIL) 1000 MG capsule, Take 1,200 mg by mouth Daily., Disp: , Rfl:   •  levothyroxine (SYNTHROID, LEVOTHROID) 50 MCG tablet, Take 1 tablet by mouth Daily., Disp: , Rfl:   •  losartan (COZAAR) 100 MG tablet, Take 1 tablet by mouth Daily., Disp: 90 tablet, Rfl: 1  •  Omega-3 Fatty Acids (FISH OIL) 1000 MG capsule capsule, Take 1,200 mg by mouth 2 (Two) Times a Day With Meals., Disp: , Rfl:   •  potassium citrate (UROCIT-K) 10 MEQ (1080 MG) CR tablet, Take 1 tablet by mouth once daily, Disp: 90 tablet, Rfl: 1  •  rosuvastatin (Crestor) 40 MG tablet, Take 1 tablet by mouth Daily., Disp: 90 tablet, Rfl: 1  •  vitamin B-12 (CYANOCOBALAMIN) 1000 MCG tablet, Take 1 tablet by mouth Daily., Disp: , Rfl:     Past Medical History:   Diagnosis  Date   • Aneurysm 2017   • Exertional dyspnea     He does have symptoms of exertional dyspnea and minimal orthopnea. This may be unrelated to his aortic stenosis, and due to his risk factors, I am also concerned about coronary artery disease.    • GERD (gastroesophageal reflux disease) 1997   • Heart murmur 1966   • Hematuria     Episode of hematuria in January with subsequent negative cystoscopy.    • History of kidney cancer    • History of kidney stones    • HL (hearing loss) 2000   • Hyperlipidemia    • Hypertension 1998   • Kidney stone 1991   • Lung nodule seen on imaging study    • Renal cancer     Hx of.Status post right nephrectomy in 1997. Status post resection of a tumor from his left kidney -- incomplete database.    • Renal insufficiency 2010   • Renal oncocytoma    • Rosacea    • Thoracic aortic aneurysm    • Thyroid disease    • UTI (urinary tract infection)      Past Surgical History:   Procedure Laterality Date   • ABLATION OF DYSRHYTHMIC FOCUS  2017    left kidney   • BASAL CELL CARCINOMA EXCISION      Status post basal cell cancer surgery.    • BRONCHOSCOPY WITH ION ROBOTIC ASSIST N/A 11/10/2023    Procedure: BRONCHOSCOPY NAVIGATION WITH ENDOBRONCHIAL ULTRASOUND AND ION ROBOT;  Surgeon: Manish Wynn MD;  Location: [facility];  Service: Robotics - Pulmonary;  Laterality: N/A;  Ion cath #4,  #4, cath guide 0085   • CATARACT EXTRACTION Bilateral    • COLONOSCOPY  04/2015    due 2025   • CYST REMOVAL     • KIDNEY STONE SURGERY Left 02/08/2012    [facility]. [facility]   • KIDNEY SURGERY      Status post resection of a tumor from his left kidney -- incomplete database.    • KIDNEY SURGERY  09/27/2017       • NEPHRECTOMY Bilateral     Has 15% of left Kidney   • NEPHRECTOMY Right 1997   • PROSTATE SURGERY  02/19/2019    [facility]. Dr. Bettencourt   • THYROID SURGERY      Status post resection of tumor from right thyroid gland.    • THYROIDECTOMY, PARTIAL       Social History     Socioeconomic  "History   • Marital status: Single   • Number of children: 1   Tobacco Use   • Smoking status: Never     Passive exposure: Never   • Smokeless tobacco: Never   • Tobacco comments:     50,60,70 years it was impossible to avoid people who smoked   Vaping Use   • Vaping status: Never Used   Substance and Sexual Activity   • Alcohol use: Yes     Comment: social - no schedule - random   • Drug use: Never   • Sexual activity: Not Currently     Partners: Female     Family History   Problem Relation Age of Onset   • Diabetes Mother    • Obesity Mother    • Heart failure Mother    • Clotting disorder Mother    • Hypertension Mother    • Clotting disorder Father    • Atrial fibrillation Father    • Pneumonia Father    • Hypertension Father    • Stroke Paternal Grandfather    • Heart attack Cousin    • Heart attack Cousin    • Suicidality Cousin    • Stroke Other    • Asthma Sister      Blood pressure 144/98, pulse 75, temperature 98.2 °F (36.8 °C), resp. rate 16, height 175.3 cm (69.02\"), weight 112 kg (246 lb 6 oz), SpO2 96%.    Physical Exam  Constitutional:       General: He is not in acute distress.  HENT:      Head: Normocephalic and atraumatic.      Nose: No congestion.      Mouth/Throat:      Pharynx: No oropharyngeal exudate.   Cardiovascular:      Rate and Rhythm: Normal rate and regular rhythm.      Heart sounds: Murmur heard.   Pulmonary:      Effort: Pulmonary effort is normal.      Breath sounds: No wheezing or rhonchi.   Musculoskeletal:      Right lower leg: No edema.      Left lower leg: No edema.   Lymphadenopathy:      Cervical: No cervical adenopathy.   Neurological:      Mental Status: He is oriented to person, place, and time.           PFTs:    October 3, 2023, FVC 2.62 L, 65%, FEV1 2.01 L, 69%, ratio 77%, flow volume loop without blunting, total lung capacity 4.98 L, 81%, diffusion capacity 20.6, 89%, no obstruction, normal total lung capacity, normal diffusion    Radiology:    Interpretation " Summary       •  Left ventricular ejection fraction appears to be greater than 70%.  •  Left ventricular wall thickness is consistent with mild concentric hypertrophy.  •  Left ventricular diastolic function is consistent with (grade I) impaired relaxation.  •  Mild biatrial enlargement.  •  Severe aortic valve stenosis is present.  Peak velocity 431 cm/s, mean gradient 39 mmHg, HEATHER 0.9 cm².  •  Mild aortic insufficiency.  •  Trace to mild mitral regurgitation.  •  Mild tricuspid regurgitation with normal RVSP.  •  Mild dilation of the aortic root is present. Mild dilation of the sinuses of Valsalva is present. Mild dilation of the ascending aorta is present.     Findings:  Hilum and Mediastinum: No pathologically enlarged lymph nodes.  Normal heart size.   There is trace pericardial effusion. There is calcified atherosclerotic disease of the thoracic aorta and coronary arteries. The ascending thoracic aorta measures 4.2 cm   in size not significantly changed. Mitral annulus calculus is noted.     Lung Parenchyma and Pleura: No focal consolidations. No endobronchial lesions.  No significant pleural effusions.     There is a groundglass opacity in the posterior right upper lung minimally changed in size measuring 1.2 cm in maximum diameter (image 24 of series 4).  There is a 5 mm groundglass subpleural nodule in the right middle lung unchanged (image 50 of series 4).  There is a perifissural 5 mm nodule in the left lower lung (image 50 of series 4).  No new nodule noted.     Upper Abdomen: Exophytic left renal cysts are noted. The right kidney is surgically absent. Layering calcified gallstones are noted.     Soft tissues: Unremarkable.     Osseous structures: No aggressive focal lytic or sclerotic osseous lesions.     IMPRESSION:  Impression:  Stable examination with stable appearance to a groundglass opacity in the posterior right upper lung.     Fusiform ascending thoracic aortic aneurysm measuring 4.2 cm.            Electronically Signed: Zina Johnson MD    2/18/2025 2:44 PM EST    Workstation ID: [id number]       Lab:    February 12, 2025 WBC 5.8, hemoglobin 15.5, platelet 166, 56% polys, 27% lymphs, 4.3% eosinophils, glucose 92, BUN 17, creatinine 1.24, sodium 143, potassium 4.4, chloride 106, bicarbonate 24, calcium 9.8, albumin 4, phosphorus 3.6, GFR 60    Final Diagnosis  LUNG, RIGHT UPPER LOBE, TRANSBRONCHIAL BIOPSY:  Benign alveolar lung parenchyma.  Negative for atypia and malignancy.  Electronically signed by Yao Alba MD on 11/13/2023 at 1115    Diagnoses and all orders for this visit:    1. Lung nodule (Primary)            Discussion:     79-year-old gentleman, lifetime non-smoker found to have a groundglass nodule in the right upper lobe on CT imaging.  With his age, right upper lobe location and groundglass appearance his malignancy risk is 10.5%.  His previous renal cell cancer also increases his risk.  He does not take any immunosuppressive medications.  He does have evidence of granulomatous changes.  Nodify was performed for autoantibodies and was positive increasing his risk of malignancy from 42%, to 91%.  PET was negative in 11/23. He therefore underwent bronchoscopy and biopsy.  Pathology was negative.  Fungal and TB cultures were also negative.  Follow-up imaging over the last year, nodule has remained stable.      His AV stenosis has become severe and he is now SOA on exertion.     CT August and follow up with Adrian De Dios MD

## 2025-03-06 NOTE — TELEPHONE ENCOUNTER
Ac has been notified!  
I have sent in Ceftin.   He will need to f/u if not improving.   
PATIENT THINKS HE HAS ANOTHER UTI AND ASKED IF WE CAN CALL IN MEDICATION FOR HIM. THE SAME MEDICATION WE TREATED HIM WITH IN THE PAST. HE CAN ONLY BE REACHED ON HIS CELL PHONE. [phone]  
Render In Strict Bullet Format?: No
Initiate Treatment: Efudex bid to face for 2 weeks, scalp for 4 weeks. Further discussed expected reaction from medication and patient will repeat medication usage after LN2 sites have healed.
Detail Level: Zone

## 2025-04-07 RX ORDER — CARVEDILOL 6.25 MG/1
6.25 TABLET ORAL 2 TIMES DAILY
Qty: 180 TABLET | Refills: 3 | Status: SHIPPED | OUTPATIENT
Start: 2025-04-07

## 2025-04-10 ENCOUNTER — OFFICE VISIT (OUTPATIENT)
Dept: UROLOGY | Facility: CLINIC | Age: 80
End: 2025-04-10
Payer: MEDICARE

## 2025-04-10 VITALS — SYSTOLIC BLOOD PRESSURE: 148 MMHG | DIASTOLIC BLOOD PRESSURE: 82 MMHG | OXYGEN SATURATION: 95 % | HEART RATE: 58 BPM

## 2025-04-10 DIAGNOSIS — N30.00 ACUTE CYSTITIS WITHOUT HEMATURIA: Primary | ICD-10-CM

## 2025-04-10 LAB
BILIRUB BLD-MCNC: NEGATIVE MG/DL
CLARITY, POC: ABNORMAL
COLOR UR: YELLOW
EXPIRATION DATE: ABNORMAL
GLUCOSE UR STRIP-MCNC: NEGATIVE MG/DL
KETONES UR QL: NEGATIVE
LEUKOCYTE EST, POC: ABNORMAL
Lab: ABNORMAL
NITRITE UR-MCNC: POSITIVE MG/ML
PH UR: 6 [PH] (ref 5–8)
PROT UR STRIP-MCNC: NEGATIVE MG/DL
RBC # UR STRIP: NEGATIVE /UL
SP GR UR: 1.02 (ref 1–1.03)
UROBILINOGEN UR QL: NORMAL

## 2025-04-10 PROCEDURE — 87077 CULTURE AEROBIC IDENTIFY: CPT

## 2025-04-10 PROCEDURE — 87186 SC STD MICRODIL/AGAR DIL: CPT

## 2025-04-10 PROCEDURE — 87086 URINE CULTURE/COLONY COUNT: CPT

## 2025-04-10 RX ORDER — GRANULES FOR ORAL 3 G/1
3 POWDER ORAL ONCE
Qty: 3 G | Refills: 0 | Status: SHIPPED | OUTPATIENT
Start: 2025-04-10 | End: 2025-04-10

## 2025-04-10 NOTE — PROGRESS NOTES
L[facility] Male Office Visit      Patient Name: Ac Caldwell  : 1945   MRN: [id number]     Chief Complaint:  Lower Urinary Tract Symptoms.   Chief Complaint   Patient presents with    Urinary Tract Infection        Referring Provider: Fabienne Youssef, *    History of Present Illness: Mr. Caldwell is a 79 y.o. male with past medical history including hypertension, hypothyroidism, dyslipidemia, esophageal reflux, diverticulosis, stage III chronic kidney disease and nephritis.  Patient presents to urology today for history of recurrent UTI.     Patient has a complex medical history including renal cell carcinoma s/p right radical nephrectomy, solitary left kidney with left renal mass s/p cryoablation in 2017. 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  Due to long history of lower urinary tract symptoms patient is s/p Rezum  with Dr. Bettencourt.     Patient presents today with concerns due to recurrent UTIs.  He has an increase in lower urinary tract symptoms including sensation of incomplete bladder emptying and urgency.  He believes the symptoms are correlating with recurrent UTI. In the future he would like to discuss a cystoscopy to evaluate prostate. patient reports he is to undergo TAVR with Dr. Castaneda due to a aortic aneurysm this upcoming .    Patient concerned about enlarged prostate due to recurrent UTI.  We discussed at this time    Daily fluid intakes include 16 ounces of coffee, 30 ounces of water and occasional soda.    Urinalysis is positive for infection today. Negative for blood.    Subjective      Review of System:   Review of Systems   Genitourinary:  Positive for dysuria, frequency and urgency.   All other systems reviewed and are negative.     I have reviewed the ROS documented by my clinical staff, I have updated appropriately and I agree. BERNICE Lua    Past Medical History:  Past Medical History:   Diagnosis Date    Aneurysm 2017    Coronary artery disease     Exertional  dyspnea     He does have symptoms of exertional dyspnea and minimal orthopnea. This may be unrelated to his aortic stenosis, and due to his risk factors, I am also concerned about coronary artery disease.     GERD (gastroesophageal reflux disease) 1997    Heart murmur 1966    Hematuria     Episode of hematuria in January with subsequent negative cystoscopy.     History of kidney cancer     History of kidney stones     HL (hearing loss) 2000    Hyperlipidemia     Hypertension 1998    Kidney stone 1991    Lung nodule 09/2023    Lung nodule seen on imaging study     Renal cancer     Hx of.Status post right nephrectomy in 1997. Status post resection of a tumor from his left kidney -- incomplete database.     Renal insufficiency 2010    Renal oncocytoma     Rosacea     Thoracic aortic aneurysm     Thyroid disease     UTI (urinary tract infection)        Past Surgical History:  Past Surgical History:   Procedure Laterality Date    ABLATION OF DYSRHYTHMIC FOCUS  2017    left kidney    BASAL CELL CARCINOMA EXCISION      Status post basal cell cancer surgery.     BRONCHOSCOPY WITH ION ROBOTIC ASSIST N/A 11/10/2023    Procedure: BRONCHOSCOPY NAVIGATION WITH ENDOBRONCHIAL ULTRASOUND AND ION ROBOT;  Surgeon: Manish Wynn MD;  Location: [facility] END[facility]COPY;  Service: Robotics - Pulmonary;  Laterality: N/A;  Ion cath #4,  #4, cath guide 0085    CATARACT EXTRACTION Bilateral     COLONOSCOPY  04/2015    due 2025    CYST REMOVAL      KIDNEY STONE SURGERY Left 02/08/2012    [facility]. [facility]    KIDNEY SURGERY      Status post resection of a tumor from his left kidney -- incomplete database.     KIDNEY SURGERY  09/27/2017        LUNG BIOPSY  nov 10,23    NEPHRECTOMY Bilateral     Has 15% of left Kidney    NEPHRECTOMY Right 1997    PROSTATE SURGERY  02/19/2019    [facility]. Dr. Bettencourt    THYROID SURGERY      Status post resection of tumor from right thyroid gland.     THYROIDECTOMY, PARTIAL         Medications:    Current  Outpatient Medications:     aspirin 325 MG tablet, Take 1 tablet by mouth Daily., Disp: , Rfl:     azelastine (ASTELIN) 0.1 % nasal spray, , Disp: , Rfl:     betamethasone valerate (VALISONE) 0.1 % ointment, Apply 1 Application topically to the appropriate area as directed As Needed., Disp: , Rfl:     carvedilol (COREG) 6.25 MG tablet, Take 1 tablet by mouth twice daily, Disp: 180 tablet, Rfl: 3    Cholecalciferol (Vitamin D3) 50 MCG (2000 UT) tablet, Take 1 tablet by mouth Daily., Disp: , Rfl:     Coenzyme Q10 (CO Q-10) 100 MG capsule, Take 100 mg by mouth 2 (Two) Times a Day., Disp: , Rfl:     esomeprazole (NexIUM) 20 MG capsule, Take 1 capsule by mouth Daily., Disp: , Rfl:     famciclovir (FAMVIR) 500 MG tablet, Take 1 tablet by mouth Daily., Disp: , Rfl:     Flaxseed, Linseed, (FLAX SEED OIL) 1000 MG capsule, Take 1,200 mg by mouth Daily., Disp: , Rfl:     levothyroxine (SYNTHROID, LEVOTHROID) 50 MCG tablet, Take 1 tablet by mouth Daily., Disp: , Rfl:     losartan (COZAAR) 100 MG tablet, Take 1 tablet by mouth Daily., Disp: 90 tablet, Rfl: 1    Omega-3 Fatty Acids (FISH OIL) 1000 MG capsule capsule, Take 1,200 mg by mouth 2 (Two) Times a Day With Meals., Disp: , Rfl:     potassium citrate (UROCIT-K) 10 MEQ (1080 MG) CR tablet, Take 1 tablet by mouth once daily, Disp: 90 tablet, Rfl: 1    rosuvastatin (Crestor) 40 MG tablet, Take 1 tablet by mouth Daily., Disp: 90 tablet, Rfl: 1    vitamin B-12 (CYANOCOBALAMIN) 1000 MCG tablet, Take 1 tablet by mouth Daily., Disp: , Rfl:     fosfomycin (MONUROL) 3 g pack, Take 3 g by mouth 1 (One) Time for 1 dose., Disp: 3 g, Rfl: 0    Allergies:  Allergies   Allergen Reactions    Norvasc [Amlodipine] Swelling    Oxycodone Other (See Comments)     Can elevate Blood pressure    Lotensin [Benazepril] Cough    Sulfamethoxazole-Trimethoprim Cough    Zocor [Simvastatin] Myalgia       Social History:  Social History     Socioeconomic History    Marital status: Single    Number of  children: 1   Tobacco Use    Smoking status: Never     Passive exposure: Never    Smokeless tobacco: Never    Tobacco comments:     50,60,70 years it was impossible to avoid people who smoked   Vaping Use    Vaping status: Never Used   Substance and Sexual Activity    Alcohol use: Not Currently     Comment: social - no schedule - random    Drug use: Never    Sexual activity: Not Currently     Partners: Female       Family History:  Family History   Problem Relation Age of Onset    Diabetes Mother     Obesity Mother     Heart failure Mother     Clotting disorder Mother     Hypertension Mother     Stroke Mother     Clotting disorder Father     Atrial fibrillation Father     Pneumonia Father     Hypertension Father     Stroke Paternal Grandfather     Heart attack Cousin     Heart attack Cousin     Suicidality Cousin     Stroke Other     Asthma Sister     Stroke Paternal Grandmother        IPSS Questionnaire (AUA-7):  Over the past month…    1)  Incomplete Emptying  How often have you had a sensation of not emptying your bladder?  1 - Less than 1 time in 5   2)  Frequency  How often have you had to urinate less than every two hours? 1 - Less than 1 time in 5   3)  Intermittency  How often have you found you stopped and started again several times when you urinated?  0 - Not at all   4) Urgency  How often have you found it difficult to postpone urination?  0 - Not at all   5) Weak Stream  How often have you had a weak urinary stream?  0 - Not at all   6) Straining  How often have you had to push or strain to begin urination?  0 - Not at all   7) Nocturia  How many times did you typically get up at night to urinate?  0 - None   Total Score:  2   The International Prostate Symptom Score (IPSS) is used to screen, diagnose, track symptoms of benign prostatic hyperplasia (BPH).    0-7 pts (Mild Symptoms)  / 8-19 pts (Moderate) / 20-35 (Severe)    Quality of life due to urinary symptoms:  If you were to spend the rest of your  life with your urinary condition the way it is now, how would you feel about that? 1-Pleased   Urine Leakage (Incontinence) 0-No Leakage         Objective     Physical Exam:   Vital Signs:   Vitals:    04/10/25 1101   BP: 148/82   Pulse: 58   SpO2: 95%     There is no height or weight on file to calculate BMI.     Physical Exam  Vitals and nursing note reviewed.   Constitutional:       Appearance: Normal appearance.   Pulmonary:      Effort: Pulmonary effort is normal.   Neurological:      Mental Status: He is alert and oriented to person, place, and time.   Psychiatric:         Mood and Affect: Mood normal.         Behavior: Behavior normal.         Labs:   Lab Results   Component Value Date    PSA 3.720 11/10/2020       Brief Urine Lab Results  (Last result in the past 365 days)        Color   Clarity   Blood   Leuk Est   Nitrite   Protein   CREAT   Urine HCG        04/10/25 1129 Yellow   Cloudy   Negative   Large (3+)   Positive   Negative                        Lab Results   Component Value Date    GLUCOSE 92 02/12/2025    CALCIUM 9.8 02/12/2025     02/12/2025    K 4.4 02/12/2025    CO2 24.0 02/12/2025     02/12/2025    BUN 17 02/12/2025    CREATININE 1.24 02/12/2025    EGFRIFNONA 59 (L) 10/27/2021    BCR 13.7 02/12/2025    ANIONGAP 13.0 02/12/2025       Lab Results   Component Value Date    WBC 5.85 02/12/2025    HGB 15.5 02/12/2025    HCT 47.6 02/12/2025    MCV 93.7 02/12/2025     02/12/2025       Images:   CT Chest Without Contrast Diagnostic  Result Date: 2/18/2025  Impression: Stable examination with stable appearance to a groundglass opacity in the posterior right upper lung. Fusiform ascending thoracic aortic aneurysm measuring 4.2 cm. Electronically Signed: Zina Johnson MD  2/18/2025 2:44 PM EST  Workstation ID: [id number]      Measures:   Tobacco:   Ac Caldwell  reports that he has never smoked. He has never been exposed to tobacco smoke. He has never used smokeless tobacco.           Urine Incontinence: Patient reports that he is not currently experiencing any symptoms of urinary incontinence.      Assessment / Plan      Assessment:  Mr. Caldwell is a 79 y.o. male who presented today with lower urinary tract symptoms and recurrent UTI. Urinalysis is positive for infection today. We will send urine for guidance PCR to assess for urine. I will notify patient once results are available. Due to UTI related symptoms today I will send in a one-time dose of fosfomycin.     We discussed a 4-month follow-up after patient has underwent cardiac surgery and recovered to discuss flexible cystoscopy and symptom check. Patient is understanding and agreeable to plan of care. He will alert with questions or concerns in the interim.    Diagnoses and all orders for this visit:    1. Acute cystitis without hematuria (Primary)  -     Urine Culture - Urine, Urine, Clean Catch; Future  -     POC Urinalysis Dipstick, Automated  -     fosfomycin (MONUROL) 3 g pack; Take 3 g by mouth 1 (One) Time for 1 dose.  Dispense: 3 g; Refill: 0  -     Urine Culture - Urine, Urine, Clean Catch          Follow Up:   Return in about 4 months (around 8/10/2025) for Next scheduled follow up.    I spent approximately 45 minutes providing clinical care for this patient; including review of patient's chart and provider documentation, face to face time spent with patient in examination room (obtaining history, performing physical exam, discussing diagnosis and management options), placing orders, and completing patient documentation.     BERNICE Lua  [facility] [place]

## 2025-04-13 ENCOUNTER — TELEPHONE (OUTPATIENT)
Dept: UROLOGY | Facility: CLINIC | Age: 80
End: 2025-04-13
Payer: MEDICARE

## 2025-04-13 DIAGNOSIS — N30.00 ACUTE CYSTITIS WITHOUT HEMATURIA: Primary | ICD-10-CM

## 2025-04-13 LAB — BACTERIA SPEC AEROBE CULT: ABNORMAL

## 2025-04-13 RX ORDER — NITROFURANTOIN 25; 75 MG/1; MG/1
100 CAPSULE ORAL 2 TIMES DAILY
Qty: 10 CAPSULE | Refills: 0 | Status: SHIPPED | OUTPATIENT
Start: 2025-04-13

## 2025-04-13 NOTE — TELEPHONE ENCOUNTER
Called to notify patient of positive urine culture results.  Left voicemail informing patient one-time dose of fosfomycin recently sent in does not cover bacterial strand culture tested positive for. I will send in a 5-day course of Macrobid 100 mg twice a day for UTI treatment.  Instructed patient if he had questions or concerns to please notify.

## 2025-04-24 ENCOUNTER — CLINICAL SUPPORT (OUTPATIENT)
Dept: UROLOGY | Facility: CLINIC | Age: 80
End: 2025-04-24
Payer: MEDICARE

## 2025-04-24 DIAGNOSIS — N30.00 ACUTE CYSTITIS WITHOUT HEMATURIA: Primary | ICD-10-CM

## 2025-04-24 LAB
BILIRUB BLD-MCNC: ABNORMAL MG/DL
CLARITY, POC: ABNORMAL
COLOR UR: YELLOW
EXPIRATION DATE: ABNORMAL
GLUCOSE UR STRIP-MCNC: NEGATIVE MG/DL
KETONES UR QL: NEGATIVE
LEUKOCYTE EST, POC: ABNORMAL
Lab: ABNORMAL
NITRITE UR-MCNC: POSITIVE MG/ML
PH UR: 6.5 [PH] (ref 5–8)
PROT UR STRIP-MCNC: NEGATIVE MG/DL
RBC # UR STRIP: NEGATIVE /UL
SP GR UR: 1.01 (ref 1–1.03)
UROBILINOGEN UR QL: ABNORMAL

## 2025-04-24 PROCEDURE — 87086 URINE CULTURE/COLONY COUNT: CPT

## 2025-04-24 PROCEDURE — 87077 CULTURE AEROBIC IDENTIFY: CPT

## 2025-04-24 PROCEDURE — 87186 SC STD MICRODIL/AGAR DIL: CPT

## 2025-04-25 ENCOUNTER — TELEPHONE (OUTPATIENT)
Dept: UROLOGY | Facility: CLINIC | Age: 80
End: 2025-04-25
Payer: MEDICARE

## 2025-04-25 DIAGNOSIS — N30.00 ACUTE CYSTITIS WITHOUT HEMATURIA: Primary | ICD-10-CM

## 2025-04-25 RX ORDER — NITROFURANTOIN 25; 75 MG/1; MG/1
100 CAPSULE ORAL 2 TIMES DAILY
Qty: 14 CAPSULE | Refills: 0 | Status: SHIPPED | OUTPATIENT
Start: 2025-04-25

## 2025-04-25 NOTE — TELEPHONE ENCOUNTER
Called to to touch base with patient regarding UTI symptoms and urinalysis. Urine culture is pending. Due to + urinalysis and symptoms I will send Macrobid 100 mg twice a day for 5 days to patients local pharmacy. Patient will alert with questions or concerns in the interim.

## 2025-04-26 LAB — BACTERIA SPEC AEROBE CULT: ABNORMAL

## 2025-05-19 DIAGNOSIS — I10 ESSENTIAL HYPERTENSION: ICD-10-CM

## 2025-05-19 RX ORDER — POTASSIUM CITRATE 1080 MG/1
10 TABLET, EXTENDED RELEASE ORAL DAILY
Qty: 90 TABLET | Refills: 1 | Status: SHIPPED | OUTPATIENT
Start: 2025-05-19

## 2025-05-19 NOTE — TELEPHONE ENCOUNTER
Patient called because he had some vomiting and shaking uncontrollably.   He had some shortness of breath.  He made an appointment with [facility] for tomorrow.  I advised patient if his symptoms get severe to go to the ER.  Patient verbalized understanding.    Refill on potassium.  LV: 12/9/24  NV: 7/10/25 for a follow up and tomorrow for acute visit  LF: 11/18/24 90/1  LL: 12/4/24

## 2025-05-20 ENCOUNTER — LAB (OUTPATIENT)
Dept: INTERNAL MEDICINE | Facility: CLINIC | Age: 80
End: 2025-05-20
Payer: MEDICARE

## 2025-05-20 ENCOUNTER — OFFICE VISIT (OUTPATIENT)
Dept: INTERNAL MEDICINE | Facility: CLINIC | Age: 80
End: 2025-05-20
Payer: MEDICARE

## 2025-05-20 VITALS
OXYGEN SATURATION: 96 % | SYSTOLIC BLOOD PRESSURE: 142 MMHG | DIASTOLIC BLOOD PRESSURE: 80 MMHG | TEMPERATURE: 97.5 F | BODY MASS INDEX: 36.29 KG/M2 | WEIGHT: 245 LBS | HEIGHT: 69 IN | HEART RATE: 58 BPM

## 2025-05-20 DIAGNOSIS — R10.9 ABDOMINAL PAIN, UNSPECIFIED ABDOMINAL LOCATION: ICD-10-CM

## 2025-05-20 DIAGNOSIS — I71.21 ANEURYSM OF ASCENDING AORTA WITHOUT RUPTURE: ICD-10-CM

## 2025-05-20 DIAGNOSIS — R06.00 DYSPNEA, UNSPECIFIED TYPE: ICD-10-CM

## 2025-05-20 DIAGNOSIS — N39.0 URINARY TRACT INFECTION WITHOUT HEMATURIA, SITE UNSPECIFIED: ICD-10-CM

## 2025-05-20 DIAGNOSIS — R10.9 ABDOMINAL PAIN, UNSPECIFIED ABDOMINAL LOCATION: Primary | ICD-10-CM

## 2025-05-20 DIAGNOSIS — I35.0 NONRHEUMATIC AORTIC VALVE STENOSIS: ICD-10-CM

## 2025-05-20 LAB
BILIRUB BLD-MCNC: NEGATIVE MG/DL
CLARITY, POC: ABNORMAL
COLOR UR: YELLOW
EXPIRATION DATE: ABNORMAL
GLUCOSE UR STRIP-MCNC: NEGATIVE MG/DL
KETONES UR QL: NEGATIVE
LEUKOCYTE EST, POC: ABNORMAL
Lab: ABNORMAL
NITRITE UR-MCNC: NEGATIVE MG/ML
PH UR: 6 [PH] (ref 5–8)
PROT UR STRIP-MCNC: NEGATIVE MG/DL
RBC # UR STRIP: NEGATIVE /UL
SP GR UR: 1.02 (ref 1–1.03)
UROBILINOGEN UR QL: NORMAL

## 2025-05-20 PROCEDURE — 87077 CULTURE AEROBIC IDENTIFY: CPT | Performed by: NURSE PRACTITIONER

## 2025-05-20 PROCEDURE — 36415 COLL VENOUS BLD VENIPUNCTURE: CPT | Performed by: NURSE PRACTITIONER

## 2025-05-20 PROCEDURE — 1126F AMNT PAIN NOTED NONE PRSNT: CPT | Performed by: NURSE PRACTITIONER

## 2025-05-20 PROCEDURE — 3077F SYST BP >= 140 MM HG: CPT | Performed by: NURSE PRACTITIONER

## 2025-05-20 PROCEDURE — 1160F RVW MEDS BY RX/DR IN RCRD: CPT | Performed by: NURSE PRACTITIONER

## 2025-05-20 PROCEDURE — 80053 COMPREHEN METABOLIC PANEL: CPT | Performed by: NURSE PRACTITIONER

## 2025-05-20 PROCEDURE — 85025 COMPLETE CBC W/AUTO DIFF WBC: CPT | Performed by: NURSE PRACTITIONER

## 2025-05-20 PROCEDURE — 87186 SC STD MICRODIL/AGAR DIL: CPT | Performed by: NURSE PRACTITIONER

## 2025-05-20 PROCEDURE — 81003 URINALYSIS AUTO W/O SCOPE: CPT | Performed by: NURSE PRACTITIONER

## 2025-05-20 PROCEDURE — 87086 URINE CULTURE/COLONY COUNT: CPT | Performed by: NURSE PRACTITIONER

## 2025-05-20 PROCEDURE — 99214 OFFICE O/P EST MOD 30 MIN: CPT | Performed by: NURSE PRACTITIONER

## 2025-05-20 PROCEDURE — 1159F MED LIST DOCD IN RCRD: CPT | Performed by: NURSE PRACTITIONER

## 2025-05-20 PROCEDURE — 3079F DIAST BP 80-89 MM HG: CPT | Performed by: NURSE PRACTITIONER

## 2025-05-20 RX ORDER — CEFUROXIME AXETIL 250 MG/1
250 TABLET ORAL 2 TIMES DAILY
Qty: 14 TABLET | Refills: 0 | Status: SHIPPED | OUTPATIENT
Start: 2025-05-20 | End: 2025-05-27

## 2025-05-20 NOTE — PROGRESS NOTES
Subjective   Ac Caldwell is a 79 y.o. male    Chief Complaint   Patient presents with    Hypertension     Patient believes HTN is reasonably controlled.    Abdominal Pain     Thursday night, had an episode, shaking, shortness of breath, very cold, disoriented for a few hours.  Didn't fall.  Had to urinate had to crawl to the bathroom     History of Present Illness     Pt states that on Thursday (5/15/2025) he went to eat at [employer].  Later that night he started with sudden SOA, rigors/chills/freezing and disorientation.  He states that this lasted for several hours.  He tried to warm up under blankets, but it did not help.  He had to crawl to the bathroom to urinate several times.  He did urinate in his bathroom floor. He does have h/o recurrent UTI and is followed by Urology.  Was recently on Macrobid.    The following portions of the patient's history were reviewed and updated as appropriate: allergies, current medications, past family history, past medical history, past social history, past surgical history, and problem list.    Current Outpatient Medications:     aspirin 325 MG tablet, Take 1 tablet by mouth Daily., Disp: , Rfl:     azelastine (ASTELIN) 0.1 % nasal spray, , Disp: , Rfl:     carvedilol (COREG) 6.25 MG tablet, Take 1 tablet by mouth twice daily, Disp: 180 tablet, Rfl: 3    Cholecalciferol (Vitamin D3) 50 MCG (2000 UT) tablet, Take 1 tablet by mouth Daily., Disp: , Rfl:     Coenzyme Q10 (CO Q-10) 100 MG capsule, Take 100 mg by mouth 2 (Two) Times a Day., Disp: , Rfl:     esomeprazole (NexIUM) 20 MG capsule, Take 1 capsule by mouth Daily., Disp: , Rfl:     famciclovir (FAMVIR) 500 MG tablet, Take 1 tablet by mouth Daily. (Patient taking differently: Take 1 tablet by mouth As Needed.), Disp: , Rfl:     Flaxseed, Linseed, (FLAX SEED OIL) 1000 MG capsule, Take 1,200 mg by mouth Daily., Disp: , Rfl:     levothyroxine (SYNTHROID, LEVOTHROID) 50 MCG tablet, Take 1 tablet by mouth Daily., Disp: , Rfl:      losartan (COZAAR) 100 MG tablet, Take 1 tablet by mouth Daily., Disp: 90 tablet, Rfl: 1    Omega-3 Fatty Acids (FISH OIL) 1000 MG capsule capsule, Take 1,200 mg by mouth 2 (Two) Times a Day With Meals., Disp: , Rfl:     potassium citrate (UROCIT-K) 10 MEQ (1080 MG) CR tablet, Take 1 tablet by mouth once daily, Disp: 90 tablet, Rfl: 1    rosuvastatin (Crestor) 40 MG tablet, Take 1 tablet by mouth Daily., Disp: 90 tablet, Rfl: 1    vitamin B-12 (CYANOCOBALAMIN) 1000 MCG tablet, Take 1 tablet by mouth Daily., Disp: , Rfl:     betamethasone valerate (VALISONE) 0.1 % ointment, Apply 1 Application topically to the appropriate area as directed As Needed. (Patient not taking: Reported on 5/20/2025), Disp: , Rfl:     cefuroxime (CEFTIN) 250 MG tablet, Take 1 tablet by mouth 2 (Two) Times a Day for 7 days., Disp: 14 tablet, Rfl: 0     Review of Systems   Constitutional:  Positive for chills and fatigue. Negative for fever.   Respiratory:  Positive for shortness of breath. Negative for cough and chest tightness.    Cardiovascular:  Negative for chest pain.   Gastrointestinal:  Negative for abdominal pain, diarrhea, nausea and vomiting.   Endocrine: Negative for cold intolerance and heat intolerance.   Genitourinary:  Positive for difficulty urinating, dysuria and urgency.   Musculoskeletal:  Positive for myalgias. Negative for arthralgias.   Neurological:  Negative for dizziness.       Objective   Physical Exam  Constitutional:       Appearance: He is well-developed.   HENT:      Head: Normocephalic and atraumatic.   Eyes:      Conjunctiva/sclera: Conjunctivae normal.      Pupils: Pupils are equal, round, and reactive to light.   Cardiovascular:      Rate and Rhythm: Normal rate and regular rhythm.      Heart sounds: Normal heart sounds.   Pulmonary:      Effort: Pulmonary effort is normal.      Breath sounds: Normal breath sounds.   Abdominal:      General: Bowel sounds are normal.      Palpations: Abdomen is soft.  "  Musculoskeletal:         General: Normal range of motion.      Cervical back: Normal range of motion.   Skin:     General: Skin is warm and dry.   Neurological:      Mental Status: He is alert and oriented to person, place, and time.      Deep Tendon Reflexes: Reflexes are normal and symmetric.   Psychiatric:         Behavior: Behavior normal.         Thought Content: Thought content normal.         Judgment: Judgment normal.       Vitals:    05/20/25 1421 05/20/25 1515   BP: 156/82 142/80   BP Location: Left arm    Patient Position: Sitting    Cuff Size: Adult    Pulse: 58    Temp: 97.5 °F (36.4 °C)    TempSrc: Infrared    SpO2: 96%    Weight: 111 kg (245 lb)    Height: 175.3 cm (69\")          Assessment & Plan   Diagnoses and all orders for this visit:    1. Abdominal pain, unspecified abdominal location (Primary)  -     POC Urinalysis Dipstick, Automated  -     CBC & Differential; Future  -     Comprehensive Metabolic Panel; Future    2. Aneurysm of ascending aorta without rupture  -     Ambulatory Referral to Cardiology  -     CBC & Differential; Future  -     Comprehensive Metabolic Panel; Future    3. Nonrheumatic aortic valve stenosis  -     Ambulatory Referral to Cardiology  -     CBC & Differential; Future  -     Comprehensive Metabolic Panel; Future    4. Dyspnea, unspecified type  -     Ambulatory Referral to Cardiology  -     CBC & Differential; Future  -     Comprehensive Metabolic Panel; Future    5. Urinary tract infection without hematuria, site unspecified  -     cefuroxime (CEFTIN) 250 MG tablet; Take 1 tablet by mouth 2 (Two) Times a Day for 7 days.  Dispense: 14 tablet; Refill: 0  -     Urine Culture - , Urine, Clean Catch; Future  -     CBC & Differential; Future  -     Comprehensive Metabolic Panel; Future  -     Urine Culture - Urine, Urine, Clean Catch      UA +, sent for culture  Covered with Ceftin  Labs sent  Will refer back to Cards for urgent referral due to AS and SOA  Return for Next " scheduled follow up.

## 2025-05-21 LAB
ALBUMIN SERPL-MCNC: 3.6 G/DL (ref 3.5–5.2)
ALBUMIN/GLOB SERPL: 1.1 G/DL
ALP SERPL-CCNC: 54 U/L (ref 39–117)
ALT SERPL W P-5'-P-CCNC: 18 U/L (ref 1–41)
ANION GAP SERPL CALCULATED.3IONS-SCNC: 10.8 MMOL/L (ref 5–15)
AST SERPL-CCNC: 27 U/L (ref 1–40)
BASOPHILS # BLD AUTO: 0.03 10*3/MM3 (ref 0–0.2)
BASOPHILS NFR BLD AUTO: 0.6 % (ref 0–1.5)
BILIRUB SERPL-MCNC: 0.6 MG/DL (ref 0–1.2)
BUN SERPL-MCNC: 17 MG/DL (ref 8–23)
BUN/CREAT SERPL: 13.5 (ref 7–25)
CALCIUM SPEC-SCNC: 9.1 MG/DL (ref 8.6–10.5)
CHLORIDE SERPL-SCNC: 104 MMOL/L (ref 98–107)
CO2 SERPL-SCNC: 22.2 MMOL/L (ref 22–29)
CREAT SERPL-MCNC: 1.26 MG/DL (ref 0.76–1.27)
DEPRECATED RDW RBC AUTO: 46.5 FL (ref 37–54)
EGFRCR SERPLBLD CKD-EPI 2021: 58.7 ML/MIN/1.73
EOSINOPHIL # BLD AUTO: 0.22 10*3/MM3 (ref 0–0.4)
EOSINOPHIL NFR BLD AUTO: 4.1 % (ref 0.3–6.2)
ERYTHROCYTE [DISTWIDTH] IN BLOOD BY AUTOMATED COUNT: 13 % (ref 12.3–15.4)
GLOBULIN UR ELPH-MCNC: 3.2 GM/DL
GLUCOSE SERPL-MCNC: 82 MG/DL (ref 65–99)
HCT VFR BLD AUTO: 46.2 % (ref 37.5–51)
HGB BLD-MCNC: 15.1 G/DL (ref 13–17.7)
IMM GRANULOCYTES # BLD AUTO: 0.03 10*3/MM3 (ref 0–0.05)
IMM GRANULOCYTES NFR BLD AUTO: 0.6 % (ref 0–0.5)
LYMPHOCYTES # BLD AUTO: 1.72 10*3/MM3 (ref 0.7–3.1)
LYMPHOCYTES NFR BLD AUTO: 32.1 % (ref 19.6–45.3)
MCH RBC QN AUTO: 31.9 PG (ref 26.6–33)
MCHC RBC AUTO-ENTMCNC: 32.7 G/DL (ref 31.5–35.7)
MCV RBC AUTO: 97.5 FL (ref 79–97)
MONOCYTES # BLD AUTO: 0.74 10*3/MM3 (ref 0.1–0.9)
MONOCYTES NFR BLD AUTO: 13.8 % (ref 5–12)
NEUTROPHILS NFR BLD AUTO: 2.61 10*3/MM3 (ref 1.7–7)
NEUTROPHILS NFR BLD AUTO: 48.8 % (ref 42.7–76)
PLATELET # BLD AUTO: 156 10*3/MM3 (ref 140–450)
PMV BLD AUTO: 11.1 FL (ref 6–12)
POTASSIUM SERPL-SCNC: 5 MMOL/L (ref 3.5–5.2)
PROT SERPL-MCNC: 6.8 G/DL (ref 6–8.5)
RBC # BLD AUTO: 4.74 10*6/MM3 (ref 4.14–5.8)
SODIUM SERPL-SCNC: 137 MMOL/L (ref 136–145)
WBC NRBC COR # BLD AUTO: 5.35 10*3/MM3 (ref 3.4–10.8)

## 2025-05-23 LAB — BACTERIA SPEC AEROBE CULT: ABNORMAL

## 2025-05-28 ENCOUNTER — TELEPHONE (OUTPATIENT)
Dept: CARDIOLOGY | Facility: CLINIC | Age: 80
End: 2025-05-28
Payer: MEDICARE

## 2025-05-28 NOTE — TELEPHONE ENCOUNTER
"Spoke with pt who states that he has had 2 episodes, once in January and May 15, where he had s/s chills, trembles, breathing problems, vomiting. Denies CP. Pt states had chicken, and is now avoiding it because he believes it made him sick. Advised that the chills and trembles sound like sick symptoms and not r/t AVS. Reviewed s/s AVS problems per Dr. Valladares's previous office note. Pt states he \"feels fine\" now and wants to keep his 6/13 f/u appt. Advised if he has any more s/s and needs to be seen sooner to call us. He voiced understanding.   "

## 2025-05-28 NOTE — TELEPHONE ENCOUNTER
Received VM from pt previously stating that he saw his PCP who wanted him to have a sooner appt but that he was doing okay. Stated that info was going to be coming to Dr. Valladares from PCP. Noted recent office note in chart with PCP. Called and left VM for pt to return my call. Need to discuss if he is having s/s and if he needs to be seen sooner than already scheduled 6/13 appt.

## 2025-05-30 DIAGNOSIS — E78.5 DYSLIPIDEMIA: ICD-10-CM

## 2025-05-30 RX ORDER — ROSUVASTATIN CALCIUM 40 MG/1
40 TABLET, COATED ORAL DAILY
Qty: 90 TABLET | Refills: 0 | Status: SHIPPED | OUTPATIENT
Start: 2025-05-30

## 2025-06-13 ENCOUNTER — OFFICE VISIT (OUTPATIENT)
Dept: CARDIOLOGY | Facility: CLINIC | Age: 80
End: 2025-06-13
Payer: MEDICARE

## 2025-06-13 VITALS
OXYGEN SATURATION: 98 % | SYSTOLIC BLOOD PRESSURE: 126 MMHG | DIASTOLIC BLOOD PRESSURE: 84 MMHG | BODY MASS INDEX: 36.67 KG/M2 | HEIGHT: 69 IN | HEART RATE: 59 BPM | WEIGHT: 247.6 LBS

## 2025-06-13 DIAGNOSIS — E78.5 DYSLIPIDEMIA: ICD-10-CM

## 2025-06-13 DIAGNOSIS — I35.0 NONRHEUMATIC AORTIC VALVE STENOSIS: Primary | ICD-10-CM

## 2025-06-13 DIAGNOSIS — I71.21 ANEURYSM OF ASCENDING AORTA WITHOUT RUPTURE: ICD-10-CM

## 2025-06-13 DIAGNOSIS — I10 ESSENTIAL HYPERTENSION: ICD-10-CM

## 2025-06-13 PROBLEM — M79.89 LEFT ARM SWELLING: Status: ACTIVE | Noted: 2025-06-13

## 2025-06-13 PROCEDURE — 3074F SYST BP LT 130 MM HG: CPT | Performed by: PHYSICIAN ASSISTANT

## 2025-06-13 PROCEDURE — 3079F DIAST BP 80-89 MM HG: CPT | Performed by: PHYSICIAN ASSISTANT

## 2025-06-13 NOTE — PROGRESS NOTES
[facility]    Encounter Date: 2025    Patient ID: Ac Caldwell is a 79 y.o. male.  : 1945     PCP: Valentine Newton APRN       Chief Complaint: Nonrheumatic aortic valve stenosis      PROBLEM LIST:  Nonrheumatic aortic valve stenosis:   Echo, 2016: Mild AI and moderate AS, HEATHER 1.2 cm2, mean gradient of 16.6, and peak gradient of 33.3. Trace TR with RVSP of 26.2. Normal EF.  Nuclear stress, 2016: EF 72%. No evidence of ischemia.  CTA, 2017: Mild ectasia of ascending aorta measuring 4.2cm significant aortic leaflet calcification, coronary sclerosis, no aneurysm of descending aorta.  Echo, 2017: Mild AI/AS with HEATHER 1.8 cm². MPG 17.0. Normal EF 64%. Mild LVH.   Echo, 2019: Mild AS, MPG 20 mmHg, HEATHER 1.5 cm2. Trace AI. Mild MR/PI, trace TR. EF 60% with mild concentric LVH. Grade I a diastolic dysfunction.   Echo, 2020: EF 55%. Moderate aortic stenosis, mean gradient 32.8 mmHg, HEATHER 1.1 cm². Mild aortic insufficiency. Mild MR and TR with RVSP 35 mmHg. Mild pulmonic insufficiency.  Echo (), 2021: EF>55%. Moderate AS. Mean gradient across the aortic valve 29 mmHg.  Echo (), 2022: EF 55-60%. Concentric LVH. Mild AR. Moderate aortic stenosis. PG 52 mmHg. MG 32 mmHg.   Echo (2023): EF 60%. Left ventricular wall thickness is consistent with moderate concentric hypertrophy. Left ventricular diastolic function is consistent with (grade I) impaired relaxation. Moderate aortic valve stenosis is present.  Mean gradient 29 mmHg.  Trace aortic insufficiency. Dilated aortic root and ascending aorta.  Echo, 10/18/2024: EF 70%. Severe AV stenosis. Peak velocity 431 cm/s, mean gradient 39 mmHg, HEATHER 0.9 cm². Mild AI. Trace to mild MR. Mild TR with normal RVSP.   Thoracic aortic aneurysm without rupture  CTA, 2017: Mild ectasia of ascending aorta measuring 4.2cm significant aortic leaflet calcification, coronary  sclerosis, no aneurysm of descending aorta.  Echo at , 01/04/2021: EF>55%. Moderate valvular aortic stenosis. Mean gradient across the aortic valve 29 mmHg. Small ascending aortic aneurysm  CTA August 12, 2023 showed aortic measurement 4.2 cm  Exertional dyspnea/anginal equivalent symptoms with occasional orthopnea.   Stress echo, 08/29/2019: Normal exercise capacity, THR reached at 6 min. No evidence of ischemia. EF 65% at rest, 75% with stress.  Hypertension.   Dyslipidemia.   Arm swelling  Venous duplex, 10/23/2024: Acute LUE superficial thrombophlebitis noted in basilic. No evidence of DVT. Arterial flow is noted in basic vein at the level of elbow and forearm suggesting presence of AV fistula.   Prediabetes   Enlarged prostate.  History of renal cancer:   Right nephrectomy in 1997.   S/p Resection of a tumor from his left kidney -- incomplete database.   S/p CT ablation of L renal mass.  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   S/p basal cell cancer surgery.   Episode of hematuria in January with subsequent negative cystoscopy.  Pancreatic cyst, followed by   Surgical Hx:  CT Ablation of Left renal mass  Endoscopic US at  for benign cyst, 09/2019.    History of Present Illness  Patient presents today for a follow-up with a history of non rheumatic aortic valve stenosis, AAA, and cardiac risk factors. Since last visit, patient has had a couple of episodes of chills, shaking and breathing problems after eating.  He is unsure if this is related to his heart.  On a daily basis he does feel quite fatigued but has not had overt shortness of breath or chest pain.  He has had no significant lower extremity swelling.  His last appointment he did have severe aortic stenosis on his echocardiogram but was not symptomatic at the time.  He states he has done extensive research on his own regarding  of valve replacement.  He also has a aortic aneurysm that is being followed by CT surgery and he does question  whether he will be able to have a TAVR or an open procedure given this abnormality.  Recently had a CT scan ordered by his pulmonologist noting an ascending thoracic aorta of 4.2 cm.    Allergies   Allergen Reactions    Norvasc [Amlodipine] Swelling    Oxycodone Other (See Comments)     Can elevate Blood pressure    Lotensin [Benazepril] Cough    Sulfamethoxazole-Trimethoprim Cough    Zocor [Simvastatin] Myalgia         Current Outpatient Medications:     aspirin 325 MG tablet, Take 1 tablet by mouth Daily., Disp: , Rfl:     azelastine (ASTELIN) 0.1 % nasal spray, , Disp: , Rfl:     betamethasone valerate (VALISONE) 0.1 % ointment, Apply 1 Application topically to the appropriate area as directed As Needed., Disp: , Rfl:     carvedilol (COREG) 6.25 MG tablet, Take 1 tablet by mouth twice daily, Disp: 180 tablet, Rfl: 3    Cholecalciferol (Vitamin D3) 50 MCG (2000 UT) tablet, Take 1 tablet by mouth Daily., Disp: , Rfl:     Coenzyme Q10 (CO Q-10) 100 MG capsule, Take 100 mg by mouth 2 (Two) Times a Day., Disp: , Rfl:     esomeprazole (NexIUM) 20 MG capsule, Take 1 capsule by mouth Daily., Disp: , Rfl:     famciclovir (FAMVIR) 500 MG tablet, Take 1 tablet by mouth Daily. (Patient taking differently: Take 1 tablet by mouth As Needed.), Disp: , Rfl:     Flaxseed, Linseed, (FLAX SEED OIL) 1000 MG capsule, Take 1,200 mg by mouth Daily., Disp: , Rfl:     levothyroxine (SYNTHROID, LEVOTHROID) 50 MCG tablet, Take 1 tablet by mouth Daily., Disp: , Rfl:     losartan (COZAAR) 100 MG tablet, Take 1 tablet by mouth Daily., Disp: 90 tablet, Rfl: 1    Omega-3 Fatty Acids (FISH OIL) 1000 MG capsule capsule, Take 1,200 mg by mouth 2 (Two) Times a Day With Meals., Disp: , Rfl:     potassium citrate (UROCIT-K) 10 MEQ (1080 MG) CR tablet, Take 1 tablet by mouth once daily, Disp: 90 tablet, Rfl: 1    rosuvastatin (CRESTOR) 40 MG tablet, Take 1 tablet by mouth once daily, Disp: 90 tablet, Rfl: 0    vitamin B-12 (CYANOCOBALAMIN) 1000 MCG  "tablet, Take 1 tablet by mouth Daily., Disp: , Rfl:     The following portions of the patient's history were reviewed and updated as appropriate: allergies, current medications, past family history, past medical history, past social history, past surgical history and problem list.    ROS  Review of Systems   14 point ROS negative except for that listed in the HPI.         Objective:     /84 (BP Location: Left arm, Patient Position: Sitting, Cuff Size: Adult)   Pulse 59   Ht 175.3 cm (69\")   Wt 112 kg (247 lb 9.6 oz)   SpO2 98%   BMI 36.56 kg/m²      Physical Exam  Constitutional: Patient appears well-developed and well-nourished.   HENT: HEENT exam unremarkable.   Neck: Neck supple. No JVD present.   Cardiovascular: Regular rate and rhythm.  3/6 systolic ejection murmur heard best in the right upper sternal border radiating to both carotids.  Pulmonary/Chest: Breath sounds normal. Does not exhibit tenderness.   Abdominal: Abdomen benign.   Musculoskeletal: Does not exhibit edema.   Neurological: Neurological exam unremarkable.       Data Review:   Lab Results   Component Value Date    GLUCOSE 82 05/20/2025    BUN 17 05/20/2025    CREATININE 1.26 05/20/2025    EGFR 58.7 (L) 05/20/2025    BCR 13.5 05/20/2025     05/20/2025    K 5.0 05/20/2025    CO2 22.2 05/20/2025    CALCIUM 9.1 05/20/2025    ALBUMIN 3.6 05/20/2025    AST 27 05/20/2025    ALT 18 05/20/2025     Lab Results   Component Value Date    CHOL 169 12/04/2024    TRIG 97 12/04/2024    HDL 41 12/04/2024     (H) 12/04/2024      Lab Results   Component Value Date    WBC 5.35 05/20/2025    RBC 4.74 05/20/2025    HGB 15.1 05/20/2025    HCT 46.2 05/20/2025    MCV 97.5 (H) 05/20/2025     05/20/2025     Lab Results   Component Value Date    TSH 4.090 12/04/2024     Lab Results   Component Value Date    HGBA1C 5.70 (H) 12/04/2024          ECG 12 Lead    Date/Time: 6/13/2025 3:47 PM  Performed by: Tabatha Rodney PA-C    Authorized by: " Tabatha Rodney PA-C  Comparison: compared with previous ECG from 8/18/2023  Similar to previous ECG  Comparison to previous ECG: Unchanged from previous EKG  Rhythm: sinus bradycardia  BPM: 59  Other findings: non-specific ST-T wave changes             Advance Care Planning   ACP discussion was declined by the patient. Patient does not have an advance directive, declines further assistance.           Assessment:      Diagnosis Plan   1. Nonrheumatic aortic valve stenosis  Adult Transthoracic Echo Complete w/ Color, Spectral and Contrast if necessary per protocol      2. Aneurysm of ascending aorta without rupture        3. Essential hypertension        4. Dyslipidemia          Plan:   Patient has known severe aortic stenosis.  He is having some fatigue and given his history we will recheck an echocardiogram to evaluate his aortic valve again.  He does have a follow-up appointment with CT surgery in 6 weeks.  Both Dr. Valladares and Dr. Castaneda will have to evaluate patient to determine whether he would benefit from a TAVR or SAVR after echocardiogram is performed  Continue aspirin 325 for antiplatelet therapy.  Continue carvedilol 6.25 mg twice daily, Losartan 100 mg daily for hypertension.  Continue Crestor 40 mg daily for hyperlipidemia.  LDL was not quite to goal at 110 but this is an outlier from previous lipid panels.  When patient is undergoing further evaluation for his valve we will reevaluate.  Patient will ultimately have to have a heart catheterization  as part of his further valve workup.    Tabatha Rodney PA-C     Please note that portions of this note may have been completed with a voice recognition program. Efforts were made to edit the dictations, but occasionally words are mistranscribed.

## 2025-06-17 ENCOUNTER — HOSPITAL ENCOUNTER (OUTPATIENT)
Dept: CARDIOLOGY | Facility: HOSPITAL | Age: 80
Discharge: HOME | End: 2025-06-17
Admitting: PHYSICIAN ASSISTANT
Payer: MEDICARE

## 2025-06-17 DIAGNOSIS — I35.0 NONRHEUMATIC AORTIC VALVE STENOSIS: ICD-10-CM

## 2025-06-17 PROCEDURE — 93306 TTE W/DOPPLER COMPLETE: CPT | Performed by: INTERNAL MEDICINE

## 2025-06-17 PROCEDURE — 93306 TTE W/DOPPLER COMPLETE: CPT

## 2025-06-18 ENCOUNTER — TELEPHONE (OUTPATIENT)
Dept: CARDIOLOGY | Facility: CLINIC | Age: 80
End: 2025-06-18
Payer: MEDICARE

## 2025-06-18 VITALS — BODY MASS INDEX: 36.58 KG/M2 | WEIGHT: 247 LBS | HEIGHT: 69 IN

## 2025-06-18 LAB
AORTIC DIMENSIONLESS INDEX: 0.35 (DI)
AV MEAN PRESS GRAD SYS DOP V1V2: 43.8 MMHG
AV VMAX SYS DOP: 432.9 CM/SEC
BH CV ECHO MEAS - AI P1/2T: 678.8 MSEC
BH CV ECHO MEAS - AO MAX PG: 75.1 MMHG
BH CV ECHO MEAS - AO ROOT DIAM: 3.7 CM
BH CV ECHO MEAS - AO V2 VTI: 99.1 CM
BH CV ECHO MEAS - AVA(I,D): 1.06 CM2
BH CV ECHO MEAS - EDV(CUBED): 97.3 ML
BH CV ECHO MEAS - EDV(MOD-SP2): 105 ML
BH CV ECHO MEAS - EDV(MOD-SP4): 116 ML
BH CV ECHO MEAS - EF(MOD-SP2): 59.5 %
BH CV ECHO MEAS - EF(MOD-SP4): 63.1 %
BH CV ECHO MEAS - ESV(CUBED): 26.6 ML
BH CV ECHO MEAS - ESV(MOD-SP2): 42.5 ML
BH CV ECHO MEAS - ESV(MOD-SP4): 42.8 ML
BH CV ECHO MEAS - FS: 35.1 %
BH CV ECHO MEAS - IVS/LVPW: 0.92 CM
BH CV ECHO MEAS - IVSD: 1.2 CM
BH CV ECHO MEAS - LA DIMENSION: 3.9 CM
BH CV ECHO MEAS - LAT PEAK E' VEL: 6.9 CM/SEC
BH CV ECHO MEAS - LV DIASTOLIC VOL/BSA (35-75): 51.3 CM2
BH CV ECHO MEAS - LV MASS(C)D: 217.4 GRAMS
BH CV ECHO MEAS - LV MAX PG: 10.9 MMHG
BH CV ECHO MEAS - LV MEAN PG: 6 MMHG
BH CV ECHO MEAS - LV SYSTOLIC VOL/BSA (12-30): 18.9 CM2
BH CV ECHO MEAS - LV V1 MAX: 165 CM/SEC
BH CV ECHO MEAS - LV V1 VTI: 34.6 CM
BH CV ECHO MEAS - LVIDD: 4.6 CM
BH CV ECHO MEAS - LVIDS: 3 CM
BH CV ECHO MEAS - LVOT AREA: 3 CM2
BH CV ECHO MEAS - LVOT DIAM: 1.96 CM
BH CV ECHO MEAS - LVPWD: 1.3 CM
BH CV ECHO MEAS - MED PEAK E' VEL: 3.6 CM/SEC
BH CV ECHO MEAS - MV A MAX VEL: 141.7 CM/SEC
BH CV ECHO MEAS - MV DEC SLOPE: 211.7 CM/SEC2
BH CV ECHO MEAS - MV DEC TIME: 0.41 SEC
BH CV ECHO MEAS - MV E MAX VEL: 77.8 CM/SEC
BH CV ECHO MEAS - MV E/A: 0.55
BH CV ECHO MEAS - MV MAX PG: 10.7 MMHG
BH CV ECHO MEAS - MV MEAN PG: 3.2 MMHG
BH CV ECHO MEAS - MV P1/2T: 129.4 MSEC
BH CV ECHO MEAS - MV V2 VTI: 39.2 CM
BH CV ECHO MEAS - MVA(P1/2T): 1.7 CM2
BH CV ECHO MEAS - MVA(VTI): 2.7 CM2
BH CV ECHO MEAS - PA ACC TIME: 0.09 SEC
BH CV ECHO MEAS - PI END-D VEL: 121.5 CM/SEC
BH CV ECHO MEAS - RAP SYSTOLE: 3 MMHG
BH CV ECHO MEAS - RVSP: 27.2 MMHG
BH CV ECHO MEAS - SV(LVOT): 104.8 ML
BH CV ECHO MEAS - SV(MOD-SP2): 62.5 ML
BH CV ECHO MEAS - SV(MOD-SP4): 73.2 ML
BH CV ECHO MEAS - SVI(LVOT): 46.4 ML/M2
BH CV ECHO MEAS - SVI(MOD-SP2): 27.7 ML/M2
BH CV ECHO MEAS - SVI(MOD-SP4): 32.4 ML/M2
BH CV ECHO MEAS - TAPSE (>1.6): 1.82 CM
BH CV ECHO MEAS - TR MAX PG: 24.2 MMHG
BH CV ECHO MEAS - TR MAX VEL: 245.8 CM/SEC
BH CV ECHO MEASUREMENTS AVERAGE E/E' RATIO: 14.82
BH CV XLRA - RV BASE: 2.9 CM
BH CV XLRA - RV LENGTH: 7.8 CM
BH CV XLRA - RV MID: 2.6 CM
BH CV XLRA - TDI S': 9.9 CM/SEC
LEFT ATRIUM VOLUME INDEX: 50 ML/M2
LV EF BIPLANE MOD: 61.3 %

## 2025-06-18 NOTE — TELEPHONE ENCOUNTER
----- Message from Migel Valladares sent at 6/17/2025  9:24 PM EDT -----  These notify the patient that his echocardiogram shows further increase in the aortic valve gradient consistent with progression of aortic stenosis.  Overall cardiac function is normal.  At this time he may benefit from further evaluation to prepare him for aortic valve replacement by TAVR.  If he is agreeable schedule him for cardiac cath and TRU.  If he would like to discuss this in person give him an appointment to see me in near future.  You can even add him to [facility] if he wants.

## 2025-06-18 NOTE — TELEPHONE ENCOUNTER
Discussed with pt Dr. Valladares's recommendations. He voiced understanding and would like to see him to discuss further before setting up cardiac cath and TRU. Transferred to office .

## 2025-06-20 ENCOUNTER — OFFICE VISIT (OUTPATIENT)
Dept: CARDIOLOGY | Facility: CLINIC | Age: 80
End: 2025-06-20
Payer: MEDICARE

## 2025-06-20 ENCOUNTER — LAB (OUTPATIENT)
Dept: LAB | Facility: HOSPITAL | Age: 80
End: 2025-06-20
Payer: MEDICARE

## 2025-06-20 VITALS
HEIGHT: 69 IN | WEIGHT: 250 LBS | SYSTOLIC BLOOD PRESSURE: 152 MMHG | DIASTOLIC BLOOD PRESSURE: 86 MMHG | BODY MASS INDEX: 37.03 KG/M2 | OXYGEN SATURATION: 97 % | HEART RATE: 70 BPM

## 2025-06-20 DIAGNOSIS — I10 ESSENTIAL HYPERTENSION: ICD-10-CM

## 2025-06-20 DIAGNOSIS — I35.0 NONRHEUMATIC AORTIC VALVE STENOSIS: Primary | ICD-10-CM

## 2025-06-20 DIAGNOSIS — E78.5 DYSLIPIDEMIA: ICD-10-CM

## 2025-06-20 DIAGNOSIS — I71.21 ANEURYSM OF ASCENDING AORTA WITHOUT RUPTURE: ICD-10-CM

## 2025-06-20 DIAGNOSIS — I79.8 OTHER DISORDERS OF ARTERIES, ARTERIOLES AND CAPILLARIES IN DISEASES CLASSIFIED ELSEWHERE: ICD-10-CM

## 2025-06-20 DIAGNOSIS — I35.0 NONRHEUMATIC AORTIC VALVE STENOSIS: ICD-10-CM

## 2025-06-20 LAB
ANION GAP SERPL CALCULATED.3IONS-SCNC: 9.4 MMOL/L (ref 5–15)
BUN SERPL-MCNC: 14 MG/DL (ref 8–23)
BUN/CREAT SERPL: 11.4 (ref 7–25)
CALCIUM SPEC-SCNC: 9.7 MG/DL (ref 8.6–10.5)
CHLORIDE SERPL-SCNC: 103 MMOL/L (ref 98–107)
CO2 SERPL-SCNC: 26.6 MMOL/L (ref 22–29)
CREAT SERPL-MCNC: 1.23 MG/DL (ref 0.76–1.27)
EGFRCR SERPLBLD CKD-EPI 2021: 60.5 ML/MIN/1.73
GLUCOSE SERPL-MCNC: 91 MG/DL (ref 65–99)
POTASSIUM SERPL-SCNC: 4.8 MMOL/L (ref 3.5–5.2)
SODIUM SERPL-SCNC: 139 MMOL/L (ref 136–145)

## 2025-06-20 PROCEDURE — 3077F SYST BP >= 140 MM HG: CPT

## 2025-06-20 PROCEDURE — 80048 BASIC METABOLIC PNL TOTAL CA: CPT

## 2025-06-20 PROCEDURE — 36415 COLL VENOUS BLD VENIPUNCTURE: CPT

## 2025-06-20 PROCEDURE — 3079F DIAST BP 80-89 MM HG: CPT

## 2025-06-20 PROCEDURE — 99214 OFFICE O/P EST MOD 30 MIN: CPT

## 2025-06-20 PROCEDURE — 85027 COMPLETE CBC AUTOMATED: CPT

## 2025-06-20 NOTE — H&P (VIEW-ONLY)
[facility]    Encounter Date: 2025    Patient ID: Ac Caldwell is a 79 y.o. male.  : 1945     PCP: Valentine Newton APRN       Chief Complaint: Nonrheumatic aortic valve stenosis      PROBLEM LIST:  Nonrheumatic aortic valve stenosis:   Echo, 2016: Mild AI and moderate AS, HEATHER 1.2 cm2, mean gradient of 16.6, and peak gradient of 33.3. Trace TR with RVSP of 26.2. Normal EF.  Nuclear stress, 2016: EF 72%. No evidence of ischemia.  CTA, 2017: Mild ectasia of ascending aorta measuring 4.2cm significant aortic leaflet calcification, coronary sclerosis, no aneurysm of descending aorta.  Echo, 2017: Mild AI/AS with HEATHER 1.8 cm². MPG 17.0. Normal EF 64%. Mild LVH.   Echo, 2019: Mild AS, MPG 20 mmHg, HEATHER 1.5 cm2. Trace AI. Mild MR/PI, trace TR. EF 60% with mild concentric LVH. Grade I a diastolic dysfunction.   Echo, 2020: EF 55%. Moderate aortic stenosis, mean gradient 32.8 mmHg, HEATHER 1.1 cm². Mild aortic insufficiency. Mild MR and TR with RVSP 35 mmHg. Mild pulmonic insufficiency.  Echo (), 2021: EF>55%. Moderate AS. Mean gradient across the aortic valve 29 mmHg.  Echo (), 2022: EF 55-60%. Concentric LVH. Mild AR. Moderate aortic stenosis. PG 52 mmHg. MG 32 mmHg.   Echo (2023): EF 60%. Left ventricular wall thickness is consistent with moderate concentric hypertrophy. Left ventricular diastolic function is consistent with (grade I) impaired relaxation. Moderate aortic valve stenosis is present.  Mean gradient 29 mmHg.  Trace aortic insufficiency. Dilated aortic root and ascending aorta.  Echo, 10/18/2024: EF 70%. Severe AV stenosis. Peak velocity 431 cm/s, mean gradient 39 mmHg, HEATHER 0.9 cm². Mild AI. Trace to mild MR. Mild TR with normal RVSP.  Echo 2025: Severe aortic stenosis present. HEATHER 1.06, mean gradient 44mmHg, peak velocity 433, DI 0.35   Thoracic aortic aneurysm without rupture  CTA, 2017:  Mild ectasia of ascending aorta measuring 4.2cm significant aortic leaflet calcification, coronary sclerosis, no aneurysm of descending aorta.  Echo at , 01/04/2021: EF>55%. Moderate valvular aortic stenosis. Mean gradient across the aortic valve 29 mmHg. Small ascending aortic aneurysm  CTA August 12, 2023 showed aortic measurement 4.2 cm  Exertional dyspnea/anginal equivalent symptoms with occasional orthopnea.   Stress echo, 08/29/2019: Normal exercise capacity, THR reached at 6 min. No evidence of ischemia. EF 65% at rest, 75% with stress.  Hypertension.   Dyslipidemia.   Arm swelling  Venous duplex, 10/23/2024: Acute LUE superficial thrombophlebitis noted in basilic. No evidence of DVT. Arterial flow is noted in basic vein at the level of elbow and forearm suggesting presence of AV fistula.   Prediabetes   Enlarged prostate.  History of renal cancer:   Right nephrectomy in 1997.   S/p Resection of a tumor from his left kidney -- incomplete database.   S/p CT ablation of L renal mass.  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   S/p basal cell cancer surgery.   Episode of hematuria in January with subsequent negative cystoscopy.  Pancreatic cyst, followed by   Surgical Hx:  CT Ablation of Left renal mass  Endoscopic US at  for benign cyst, 09/2019.    History of Present Illness  Patient presents today for a follow-up with a history of AAA, non rheumatic AV stenosis, and cardiac risk factors. Since last visit, patient underwent echocardiogram that revealed severe aortic stenosis. He requested appointment today to discuss TAVR workup and what that entails. He has been stable otherwise. Has dyspnea on exertion. No syncope. Is nervous about TRU and LHC.    Allergies   Allergen Reactions    Norvasc [Amlodipine] Swelling    Oxycodone Other (See Comments)     Can elevate Blood pressure    Lotensin [Benazepril] Cough    Sulfamethoxazole-Trimethoprim Cough    Zocor [Simvastatin] Myalgia         Current  Outpatient Medications:     aspirin 325 MG tablet, Take 1 tablet by mouth Daily., Disp: , Rfl:     azelastine (ASTELIN) 0.1 % nasal spray, , Disp: , Rfl:     betamethasone valerate (VALISONE) 0.1 % ointment, Apply 1 Application topically to the appropriate area as directed As Needed., Disp: , Rfl:     carvedilol (COREG) 6.25 MG tablet, Take 1 tablet by mouth twice daily, Disp: 180 tablet, Rfl: 3    Cholecalciferol (Vitamin D3) 50 MCG (2000 UT) tablet, Take 1 tablet by mouth Daily., Disp: , Rfl:     Coenzyme Q10 (CO Q-10) 100 MG capsule, Take 100 mg by mouth 2 (Two) Times a Day., Disp: , Rfl:     esomeprazole (NexIUM) 20 MG capsule, Take 1 capsule by mouth Daily., Disp: , Rfl:     famciclovir (FAMVIR) 500 MG tablet, Take 1 tablet by mouth Daily. (Patient taking differently: Take 1 tablet by mouth As Needed.), Disp: , Rfl:     Flaxseed, Linseed, (FLAX SEED OIL) 1000 MG capsule, Take 1,200 mg by mouth Daily., Disp: , Rfl:     levothyroxine (SYNTHROID, LEVOTHROID) 50 MCG tablet, Take 1 tablet by mouth Daily., Disp: , Rfl:     Omega-3 Fatty Acids (FISH OIL) 1000 MG capsule capsule, Take 1,200 mg by mouth 2 (Two) Times a Day With Meals., Disp: , Rfl:     potassium citrate (UROCIT-K) 10 MEQ (1080 MG) CR tablet, Take 1 tablet by mouth once daily, Disp: 90 tablet, Rfl: 1    rosuvastatin (CRESTOR) 40 MG tablet, Take 1 tablet by mouth once daily, Disp: 90 tablet, Rfl: 0    vitamin B-12 (CYANOCOBALAMIN) 1000 MCG tablet, Take 1 tablet by mouth Daily., Disp: , Rfl:     losartan (COZAAR) 100 MG tablet, Take 1 tablet by mouth once daily, Disp: 30 tablet, Rfl: 0    The following portions of the patient's history were reviewed and updated as appropriate: allergies, current medications, past family history, past medical history, past social history, past surgical history and problem list.    ROS  Review of Systems   14 point ROS negative except for that listed in the HPI.         Objective:     /86 (BP Location: Right arm,  "Patient Position: Sitting, Cuff Size: Adult)   Pulse 70   Ht 175.3 cm (69\")   Wt 113 kg (250 lb)   SpO2 97%   BMI 36.92 kg/m²      Physical Exam  Constitutional: Patient appears well-developed and well-nourished.   HENT: HEENT exam unremarkable.   Neck: Neck supple. No JVD present.    Cardiovascular: Normal rate, regular rhythm and normal heart sounds. 3/6 JENNY   2+ symmetric pulses.   Pulmonary/Chest: Breath sounds normal. Does not exhibit tenderness.   Musculoskeletal: Does not exhibit edema.   Vitals reviewed.    Data Review:   Lab Results   Component Value Date    GLUCOSE 91 06/20/2025    BUN 14.0 06/20/2025    CREATININE 1.23 06/20/2025    EGFR 60.5 06/20/2025    BCR 11.4 06/20/2025     06/20/2025    K 4.8 06/20/2025    CO2 26.6 06/20/2025    CALCIUM 9.7 06/20/2025    ALBUMIN 3.6 05/20/2025    AST 27 05/20/2025    ALT 18 05/20/2025     Lab Results   Component Value Date    CHOL 169 12/04/2024    TRIG 97 12/04/2024    HDL 41 12/04/2024     (H) 12/04/2024      Lab Results   Component Value Date    WBC 6.67 06/20/2025    RBC 4.87 06/20/2025    HGB 15.3 06/20/2025    HCT 45.4 06/20/2025    MCV 93.2 06/20/2025     06/20/2025     Lab Results   Component Value Date    TSH 4.090 12/04/2024     Lab Results   Component Value Date    HGBA1C 5.70 (H) 12/04/2024        Procedures       Advance Care Planning   ACP discussion was held with the patient during this visit. Patient does not have an advance directive, declines further assistance.           Assessment and plan:      Diagnosis Plan   1. Nonrheumatic aortic valve stenosis  CBC (No Diff)    Basic Metabolic Panel      2. Aneurysm of ascending aorta without rupture        3. Essential hypertension        4. Dyslipidemia          Patient with severe AS. Discussed TAVR vs SAVR. We will proceed with TAVR workup. Scheduled for TRU, LHC and carotid duplex. Will have CTS and Rupal Maldonado, TAVR coordinator see the patient on same day.   Aneurysm " stable on last imaging. Will need CTA TAVR.  BP well controlled. Continue current antihypertensive therapy.   Continue statin therapy.   Continue current medications.   FU in 3 MO, sooner as needed.  Thank you for allowing us to participate in the care of your patient.       Nicole De La O PA-C      Please note that portions of this note may have been completed with a voice recognition program. Efforts were made to edit the dictations, but occasionally words are mistranscribed.

## 2025-06-20 NOTE — PROGRESS NOTES
[facility] Cardiology    Encounter Date: 2025    Patient ID: Ac Caldwell is a 79 y.o. male.  : 1945     PCP: Valentine Newton APRN       Chief Complaint: Nonrheumatic aortic valve stenosis      PROBLEM LIST:  Nonrheumatic aortic valve stenosis:   Echo, 2016: Mild AI and moderate AS, HEATHER 1.2 cm2, mean gradient of 16.6, and peak gradient of 33.3. Trace TR with RVSP of 26.2. Normal EF.  Nuclear stress, 2016: EF 72%. No evidence of ischemia.  CTA, 2017: Mild ectasia of ascending aorta measuring 4.2cm significant aortic leaflet calcification, coronary sclerosis, no aneurysm of descending aorta.  Echo, 2017: Mild AI/AS with HEATHER 1.8 cm². MPG 17.0. Normal EF 64%. Mild LVH.   Echo, 2019: Mild AS, MPG 20 mmHg, HEATHER 1.5 cm2. Trace AI. Mild MR/PI, trace TR. EF 60% with mild concentric LVH. Grade I a diastolic dysfunction.   Echo, 2020: EF 55%. Moderate aortic stenosis, mean gradient 32.8 mmHg, HEATHER 1.1 cm². Mild aortic insufficiency. Mild MR and TR with RVSP 35 mmHg. Mild pulmonic insufficiency.  Echo (), 2021: EF>55%. Moderate AS. Mean gradient across the aortic valve 29 mmHg.  Echo (), 2022: EF 55-60%. Concentric LVH. Mild AR. Moderate aortic stenosis. PG 52 mmHg. MG 32 mmHg.   Echo (2023): EF 60%. Left ventricular wall thickness is consistent with moderate concentric hypertrophy. Left ventricular diastolic function is consistent with (grade I) impaired relaxation. Moderate aortic valve stenosis is present.  Mean gradient 29 mmHg.  Trace aortic insufficiency. Dilated aortic root and ascending aorta.  Echo, 10/18/2024: EF 70%. Severe AV stenosis. Peak velocity 431 cm/s, mean gradient 39 mmHg, HEATHER 0.9 cm². Mild AI. Trace to mild MR. Mild TR with normal RVSP.  Echo 2025: Severe aortic stenosis present. HEATHER 1.06, mean gradient 44mmHg, peak velocity 433, DI 0.35   Thoracic aortic aneurysm without rupture  CTA, 2017:  Mild ectasia of ascending aorta measuring 4.2cm significant aortic leaflet calcification, coronary sclerosis, no aneurysm of descending aorta.  Echo at , 01/04/2021: EF>55%. Moderate valvular aortic stenosis. Mean gradient across the aortic valve 29 mmHg. Small ascending aortic aneurysm  CTA August 12, 2023 showed aortic measurement 4.2 cm  Exertional dyspnea/anginal equivalent symptoms with occasional orthopnea.   Stress echo, 08/29/2019: Normal exercise capacity, THR reached at 6 min. No evidence of ischemia. EF 65% at rest, 75% with stress.  Hypertension.   Dyslipidemia.   Arm swelling  Venous duplex, 10/23/2024: Acute LUE superficial thrombophlebitis noted in basilic. No evidence of DVT. Arterial flow is noted in basic vein at the level of elbow and forearm suggesting presence of AV fistula.   Prediabetes   Enlarged prostate.  History of renal cancer:   Right nephrectomy in 1997.   S/p Resection of a tumor from his left kidney -- incomplete database.   S/p CT ablation of L renal mass.  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   S/p basal cell cancer surgery.   Episode of hematuria in January with subsequent negative cystoscopy.  Pancreatic cyst, followed by   Surgical Hx:  CT Ablation of Left renal mass  Endoscopic US at  for benign cyst, 09/2019.    History of Present Illness  Patient presents today for a follow-up with a history of AAA, non rheumatic AV stenosis, and cardiac risk factors. Since last visit, patient underwent echocardiogram that revealed severe aortic stenosis. He requested appointment today to discuss TAVR workup and what that entails. He has been stable otherwise. Has dyspnea on exertion. No syncope. Is nervous about TRU and LHC.    Allergies   Allergen Reactions    Norvasc [Amlodipine] Swelling    Oxycodone Other (See Comments)     Can elevate Blood pressure    Lotensin [Benazepril] Cough    Sulfamethoxazole-Trimethoprim Cough    Zocor [Simvastatin] Myalgia         Current  Outpatient Medications:     aspirin 325 MG tablet, Take 1 tablet by mouth Daily., Disp: , Rfl:     azelastine (ASTELIN) 0.1 % nasal spray, , Disp: , Rfl:     betamethasone valerate (VALISONE) 0.1 % ointment, Apply 1 Application topically to the appropriate area as directed As Needed., Disp: , Rfl:     carvedilol (COREG) 6.25 MG tablet, Take 1 tablet by mouth twice daily, Disp: 180 tablet, Rfl: 3    Cholecalciferol (Vitamin D3) 50 MCG (2000 UT) tablet, Take 1 tablet by mouth Daily., Disp: , Rfl:     Coenzyme Q10 (CO Q-10) 100 MG capsule, Take 100 mg by mouth 2 (Two) Times a Day., Disp: , Rfl:     esomeprazole (NexIUM) 20 MG capsule, Take 1 capsule by mouth Daily., Disp: , Rfl:     famciclovir (FAMVIR) 500 MG tablet, Take 1 tablet by mouth Daily. (Patient taking differently: Take 1 tablet by mouth As Needed.), Disp: , Rfl:     Flaxseed, Linseed, (FLAX SEED OIL) 1000 MG capsule, Take 1,200 mg by mouth Daily., Disp: , Rfl:     levothyroxine (SYNTHROID, LEVOTHROID) 50 MCG tablet, Take 1 tablet by mouth Daily., Disp: , Rfl:     Omega-3 Fatty Acids (FISH OIL) 1000 MG capsule capsule, Take 1,200 mg by mouth 2 (Two) Times a Day With Meals., Disp: , Rfl:     potassium citrate (UROCIT-K) 10 MEQ (1080 MG) CR tablet, Take 1 tablet by mouth once daily, Disp: 90 tablet, Rfl: 1    rosuvastatin (CRESTOR) 40 MG tablet, Take 1 tablet by mouth once daily, Disp: 90 tablet, Rfl: 0    vitamin B-12 (CYANOCOBALAMIN) 1000 MCG tablet, Take 1 tablet by mouth Daily., Disp: , Rfl:     losartan (COZAAR) 100 MG tablet, Take 1 tablet by mouth once daily, Disp: 30 tablet, Rfl: 0    The following portions of the patient's history were reviewed and updated as appropriate: allergies, current medications, past family history, past medical history, past social history, past surgical history and problem list.    ROS  Review of Systems   14 point ROS negative except for that listed in the HPI.         Objective:     /86 (BP Location: Right arm,  "Patient Position: Sitting, Cuff Size: Adult)   Pulse 70   Ht 175.3 cm (69\")   Wt 113 kg (250 lb)   SpO2 97%   BMI 36.92 kg/m²      Physical Exam  Constitutional: Patient appears well-developed and well-nourished.   HENT: HEENT exam unremarkable.   Neck: Neck supple. No JVD present.    Cardiovascular: Normal rate, regular rhythm and normal heart sounds. 3/6 JENNY   2+ symmetric pulses.   Pulmonary/Chest: Breath sounds normal. Does not exhibit tenderness.   Musculoskeletal: Does not exhibit edema.   Vitals reviewed.    Data Review:   Lab Results   Component Value Date    GLUCOSE 91 06/20/2025    BUN 14.0 06/20/2025    CREATININE 1.23 06/20/2025    EGFR 60.5 06/20/2025    BCR 11.4 06/20/2025     06/20/2025    K 4.8 06/20/2025    CO2 26.6 06/20/2025    CALCIUM 9.7 06/20/2025    ALBUMIN 3.6 05/20/2025    AST 27 05/20/2025    ALT 18 05/20/2025     Lab Results   Component Value Date    CHOL 169 12/04/2024    TRIG 97 12/04/2024    HDL 41 12/04/2024     (H) 12/04/2024      Lab Results   Component Value Date    WBC 6.67 06/20/2025    RBC 4.87 06/20/2025    HGB 15.3 06/20/2025    HCT 45.4 06/20/2025    MCV 93.2 06/20/2025     06/20/2025     Lab Results   Component Value Date    TSH 4.090 12/04/2024     Lab Results   Component Value Date    HGBA1C 5.70 (H) 12/04/2024        Procedures       Advance Care Planning   ACP discussion was held with the patient during this visit. Patient does not have an advance directive, declines further assistance.           Assessment and plan:      Diagnosis Plan   1. Nonrheumatic aortic valve stenosis  CBC (No Diff)    Basic Metabolic Panel      2. Aneurysm of ascending aorta without rupture        3. Essential hypertension        4. Dyslipidemia          Patient with severe AS. Discussed TAVR vs SAVR. We will proceed with TAVR workup. Scheduled for TRU, LHC and carotid duplex. Will have CTS and Rupal Maldonado, TAVR coordinator see the patient on same day.   Aneurysm " stable on last imaging. Will need CTA TAVR.  BP well controlled. Continue current antihypertensive therapy.   Continue statin therapy.   Continue current medications.   FU in 3 MO, sooner as needed.  Thank you for allowing us to participate in the care of your patient.       Nicole De La O PA-C      Please note that portions of this note may have been completed with a voice recognition program. Efforts were made to edit the dictations, but occasionally words are mistranscribed.

## 2025-06-21 LAB
DEPRECATED RDW RBC AUTO: 42.8 FL (ref 37–54)
ERYTHROCYTE [DISTWIDTH] IN BLOOD BY AUTOMATED COUNT: 12.6 % (ref 12.3–15.4)
HCT VFR BLD AUTO: 45.4 % (ref 37.5–51)
HGB BLD-MCNC: 15.3 G/DL (ref 13–17.7)
MCH RBC QN AUTO: 31.4 PG (ref 26.6–33)
MCHC RBC AUTO-ENTMCNC: 33.7 G/DL (ref 31.5–35.7)
MCV RBC AUTO: 93.2 FL (ref 79–97)
PLATELET # BLD AUTO: 155 10*3/MM3 (ref 140–450)
PMV BLD AUTO: 10.7 FL (ref 6–12)
RBC # BLD AUTO: 4.87 10*6/MM3 (ref 4.14–5.8)
WBC NRBC COR # BLD AUTO: 6.67 10*3/MM3 (ref 3.4–10.8)

## 2025-06-23 ENCOUNTER — DOCUMENTATION (OUTPATIENT)
Dept: CARDIOLOGY | Facility: HOSPITAL | Age: 80
End: 2025-06-23
Payer: MEDICARE

## 2025-06-23 NOTE — PROGRESS NOTES
Referral received for severe aortic valve stenosis. We reviewed AS, TAVR vs SAVR and upcoming LHC/TRU & carotid ultrasound, followed by a CTA with TAVR protocol and apt with Dr. Castaneda. Education folder mailed, my contact information is included. Will follow up after testing.

## 2025-06-24 RX ORDER — LOSARTAN POTASSIUM 100 MG/1
100 TABLET ORAL DAILY
Qty: 30 TABLET | Refills: 0 | Status: SHIPPED | OUTPATIENT
Start: 2025-06-24

## 2025-06-24 NOTE — TELEPHONE ENCOUNTER
Rx Refill Note  Requested Prescriptions     Pending Prescriptions Disp Refills    losartan (COZAAR) 100 MG tablet [Pharmacy Med Name: Losartan Potassium 100 MG Oral Tablet] 30 tablet 0     Sig: Take 1 tablet by mouth once daily      Last office visit with prescribing clinician: 5/20/2025   Last telemedicine visit with prescribing clinician: Visit date not found   Next office visit with prescribing clinician: 7/10/2025       Dagmar Bright  06/24/25, 07:45 EDT

## 2025-06-30 DIAGNOSIS — I35.0 NONRHEUMATIC AORTIC VALVE STENOSIS: Primary | ICD-10-CM

## 2025-06-30 RX ORDER — SODIUM CHLORIDE 0.9 % (FLUSH) 0.9 %
10 SYRINGE (ML) INJECTION AS NEEDED
Status: CANCELLED | OUTPATIENT
Start: 2025-06-30

## 2025-06-30 RX ORDER — SODIUM CHLORIDE 0.9 % (FLUSH) 0.9 %
10 SYRINGE (ML) INJECTION EVERY 12 HOURS SCHEDULED
Status: CANCELLED | OUTPATIENT
Start: 2025-06-30

## 2025-06-30 RX ORDER — SODIUM CHLORIDE 9 MG/ML
40 INJECTION, SOLUTION INTRAVENOUS AS NEEDED
Status: CANCELLED | OUTPATIENT
Start: 2025-06-30

## 2025-06-30 RX ORDER — ASPIRIN 81 MG/1
324 TABLET, CHEWABLE ORAL ONCE
Status: CANCELLED | OUTPATIENT
Start: 2025-06-30 | End: 2025-06-30

## 2025-06-30 RX ORDER — ASPIRIN 81 MG/1
81 TABLET ORAL DAILY
Status: CANCELLED | OUTPATIENT
Start: 2025-07-01

## 2025-07-03 ENCOUNTER — APPOINTMENT (OUTPATIENT)
Dept: CARDIOLOGY | Facility: HOSPITAL | Age: 80
End: 2025-07-03
Payer: MEDICARE

## 2025-07-03 ENCOUNTER — HOSPITAL ENCOUNTER (OUTPATIENT)
Dept: CARDIOLOGY | Facility: HOSPITAL | Age: 80
Setting detail: HOSPITAL OUTPATIENT SURGERY
Discharge: HOME | End: 2025-07-03
Payer: MEDICARE

## 2025-07-03 ENCOUNTER — HOSPITAL ENCOUNTER (OUTPATIENT)
Facility: HOSPITAL | Age: 80
Setting detail: HOSPITAL OUTPATIENT SURGERY
Discharge: HOME | End: 2025-07-03
Attending: INTERNAL MEDICINE | Admitting: INTERNAL MEDICINE
Payer: MEDICARE

## 2025-07-03 VITALS
BODY MASS INDEX: 36.9 KG/M2 | RESPIRATION RATE: 15 BRPM | HEART RATE: 73 BPM | TEMPERATURE: 97.8 F | HEIGHT: 69 IN | DIASTOLIC BLOOD PRESSURE: 75 MMHG | OXYGEN SATURATION: 93 % | WEIGHT: 249.12 LBS | SYSTOLIC BLOOD PRESSURE: 137 MMHG

## 2025-07-03 VITALS — BODY MASS INDEX: 36.9 KG/M2 | WEIGHT: 249.12 LBS | HEIGHT: 69 IN

## 2025-07-03 DIAGNOSIS — I35.0 NONRHEUMATIC AORTIC VALVE STENOSIS: ICD-10-CM

## 2025-07-03 DIAGNOSIS — I79.8 OTHER DISORDERS OF ARTERIES, ARTERIOLES AND CAPILLARIES IN DISEASES CLASSIFIED ELSEWHERE: ICD-10-CM

## 2025-07-03 LAB
AV MEAN PRESS GRAD SYS DOP V1V2: 42 MMHG
AV VMAX SYS DOP: 434 CM/SEC
BH CV ECHO MEAS - AO MAX PG: 78 MMHG
LV EF 2D ECHO EST: 60 %

## 2025-07-03 PROCEDURE — 25810000003 SODIUM CHLORIDE 0.9 % SOLUTION: Performed by: INTERNAL MEDICINE

## 2025-07-03 PROCEDURE — 93454 CORONARY ARTERY ANGIO S&I: CPT | Performed by: INTERNAL MEDICINE

## 2025-07-03 PROCEDURE — 25010000002 HEPARIN (PORCINE) PER 1000 UNITS: Performed by: INTERNAL MEDICINE

## 2025-07-03 PROCEDURE — 93880 EXTRACRANIAL BILAT STUDY: CPT

## 2025-07-03 PROCEDURE — 25010000002 LIDOCAINE PF 1% 1 % SOLUTION: Performed by: INTERNAL MEDICINE

## 2025-07-03 PROCEDURE — 93880 EXTRACRANIAL BILAT STUDY: CPT | Performed by: INTERNAL MEDICINE

## 2025-07-03 PROCEDURE — C1769 GUIDE WIRE: HCPCS | Performed by: INTERNAL MEDICINE

## 2025-07-03 PROCEDURE — 25810000003 SODIUM CHLORIDE 0.9 % SOLUTION: Performed by: NURSE PRACTITIONER

## 2025-07-03 PROCEDURE — 25010000002 NICARDIPINE 2.5 MG/ML SOLUTION: Performed by: INTERNAL MEDICINE

## 2025-07-03 PROCEDURE — 93325 DOPPLER ECHO COLOR FLOW MAPG: CPT

## 2025-07-03 PROCEDURE — 99212 OFFICE O/P EST SF 10 MIN: CPT

## 2025-07-03 PROCEDURE — 25010000002 MIDAZOLAM PER 1 MG: Performed by: INTERNAL MEDICINE

## 2025-07-03 PROCEDURE — 25510000001 IOPAMIDOL PER 1 ML: Performed by: INTERNAL MEDICINE

## 2025-07-03 PROCEDURE — 93321 DOPPLER ECHO F-UP/LMTD STD: CPT

## 2025-07-03 PROCEDURE — C1894 INTRO/SHEATH, NON-LASER: HCPCS | Performed by: INTERNAL MEDICINE

## 2025-07-03 RX ORDER — IOPAMIDOL 755 MG/ML
INJECTION, SOLUTION INTRAVASCULAR
Status: DISCONTINUED | OUTPATIENT
Start: 2025-07-03 | End: 2025-07-03 | Stop reason: HOSPADM

## 2025-07-03 RX ORDER — ASPIRIN 81 MG/1
324 TABLET, CHEWABLE ORAL ONCE
Status: DISCONTINUED | OUTPATIENT
Start: 2025-07-03 | End: 2025-07-03 | Stop reason: HOSPADM

## 2025-07-03 RX ORDER — LIDOCAINE HYDROCHLORIDE 10 MG/ML
INJECTION, SOLUTION EPIDURAL; INFILTRATION; INTRACAUDAL; PERINEURAL
Status: DISCONTINUED | OUTPATIENT
Start: 2025-07-03 | End: 2025-07-03 | Stop reason: HOSPADM

## 2025-07-03 RX ORDER — MIDAZOLAM HYDROCHLORIDE 1 MG/ML
2-20 INJECTION, SOLUTION INTRAMUSCULAR; INTRAVENOUS ONCE AS NEEDED
Status: DISCONTINUED | OUTPATIENT
Start: 2025-07-03 | End: 2025-07-03 | Stop reason: HOSPADM

## 2025-07-03 RX ORDER — FLUMAZENIL 0.1 MG/ML
0.5 INJECTION INTRAVENOUS ONCE AS NEEDED
Status: DISCONTINUED | OUTPATIENT
Start: 2025-07-03 | End: 2025-07-03 | Stop reason: HOSPADM

## 2025-07-03 RX ORDER — ETOMIDATE 2 MG/ML
0.3 INJECTION INTRAVENOUS AS NEEDED
Status: DISCONTINUED | OUTPATIENT
Start: 2025-07-03 | End: 2025-07-03 | Stop reason: HOSPADM

## 2025-07-03 RX ORDER — NITROGLYCERIN 0.4 MG/1
0.4 TABLET SUBLINGUAL
Status: DISCONTINUED | OUTPATIENT
Start: 2025-07-03 | End: 2025-07-03 | Stop reason: HOSPADM

## 2025-07-03 RX ORDER — MIDAZOLAM HYDROCHLORIDE 1 MG/ML
INJECTION, SOLUTION INTRAMUSCULAR; INTRAVENOUS
Status: DISCONTINUED
Start: 2025-07-03 | End: 2025-07-03 | Stop reason: HOSPADM

## 2025-07-03 RX ORDER — ASPIRIN 81 MG/1
81 TABLET ORAL DAILY
Status: DISCONTINUED | OUTPATIENT
Start: 2025-07-04 | End: 2025-07-03 | Stop reason: HOSPADM

## 2025-07-03 RX ORDER — HEPARIN SODIUM 1000 [USP'U]/ML
INJECTION, SOLUTION INTRAVENOUS; SUBCUTANEOUS
Status: DISCONTINUED | OUTPATIENT
Start: 2025-07-03 | End: 2025-07-03 | Stop reason: HOSPADM

## 2025-07-03 RX ORDER — ACETAMINOPHEN 325 MG/1
650 TABLET ORAL EVERY 4 HOURS PRN
Status: DISCONTINUED | OUTPATIENT
Start: 2025-07-03 | End: 2025-07-03 | Stop reason: HOSPADM

## 2025-07-03 RX ORDER — FENTANYL CITRATE 50 UG/ML
INJECTION, SOLUTION INTRAMUSCULAR; INTRAVENOUS
Status: DISCONTINUED
Start: 2025-07-03 | End: 2025-07-03 | Stop reason: WASHOUT

## 2025-07-03 RX ORDER — ETOMIDATE 2 MG/ML
INJECTION INTRAVENOUS
Status: DISCONTINUED
Start: 2025-07-03 | End: 2025-07-03 | Stop reason: HOSPADM

## 2025-07-03 RX ORDER — SODIUM CHLORIDE 0.9 % (FLUSH) 0.9 %
10 SYRINGE (ML) INJECTION EVERY 12 HOURS SCHEDULED
Status: DISCONTINUED | OUTPATIENT
Start: 2025-07-03 | End: 2025-07-03 | Stop reason: HOSPADM

## 2025-07-03 RX ORDER — FENTANYL CITRATE 50 UG/ML
50-100 INJECTION, SOLUTION INTRAMUSCULAR; INTRAVENOUS ONCE AS NEEDED
Refills: 0 | Status: DISCONTINUED | OUTPATIENT
Start: 2025-07-03 | End: 2025-07-03

## 2025-07-03 RX ORDER — FENTANYL CITRATE 50 UG/ML
50-200 INJECTION, SOLUTION INTRAMUSCULAR; INTRAVENOUS ONCE AS NEEDED
Refills: 0 | Status: DISCONTINUED | OUTPATIENT
Start: 2025-07-03 | End: 2025-07-03 | Stop reason: HOSPADM

## 2025-07-03 RX ORDER — MIDAZOLAM HYDROCHLORIDE 1 MG/ML
INJECTION, SOLUTION INTRAMUSCULAR; INTRAVENOUS
Status: COMPLETED | OUTPATIENT
Start: 2025-07-03 | End: 2025-07-03

## 2025-07-03 RX ORDER — SODIUM CHLORIDE 9 MG/ML
100 INJECTION, SOLUTION INTRAVENOUS CONTINUOUS
Status: DISCONTINUED | OUTPATIENT
Start: 2025-07-03 | End: 2025-07-03 | Stop reason: HOSPADM

## 2025-07-03 RX ORDER — NALOXONE HCL 0.4 MG/ML
0.4 VIAL (ML) INJECTION ONCE AS NEEDED
Status: DISCONTINUED | OUTPATIENT
Start: 2025-07-03 | End: 2025-07-03 | Stop reason: HOSPADM

## 2025-07-03 RX ORDER — SODIUM CHLORIDE 9 MG/ML
40 INJECTION, SOLUTION INTRAVENOUS AS NEEDED
Status: DISCONTINUED | OUTPATIENT
Start: 2025-07-03 | End: 2025-07-03 | Stop reason: HOSPADM

## 2025-07-03 RX ORDER — ETOMIDATE 2 MG/ML
INJECTION INTRAVENOUS
Status: COMPLETED | OUTPATIENT
Start: 2025-07-03 | End: 2025-07-03

## 2025-07-03 RX ORDER — SODIUM CHLORIDE 0.9 % (FLUSH) 0.9 %
10 SYRINGE (ML) INJECTION AS NEEDED
Status: DISCONTINUED | OUTPATIENT
Start: 2025-07-03 | End: 2025-07-03 | Stop reason: HOSPADM

## 2025-07-03 RX ADMIN — SODIUM CHLORIDE 330 ML: 9 INJECTION, SOLUTION INTRAVENOUS at 08:46

## 2025-07-03 RX ADMIN — ETOMIDATE 10 MG: 40 INJECTION, SOLUTION INTRAVENOUS at 08:50

## 2025-07-03 RX ADMIN — MIDAZOLAM HYDROCHLORIDE 2 MG: 1 INJECTION, SOLUTION INTRAMUSCULAR; INTRAVENOUS at 08:50

## 2025-07-03 NOTE — SIGNIFICANT NOTE
All discharge instructions discussed with patient and family. All questions answered and denies any further need at this time

## 2025-07-03 NOTE — INTERVAL H&P NOTE
H&P reviewed. The patient was examined and there are no changes to the H&P.      Patient presents today for LHC +/- CBI, TRU.  Risk, benefits, and alternatives discussed with the patient and they wish to proceed.  We will proceed via right radial.

## 2025-07-03 NOTE — SIGNIFICANT NOTE
Patient discharged from recovery. No acute distress noted. All personal belongings with patient at time of discharge. Site noted to be clean, dry, intact and soft to touch. Denies any needs at time of discharge. IV removed with cath tip intact.

## 2025-07-03 NOTE — CONSULTS
[facility] Cardiothoracic Surgery In-Patient Consult    Name:  Ac Caldwell  MRN Number:  [id number]  Date of Admission:  7/3/2025  Date of Consultation: 7/3/2025    Consulting Provider:    PCP: Valentine Newton APRN  IP Care Team:  Patient Care Team:  Valentine Newton APRN as PCP - General (Family Medicine)  Migel Valladares MD as Consulting Physician (Cardiology)  Manish Camp MD as Consulting Physician (Dermatology)  Chacorta Guajardo MD as Consulting Physician (Ophthalmology)  Ez Everett MD as Consulting Physician (Otolaryngology)  Jasen Ayala MD as Consulting Physician (Urology)  Chacorta Ludwig MD (Vascular Surgery)  Layla Mccarthy MD as Consulting Physician (Dermatology)  Nicole De La O PA-C as Physician Assistant (Physician Assistant)  Eva Lopez MD as Consulting Physician (Nephrology)  Georgiana Bettencourt MD as Consulting Physician (Urology)  Layla De Dios MD as Consulting Physician (Pulmonary Disease)  Aracelis Bazzi RN as Nurse Navigator    Reason for Consultation: Severe aortic stenosis    History of Present Illness:    Ac Caldwell is a 79 y.o. male with a history of hypertension, hyperlipidemia, ascending aortic aneurysm (4.2 cm), nonrheumatic aortic valve stenosis, renal cancer s/p right nephrectomy in 1997 s/p resection of tumor from left kidney s/p ablation of left renal mass, basal cell carcinoma s/p excision, thyroid tumor s/p partial thyroidectomy, and never smoker.  He presented to our facility today to undergo TAVR workup with cardiac cath and TRU.  Cath results revealed no coronary artery disease.  TRU noted LVEF 60%, bicuspid aortic valve with severe aortic valve stenosis and a mean pressure gradient of 42 mmHg, peak velocity of 434 cm/s, mild aortic valve regurgitation, and mild mitral and tricuspid valve regurgitation.  He reports exertional dyspnea since October 2024, fatigue, and recently had a syncopal episode in May. Our service  has been consulted for consideration of TAVR.     Review of Systems:  Review of Systems   Constitutional:  Positive for fatigue.   Neurological:  Positive for syncope.   All other systems reviewed and are negative.      Hospital Problems:   Nonrheumatic aortic valve stenosis       Past Medical History:    Past Medical History:   Diagnosis Date    Aneurysm 2017    aortic    Coronary artery disease     Exertional dyspnea     He does have symptoms of exertional dyspnea and minimal orthopnea. This may be unrelated to his aortic stenosis, and due to his risk factors, I am also concerned about coronary artery disease.     GERD (gastroesophageal reflux disease) 1997    Heart murmur 1966    failed [employer] physical - 4F    Hematuria     Episode of hematuria in January with subsequent negative cystoscopy.     History of kidney cancer     History of kidney stones     HL (hearing loss) 2000    Hyperlipidemia     Hypertension 1998    Kidney stone 1991    Lung nodule 09/2023    Lung nodule seen on imaging study     Renal cancer     Hx of.Status post right nephrectomy in 1997. Status post resection of a tumor from his left kidney -- incomplete database.     Renal insufficiency 2010    Renal oncocytoma     Rosacea     Thoracic aortic aneurysm     Thyroid disease     UTI (urinary tract infection)        Past Surgical History:    Past Surgical History:   Procedure Laterality Date    ABLATION OF DYSRHYTHMIC FOCUS  2017    left kidney    BASAL CELL CARCINOMA EXCISION      Status post basal cell cancer surgery.     BRONCHOSCOPY WITH ION ROBOTIC ASSIST N/A 11/10/2023    Procedure: BRONCHOSCOPY NAVIGATION WITH ENDOBRONCHIAL ULTRASOUND AND ION ROBOT;  Surgeon: Manish Wynn MD;  Location: [facility];  Service: Robotics - Pulmonary;  Laterality: N/A;  Ion cath #4,  #4, cath guide 0085    CATARACT EXTRACTION Bilateral     COLONOSCOPY  04/2015    due 2025    CYST REMOVAL      KIDNEY STONE SURGERY Left 02/08/2012    [facility]    KIDNEY SURGERY      Status post resection of a tumor from his left kidney -- incomplete database.     KIDNEY SURGERY  09/27/2017        LUNG BIOPSY  nov 10,23    NEPHRECTOMY Bilateral     Has 15% of left Kidney    NEPHRECTOMY Right 1997    PROSTATE SURGERY  02/19/2019    [facility]    THYROID SURGERY      Status post resection of tumor from right thyroid gland.     THYROIDECTOMY, PARTIAL         Family History:    Family History   Problem Relation Age of Onset    Diabetes Mother     Obesity Mother     Heart failure Mother     Clotting disorder Mother     Hypertension Mother     Stroke Mother     Clotting disorder Father     Atrial fibrillation Father     Pneumonia Father     Hypertension Father     Asthma Sister     Stroke Paternal Grandmother     Stroke Paternal Grandfather     Heart attack Cousin     Heart attack Cousin     Suicidality Cousin     Stroke Other        Social History:    Social History     Socioeconomic History    Marital status: Single    Number of children: 1   Tobacco Use    Smoking status: Never     Passive exposure: Never    Smokeless tobacco: Never    Tobacco comments:     50,60,70 years it was impossible to avoid people who smoked   Vaping Use    Vaping status: Never Used   Substance and Sexual Activity    Alcohol use: Not Currently     Comment: social - no schedule - random    Drug use: Never    Sexual activity: Not Currently     Partners: Female       Allergies:  Allergies   Allergen Reactions    Norvasc [Amlodipine] Swelling    Oxycodone Other (See Comments)     Can elevate Blood pressure    Lotensin [Benazepril] Cough    Sulfamethoxazole-Trimethoprim Cough    Zocor [Simvastatin] Myalgia         Physical Exam:  Vital Signs:    Temp:  [97.8 °F (36.6 °C)] 97.8 °F (36.6 °C)  Heart Rate:  [76-85] 76  Resp:  [15-16] 15  BP: (132-195)/() 132/83  Body mass index is 36.77 kg/m².     Physical Exam  Constitutional:       Appearance: He is obese.   HENT:      Head:  "Atraumatic.      Mouth/Throat:      Pharynx: Oropharynx is clear.   Eyes:      Pupils: Pupils are equal, round, and reactive to light.   Cardiovascular:      Rate and Rhythm: Normal rate.      Pulses: Normal pulses.      Heart sounds: Murmur heard.      Systolic murmur is present with a grade of 3/6.   Pulmonary:      Effort: Pulmonary effort is normal.   Abdominal:      General: Abdomen is flat.      Palpations: Abdomen is soft.   Musculoskeletal:      Cervical back: Neck supple.   Skin:     General: Skin is warm.   Neurological:      General: No focal deficit present.      Mental Status: He is alert and oriented to person, place, and time.   Psychiatric:         Mood and Affect: Mood normal.         Behavior: Behavior normal.         Labs/Imaging/Procedures:         Invalid input(s): \"LABALBU\", \"PROT\"                      Cardiac Catheterization/Vascular Study  Result Date: 7/3/2025  Angiographically near normal coronary arteries. RECOMMENDATIONS: Proceed to further evaluation and scheduling for TAVR. Indications: Severe/symptomatic aortic stenosis. Access: Right radial. Procedures: Selective coronary angiography. Arterial site hemostasis with radial band. Procedure narrative: The patient was brought to the catheterization lab in a fasting condition.  Access site was prepped and draped in standard sterile fashion.  Lidocaine was injected and arterial access was obtained by percutaneous anterior wall puncture technique.  A 6 English arterial sheath was placed. Above procedures were performed without complications.  At the conclusion the arterial sheath was removed and hemostasis was achieved.  The patient was transferred to the unit in a stable condition. Angiographic Findings: Right coronary dominance. LM: Angiographically normal. LAD: Minimal luminal irregularities with angiographically near normal vessel. LCX: Minimal luminal irregularities with angiographically near normal vessel. RCA: Dominant vessel, " angiographically normal. Complications: No acute procedure related complications.      [facility]: Results for orders placed during the hospital encounter of 07/03/25    Cardiac Catheterization/Vascular Study    Conclusion  FINAL    Impression  Angiographically near normal coronary arteries.    RECOMMENDATIONS:  Proceed to further evaluation and scheduling for TAVR.    Indications: Severe/symptomatic aortic stenosis.    Access: Right radial.    Procedures:  Selective coronary angiography.  Arterial site hemostasis with radial band.    Procedure narrative:  The patient was brought to the catheterization lab in a fasting condition.  Access site was prepped and draped in standard sterile fashion.  Lidocaine was injected and arterial access was obtained by percutaneous anterior wall puncture technique.  A 6 Syriac arterial sheath was placed. Above procedures were performed without complications.  At the conclusion the arterial sheath was removed and hemostasis was achieved.  The patient was transferred to the unit in a stable condition.    Angiographic Findings:  Right coronary dominance.  LM: Angiographically normal.  LAD: Minimal luminal irregularities with angiographically near normal vessel.  LCX: Minimal luminal irregularities with angiographically near normal vessel.  RCA: Dominant vessel, angiographically normal.    Complications: No acute procedure related complications.     Echo: Results for orders placed during the hospital encounter of 10/18/24    Adult Transthoracic Echo Complete w/ Color, Spectral and Contrast if necessary per protocol 10/18/2024 11:13 AM    Interpretation Summary    Left ventricular ejection fraction appears to be greater than 70%.    Left ventricular wall thickness is consistent with mild concentric hypertrophy.    Left ventricular diastolic function is consistent with (grade I) impaired relaxation.    Mild biatrial enlargement.    Severe aortic valve stenosis is present.  Peak velocity 431  cm/s, mean gradient 39 mmHg, HEATHER 0.9 cm².    Mild aortic insufficiency.    Trace to mild mitral regurgitation.    Mild tricuspid regurgitation with normal RVSP.    Mild dilation of the aortic root is present. Mild dilation of the sinuses of Valsalva is present. Mild dilation of the ascending aorta is present.     Carotid Duplex: Results for orders placed during the hospital encounter of 10/23/24    Duplex Venous Upper Extremity - Left CAR 10/24/2024  6:28 AM    Interpretation Summary    Acute left upper extremity superficial thrombophlebitis noted in the basilic (upper arm) and basilic (forearm).    There is no evidence of DVT.    Arterial flow is noted in the basic vein at the level of elbow and forearm suggesting presence of an AV fistula.    Assessment:    Nonrheumatic aortic valve stenosis      Plan:  Continue with preoperative TAVR workup. Carotid duplex to be completed today. Scheduled for CT/TAVR in the near future.     I agree with above    BERNICE Glover  10:01 EDT  07/03/25     Thank you for allowing us to participate in the care of your patient. Please do not hesitate to contact us with additional questions or concerns.

## 2025-07-03 NOTE — CONSULTS
Chart review for diabetes educator consult.     At the time of this review patient A1c is 5.7 , they have no noted history of diabetes and no home medications noted for treatment of diabetes. At this time we do not feel the patient would benefit from diabetes education. Thank you for this consult, should patient needs change please re consult us.

## 2025-07-04 LAB
BH CV XLRA MEAS LEFT DIST CCA EDV: 9.5 CM/SEC
BH CV XLRA MEAS LEFT DIST CCA PSV: 56.3 CM/SEC
BH CV XLRA MEAS LEFT DIST ICA EDV: 9.9 CM/SEC
BH CV XLRA MEAS LEFT DIST ICA PSV: 36 CM/SEC
BH CV XLRA MEAS LEFT ICA/CCA RATIO: 0.68
BH CV XLRA MEAS LEFT MID CCA EDV: 16 CM/SEC
BH CV XLRA MEAS LEFT MID CCA PSV: 69.7 CM/SEC
BH CV XLRA MEAS LEFT MID ICA EDV: 22 CM/SEC
BH CV XLRA MEAS LEFT MID ICA PSV: 58.7 CM/SEC
BH CV XLRA MEAS LEFT PROX CCA EDV: 21.6 CM/SEC
BH CV XLRA MEAS LEFT PROX CCA PSV: 85.7 CM/SEC
BH CV XLRA MEAS LEFT PROX ECA EDV: 12 CM/SEC
BH CV XLRA MEAS LEFT PROX ECA PSV: 55.5 CM/SEC
BH CV XLRA MEAS LEFT PROX ICA EDV: 10.8 CM/SEC
BH CV XLRA MEAS LEFT PROX ICA PSV: 55.5 CM/SEC
BH CV XLRA MEAS LEFT PROX SCLA PSV: 67.5 CM/SEC
BH CV XLRA MEAS LEFT VERTEBRAL A EDV: 8.5 CM/SEC
BH CV XLRA MEAS LEFT VERTEBRAL A PSV: 27.2 CM/SEC
BH CV XLRA MEAS RIGHT DIST CCA EDV: 10 CM/SEC
BH CV XLRA MEAS RIGHT DIST CCA PSV: 41.9 CM/SEC
BH CV XLRA MEAS RIGHT DIST ICA EDV: 16.9 CM/SEC
BH CV XLRA MEAS RIGHT DIST ICA PSV: 44.1 CM/SEC
BH CV XLRA MEAS RIGHT ICA/CCA RATIO: 0.77
BH CV XLRA MEAS RIGHT MID CCA EDV: 12.8 CM/SEC
BH CV XLRA MEAS RIGHT MID CCA PSV: 52.2 CM/SEC
BH CV XLRA MEAS RIGHT MID ICA EDV: 9.6 CM/SEC
BH CV XLRA MEAS RIGHT MID ICA PSV: 39.3 CM/SEC
BH CV XLRA MEAS RIGHT PROX CCA EDV: 12.8 CM/SEC
BH CV XLRA MEAS RIGHT PROX CCA PSV: 57.1 CM/SEC
BH CV XLRA MEAS RIGHT PROX ECA EDV: 9.5 CM/SEC
BH CV XLRA MEAS RIGHT PROX ECA PSV: 76.6 CM/SEC
BH CV XLRA MEAS RIGHT PROX ICA EDV: 17 CM/SEC
BH CV XLRA MEAS RIGHT PROX ICA PSV: 37.3 CM/SEC
BH CV XLRA MEAS RIGHT PROX SCLA PSV: 82 CM/SEC
BH CV XLRA MEAS RIGHT VERTEBRAL A EDV: 6.5 CM/SEC
BH CV XLRA MEAS RIGHT VERTEBRAL A PSV: 25 CM/SEC
LEFT ARM BP: NORMAL MMHG
RIGHT ARM BP: NORMAL MMHG

## 2025-07-07 ENCOUNTER — DOCUMENTATION (OUTPATIENT)
Dept: CARDIAC REHAB | Facility: HOSPITAL | Age: 80
End: 2025-07-07
Payer: MEDICARE

## 2025-07-07 DIAGNOSIS — I35.0 NONRHEUMATIC AORTIC VALVE STENOSIS: Primary | ICD-10-CM

## 2025-07-07 DIAGNOSIS — I71.21 ANEURYSM OF ASCENDING AORTA WITHOUT RUPTURE: ICD-10-CM

## 2025-07-07 NOTE — PROGRESS NOTES
TAVR workup ongoing. TRU, LHC, carotid ultrasound and CT surgery consult completed on 7/3. Will need CTA with TAVR protocol to complete workup. If imaging is satisfactory and all are in agreement, will schedule TAVR. Tentative August date TBD

## 2025-07-10 ENCOUNTER — OFFICE VISIT (OUTPATIENT)
Dept: INTERNAL MEDICINE | Facility: CLINIC | Age: 80
End: 2025-07-10
Payer: MEDICARE

## 2025-07-10 VITALS
BODY MASS INDEX: 37.23 KG/M2 | TEMPERATURE: 97.5 F | DIASTOLIC BLOOD PRESSURE: 78 MMHG | SYSTOLIC BLOOD PRESSURE: 142 MMHG | OXYGEN SATURATION: 97 % | WEIGHT: 251.4 LBS | RESPIRATION RATE: 22 BRPM | HEIGHT: 69 IN | HEART RATE: 64 BPM

## 2025-07-10 DIAGNOSIS — E78.5 DYSLIPIDEMIA: ICD-10-CM

## 2025-07-10 DIAGNOSIS — R73.03 PREDIABETES: ICD-10-CM

## 2025-07-10 DIAGNOSIS — E03.9 ACQUIRED HYPOTHYROIDISM: ICD-10-CM

## 2025-07-10 DIAGNOSIS — E55.9 VITAMIN D DEFICIENCY: ICD-10-CM

## 2025-07-10 DIAGNOSIS — I35.0 NONRHEUMATIC AORTIC VALVE STENOSIS: Primary | ICD-10-CM

## 2025-07-10 DIAGNOSIS — I10 ESSENTIAL HYPERTENSION: ICD-10-CM

## 2025-07-10 DIAGNOSIS — N18.31 STAGE 3A CHRONIC KIDNEY DISEASE: ICD-10-CM

## 2025-07-10 NOTE — ASSESSMENT & PLAN NOTE
{Hypertension is (optional):[id number]}    Orders:    Lipid Panel; Future    TSH; Future    Vitamin B12; Future    Vitamin D,25-Hydroxy; Future    Hemoglobin A1c; Future    T4, Free; Future

## 2025-07-10 NOTE — PROGRESS NOTES
Subjective   The ABCs of the Annual Wellness Visit  Medicare Wellness Visit      Ac Caldwell is a 79 y.o. patient who presents for a Medicare Wellness Visit.    The following portions of the patient's history were reviewed and   updated as appropriate: allergies, current medications, past family history, past medical history, past social history, past surgical history, and problem list.    Compared to one year ago, the patient's physical   health is worse.  Compared to one year ago, the patient's mental   health is the same.    Recent Hospitalizations:  He was not admitted to the hospital during the last year.     Current Medical Providers:  Patient Care Team:  Valentine Newton APRN as PCP - General (Family Medicine)  Migel Valladares MD as Consulting Physician (Cardiology)  Manish Camp MD as Consulting Physician (Dermatology)  Chacorta Guajardo MD as Consulting Physician (Ophthalmology)  Ez Everett MD as Consulting Physician (Otolaryngology)  Jasen Ayala MD as Consulting Physician (Urology)  Chacorta Luwdig MD (Vascular Surgery)  Layla Mccarthy MD as Consulting Physician (Dermatology)  Nicole De La O PA-C as Physician Assistant (Physician Assistant)  Eva Lopez MD as Consulting Physician (Nephrology)  Georgiana Bettencourt MD as Consulting Physician (Urology)  Layla De Dios MD as Consulting Physician (Pulmonary Disease)  Aracelis Bazzi RN as Nurse Navigator    Outpatient Medications Prior to Visit   Medication Sig Dispense Refill    aspirin 325 MG tablet Take 1 tablet by mouth Every Night.      azelastine (ASTELIN) 0.1 % nasal spray Administer  into the nostril(s) as directed by provider As Needed for Allergies.      betamethasone valerate (VALISONE) 0.1 % ointment Apply 1 Application topically to the appropriate area as directed As Needed.      carvedilol (COREG) 6.25 MG tablet Take 1 tablet by mouth twice daily 180 tablet 3    Cholecalciferol (Vitamin D3) 50 MCG (2000  UT) tablet Take 1 tablet by mouth Daily.      Coenzyme Q10 (CO Q-10) 100 MG capsule Take 100 mg by mouth 2 (Two) Times a Day.      esomeprazole (NexIUM) 20 MG capsule Take 1 capsule by mouth Daily.      famciclovir (FAMVIR) 500 MG tablet Take 1 tablet by mouth Daily. (Patient taking differently: Take 1 tablet by mouth As Needed.)      Flaxseed, Linseed, (FLAX SEED OIL) 1000 MG capsule Take 1,200 mg by mouth Daily.      Fluorouracil-Calcipotriene 5-0.005 % cream Apply 1 Application topically to the appropriate area as directed 2 (Two) Times a Day.      levothyroxine (SYNTHROID, LEVOTHROID) 50 MCG tablet Take 1 tablet by mouth Daily.      losartan (COZAAR) 100 MG tablet Take 1 tablet by mouth once daily 30 tablet 0    Omega-3 Fatty Acids (FISH OIL) 1000 MG capsule capsule Take 1,200 mg by mouth 2 (Two) Times a Day With Meals.      potassium citrate (UROCIT-K) 10 MEQ (1080 MG) CR tablet Take 1 tablet by mouth once daily 90 tablet 1    rosuvastatin (CRESTOR) 40 MG tablet Take 1 tablet by mouth once daily (Patient taking differently: Take 1 tablet by mouth Every Night.) 90 tablet 0    vitamin B-12 (CYANOCOBALAMIN) 1000 MCG tablet Take 1 tablet by mouth Daily.       No facility-administered medications prior to visit.     No opioid medication identified on active medication list. I have reviewed chart for other potential  high risk medication/s and harmful drug interactions in the elderly.      Aspirin is on active medication list. Aspirin use is indicated based on review of current medical condition/s. Pros and cons of this therapy have been discussed today. Benefits of this medication outweigh potential harm.  Patient has been encouraged to continue taking this medication.  .      Patient Active Problem List   Diagnosis    Nephritis and nephropathy    Essential hypertension    Esophageal reflux    Diverticulosis of small intestine without hemorrhage    Morbidly obese    Lymphadenopathy    Ganglion cyst    Malignant  "neoplasm of kidney, except pelvis    Dyslipidemia    Exertional dyspnea    Nonrheumatic aortic valve stenosis    Acquired hypothyroidism    Pancreatic cyst    Aneurysm of ascending aorta without rupture    Prediabetes    Stage 3a chronic kidney disease    Chest discomfort    IPMN (intraductal papillary mucinous neoplasm)    At average risk for colon cancer    Lung nodule    Acute cystitis without hematuria    Left arm swelling     Advance Care Planning Advance Directive is not on file.  ACP discussion was held with the patient during this visit. Patient does not have an advance directive, information provided.            Objective   Vitals:    07/10/25 1319   BP: 142/78   BP Location: Left arm   Patient Position: Sitting   Cuff Size: Adult   Pulse: 64   Resp: 22   Temp: 97.5 °F (36.4 °C)   TempSrc: Infrared   SpO2: 97%   Weight: 114 kg (251 lb 6.4 oz)   Height: 175.3 cm (69\")   PainSc: 0-No pain       Estimated body mass index is 37.13 kg/m² as calculated from the following:    Height as of this encounter: 175.3 cm (69\").    Weight as of this encounter: 114 kg (251 lb 6.4 oz).    Class 2 Severe Obesity (BMI >=35 and <=39.9). Obesity-related health conditions include the following: hypertension and dyslipidemias. Obesity is unchanged. BMI is is above average; BMI management plan is completed. We discussed portion control and increasing exercise.           Does the patient have evidence of cognitive impairment? No                                                                                                Health  Risk Assessment    Smoking Status:  Social History     Tobacco Use   Smoking Status Never    Passive exposure: Never   Smokeless Tobacco Never   Tobacco Comments    50,60,70 years it was impossible to avoid people who smoked     Alcohol Consumption:  Social History     Substance and Sexual Activity   Alcohol Use Not Currently    Comment: social - no schedule - random       Fall Risk Screen  STEADI Fall Risk " Assessment was completed, and patient is at LOW risk for falls.Assessment completed on:7/10/2025    Depression Screening   Little interest or pleasure in doing things? Not at all   Feeling down, depressed, or hopeless? Not at all   PHQ-2 Total Score 0      Health Habits and Functional and Cognitive Screenin/7/2025     9:20 AM   Functional & Cognitive Status   Do you have difficulty preparing food and eating? No   Do you have difficulty bathing yourself, getting dressed or grooming yourself? No   Do you have difficulty using the toilet? No   Do you have difficulty moving around from place to place? No   Do you have trouble with steps or getting out of a bed or a chair? No   Current Diet Limited Junk Food   Dental Exam Up to date   Eye Exam Up to date   Exercise (times per week) Other   Current Exercises Include Gardening   Do you need help using the phone?  No   Are you deaf or do you have serious difficulty hearing?  No   Do you need help to go to places out of walking distance? No   Do you need help shopping? No   Do you need help preparing meals?  No   Do you need help with housework?  No   Do you need help with laundry? No   Do you need help taking your medications? No   Do you need help managing money? No   Do you ever drive or ride in a car without wearing a seat belt? No   Have you felt unusual fatigue (could be tiredness), stress, anger or loneliness in the last month? No   Who do you live with? Alone   If you need help, do you have trouble finding someone available to you? No   Have you been bothered in the last four weeks by sexual problems? No   Do you have difficulty concentrating, remembering or making decisions? No           Age-appropriate Screening Schedule:  Refer to the list below for future screening recommendations based on patient's age, sex and/or medical conditions. Orders for these recommended tests are listed in the plan section. The patient has been provided with a written  plan.    Health Maintenance List  Health Maintenance   Topic Date Due    COLORECTAL CANCER SCREENING  04/28/2025    COVID-19 Vaccine (7 - 2024-25 season) 01/10/2026 (Originally 3/3/2025)    INFLUENZA VACCINE  10/01/2025    LIPID PANEL  12/04/2025    ANNUAL WELLNESS VISIT  07/10/2026    TDAP/TD VACCINES (3 - Td or Tdap) 09/03/2034    HEPATITIS C SCREENING  Completed    RSV Vaccine - Adults  Completed    Pneumococcal Vaccine 50+  Completed    ZOSTER VACCINE  Completed                                                                                                                                                CMS Preventative Services Quick Reference  Risk Factors Identified During Encounter  Inactivity/Sedentary: Patient was advised to exercise at least 150 minutes a week per CDC recommendations. and to hold off for now until TAVR complete    The above risks/problems have been discussed with the patient.  Pertinent information has been shared with the patient in the After Visit Summary.  An After Visit Summary and PPPS were made available to the patient.    Follow Up:   Next Medicare Wellness visit to be scheduled in 1 year.         Additional E&M Note during same encounter follows:  Patient has additional, significant, and separately identifiable condition(s)/problem(s) that require work above and beyond the Medicare Wellness Visit     Chief Complaint  subsquent medicare wellness (Chronic conditions: HTN, Hypothyroidism, Dyslipidemia, Stage 3 Chronic Kidney Disease.)    Subjective   HPI  Ac is also being seen today for additional medical problem/s.    On 1/26/15, Ac saw gross hematuria, saw Dr. Ayala (urology) and he did a scope. It was ok. Then had a CT scan on 2/3/15. Cr was high, so they did it without contrast. CT scan showed a lesion, but he was not concerned. Then had a fish bladder test on 2/5/15. His urologist never got the results. Did go back to see Nephrology and had labs in Feb. Cr. was 1.1 and  BUN was 16. Everything has been stable since. Has a h/o right kidney CA in 1997 and had nephrectomy.  Urologist - Dr. Jossue Serrano left and moved to [place] and [place] [facility] [place] saw him several times for recurrent UTI, hydrocele, renal cyst, and epididymoorchits. He saw them again 5/31/2017 for repeat renal US that showed a 3.4cm left renal cyst, which had grown from 2.5cm on last scan.  Dr. Farrell moved to [place] and he was then referred to  Urology.  Had CXR and labs.  CXR revealed a possible thoracic aortic aneurysm.  Had f/u chest CT scan, no aneurysm.  CT renal mass protocol was done on 8/30/2017 which confirmed a solid enhancing mass in the lower pole of the left kidney, without lymphadenopathy.  It was elected to proceed with a cryoablation of this lesion per Dr. Mark at .  He underwent this procedure on 9/27/17.  Pathology is unclear as to it being malignant vs benign.  Was re-scanned in 30 days, then 90 days, then Q6 months.  He has been told that he does not need to come back unless he has problems.       CT in Aug 2017 revealed an incidental pancreatic lesion on the head of the pancreas that was 12 mm in size; f/u imaging done 9/2018 showed that the mass was stable and unchanged in size.  Had a biopsy of the pancreatic cyst in 2019 and it was benign.  They will monitor Q12 months with MRI.  Ac prefers a CT over an MRI.  The CT done on 10/1/2020 of abd and pelvis shows a simple cystic structure near the head of the pancreas measuring 1.6 cm.  The pancreatic cyst appears to be slightly larger on this scan than previous (from 12 mm to 16 mm) - this is followed by Dr. Norwood at .  We faxed a copy to his office and he underwent an EGD and EUS on 3/2/2021.  The pathology results were benign but he suggested another followup  EUS/FNA in one year, in the March/April 2022 time frame.  Had this done in 6/2022.  Was referred to [facility].  All records reviewed and he was  rescanned in July 2024     Thoracic AA - followed by CT surgery at .  Per echo in Marc 2022 Aortic Root measured 41 mm and per last echo at [facility] by [facility] it is 44 mm.  He was referred to [facility] CT surgery for routine f/u.  Last US was 10 2024     After CT for AA, a pulm nodule and was referred to [facility] operated by [facility] Pul.  Had f/u CT, PET and Bronch.  Neg for malignancy.  Most recent CT showed new nodule.  They are following closely     Underwent a STEAM procedure in 2/2019 for BPH @       Nephrologist- [facility]; his Creatinine is 1.32     Had a very large thyroid nodule removed on 4/23/14 per ENT. It was 8cm in size; they took the right side of his thyroid and placed on synthroid 50mcg. It was benign! Was seeing Dr. Everett annually and everything has been stable.  He has retired        Lab Results   Component Value Date     TSH 4.090 12/04/2024      AS - now followed by Cardiology, Dr. Valladares.  He is currently undergoing the workup for the TAVR.  Had LHC on 7/3/2024      HTN - well controlled with Losartan 100 mg and Carvedilol 12.5 mg BID (recent per Cards); BP is well controlled and he denies SE.      HL - stable on Lipitor 20mg daily without SE.  We did try to go up to 40 mg daily, but he did not tolerate due to leg cramps.    Lab Results   Component Value Date    CHOL 169 12/04/2024    TRIG 97 12/04/2024    HDL 41 12/04/2024     (H) 12/04/2024       COVID - 3/12/21 and 4/2/2021, 10/14/2021, 4/9/2022, 10/1/2022, 9/3/2024  Flu shot - 9/3/2024  Tdap 2009   Pneumovax 2012   Prevnar - 4/2015   Zostavax 2010  Shingrix - UTD  PSA per Urology - WNL, no need to repeat  Colon - 4/2015, due 2025, needs to hold off until TAVR is done  RSV - 3/2024    Review of Systems   Constitutional:  Positive for fatigue.   Respiratory:  Positive for shortness of breath.    All other systems reviewed and are negative.             Objective   Vital Signs:  /78 (BP Location: Left arm, Patient Position: Sitting, Cuff Size:  "Adult)   Pulse 64   Temp 97.5 °F (36.4 °C) (Infrared)   Resp 22   Ht 175.3 cm (69\")   Wt 114 kg (251 lb 6.4 oz)   SpO2 97%   BMI 37.13 kg/m²   Physical Exam  Constitutional:       Appearance: He is well-developed.   HENT:      Head: Normocephalic and atraumatic.   Eyes:      Conjunctiva/sclera: Conjunctivae normal.      Pupils: Pupils are equal, round, and reactive to light.   Cardiovascular:      Rate and Rhythm: Normal rate and regular rhythm.      Heart sounds: Normal heart sounds.   Pulmonary:      Effort: Pulmonary effort is normal.      Breath sounds: Normal breath sounds.   Abdominal:      General: Bowel sounds are normal.      Palpations: Abdomen is soft.   Musculoskeletal:         General: Normal range of motion.      Cervical back: Normal range of motion.   Skin:     General: Skin is warm and dry.   Neurological:      Mental Status: He is alert and oriented to person, place, and time.      Deep Tendon Reflexes: Reflexes are normal and symmetric.   Psychiatric:         Behavior: Behavior normal.         Thought Content: Thought content normal.         Judgment: Judgment normal.                    Assessment and Plan Additional age appropriate preventative wellness advice topics were discussed during today's preventative wellness exam(some topics already addressed during AWV portion of the note above):    Physical Activity: Advised cardiovascular activity 150 minutes per week as tolerated. (example brisk walk for 30 minutes, 5 days a week).     Nutrition: Discussed nutrition plan with patient. Information shared in after visit summary. Goal is for a well balanced diet to enhance overall health.     Healthy Weight: Discussed current and goal BMI with patient. Steps to attain this goal discussed. Information shared in after visit summary.     Nonrheumatic aortic valve stenosis    Orders:    Lipid Panel; Future    TSH; Future    Vitamin B12; Future    Vitamin D,25-Hydroxy; Future    Hemoglobin A1c; " Future    T4, Free; Future    Essential hypertension      Orders:    Lipid Panel; Future    TSH; Future    Vitamin B12; Future    Vitamin D,25-Hydroxy; Future    Hemoglobin A1c; Future    T4, Free; Future    Dyslipidemia    Orders:    Lipid Panel; Future    TSH; Future    Vitamin B12; Future    Vitamin D,25-Hydroxy; Future    Hemoglobin A1c; Future    T4, Free; Future    Acquired hypothyroidism    Orders:    Lipid Panel; Future    TSH; Future    Vitamin B12; Future    Vitamin D,25-Hydroxy; Future    Hemoglobin A1c; Future    T4, Free; Future    Prediabetes    Orders:    Lipid Panel; Future    TSH; Future    Vitamin B12; Future    Vitamin D,25-Hydroxy; Future    Hemoglobin A1c; Future    T4, Free; Future    Stage 3a chronic kidney disease      Orders:    Lipid Panel; Future    TSH; Future    Vitamin B12; Future    Vitamin D,25-Hydroxy; Future    Hemoglobin A1c; Future    T4, Free; Future    Vitamin D deficiency    Orders:    Lipid Panel; Future    TSH; Future    Vitamin B12; Future    Vitamin D,25-Hydroxy; Future    Hemoglobin A1c; Future    T4, Free; Future            Follow Up   Return in about 6 months (around 1/10/2026) for f/u.  Patient was given instructions and counseling regarding his condition or for health maintenance advice. Please see specific information pulled into the AVS if appropriate.

## 2025-07-10 NOTE — ASSESSMENT & PLAN NOTE
Orders:    Lipid Panel; Future    TSH; Future    Vitamin B12; Future    Vitamin D,25-Hydroxy; Future    Hemoglobin A1c; Future    T4, Free; Future

## 2025-07-10 NOTE — ASSESSMENT & PLAN NOTE
{Renal Disease A/P (Optional):03648}    Orders:    Lipid Panel; Future    TSH; Future    Vitamin B12; Future    Vitamin D,25-Hydroxy; Future    Hemoglobin A1c; Future    T4, Free; Future

## 2025-07-16 DIAGNOSIS — I35.0 NONRHEUMATIC AORTIC VALVE STENOSIS: Primary | ICD-10-CM

## 2025-07-17 ENCOUNTER — TELEPHONE (OUTPATIENT)
Facility: HOSPITAL | Age: 80
End: 2025-07-17
Payer: MEDICARE

## 2025-07-17 NOTE — TELEPHONE ENCOUNTER
Pt contacted as pre-procedure phone call prior to planned CTA TAVR for 1:45 PM on 7/18/25 .Reviewed with patient arrival time between 1:15-1:30 PM to main Radiology registration at [facility]. Hydrate well the night before and morning of the procedure then nothing to eat or drink by mouth 2 hours prior to arrival except sips of water with medications. No caffeine or nicotine after midnight,  recommended,  reviewed procedure instructions and allowed time for questions, and reviewed home medications, allergies, and medical history

## 2025-07-18 ENCOUNTER — HOSPITAL ENCOUNTER (OUTPATIENT)
Facility: HOSPITAL | Age: 80
Discharge: HOME | End: 2025-07-18
Payer: MEDICARE

## 2025-07-18 VITALS
HEART RATE: 72 BPM | RESPIRATION RATE: 19 BRPM | OXYGEN SATURATION: 94 % | DIASTOLIC BLOOD PRESSURE: 75 MMHG | TEMPERATURE: 98.1 F | HEIGHT: 69 IN | BODY MASS INDEX: 36.72 KG/M2 | WEIGHT: 247.91 LBS | SYSTOLIC BLOOD PRESSURE: 147 MMHG

## 2025-07-18 DIAGNOSIS — R91.1 LUNG NODULE: ICD-10-CM

## 2025-07-18 DIAGNOSIS — I35.0 NONRHEUMATIC AORTIC VALVE STENOSIS: ICD-10-CM

## 2025-07-18 DIAGNOSIS — I71.21 ANEURYSM OF ASCENDING AORTA WITHOUT RUPTURE: ICD-10-CM

## 2025-07-18 LAB — CREAT BLDA-MCNC: 1 MG/DL (ref 0.6–1.3)

## 2025-07-18 PROCEDURE — 74174 CTA ABD&PLVS W/CONTRAST: CPT

## 2025-07-18 PROCEDURE — 71250 CT THORAX DX C-: CPT

## 2025-07-18 PROCEDURE — 25510000001 IOPAMIDOL PER 1 ML: Performed by: INTERNAL MEDICINE

## 2025-07-18 PROCEDURE — 82565 ASSAY OF CREATININE: CPT | Performed by: INTERNAL MEDICINE

## 2025-07-18 PROCEDURE — 71275 CT ANGIOGRAPHY CHEST: CPT

## 2025-07-18 RX ORDER — METOPROLOL TARTRATE 100 MG/1
100 TABLET ORAL ONCE AS NEEDED
Status: DISCONTINUED | OUTPATIENT
Start: 2025-07-18 | End: 2025-07-19 | Stop reason: HOSPADM

## 2025-07-18 RX ORDER — METOPROLOL TARTRATE 25 MG/1
50 TABLET, FILM COATED ORAL ONCE AS NEEDED
Status: DISCONTINUED | OUTPATIENT
Start: 2025-07-18 | End: 2025-07-19 | Stop reason: HOSPADM

## 2025-07-18 RX ORDER — SODIUM CHLORIDE 9 MG/ML
40 INJECTION, SOLUTION INTRAVENOUS AS NEEDED
Status: DISCONTINUED | OUTPATIENT
Start: 2025-07-18 | End: 2025-07-19 | Stop reason: HOSPADM

## 2025-07-18 RX ORDER — SODIUM CHLORIDE 0.9 % (FLUSH) 0.9 %
10 SYRINGE (ML) INJECTION EVERY 12 HOURS SCHEDULED
Status: DISCONTINUED | OUTPATIENT
Start: 2025-07-18 | End: 2025-07-19 | Stop reason: HOSPADM

## 2025-07-18 RX ORDER — IOPAMIDOL 755 MG/ML
100 INJECTION, SOLUTION INTRAVASCULAR
Status: COMPLETED | OUTPATIENT
Start: 2025-07-18 | End: 2025-07-18

## 2025-07-18 RX ORDER — SODIUM CHLORIDE 0.9 % (FLUSH) 0.9 %
10 SYRINGE (ML) INJECTION AS NEEDED
Status: DISCONTINUED | OUTPATIENT
Start: 2025-07-18 | End: 2025-07-19 | Stop reason: HOSPADM

## 2025-07-18 RX ADMIN — IOPAMIDOL 100 ML: 755 INJECTION, SOLUTION INTRAVENOUS at 13:38

## 2025-07-21 ENCOUNTER — DOCUMENTATION (OUTPATIENT)
Dept: CARDIOLOGY | Facility: HOSPITAL | Age: 80
End: 2025-07-21
Payer: MEDICARE

## 2025-07-21 NOTE — PROGRESS NOTES
TAVR workup ongoing, CT surgery consult scheduled for 7/31. If all are in agreement, will schedule TAVR. Date TBD

## 2025-07-22 ENCOUNTER — LAB (OUTPATIENT)
Facility: HOSPITAL | Age: 80
End: 2025-07-22
Payer: MEDICARE

## 2025-07-22 DIAGNOSIS — R73.03 PREDIABETES: ICD-10-CM

## 2025-07-22 DIAGNOSIS — N18.31 STAGE 3A CHRONIC KIDNEY DISEASE: ICD-10-CM

## 2025-07-22 DIAGNOSIS — I10 ESSENTIAL HYPERTENSION: ICD-10-CM

## 2025-07-22 DIAGNOSIS — I35.0 NONRHEUMATIC AORTIC VALVE STENOSIS: ICD-10-CM

## 2025-07-22 DIAGNOSIS — E78.5 DYSLIPIDEMIA: ICD-10-CM

## 2025-07-22 DIAGNOSIS — E03.9 ACQUIRED HYPOTHYROIDISM: ICD-10-CM

## 2025-07-22 DIAGNOSIS — E55.9 VITAMIN D DEFICIENCY: ICD-10-CM

## 2025-07-22 LAB
25(OH)D3 SERPL-MCNC: 33.9 NG/ML (ref 30–100)
CHOLEST SERPL-MCNC: 125 MG/DL (ref 0–200)
HBA1C MFR BLD: 5.6 % (ref 4.8–5.6)
HDLC SERPL-MCNC: 38 MG/DL (ref 40–60)
LDLC SERPL CALC-MCNC: 71 MG/DL (ref 0–100)
LDLC/HDLC SERPL: 1.87 {RATIO}
T4 FREE SERPL-MCNC: 1.16 NG/DL (ref 0.92–1.68)
TRIGL SERPL-MCNC: 79 MG/DL (ref 0–150)
TSH SERPL DL<=0.05 MIU/L-ACNC: 1.68 UIU/ML (ref 0.27–4.2)
VIT B12 BLD-MCNC: 877 PG/ML (ref 211–946)
VLDLC SERPL-MCNC: 16 MG/DL (ref 5–40)

## 2025-07-22 PROCEDURE — 80061 LIPID PANEL: CPT

## 2025-07-22 PROCEDURE — 82607 VITAMIN B-12: CPT

## 2025-07-22 PROCEDURE — 84443 ASSAY THYROID STIM HORMONE: CPT

## 2025-07-22 PROCEDURE — 82306 VITAMIN D 25 HYDROXY: CPT

## 2025-07-22 PROCEDURE — 84439 ASSAY OF FREE THYROXINE: CPT

## 2025-07-22 PROCEDURE — 83036 HEMOGLOBIN GLYCOSYLATED A1C: CPT

## 2025-07-23 RX ORDER — LOSARTAN POTASSIUM 100 MG/1
100 TABLET ORAL DAILY
Qty: 30 TABLET | Refills: 0 | Status: SHIPPED | OUTPATIENT
Start: 2025-07-23

## 2025-07-30 NOTE — PROGRESS NOTES
[facility] Cardiothoracic Surgery Office Follow Up Note     Date of Encounter: 2025     Name: Ac Caldwell  : 1945     Referred By: No ref. provider found  PCP: Valentine Newton APRN    Chief Complaint:    Chief Complaint   Patient presents with    Follow-up     1 yr follow up with CTA chest for ascending aneursym TAA, also referred back by Dr. Valladares for TAVR eval. Pt states that he is SOA and very fatigued.        Subjective      History of Present Illness:    Ac Caldwell is a 79 y.o. male followed by Vascular Surgery for ascending aortic aneurysm 4.2 cm and aortic stenosis.  He comes in today to discuss TAVR procedure to treat severe aortic stenosis.  Following cardiac cath 7/3/25, he was seen by CT Surgery APRN but all testing was not complete at that time.  PMH: Hypertension, hyperlipidemia, ascending aortic aneurysm (4.2 cm), nonrheumatic aortic stenosis (HEATHER 1 cm², mean gradient 44 mmHg, and AV V-max 4.3 m/sec), near normal coronary arteries per cardiac cath 7/3/2025, renal cancer s/p right nephrectomy  followed by resection left kidney tumor via ablation, basal cell skin's carcinoma s/p excision, thyroid tumor s/p partial thyroidectomy, pulmonary nodule (followed by Dr. De Dios), and lifelong non-smoker.  Reports failing [employer] physical mid  due to cardiac murmur.  Patient experienced a syncopal episode in May.  He also reports fatigue and exertional dyspnea over the past 6-12 months.  Dr. Valladares has recommended TAVR.    Review of Systems:  Review of Systems   Constitutional: Positive for malaise/fatigue. Negative for chills, fever, night sweats and weight loss.   HENT:  Negative for congestion, hearing loss, nosebleeds and odynophagia.    Eyes:  Negative for vision loss in right eye.   Cardiovascular:  Positive for dyspnea on exertion. Negative for chest pain, claudication, leg swelling, orthopnea, palpitations and syncope.   Respiratory:  Negative for cough,  hemoptysis, shortness of breath and wheezing.    Endocrine: Negative for cold intolerance, heat intolerance, polydipsia, polyphagia and polyuria.   Hematologic/Lymphatic: Positive for bleeding problem. Bruises/bleeds easily.   Skin:  Negative for itching, poor wound healing and rash.   Musculoskeletal:  Positive for arthritis and joint pain. Negative for back pain, joint swelling and myalgias.   Gastrointestinal:  Negative for abdominal pain, constipation, diarrhea, hematemesis, melena, nausea and vomiting.   Genitourinary:  Positive for frequency and nocturia. Negative for dysuria, hematuria and urgency.   Neurological:  Positive for numbness (lower feet and legs). Negative for dizziness, light-headedness and loss of balance.   Psychiatric/Behavioral:  Negative for depression and suicidal ideas. The patient has insomnia (poor). The patient is not nervous/anxious.    Allergic/Immunologic: Negative for environmental allergies and HIV exposure.       I have reviewed the following portions of the patient's history: problem list, current medications, allergies, past surgical history, past medical history, past social history, past family history, and ROS and confirm it's accurate.    Allergies:  Allergies   Allergen Reactions    Norvasc [Amlodipine] Swelling    Oxycodone Other (See Comments)     Can elevate Blood pressure    Lotensin [Benazepril] Cough    Sulfamethoxazole-Trimethoprim Cough    Zocor [Simvastatin] Myalgia       Medications:      Current Outpatient Medications:     aspirin 325 MG tablet, Take 1 tablet by mouth Every Night., Disp: , Rfl:     azelastine (ASTELIN) 0.1 % nasal spray, Administer  into the nostril(s) as directed by provider As Needed for Allergies., Disp: , Rfl:     betamethasone valerate (VALISONE) 0.1 % ointment, Apply 1 Application topically to the appropriate area as directed As Needed., Disp: , Rfl:     carvedilol (COREG) 6.25 MG tablet, Take 1 tablet by mouth twice daily, Disp: 180  tablet, Rfl: 3    Cholecalciferol (Vitamin D3) 50 MCG (2000 UT) tablet, Take 1 tablet by mouth Daily., Disp: , Rfl:     Coenzyme Q10 (CO Q-10) 100 MG capsule, Take 100 mg by mouth 2 (Two) Times a Day., Disp: , Rfl:     esomeprazole (NexIUM) 20 MG capsule, Take 1 capsule by mouth Daily., Disp: , Rfl:     famciclovir (FAMVIR) 500 MG tablet, Take 1 tablet by mouth Daily. (Patient taking differently: Take 1 tablet by mouth As Needed.), Disp: , Rfl:     Flaxseed, Linseed, (FLAX SEED OIL) 1000 MG capsule, Take 1,200 mg by mouth Daily., Disp: , Rfl:     Fluorouracil-Calcipotriene 5-0.005 % cream, Apply 1 Application topically to the appropriate area as directed 2 (Two) Times a Day., Disp: , Rfl:     levothyroxine (SYNTHROID, LEVOTHROID) 50 MCG tablet, Take 1 tablet by mouth Daily., Disp: , Rfl:     losartan (COZAAR) 100 MG tablet, Take 1 tablet by mouth once daily, Disp: 30 tablet, Rfl: 0    Omega-3 Fatty Acids (FISH OIL) 1000 MG capsule capsule, Take 1,200 mg by mouth 2 (Two) Times a Day With Meals., Disp: , Rfl:     potassium citrate (UROCIT-K) 10 MEQ (1080 MG) CR tablet, Take 1 tablet by mouth once daily, Disp: 90 tablet, Rfl: 1    rosuvastatin (CRESTOR) 40 MG tablet, Take 1 tablet by mouth once daily (Patient taking differently: Take 1 tablet by mouth Every Night.), Disp: 90 tablet, Rfl: 0    vitamin B-12 (CYANOCOBALAMIN) 1000 MCG tablet, Take 1 tablet by mouth Daily., Disp: , Rfl:     History:   Past Medical History:   Diagnosis Date    Aneurysm 2017    aortic    Coronary artery disease     Exertional dyspnea     He does have symptoms of exertional dyspnea and minimal orthopnea. This may be unrelated to his aortic stenosis, and due to his risk factors, I am also concerned about coronary artery disease.     GERD (gastroesophageal reflux disease) 1997    Heart murmur 1966    failed [employer] physical - 4F    Hematuria     Episode of hematuria in January with subsequent negative cystoscopy.     History of kidney cancer      History of kidney stones     HL (hearing loss) 2000    Hyperlipidemia     Hypertension 1998    Kidney stone 1991    Lung nodule 09/2023    Lung nodule seen on imaging study     Renal cancer     Hx of.Status post right nephrectomy in 1997. Status post resection of a tumor from his left kidney -- incomplete database.     Renal insufficiency 2010    Renal oncocytoma     Rosacea     Thoracic aortic aneurysm     Thyroid disease     UTI (urinary tract infection)        Past Surgical History:   Procedure Laterality Date    ABLATION OF DYSRHYTHMIC FOCUS  2017    left kidney    BASAL CELL CARCINOMA EXCISION      Status post basal cell cancer surgery.     BRONCHOSCOPY WITH ION ROBOTIC ASSIST N/A 11/10/2023    Procedure: BRONCHOSCOPY NAVIGATION WITH ENDOBRONCHIAL ULTRASOUND AND ION ROBOT;  Surgeon: Manish Wynn MD;  Location: [facility];  Service: Robotics - Pulmonary;  Laterality: N/A;  Ion cath #4,  #4, cath guide 0085    CARDIAC CATHETERIZATION N/A 07/03/2025    Procedure: Left Heart Cath;  Surgeon: Migel Valladares MD;  Location: [facility]ATH INVASIVE LOCATION;  Service: Cardiology;  Laterality: N/A;  TAVR work-up    CARDIAC CATHETERIZATION  July 3, 2025    CATARACT EXTRACTION Bilateral     COLONOSCOPY  04/2015    due 2025    CYST REMOVAL      KIDNEY STONE SURGERY Left 02/08/2012    [facility]. [facility]    KIDNEY SURGERY      Status post resection of a tumor from his left kidney -- incomplete database.     KIDNEY SURGERY  09/27/2017        LUNG BIOPSY  nov 10,23    NEPHRECTOMY Bilateral     Has 15% of left Kidney    NEPHRECTOMY Right 1997    PROSTATE SURGERY  02/19/2019    [place]. Dr. Bettencourt    THYROID SURGERY      Status post resection of tumor from right thyroid gland.     THYROIDECTOMY, PARTIAL         Social History     Socioeconomic History    Marital status: Single    Number of children: 1   Tobacco Use    Smoking status: Never     Passive exposure: Never    Smokeless tobacco: Never    Tobacco  "comments:     50,60,70 years it was impossible to avoid people who smoked   Vaping Use    Vaping status: Never Used   Substance and Sexual Activity    Alcohol use: Not Currently     Comment: social - no schedule - random    Drug use: Never    Sexual activity: Not Currently     Partners: Female        Family History   Problem Relation Age of Onset    Diabetes Mother     Obesity Mother     Heart failure Mother     Clotting disorder Mother     Hypertension Mother     Stroke Mother     Clotting disorder Father     Atrial fibrillation Father     Pneumonia Father     Hypertension Father     Asthma Sister     Stroke Paternal Grandmother     Stroke Paternal Grandfather     Heart attack Cousin     Heart attack Cousin     Suicidality Cousin     Stroke Other        Objective   Physical Exam:  Vitals:    07/31/25 1059 07/31/25 1100   BP: 148/92  Comment: left arm 142/90  Comment: right arm   Pulse: 89    Temp: 98.7 °F (37.1 °C)    SpO2: 98%    Weight: 112 kg (246 lb 14.4 oz)    Height: 175.3 cm (69\")  Comment: per pt       Body mass index is 36.46 kg/m².    Physical Exam  Vitals reviewed.   Constitutional:       General: He is not in acute distress.     Appearance: He is obese. He is not toxic-appearing.   HENT:      Head: Normocephalic and atraumatic.   Eyes:      General: Lids are normal.      Conjunctiva/sclera: Conjunctivae normal.      Pupils: Pupils are equal, round, and reactive to light.   Neck:      Vascular: Carotid bruit present. No JVD.      Comments: Cardiac murmur radiates to carotids  Cardiovascular:      Rate and Rhythm: Normal rate and regular rhythm.      Pulses:           Carotid pulses are 2+ on the right side and 2+ on the left side.       Radial pulses are 2+ on the right side and 2+ on the left side.        Dorsalis pedis pulses are 2+ on the right side and 2+ on the left side.        Posterior tibial pulses are 2+ on the right side and 2+ on the left side.      Heart sounds: S1 normal and S2 normal. " Murmur heard.      Systolic murmur is present with a grade of 3/6.   Pulmonary:      Effort: Pulmonary effort is normal. No respiratory distress.      Breath sounds: Normal breath sounds.   Musculoskeletal:         General: Normal range of motion.      Cervical back: Normal range of motion and neck supple.      Right lower leg: No edema.      Left lower leg: No edema.   Skin:     General: Skin is warm and dry.      Capillary Refill: Capillary refill takes less than 2 seconds.   Neurological:      General: No focal deficit present.      Mental Status: He is alert and oriented to person, place, and time.      GCS: GCS eye subscore is 4. GCS verbal subscore is 5. GCS motor subscore is 6.   Psychiatric:         Attention and Perception: Attention normal.         Mood and Affect: Mood normal.         Speech: Speech normal.         Behavior: Behavior is cooperative.         Imaging/Labs:  CT Chest Without Contrast Diagnostic: 7/20/2025  Impression:  1. CTA of the chest, abdomen and pelvis, with image data saved per TAVR protocol.   2. Dense calcification of the aortic valve leaflets. Borderline aneurysmal 4.0 cm ascending thoracic aorta. No evidence of dissection. No evidence of significant aortoiliac luminal stenosis.   3. CT of the chest demonstrates a stable 12 to 13 mm groundglass nodule in the right upper lobe.   Electronically Signed: Flash Sanabria MD  7/20/2025     CT Angio TAVR Chest Abdomen Pelvis: 7/20/2025  Impression:  1. CTA of the chest, abdomen and pelvis, with image data saved per TAVR protocol.   2. Dense calcification of the aortic valve leaflets. Borderline aneurysmal 4.0 cm ascending thoracic aorta. No evidence of dissection. No evidence of significant aortoiliac luminal stenosis.   3. CT of the chest demonstrates a stable 12 to 13 mm groundglass nodule in the right upper lobe.   Electronically Signed: Flash Sanabria MD  7/20/2025     Cardiac Catheterization/Vascular Study  Result Date:  7/3/2025  Angiographically near normal coronary arteries. RECOMMENDATIONS: Proceed to further evaluation and scheduling for TAVR. Indications: Severe/symptomatic aortic stenosis. Access: Right radial. Procedures: Selective coronary angiography. Arterial site hemostasis with radial band. Procedure narrative: The patient was brought to the catheterization lab in a fasting condition.  Access site was prepped and draped in standard sterile fashion.  Lidocaine was injected and arterial access was obtained by percutaneous anterior wall puncture technique.  A 6 Wolof arterial sheath was placed. Above procedures were performed without complications.  At the conclusion the arterial sheath was removed and hemostasis was achieved.  The patient was transferred to the unit in a stable condition. Angiographic Findings: Right coronary dominance. LM: Angiographically normal. LAD: Minimal luminal irregularities with angiographically near normal vessel. LCX: Minimal luminal irregularities with angiographically near normal vessel. RCA: Dominant vessel, angiographically normal. Complications: No acute procedure related complications.      Duplex Carotid Ultrasound CAR 07/04/2025   Interpretation Summary    Right internal carotid artery demonstrates normal flow without evidence of hemodynamically significant stenosis.    Antegrade right vertebral flow.    Left internal carotid artery demonstrates normal flow without evidence of hemodynamically significant stenosis.    Antegrade left vertebral flow.       Assessment / Plan      Assessment / Plan:  1. Nonrheumatic aortic valve stenosis   2. Aneurysm of ascending aorta without rupture  - 79 y.o. male followed by Vascular Surgery for ascending aortic aneurysm 4.2 cm and aortic stenosis.    - PMH: Hypertension, hyperlipidemia, ascending aortic aneurysm (4.2 cm), nonrheumatic aortic stenosis (HEATHER 1 cm², mean gradient 44 mmHg, and AV V-max 4.3 m/sec), near normal coronary arteries per cardiac  cath 7/3/2025, renal cancer s/p right nephrectomy 1997 followed by resection left kidney tumor via ablation, basal cell skin's carcinoma s/p excision, prediabetes, Hx pancreatic cyst followed by , thyroid tumor s/p partial thyroidectomy, pulmonary nodule (followed by Dr. De Dios), CKD stage II, and lifelong non-smoker.   - Current symptoms:  syncopal episode in May 2025, fatigue and exertional dyspnea over the past 6-12 months.    - TTE 6/17/2025: Severe aortic stenosis, HEATHER 1.06 cm², AV mean gradient 44 mmHg, mild AI, and AV V-max 4.3 m/s  - TRU 7/3/2025 per Dr. Valladares: LVEF 60%, aortic valve functionally bicuspid, severe aortic stenosis.  AV mean gradient 42 mmHg, aortic annulus 2.8 cm, STJ 3.4 cm.  Mild AI, moderate dilation proximal aorta, mild MR and mild TR  - Cardiac cath 7/3/2025: Angiographically near normal coronaries  - Carotid duplex 7/3/2025: Normal flow without evidence of hemodynamically significant stenosis bilateral ICA.  Antegrade bilateral vertebral flow.  - CTA TAVR protocol 7/18/2025: Densely calcified aortic valve leaflets.  Aneurysmal ascending thoracic aorta 4.0 cm.  No dissection or evidence of significant aortic iliac luminal stenosis.  Persistent groundglass nodule 12 to 13 mm right upper lobe.  - Patient is very well-informed regarding details of TAVR procedure.  We discussed the procedure, postop expectations, and procedure risk including death, stroke, MI, pain, bleeding, blood clots, renal dysfunction, and possible PPM.  - Up-to-date on dental care s/p cleaning and checkup March 2025.  - Mr. Caldwell is a reasonable candidate for TAVR.  Although he is in the low risk range for open SAVR operative mortality at 1.9%, his advanced age and multiple comorbid conditions make him better suited for TAVR.  He understands he will meet Dr. Castaneda in preop day of procedure.  - Return to CT/Vascular Surgery for 4 week follow up after completion of TAVR procedure.    Patient Education: Risks,  benefits, and alternatives were presented to the patient and through shared decision making, the patient elects to proceed with TAVR procedure per Dr. Castaneda.        Follow Up:   Return in about 4 weeks (around 8/28/2025) for Post op TAVR per Dr. Castaneda.   Or sooner for any further concerns or worsening sign and symptoms. If unable to reach us in the office please dial 911 or go to the nearest emergency department.      BERNICE Ybarra  [facility] Cardiothoracic Surgery    Time Spent: I spent 44 minutes caring for Ac on this date of service. This time includes time spent by me in the following activities: preparing for the visit, reviewing tests, obtaining and/or reviewing a separately obtained history, performing a medically appropriate examination and/or evaluation, counseling and educating the patient/family/caregiver, documenting information in the medical record, independently interpreting results and communicating that information with the patient/family/caregiver, and care coordination.

## 2025-07-31 ENCOUNTER — OFFICE VISIT (OUTPATIENT)
Dept: CARDIAC SURGERY | Facility: CLINIC | Age: 80
End: 2025-07-31
Payer: MEDICARE

## 2025-07-31 ENCOUNTER — TELEPHONE (OUTPATIENT)
Dept: CARDIAC SURGERY | Facility: CLINIC | Age: 80
End: 2025-07-31

## 2025-07-31 VITALS
WEIGHT: 246.9 LBS | HEIGHT: 69 IN | BODY MASS INDEX: 36.57 KG/M2 | HEART RATE: 89 BPM | TEMPERATURE: 98.7 F | OXYGEN SATURATION: 98 % | SYSTOLIC BLOOD PRESSURE: 142 MMHG | DIASTOLIC BLOOD PRESSURE: 90 MMHG

## 2025-07-31 DIAGNOSIS — I35.0 NONRHEUMATIC AORTIC VALVE STENOSIS: Primary | ICD-10-CM

## 2025-07-31 DIAGNOSIS — I71.21 ANEURYSM OF ASCENDING AORTA WITHOUT RUPTURE: ICD-10-CM

## 2025-07-31 NOTE — TELEPHONE ENCOUNTER
LVM for pt that he is not on the August TAVR schedule. Will update him once Rupal Maldonado completes September schedule.

## 2025-08-05 ENCOUNTER — TRANSCRIBE ORDERS (OUTPATIENT)
Facility: HOSPITAL | Age: 80
End: 2025-08-05
Payer: MEDICARE

## 2025-08-05 ENCOUNTER — LAB (OUTPATIENT)
Facility: HOSPITAL | Age: 80
End: 2025-08-05
Payer: MEDICARE

## 2025-08-05 DIAGNOSIS — N18.31 STAGE 3A CHRONIC KIDNEY DISEASE: Primary | ICD-10-CM

## 2025-08-05 DIAGNOSIS — N18.31 STAGE 3A CHRONIC KIDNEY DISEASE: ICD-10-CM

## 2025-08-05 LAB
ALBUMIN SERPL-MCNC: 3.8 G/DL (ref 3.5–5.2)
ANION GAP SERPL CALCULATED.3IONS-SCNC: 10.2 MMOL/L (ref 5–15)
BACTERIA UR QL AUTO: ABNORMAL /HPF
BASOPHILS # BLD AUTO: 0.02 10*3/MM3 (ref 0–0.2)
BASOPHILS NFR BLD AUTO: 0.4 % (ref 0–1.5)
BILIRUB UR QL STRIP: NEGATIVE
BUN SERPL-MCNC: 15 MG/DL (ref 8–23)
BUN/CREAT SERPL: 11.6 (ref 7–25)
CALCIUM SPEC-SCNC: 9 MG/DL (ref 8.6–10.5)
CHLORIDE SERPL-SCNC: 106 MMOL/L (ref 98–107)
CLARITY UR: ABNORMAL
CO2 SERPL-SCNC: 24.8 MMOL/L (ref 22–29)
COLOR UR: ABNORMAL
CREAT SERPL-MCNC: 1.29 MG/DL (ref 0.76–1.27)
CREAT UR-MCNC: 153.3 MG/DL
DEPRECATED RDW RBC AUTO: 41.8 FL (ref 37–54)
EGFRCR SERPLBLD CKD-EPI 2021: 56.8 ML/MIN/1.73
EOSINOPHIL # BLD AUTO: 0.25 10*3/MM3 (ref 0–0.4)
EOSINOPHIL NFR BLD AUTO: 4.8 % (ref 0.3–6.2)
ERYTHROCYTE [DISTWIDTH] IN BLOOD BY AUTOMATED COUNT: 12.2 % (ref 12.3–15.4)
GLUCOSE SERPL-MCNC: 109 MG/DL (ref 65–99)
GLUCOSE UR STRIP-MCNC: NEGATIVE MG/DL
HCT VFR BLD AUTO: 42.7 % (ref 37.5–51)
HGB BLD-MCNC: 14.8 G/DL (ref 13–17.7)
HGB UR QL STRIP.AUTO: NEGATIVE
HYALINE CASTS UR QL AUTO: ABNORMAL /LPF
IMM GRANULOCYTES # BLD AUTO: 0.02 10*3/MM3 (ref 0–0.05)
IMM GRANULOCYTES NFR BLD AUTO: 0.4 % (ref 0–0.5)
KETONES UR QL STRIP: ABNORMAL
LEUKOCYTE ESTERASE UR QL STRIP.AUTO: ABNORMAL
LYMPHOCYTES # BLD AUTO: 1.59 10*3/MM3 (ref 0.7–3.1)
LYMPHOCYTES NFR BLD AUTO: 30.7 % (ref 19.6–45.3)
MCH RBC QN AUTO: 32.4 PG (ref 26.6–33)
MCHC RBC AUTO-ENTMCNC: 34.7 G/DL (ref 31.5–35.7)
MCV RBC AUTO: 93.4 FL (ref 79–97)
MONOCYTES # BLD AUTO: 0.65 10*3/MM3 (ref 0.1–0.9)
MONOCYTES NFR BLD AUTO: 12.5 % (ref 5–12)
NEUTROPHILS NFR BLD AUTO: 2.65 10*3/MM3 (ref 1.7–7)
NEUTROPHILS NFR BLD AUTO: 51.2 % (ref 42.7–76)
NITRITE UR QL STRIP: POSITIVE
PH UR STRIP.AUTO: 8 [PH] (ref 5–8)
PHOSPHATE SERPL-MCNC: 3.2 MG/DL (ref 2.5–4.5)
PLATELET # BLD AUTO: 131 10*3/MM3 (ref 140–450)
PMV BLD AUTO: 11 FL (ref 6–12)
POTASSIUM SERPL-SCNC: 4.4 MMOL/L (ref 3.5–5.2)
PROT ?TM UR-MCNC: 26.7 MG/DL
PROT UR QL STRIP: ABNORMAL
RBC # BLD AUTO: 4.57 10*6/MM3 (ref 4.14–5.8)
RBC # UR STRIP: ABNORMAL /HPF
REF LAB TEST METHOD: ABNORMAL
SODIUM SERPL-SCNC: 141 MMOL/L (ref 136–145)
SP GR UR STRIP: 1.02 (ref 1–1.03)
SQUAMOUS #/AREA URNS HPF: ABNORMAL /HPF
UROBILINOGEN UR QL STRIP: ABNORMAL
WBC # UR STRIP: ABNORMAL /HPF
WBC NRBC COR # BLD AUTO: 5.18 10*3/MM3 (ref 3.4–10.8)

## 2025-08-05 PROCEDURE — 85025 COMPLETE CBC W/AUTO DIFF WBC: CPT

## 2025-08-05 PROCEDURE — 80069 RENAL FUNCTION PANEL: CPT

## 2025-08-05 PROCEDURE — 36415 COLL VENOUS BLD VENIPUNCTURE: CPT

## 2025-08-05 PROCEDURE — 84156 ASSAY OF PROTEIN URINE: CPT

## 2025-08-05 PROCEDURE — 81001 URINALYSIS AUTO W/SCOPE: CPT

## 2025-08-05 PROCEDURE — 82570 ASSAY OF URINE CREATININE: CPT

## 2025-08-15 DIAGNOSIS — I35.0 NONRHEUMATIC AORTIC VALVE STENOSIS: Primary | ICD-10-CM

## 2025-08-19 RX ORDER — LOSARTAN POTASSIUM 100 MG/1
100 TABLET ORAL DAILY
Qty: 90 TABLET | Refills: 0 | Status: SHIPPED | OUTPATIENT
Start: 2025-08-19

## 2025-08-21 ENCOUNTER — DOCUMENTATION (OUTPATIENT)
Dept: CARDIOLOGY | Facility: HOSPITAL | Age: 80
End: 2025-08-21
Payer: MEDICARE

## 2025-08-23 DIAGNOSIS — E78.5 DYSLIPIDEMIA: ICD-10-CM

## 2025-08-25 RX ORDER — ROSUVASTATIN CALCIUM 40 MG/1
40 TABLET, COATED ORAL DAILY
Qty: 90 TABLET | Refills: 1 | Status: SHIPPED | OUTPATIENT
Start: 2025-08-25

## (undated) DEVICE — BRUSH CYTO BRONCOSCOPE

## (undated) DEVICE — INF 5F 0.038 INCHES 100CM JL3: Brand: INFINITI

## (undated) DEVICE — BOWL UTIL STRL 32OZ

## (undated) DEVICE — SENSR O2 OXIMAX FNGR A/ 18IN NONSTR

## (undated) DEVICE — CATHETER: Brand: ION

## (undated) DEVICE — BIOPSY NEEDLE, 19G: Brand: FLEXISION

## (undated) DEVICE — Device: Brand: ION

## (undated) DEVICE — VISION PROBE: Brand: ION

## (undated) DEVICE — SYR SLPTP 20CC

## (undated) DEVICE — FRCP BX RADJAW4 PULM WO NDL STD1.8X2 100

## (undated) DEVICE — PK CATH CARD 10

## (undated) DEVICE — SINGLE USE SUCTION VALVE MAJ-209: Brand: SINGLE USE SUCTION VALVE (STERILE)

## (undated) DEVICE — TRAP SPECI MUCUS 40CC LF STRL

## (undated) DEVICE — GW INQWIRE FC PTFE STD J/1.5 .035 260

## (undated) DEVICE — SWIVEL CONNECTOR

## (undated) DEVICE — SOL IRR NACL 0.9PCT 1000ML

## (undated) DEVICE — INTRO SHEATH PRELUDE IDEAL SPRNG COIL 021 6F 23X80CM

## (undated) DEVICE — CATHETER GUIDE

## (undated) DEVICE — VLV SXN BRONCH DISP FOR FLEX ENDOSCOPE

## (undated) DEVICE — CATH DIAG EXPO M/ PK 5F FL4/FR4 PIG

## (undated) DEVICE — TR BAND RADIAL ARTERY COMPRESSION DEVICE: Brand: TR BAND

## (undated) DEVICE — MODEL BT2000 P/N 700287-012KIT CONTENTS: MANIFOLD WITH SALINE AND CONTRAST PORTS, SALINE TUBING WITH SPIKE AND HAND SYRINGE, TRANSDUCER: Brand: BT2000 AUTOMATED MANIFOLD KIT

## (undated) DEVICE — VISION PROBE ADAPTER AND SUCTION ADAPTER

## (undated) DEVICE — MODEL AT P65, P/N 701554-001KIT CONTENTS: HAND CONTROLLER, 3-WAY HIGH-PRESSURE STOPCOCK WITH ROTATING END AND PREMIUM HIGH-PRESSURE TUBING: Brand: ANGIOTOUCH® KIT